# Patient Record
Sex: FEMALE | Race: WHITE | Employment: OTHER | URBAN - METROPOLITAN AREA
[De-identification: names, ages, dates, MRNs, and addresses within clinical notes are randomized per-mention and may not be internally consistent; named-entity substitution may affect disease eponyms.]

---

## 2018-07-24 ENCOUNTER — HOSPITAL ENCOUNTER (OUTPATIENT)
Age: 68
Discharge: HOME | End: 2018-07-26
Payer: MEDICARE

## 2018-07-24 LAB
ALBUMIN SERPL-MCNC: 4.1 G/DL (ref 3.5–5.2)
ALP BLD-CCNC: 68 U/L (ref 35–104)
ALT SERPL-CCNC: 22 U/L (ref 0–32)
ANION GAP SERPL CALCULATED.3IONS-SCNC: 16 MMOL/L (ref 7–16)
AST SERPL-CCNC: 26 U/L (ref 0–31)
BASOPHILS ABSOLUTE: 0.04 E9/L (ref 0–0.2)
BASOPHILS RELATIVE PERCENT: 0.8 % (ref 0–2)
BILIRUB SERPL-MCNC: 0.4 MG/DL (ref 0–1.2)
BUN BLDV-MCNC: 22 MG/DL (ref 8–23)
CALCIUM SERPL-MCNC: 9.4 MG/DL (ref 8.6–10.2)
CHLORIDE BLD-SCNC: 101 MMOL/L (ref 98–107)
CHOLESTEROL, TOTAL: 207 MG/DL (ref 0–199)
CO2: 25 MMOL/L (ref 22–29)
CREAT SERPL-MCNC: 0.9 MG/DL (ref 0.5–1)
EOSINOPHILS ABSOLUTE: 0.17 E9/L (ref 0.05–0.5)
EOSINOPHILS RELATIVE PERCENT: 3.6 % (ref 0–6)
GFR AFRICAN AMERICAN: >60
GFR NON-AFRICAN AMERICAN: >60 ML/MIN/1.73
GLUCOSE BLD-MCNC: 92 MG/DL (ref 74–109)
HCT VFR BLD CALC: 40.6 % (ref 34–48)
HDLC SERPL-MCNC: 66 MG/DL
HEMOGLOBIN: 13.6 G/DL (ref 11.5–15.5)
IMMATURE GRANULOCYTES #: 0.01 E9/L
IMMATURE GRANULOCYTES %: 0.2 % (ref 0–5)
LDL CHOLESTEROL CALCULATED: 120 MG/DL (ref 0–99)
LYMPHOCYTES ABSOLUTE: 1.08 E9/L (ref 1.5–4)
LYMPHOCYTES RELATIVE PERCENT: 22.7 % (ref 20–42)
MCH RBC QN AUTO: 31.9 PG (ref 26–35)
MCHC RBC AUTO-ENTMCNC: 33.5 % (ref 32–34.5)
MCV RBC AUTO: 95.3 FL (ref 80–99.9)
MONOCYTES ABSOLUTE: 0.51 E9/L (ref 0.1–0.95)
MONOCYTES RELATIVE PERCENT: 10.7 % (ref 2–12)
NEUTROPHILS ABSOLUTE: 2.95 E9/L (ref 1.8–7.3)
NEUTROPHILS RELATIVE PERCENT: 62 % (ref 43–80)
PDW BLD-RTO: 12.9 FL (ref 11.5–15)
PLATELET # BLD: 270 E9/L (ref 130–450)
PMV BLD AUTO: 9 FL (ref 7–12)
POTASSIUM SERPL-SCNC: 4.5 MMOL/L (ref 3.5–5)
RBC # BLD: 4.26 E12/L (ref 3.5–5.5)
SODIUM BLD-SCNC: 142 MMOL/L (ref 132–146)
T4 FREE: 1.23 NG/DL (ref 0.93–1.7)
TOTAL PROTEIN: 6.6 G/DL (ref 6.4–8.3)
TRIGL SERPL-MCNC: 104 MG/DL (ref 0–149)
TSH SERPL DL<=0.05 MIU/L-ACNC: 1.49 UIU/ML (ref 0.27–4.2)
VLDLC SERPL CALC-MCNC: 21 MG/DL
WBC # BLD: 4.8 E9/L (ref 4.5–11.5)

## 2018-07-24 PROCEDURE — 80061 LIPID PANEL: CPT

## 2018-07-24 PROCEDURE — 85025 COMPLETE CBC W/AUTO DIFF WBC: CPT

## 2018-07-24 PROCEDURE — 84439 ASSAY OF FREE THYROXINE: CPT

## 2018-07-24 PROCEDURE — 84443 ASSAY THYROID STIM HORMONE: CPT

## 2018-07-24 PROCEDURE — 80053 COMPREHEN METABOLIC PANEL: CPT

## 2019-02-04 ENCOUNTER — HOSPITAL ENCOUNTER (OUTPATIENT)
Age: 69
Discharge: HOME | End: 2019-02-06
Payer: MEDICARE

## 2019-02-04 LAB
ANION GAP SERPL CALCULATED.3IONS-SCNC: 13 MMOL/L (ref 7–16)
BUN BLDV-MCNC: 18 MG/DL (ref 8–23)
CALCIUM SERPL-MCNC: 9.6 MG/DL (ref 8.6–10.2)
CHLORIDE BLD-SCNC: 103 MMOL/L (ref 98–107)
CO2: 26 MMOL/L (ref 22–29)
CREAT SERPL-MCNC: 0.9 MG/DL (ref 0.5–1)
GFR AFRICAN AMERICAN: >60
GFR NON-AFRICAN AMERICAN: >60 ML/MIN/1.73
GLUCOSE BLD-MCNC: 88 MG/DL (ref 74–99)
POTASSIUM SERPL-SCNC: 4.2 MMOL/L (ref 3.5–5)
SODIUM BLD-SCNC: 142 MMOL/L (ref 132–146)

## 2019-02-04 PROCEDURE — 80048 BASIC METABOLIC PNL TOTAL CA: CPT

## 2019-05-07 ENCOUNTER — HOSPITAL ENCOUNTER (OUTPATIENT)
Age: 69
Discharge: HOME | End: 2019-05-09
Payer: MEDICARE

## 2019-05-07 LAB
ALBUMIN SERPL-MCNC: 4.3 G/DL (ref 3.5–5.2)
ALP BLD-CCNC: 60 U/L (ref 35–104)
ALT SERPL-CCNC: 16 U/L (ref 0–32)
ANION GAP SERPL CALCULATED.3IONS-SCNC: 14 MMOL/L (ref 7–16)
AST SERPL-CCNC: 24 U/L (ref 0–31)
BASOPHILS ABSOLUTE: 0.03 E9/L (ref 0–0.2)
BASOPHILS RELATIVE PERCENT: 0.7 % (ref 0–2)
BILIRUB SERPL-MCNC: 0.4 MG/DL (ref 0–1.2)
BUN BLDV-MCNC: 15 MG/DL (ref 8–23)
CALCIUM SERPL-MCNC: 9.7 MG/DL (ref 8.6–10.2)
CHLORIDE BLD-SCNC: 101 MMOL/L (ref 98–107)
CHOLESTEROL, TOTAL: 191 MG/DL (ref 0–199)
CO2: 28 MMOL/L (ref 22–29)
CREAT SERPL-MCNC: 0.9 MG/DL (ref 0.5–1)
EOSINOPHILS ABSOLUTE: 0.14 E9/L (ref 0.05–0.5)
EOSINOPHILS RELATIVE PERCENT: 3.3 % (ref 0–6)
GFR AFRICAN AMERICAN: >60
GFR NON-AFRICAN AMERICAN: >60 ML/MIN/1.73
GLUCOSE BLD-MCNC: 98 MG/DL (ref 74–99)
HCT VFR BLD CALC: 42.9 % (ref 34–48)
HDLC SERPL-MCNC: 61 MG/DL
HEMOGLOBIN: 14.2 G/DL (ref 11.5–15.5)
IMMATURE GRANULOCYTES #: 0.02 E9/L
IMMATURE GRANULOCYTES %: 0.5 % (ref 0–5)
LDL CHOLESTEROL CALCULATED: 117 MG/DL (ref 0–99)
LYMPHOCYTES ABSOLUTE: 1.23 E9/L (ref 1.5–4)
LYMPHOCYTES RELATIVE PERCENT: 29 % (ref 20–42)
MCH RBC QN AUTO: 31.3 PG (ref 26–35)
MCHC RBC AUTO-ENTMCNC: 33.1 % (ref 32–34.5)
MCV RBC AUTO: 94.5 FL (ref 80–99.9)
MONOCYTES ABSOLUTE: 0.44 E9/L (ref 0.1–0.95)
MONOCYTES RELATIVE PERCENT: 10.4 % (ref 2–12)
NEUTROPHILS ABSOLUTE: 2.38 E9/L (ref 1.8–7.3)
NEUTROPHILS RELATIVE PERCENT: 56.1 % (ref 43–80)
PDW BLD-RTO: 12.5 FL (ref 11.5–15)
PLATELET # BLD: 293 E9/L (ref 130–450)
PMV BLD AUTO: 9.4 FL (ref 7–12)
POTASSIUM SERPL-SCNC: 3.9 MMOL/L (ref 3.5–5)
RBC # BLD: 4.54 E12/L (ref 3.5–5.5)
SODIUM BLD-SCNC: 143 MMOL/L (ref 132–146)
TOTAL PROTEIN: 7.2 G/DL (ref 6.4–8.3)
TRIGL SERPL-MCNC: 63 MG/DL (ref 0–149)
TSH SERPL DL<=0.05 MIU/L-ACNC: 1.57 UIU/ML (ref 0.27–4.2)
VLDLC SERPL CALC-MCNC: 13 MG/DL
WBC # BLD: 4.2 E9/L (ref 4.5–11.5)

## 2019-05-07 PROCEDURE — 80061 LIPID PANEL: CPT

## 2019-05-07 PROCEDURE — 84443 ASSAY THYROID STIM HORMONE: CPT

## 2019-05-07 PROCEDURE — 80053 COMPREHEN METABOLIC PANEL: CPT

## 2019-05-07 PROCEDURE — 85025 COMPLETE CBC W/AUTO DIFF WBC: CPT

## 2019-08-08 ENCOUNTER — HOSPITAL ENCOUNTER (OUTPATIENT)
Age: 69
Discharge: HOME | End: 2019-08-10
Payer: MEDICARE

## 2019-08-08 LAB
ALBUMIN SERPL-MCNC: 4.4 G/DL (ref 3.5–5.2)
ALP BLD-CCNC: 61 U/L (ref 35–104)
ALT SERPL-CCNC: 19 U/L (ref 0–32)
ANION GAP SERPL CALCULATED.3IONS-SCNC: 16 MMOL/L (ref 7–16)
AST SERPL-CCNC: 27 U/L (ref 0–31)
BASOPHILS ABSOLUTE: 0.05 E9/L (ref 0–0.2)
BASOPHILS RELATIVE PERCENT: 1 % (ref 0–2)
BILIRUB SERPL-MCNC: 0.6 MG/DL (ref 0–1.2)
BUN BLDV-MCNC: 15 MG/DL (ref 8–23)
CALCIUM SERPL-MCNC: 9.9 MG/DL (ref 8.6–10.2)
CHLORIDE BLD-SCNC: 98 MMOL/L (ref 98–107)
CHOLESTEROL, TOTAL: 209 MG/DL (ref 0–199)
CO2: 27 MMOL/L (ref 22–29)
CREAT SERPL-MCNC: 1 MG/DL (ref 0.5–1)
EOSINOPHILS ABSOLUTE: 0.19 E9/L (ref 0.05–0.5)
EOSINOPHILS RELATIVE PERCENT: 3.9 % (ref 0–6)
GFR AFRICAN AMERICAN: >60
GFR NON-AFRICAN AMERICAN: 55 ML/MIN/1.73
GLUCOSE BLD-MCNC: 94 MG/DL (ref 74–99)
HCT VFR BLD CALC: 43.2 % (ref 34–48)
HDLC SERPL-MCNC: 65 MG/DL
HEMOGLOBIN: 14.4 G/DL (ref 11.5–15.5)
IMMATURE GRANULOCYTES #: 0.02 E9/L
IMMATURE GRANULOCYTES %: 0.4 % (ref 0–5)
LDL CHOLESTEROL CALCULATED: 128 MG/DL (ref 0–99)
LYMPHOCYTES ABSOLUTE: 1.04 E9/L (ref 1.5–4)
LYMPHOCYTES RELATIVE PERCENT: 21.4 % (ref 20–42)
MCH RBC QN AUTO: 31.7 PG (ref 26–35)
MCHC RBC AUTO-ENTMCNC: 33.3 % (ref 32–34.5)
MCV RBC AUTO: 95.2 FL (ref 80–99.9)
MONOCYTES ABSOLUTE: 0.47 E9/L (ref 0.1–0.95)
MONOCYTES RELATIVE PERCENT: 9.7 % (ref 2–12)
NEUTROPHILS ABSOLUTE: 3.08 E9/L (ref 1.8–7.3)
NEUTROPHILS RELATIVE PERCENT: 63.6 % (ref 43–80)
PDW BLD-RTO: 12.4 FL (ref 11.5–15)
PLATELET # BLD: 310 E9/L (ref 130–450)
PMV BLD AUTO: 9.1 FL (ref 7–12)
POTASSIUM REFLEX MAGNESIUM: 4.1 MMOL/L (ref 3.5–5)
RBC # BLD: 4.54 E12/L (ref 3.5–5.5)
SODIUM BLD-SCNC: 141 MMOL/L (ref 132–146)
TOTAL PROTEIN: 7.3 G/DL (ref 6.4–8.3)
TRIGL SERPL-MCNC: 80 MG/DL (ref 0–149)
TSH SERPL DL<=0.05 MIU/L-ACNC: 1.61 UIU/ML (ref 0.27–4.2)
VLDLC SERPL CALC-MCNC: 16 MG/DL
WBC # BLD: 4.9 E9/L (ref 4.5–11.5)

## 2019-08-08 PROCEDURE — 80053 COMPREHEN METABOLIC PANEL: CPT

## 2019-08-08 PROCEDURE — 84443 ASSAY THYROID STIM HORMONE: CPT

## 2019-08-08 PROCEDURE — 85025 COMPLETE CBC W/AUTO DIFF WBC: CPT

## 2019-08-08 PROCEDURE — 80061 LIPID PANEL: CPT

## 2020-10-05 ENCOUNTER — HOSPITAL ENCOUNTER (OUTPATIENT)
Age: 70
Discharge: HOME | End: 2020-10-07
Payer: MEDICARE

## 2020-10-05 LAB
ALBUMIN SERPL-MCNC: 4.6 G/DL (ref 3.5–5.2)
ALP BLD-CCNC: 59 U/L (ref 35–104)
ALT SERPL-CCNC: 35 U/L (ref 0–32)
ANION GAP SERPL CALCULATED.3IONS-SCNC: 17 MMOL/L (ref 7–16)
AST SERPL-CCNC: 42 U/L (ref 0–31)
BASOPHILS ABSOLUTE: 0.04 E9/L (ref 0–0.2)
BASOPHILS RELATIVE PERCENT: 0.7 % (ref 0–2)
BILIRUB SERPL-MCNC: 0.5 MG/DL (ref 0–1.2)
BUN BLDV-MCNC: 14 MG/DL (ref 8–23)
CALCIUM SERPL-MCNC: 10 MG/DL (ref 8.6–10.2)
CHLORIDE BLD-SCNC: 96 MMOL/L (ref 98–107)
CHOLESTEROL, TOTAL: 219 MG/DL (ref 0–199)
CO2: 26 MMOL/L (ref 22–29)
CREAT SERPL-MCNC: 0.9 MG/DL (ref 0.5–1)
EOSINOPHILS ABSOLUTE: 0.1 E9/L (ref 0.05–0.5)
EOSINOPHILS RELATIVE PERCENT: 1.7 % (ref 0–6)
GFR AFRICAN AMERICAN: >60
GFR NON-AFRICAN AMERICAN: >60 ML/MIN/1.73
GLUCOSE BLD-MCNC: 91 MG/DL (ref 74–99)
HCT VFR BLD CALC: 45.9 % (ref 34–48)
HDLC SERPL-MCNC: 65 MG/DL
HEMOGLOBIN: 15.7 G/DL (ref 11.5–15.5)
IMMATURE GRANULOCYTES #: 0.02 E9/L
IMMATURE GRANULOCYTES %: 0.3 % (ref 0–5)
LDL CHOLESTEROL CALCULATED: 141 MG/DL (ref 0–99)
LYMPHOCYTES ABSOLUTE: 1.22 E9/L (ref 1.5–4)
LYMPHOCYTES RELATIVE PERCENT: 20.3 % (ref 20–42)
MCH RBC QN AUTO: 32 PG (ref 26–35)
MCHC RBC AUTO-ENTMCNC: 34.2 % (ref 32–34.5)
MCV RBC AUTO: 93.5 FL (ref 80–99.9)
MONOCYTES ABSOLUTE: 0.69 E9/L (ref 0.1–0.95)
MONOCYTES RELATIVE PERCENT: 11.5 % (ref 2–12)
NEUTROPHILS ABSOLUTE: 3.95 E9/L (ref 1.8–7.3)
NEUTROPHILS RELATIVE PERCENT: 65.5 % (ref 43–80)
PDW BLD-RTO: 12 FL (ref 11.5–15)
PLATELET # BLD: 335 E9/L (ref 130–450)
PMV BLD AUTO: 9.1 FL (ref 7–12)
POTASSIUM SERPL-SCNC: 3.6 MMOL/L (ref 3.5–5)
RBC # BLD: 4.91 E12/L (ref 3.5–5.5)
SODIUM BLD-SCNC: 139 MMOL/L (ref 132–146)
TOTAL PROTEIN: 7.8 G/DL (ref 6.4–8.3)
TRIGL SERPL-MCNC: 67 MG/DL (ref 0–149)
TSH SERPL DL<=0.05 MIU/L-ACNC: 1.03 UIU/ML (ref 0.27–4.2)
VITAMIN D 25-HYDROXY: 35 NG/ML (ref 30–100)
VLDLC SERPL CALC-MCNC: 13 MG/DL
WBC # BLD: 6 E9/L (ref 4.5–11.5)

## 2020-10-05 PROCEDURE — 85025 COMPLETE CBC W/AUTO DIFF WBC: CPT

## 2020-10-05 PROCEDURE — 84443 ASSAY THYROID STIM HORMONE: CPT

## 2020-10-05 PROCEDURE — 80053 COMPREHEN METABOLIC PANEL: CPT

## 2020-10-05 PROCEDURE — 80061 LIPID PANEL: CPT

## 2020-10-05 PROCEDURE — 82306 VITAMIN D 25 HYDROXY: CPT

## 2020-12-29 ENCOUNTER — TELEPHONE (OUTPATIENT)
Dept: ADMINISTRATIVE | Age: 70
End: 2020-12-29

## 2020-12-29 NOTE — TELEPHONE ENCOUNTER
Patient called in, looking to establish care with new provider, feels she is not getting the care she needs with other pcp; Would you be able to prescribe these two meds:  1) generic lamotrigene 50 mg/day  2 a day (lamictal)  mood stabilizer  2) generic clonazepam  0.05 mg  prn (klonopin)  anti-anxiety   She stopped seeing the Psych Doctor due to the high cost, pcp is currently prescribing. Advised provider may not be permitted to prescribe certain medications, but I will ask and get back to her. Please advise and Thank you.

## 2021-01-08 ENCOUNTER — OFFICE VISIT (OUTPATIENT)
Dept: FAMILY MEDICINE CLINIC | Age: 71
End: 2021-01-08
Payer: MEDICARE

## 2021-01-08 VITALS
DIASTOLIC BLOOD PRESSURE: 70 MMHG | RESPIRATION RATE: 18 BRPM | HEIGHT: 64 IN | HEART RATE: 86 BPM | OXYGEN SATURATION: 98 % | WEIGHT: 207 LBS | TEMPERATURE: 97.5 F | BODY MASS INDEX: 35.34 KG/M2 | SYSTOLIC BLOOD PRESSURE: 128 MMHG
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
## 2021-01-08 DIAGNOSIS — K21.00 GASTROESOPHAGEAL REFLUX DISEASE WITH ESOPHAGITIS WITHOUT HEMORRHAGE: ICD-10-CM

## 2021-01-08 DIAGNOSIS — Z12.11 ENCOUNTER FOR SCREENING COLONOSCOPY: ICD-10-CM

## 2021-01-08 DIAGNOSIS — S61.451A CAT BITE OF HAND, RIGHT, INITIAL ENCOUNTER: Primary | ICD-10-CM

## 2021-01-08 LAB
ALBUMIN SERPL-MCNC: 4.3 G/DL (ref 3.5–5.2)
ALP BLD-CCNC: 71 U/L (ref 35–104)
ALT SERPL-CCNC: 23 U/L (ref 0–32)
ANION GAP SERPL CALCULATED.3IONS-SCNC: 10 MMOL/L (ref 7–16)
AST SERPL-CCNC: 27 U/L (ref 0–31)
BASOPHILS ABSOLUTE: 0.06 E9/L (ref 0–0.2)
BASOPHILS RELATIVE PERCENT: 0.8 % (ref 0–2)
BILIRUB SERPL-MCNC: 0.3 MG/DL (ref 0–1.2)
BUN BLDV-MCNC: 16 MG/DL (ref 8–23)
CALCIUM SERPL-MCNC: 9.8 MG/DL (ref 8.6–10.2)
CHLORIDE BLD-SCNC: 102 MMOL/L (ref 98–107)
CO2: 28 MMOL/L (ref 22–29)
CREAT SERPL-MCNC: 0.8 MG/DL (ref 0.5–1)
EOSINOPHILS ABSOLUTE: 0.13 E9/L (ref 0.05–0.5)
EOSINOPHILS RELATIVE PERCENT: 1.7 % (ref 0–6)
GFR AFRICAN AMERICAN: >60
GFR NON-AFRICAN AMERICAN: >60 ML/MIN/1.73
GLUCOSE BLD-MCNC: 96 MG/DL (ref 74–99)
HCT VFR BLD CALC: 45.8 % (ref 34–48)
HEMOGLOBIN: 14.5 G/DL (ref 11.5–15.5)
IMMATURE GRANULOCYTES #: 0.02 E9/L
IMMATURE GRANULOCYTES %: 0.3 % (ref 0–5)
LYMPHOCYTES ABSOLUTE: 1.59 E9/L (ref 1.5–4)
LYMPHOCYTES RELATIVE PERCENT: 20.9 % (ref 20–42)
MCH RBC QN AUTO: 30.8 PG (ref 26–35)
MCHC RBC AUTO-ENTMCNC: 31.7 % (ref 32–34.5)
MCV RBC AUTO: 97.2 FL (ref 80–99.9)
MONOCYTES ABSOLUTE: 0.68 E9/L (ref 0.1–0.95)
MONOCYTES RELATIVE PERCENT: 9 % (ref 2–12)
NEUTROPHILS ABSOLUTE: 5.11 E9/L (ref 1.8–7.3)
NEUTROPHILS RELATIVE PERCENT: 67.3 % (ref 43–80)
PDW BLD-RTO: 12.1 FL (ref 11.5–15)
PLATELET # BLD: 370 E9/L (ref 130–450)
PMV BLD AUTO: 8.8 FL (ref 7–12)
POTASSIUM SERPL-SCNC: 3.6 MMOL/L (ref 3.5–5)
RBC # BLD: 4.71 E12/L (ref 3.5–5.5)
SODIUM BLD-SCNC: 140 MMOL/L (ref 132–146)
TOTAL PROTEIN: 7.5 G/DL (ref 6.4–8.3)
WBC # BLD: 7.6 E9/L (ref 4.5–11.5)

## 2021-01-08 PROCEDURE — 99204 OFFICE O/P NEW MOD 45 MIN: CPT | Performed by: NURSE PRACTITIONER

## 2021-01-08 RX ORDER — FAMOTIDINE 10 MG
10 TABLET ORAL 2 TIMES DAILY
COMMUNITY
End: 2021-01-29

## 2021-01-08 RX ORDER — DOXYCYCLINE HYCLATE 100 MG
100 TABLET ORAL 2 TIMES DAILY
Qty: 20 TABLET | Refills: 0 | Status: SHIPPED | OUTPATIENT
Start: 2021-01-08 | End: 2021-01-18

## 2021-01-08 RX ORDER — MULTIVIT WITH MINERALS/LUTEIN
1000 TABLET ORAL DAILY
COMMUNITY
End: 2021-06-08

## 2021-01-08 RX ORDER — PANTOPRAZOLE SODIUM 40 MG/1
40 TABLET, DELAYED RELEASE ORAL
Qty: 90 TABLET | Refills: 1 | Status: SHIPPED
Start: 2021-01-08 | End: 2021-05-04 | Stop reason: SINTOL

## 2021-01-08 RX ORDER — HYDROCHLOROTHIAZIDE 25 MG/1
25 TABLET ORAL DAILY
COMMUNITY
End: 2021-01-29 | Stop reason: SDUPTHER

## 2021-01-08 RX ORDER — VITAMIN B COMPLEX
1000 TABLET ORAL DAILY
COMMUNITY

## 2021-01-08 ASSESSMENT — ENCOUNTER SYMPTOMS
COUGH: 0
COLOR CHANGE: 1
CHEST TIGHTNESS: 0
SINUS PRESSURE: 0
SHORTNESS OF BREATH: 1
TROUBLE SWALLOWING: 0
RHINORRHEA: 0
VOMITING: 0
CONSTIPATION: 0
FACIAL SWELLING: 0
ABDOMINAL PAIN: 1
BACK PAIN: 1
VOICE CHANGE: 0
SORE THROAT: 0
WHEEZING: 0
NAUSEA: 1
SINUS PAIN: 0
DIARRHEA: 0

## 2021-01-08 NOTE — PROGRESS NOTES
NEW PATIENT PROGRESS NOTE  [address]  [address] [id number]  Dept: [phone]   Chief Complaint   Patient presents with   Bianca Byrd Establish Care     prev. pcp Franky flores, has issues with stomach pain when eating. HPI:     Here to establish care was seeing was seeing Dr Letty De Leon    She has bipolar, anxiety and depression, she has allergy to mold, OA, skin cancer on shoulder removed, chronic back pain spinal stenosis, GERD, HTN, she has cervical spondylosis and does get numbness in her arms and hands when sleeping. Heart murmur, MVP,   PSH: gastric bypass, C section, cholecystectomy, T&A, left eye cataract surgery, skin cancer right shoulder squamous. Breast reduction, Colonoscopy. She does drink wine but not daily will buy a bottle and drink for the week but not weekly, she denies any smoking or drug use. FH: parents  mom asthma, DM, heart disease, copd anxiety, sarcoidosis, Father obesity, stroke, One brother  heart disease, obesity, substance abuse, suicide, one brother living drug abuse, hepatitis C    Last Pap  she thinks, LMPNo LMP recorded. Patient is postmenopausal. , Last mammogram , last DEXA years ago, last colonoscopy greater than 10 years, Last dental exam years, last eye exam     She was bitten by her cat on New Year's belinda she went to urgent care and was placed on Amoxil for 5 days, it is still swollen, pain to the touch. GERD: Paitent complains of heartburn. This has been associated with belching, bilious reflux, early satiety, fullness after meals, heartburn, hoarseness, epigastric pain, nausea, need to clear throat frequently, nocturnal burning and shortness of breath. She denies no other symptoms. Symptoms have been present for 1 years. She denies dysphagia. She has not lost weight. She denies melena, hematochezia, hematemesis, and coffee ground emesis.  Medical therapy in the past has included H2 antagonists. She had gastric bypass surgery in 1996 was stapled. Labs reviewed from 10/5/2020 and found to have slightly elevated liver enzymes and lipids, she states she fasted for 2 days prior to labs. She quit going to the Psychiatrist and Dr Teddy Sorenson was RX the Clonazepam and the Lamictal explained prior to scheduling appointment I do not Rx those medications and she would need to see psychiatry.        No Known Allergies     Current Outpatient Medications:     Multiple Vitamin (MULTI-VITAMIN DAILY PO), Take by mouth, Disp: , Rfl:     vitamin E 1000 units capsule, Take 1,000 Units by mouth daily, Disp: , Rfl:     Vitamin D (CHOLECALCIFEROL) 25 MCG (1000 UT) TABS tablet, Take 1,000 Units by mouth daily, Disp: , Rfl:     famotidine (PEPCID) 10 MG tablet, Take 10 mg by mouth 2 times daily, Disp: , Rfl:     hydroCHLOROthiazide (HYDRODIURIL) 25 MG tablet, Take 25 mg by mouth daily, Disp: , Rfl:     pantoprazole (PROTONIX) 40 MG tablet, Take 1 tablet by mouth every morning (before breakfast), Disp: 90 tablet, Rfl: 1    doxycycline hyclate (VIBRA-TABS) 100 MG tablet, Take 1 tablet by mouth 2 times daily for 10 days, Disp: 20 tablet, Rfl: 0    naproxen (NAPROSYN) 500 MG tablet, Take 1 tablet by mouth 2 times daily as needed for Pain, Disp: 60 tablet, Rfl: 1    loratadine (CLARITIN) 10 MG tablet, Take 1 tablet by mouth daily, Disp: 90 tablet, Rfl: 1    clonazePAM (KLONOPIN) 0.5 MG tablet,  Take 0.5 mg by mouth daily as needed Dr Remy Shea psychiatrist [facility], Disp: , Rfl:     lamoTRIgine (LAMICTAL) 25 MG tablet, Take 25 mg by mouth daily 2 tabs qd, Disp: , Rfl:    Past Medical History:   Diagnosis Date    Allergic rhinitis     Anxiety disorder     [facility] disorder [facility] [facility]     Caffeine use 5/19/2015    Cancer ([facility] [address] [facility]     skin ca on shoulder    Chronic low back pain 5/19/2015    Depression, major, in remission ([facility] [address]     multiple hospitalizations    Endocervical polyp 4/19/2012  GERD (gastroesophageal reflux disease)     Heart murmur, systolic     J3/0    History of bariatric surgery     History of cervical dysplasia 2015    History of low back pain     Hypertension     borderline no meds    Insomnia 2015    Knee pain, right     severe OA per  Dr Sánchez Sales 2015    MVP (mitral valve prolapse)     Neuropathy     cervical spondylosis    Obesity     Osteoarthritis     Osteoarthritis of both knees     Osteoarthritis of hand 2015    Pap smear abnormality of cervix with LGSIL     Spinal stenosis, lumbar 2013 MRI    disc degeneration and bulges with canal and foraminal stenoses      Past Surgical History:   Procedure Laterality Date    BARIATRIC SURGERY      stapling of stomach    BREAST SURGERY      reduction     SECTION      CHOLECYSTECTOMY      COLONOSCOPY  2010    donald one polyp    EYE SURGERY Right 2018    cataract    FOOT SURGERY      FRACTURE SURGERY Bilateral     feet    SKIN BIOPSY      TONSILLECTOMY        Family History   Problem Relation Age of Onset    Heart Disease Mother     COPD Mother     Anxiety Disorder Mother     Asthma Mother     Other Mother         sarcoidosis    Diabetes Mother     Stroke Father     Obesity Father     Heart Disease Brother     Other Brother         suicide    Obesity Brother     Substance Abuse Brother     Substance Abuse Brother         hepatitis C    Mental Illness Brother     Cancer Paternal Uncle         throat    Diabetes Maternal Grandmother     Stroke Maternal Grandmother     Stroke Paternal Grandmother     Stroke Paternal Grandfather     Cancer Paternal Grandfather       Social History     Socioeconomic History    Marital status:      Spouse name: None    Number of children: None    Years of education: None    Highest education level: None   Occupational History    None   Social Needs    Financial resource strain: None    Food insecurity     Worry: None     Inability: None    Transportation needs     Medical: None     Non-medical: None   Tobacco Use    Smoking status: Never Smoker    Smokeless tobacco: Never Used   Substance and Sexual Activity    Alcohol use: Yes     Comment: wine several times per week but not weekly    Drug use: No    Sexual activity: Not Currently     Partners: Male   Lifestyle    Physical activity     Days per week: None     Minutes per session: None    Stress: None   Relationships    Social connections     Talks on phone: None     Gets together: None     Attends Scientologist service: None     Active member of club or organization: None     Attends meetings of clubs or organizations: None     Relationship status: None    Intimate partner violence     Fear of current or ex partner: None     Emotionally abused: None     Physically abused: None     Forced sexual activity: None   Other Topics Concern    None   Social History Narrative    None       I have reviewed Eden's allergies, medications, problem list, medical, social and family history and have updated as needed in the electronic medical record    Review of Systems   Constitutional: Positive for activity change. Negative for appetite change, chills, diaphoresis, fatigue, fever and unexpected weight change. HENT: Positive for postnasal drip. Negative for congestion, dental problem, drooling, ear discharge, ear pain, facial swelling, hearing loss, mouth sores, nosebleeds, rhinorrhea, sinus pressure, sinus pain, sneezing, sore throat, tinnitus, trouble swallowing and voice change. Eyes: Negative for visual disturbance. Respiratory: Positive for shortness of breath ( with activity hasn't been exercising). Negative for cough, chest tightness and wheezing. Cardiovascular: Negative for chest pain, palpitations and leg swelling. Gastrointestinal: Positive for abdominal pain and nausea. Negative for constipation, diarrhea and vomiting.         See HPI GERD   Endocrine: Negative for cold intolerance, heat intolerance, polydipsia, polyphagia and polyuria. Genitourinary: Negative for difficulty urinating, frequency and urgency. Musculoskeletal: Positive for arthralgias, back pain and neck pain. Negative for gait problem, joint swelling, myalgias and neck stiffness. Skin: Positive for color change. Negative for pallor, rash and wound. Allergic/Immunologic: Positive for environmental allergies. Negative for food allergies and immunocompromised state. Neurological: Positive for light-headedness and numbness. Negative for dizziness, tremors, seizures, syncope, facial asymmetry, speech difficulty, weakness and headaches. Hematological: Negative for adenopathy. Does not bruise/bleed easily. Psychiatric/Behavioral: Positive for dysphoric mood. Negative for agitation, behavioral problems, confusion, decreased concentration, hallucinations, self-injury, sleep disturbance and suicidal ideas. The patient is not nervous/anxious and is not hyperactive.         OBJECTIVE:     VS:  Wt Readings from Last 3 Encounters:   01/08/21 207 lb (93.9 kg)   07/14/16 197 lb (89.4 kg)   01/13/16 164 lb (74.4 kg)                       Vitals:    01/08/21 0908 01/08/21 0919 01/08/21 1057   BP: (!) 142/72 (!) 142/70 128/70   Site:   Left Upper Arm   Position:   Sitting   Cuff Size:   Large Adult   Pulse: 86     Resp: 18     Temp: 97.5 °F (36.4 °C)     SpO2: 98%     Weight: 207 lb (93.9 kg)     Height: 5' 4\" (1.626 m)         General: Alert and oriented to person, place, and time, well developed and well nourished, obese in no acute distress  SKIN: Warm and dry, intact with right hand redness and a 1.5 cm slightly raised fluctuant area to the dorsal surface  HEAD: normocephalic, atraumatic  Eyes: sclera/conjunctiva clear, PERRLA, EOMI's intact  ENT: tympanic membranes, external ear and ear canal normal bilaterally, normal hearing, Nose without deformity, nasal mucosa and turbinates normal without polyps   Throat: clear, tongue midline, no  drainage, no masses or lesions noted, good dentition  Neck: supple and non-tender without mass, trachea midline, no cervical lymphadenopathy, no bruit, no thyromegaly or nodules  Cardiovascular: regular rate and regular rhythm,  No murmurs,  rubs, clicks, or gallop. Distal pulses intact, no carotid bruits. No edema  Pulmonary/Chest: clear to auscultation bilaterally, no wheezes, rales or rhonchi, normal air movement, no respiratory distress  Abdomen: soft, non-tender, non-distended, normal bowel sounds, no masses or hepatosplenomegaly  Musculoskeletal: Normal ROM, no joint swelling, deformity or tenderness   Neurologic: reflexes normal and symmetric, no cranial nerve deficit, gait, coordination and speech normal  Extremities: no clubbing, cyanosis, or edema. Psychiatric: Good eye contact, normal mood and affect, answers questions appropriately    ASSESSMENT/PLAN   Lachelle Ge was seen today for establish care. Diagnoses and all orders for this visit:  Establish care with new provider     Cat bite of hand, right, initial encounter  -     doxycycline hyclate (VIBRA-TABS) 100 MG tablet; Take 1 tablet by mouth 2 times daily for 10 days with food  Warm Epsom salt soaks 3 times a day for 20 minutes  Do not pick at the area    Gastroesophageal reflux disease with esophagitis without hemorrhage New  -     pantoprazole (PROTONIX) 40 MG tablet; Take 1 tablet by mouth every morning (before breakfast)  -     Amb External Referral To Gastroenterology  -     CBC Auto Differential; Future  -     Comprehensive Metabolic Panel;  Future  -Discussed elevated the HOB 30 degrees  -Discussed limiting spicy, fatty, greasy foods  -Discussed limit caffeine consumption to 1 - 2 cups daily  -Discussed waiting at least 3 - 4 hours before going to bed after eating  -Discussed not to eat and bend over immediately or lift heavy items.  -Discussed weight reduction if needed even 5 - 10 pounds.  -Discussed taking medications 1 hour prior to eating. Encounter for screening colonoscopy  -     Amb External Referral To Gastroenterology        Discussed reducing caffeine intake and Discussed taking medications as directed and adverse effects    · Reach and stay at a healthy weight. This will lower your risk for many problems, such as obesity, diabetes, heart disease, and high blood pressure. · Get at least 30 minutes of exercise on most days of the week. Walking is a good choice. You also may want to do other activities, such as running, swimming, cycling, or playing tennis or team sports. · Do not smoke. Smoking can make health problems worse. If you need help quitting, talk to your doctor about stop-smoking programs and medicines. These can increase your chances of quitting for good. · Protect your skin from too much sun. When you're outdoors from 10 a.m. to 4 p.m., stay in the shade or cover up with clothing and a hat with a wide brim. Wear sunglasses that block UV rays. Even when it's cloudy, put broad-spectrum sunscreen (SPF 30 or higher) on any exposed skin. · See a dentist one or two times a year for checkups and to have your teeth cleaned. · Wear a seat belt in the car. · Limit alcohol to 1 drink a day. Too much alcohol can cause health problems. Return in about 3 weeks (around 1/29/2021) for hand, back pain labs. I have reviewed my findings and recommendations with Lopez Giraldo.     Pamela Perez, NP-C, FNP-BC

## 2021-01-08 NOTE — PATIENT INSTRUCTIONS
Patient Education        Animal Bites: Care Instructions  Your Care Instructions  After an animal bite, the biggest concern is infection. The chance of infection depends on the type of animal that bit you, where on your body you were bitten, and your general health. Many animal bites are not closed with stitches, because this can increase the chance of infection. Your bite may take as little as 7 days or as long as several months to heal, depending on how bad it is. Taking good care of your wound at home will help it heal and reduce your chance of infection. The doctor has checked you carefully, but problems can develop later. If you notice any problems or new symptoms, get medical treatment right away. Follow-up care is a key part of your treatment and safety. Be sure to make and go to all appointments, and call your doctor if you are having problems. It's also a good idea to know your test results and keep a list of the medicines you take. How can you care for yourself at home? · If your doctor told you how to care for your wound, follow your doctor's instructions. If you did not get instructions, follow this general advice:  ? After 24 to 48 hours, gently wash the wound with clean water 2 times a day. Do not scrub or soak the wound. Don't use hydrogen peroxide or alcohol, which can slow healing. ? You may cover the wound with a thin layer of petroleum jelly, such as Vaseline, and a nonstick bandage. ? Apply more petroleum jelly and replace the bandage as needed. · After you shower, gently dry the wound with a clean towel. · If your doctor has closed the wound, cover the bandage with a plastic bag before you take a shower. · A small amount of skin redness and swelling around the wound edges and the stitches or staples is normal. Your wound may itch or feel irritated. Do not scratch or rub the wound.   · Ask your doctor if you can take an over-the-counter pain medicine, such as acetaminophen (Tylenol), ibuprofen (Advil, Motrin), or naproxen (Aleve). Read and follow all instructions on the label. · Do not take two or more pain medicines at the same time unless the doctor told you to. Many pain medicines have acetaminophen, which is Tylenol. Too much acetaminophen (Tylenol) can be harmful. · If your bite puts you at risk for rabies, you will get a series of shots over the next few weeks to prevent rabies. Your doctor will tell you when to get the shots. It is very important that you get the full cycle of shots. Follow your doctor's instructions exactly. · You may need a tetanus shot if you have not received one in the last 5 years. · If your doctor prescribed antibiotics, take them as directed. Do not stop taking them just because you feel better. You need to take the full course of antibiotics. When should you call for help? Call your doctor now or seek immediate medical care if:    · The skin near the bite turns cold or pale or it changes color.     · You lose feeling in the area near the bite, or it feels numb or tingly.     · You have trouble moving a limb near the bite.     · You have symptoms of infection, such as:  ? Increased pain, swelling, warmth, or redness near the wound. ? Red streaks leading from the wound. ? Pus draining from the wound. ? A fever.     · Blood soaks through the bandage. Oozing small amounts of blood is normal.     · Your pain is getting worse. Watch closely for changes in your health, and be sure to contact your doctor if you are not getting better as expected. Where can you learn more? Go to https://CORD:USE Cord Blood Bankgertrudis.QM Power. org and sign in to your [employer] account. Enter B450 in the [facility] box to learn more about \"Animal Bites: Care Instructions. \"     If you do not have an account, please click on the \"Sign Up Now\" link. Current as of: June 26, 2019               Content Version: 12.6  © 6780-9273 [employer].    Care instructions adapted under license by [facility] ([facility]). If you have questions about a medical condition or this instruction, always ask your healthcare professional. [place] [address] any warranty or liability for your use of this information. Patient Education        Colon Cancer Screening: Care Instructions  Your Care Instructions     Colorectal cancer occurs in the colon or rectum. That's the lower part of your digestive system. It is the second-leading cause of cancer deaths in the [facility]. It often starts with small growths called polyps in the colon or rectum. Polyps are usually found with screening tests. Depending on the type of test, any polyps found may be removed during the tests. Colorectal cancer usually does not cause symptoms at first. But regular tests can help find it early, before it spreads and becomes harder to treat. Your risk for colorectal cancer gets higher as you get older. Some experts say that adults should start regular screening at age 48 and stop at age 76. Others say to start before age 48 or continue after age 76. Talk with your doctor about your risk and when to start and stop screening. You may have one of several tests. Follow-up care is a key part of your treatment and safety. Be sure to make and go to all appointments, and call your doctor if you are having problems. It's also a good idea to know your test results and keep a list of the medicines you take. What are the main screening tests for colon cancer? The screening tests are:  Stool tests. These include the guaiac fecal occult blood test (gFOBT), the fecal immunochemical test (FIT), and the combined fecal immunochemical test and stool DNA test (FIT-DNA). These tests check stool samples for signs of cancer. If your test is positive, you will need to have a colonoscopy. Sigmoidoscopy. This test lets your doctor look at the lining of your rectum and the lowest part of your colon.  Your doctor uses a lighted tube called a sigmoidoscope. This test can't find cancers or polyps in the upper part of your colon. In some cases, polyps that are found can be removed. But if your doctor finds polyps, you will need to have a colonoscopy to check the upper part of your colon. Colonoscopy. This test lets your doctor look at the lining of your rectum and your entire colon. The doctor uses a thin, flexible tool called a colonoscope. It can also be used to remove polyps or get a tissue sample (biopsy). A less common test is CT colonography (CTC). It's also called virtual colonoscopy. Who should be screened for colorectal cancer? Your risk for colorectal cancer gets higher as you get older. Some experts say that adults should start regular screening at age 48 and stop at age 76. Others say to start before age 48 or continue after age 76. Talk with your doctor about your risk and when to start and stop screening. How often you need screening depends on the type of test you get:  Stool tests. Every 1 or 2 years for FIT or gFOBT. Every 3 years for sDNA, also called FIT-DNA. Tests that look inside the colon. Every 5 or 10 years for sigmoidoscopy. Every 5 years for CT colonography (virtual colonoscopy). Every 10 years for colonoscopy. Experts agree that people at higher risk may need to be tested sooner. This includes people who have a strong family history of colon cancer. Talk to your doctor about which test is best for you and when to be tested. When should you call for help? Watch closely for changes in your health, and be sure to contact your doctor if:    · You have any changes in your bowel habits.     · You have any problems. Where can you learn more? Go to https://cody.iSpecimen. org and sign in to your [employer] account. Enter [id number] [id number] in the "[employer] box to learn more about \"Colon Cancer Screening: Care Instructions. \"     If you do not have an account, please click on the \"Sign Up Now\" link. Current as of: April 29, 2020               Content Version: 12.6  © 2006-2020 [employer]. Care instructions adapted under license by [facility] ([facility]). If you have questions about a medical condition or this instruction, always ask your healthcare professional. [address] any warranty or liability for your use of this information. Patient Education        Gastroesophageal Reflux Disease (GERD): Care Instructions  Overview     Gastroesophageal reflux disease (GERD) is the backward flow of stomach acid into the esophagus. The esophagus is the tube that leads from your throat to your stomach. A one-way valve prevents the stomach acid from backing up into this tube. But when you have GERD, this valve does not close tightly enough. This can also cause pain and swelling in your esophagus. (This is called esophagitis.)  If you have mild GERD symptoms including heartburn, you may be able to control the problem with antacids or over-the-counter medicine. You can also make lifestyle changes to help reduce your symptoms. These include changing your diet and eating habits, such as not eating late at night and losing weight. Follow-up care is a key part of your treatment and safety. Be sure to make and go to all appointments, and call your doctor if you are having problems. It's also a good idea to know your test results and keep a list of the medicines you take. How can you care for yourself at home? · Take your medicines exactly as prescribed. Call your doctor if you think you are having a problem with your medicine. · Your doctor may recommend over-the-counter medicine. For mild or occasional indigestion, antacids, such as Tums, Gaviscon, Mylanta, or Maalox, may help. Your doctor also may recommend over-the-counter acid reducers, such as famotidine (Pepcid AC), cimetidine (Tagamet HB), or omeprazole (Prilosec). Read and follow all instructions on the label. If you use these medicines often, talk with your doctor. · Change your eating habits. ? It's best to eat several small meals instead of two or three large meals. ? After you eat, wait 2 to 3 hours before you lie down. ? Chocolate, mint, and alcohol can make GERD worse. ? Spicy foods, foods that have a lot of acid (like tomatoes and oranges), and coffee can make GERD symptoms worse in some people. If your symptoms are worse after you eat a certain food, you may want to stop eating that food to see if your symptoms get better. · Do not smoke or chew tobacco. Smoking can make GERD worse. If you need help quitting, talk to your doctor about stop-smoking programs and medicines. These can increase your chances of quitting for good. · If you have GERD symptoms at night, raise the head of your bed 6 to 8 inches by putting the frame on blocks or placing a foam wedge under the head of your mattress. (Adding extra pillows does not work.)  · Do not wear tight clothing around your middle. · Lose weight if you need to. Losing just 5 to 10 pounds can help. When should you call for help? Call your doctor now or seek immediate medical care if:    · You have new or different belly pain.     · Your stools are black and tarlike or have streaks of blood. Watch closely for changes in your health, and be sure to contact your doctor if:    · Your symptoms have not improved after 2 days.     · Food seems to catch in your throat or chest.   Where can you learn more? Go to https://StreamSpec.Snapeee. org and sign in to your [facility] account. Enter V496 in the [facility] box to learn more about \"Gastroesophageal Reflux Disease (GERD): Care Instructions. \"     If you do not have an account, please click on the \"Sign Up Now\" link. Current as of: April 15, 2020               Content Version: 12.6  © 3472-4575 [employer]. Care instructions adapted under license by [facility] ([facility]).  If you have questions about a medical condition or this instruction, always ask your healthcare professional. [place] [address] any warranty or liability for your use of this information. Patient Education        pantoprazole (oral/injection)  Pronunciation:  pan TOE pra zolgonzalo  Brand:  Protonix  What is the most important information I should know about pantoprazole? Pantoprazole can cause kidney problems. Tell your doctor if you are urinating less than usual, or if you have blood in your urine. Diarrhea may be a sign of a new infection. Call your doctor if you have diarrhea that is watery or has blood in it. Pantoprazole may cause new or worsening symptoms of lupus. Tell your doctor if you have joint pain and a skin rash on your cheeks or arms that worsens in sunlight. You may be more likely to have a broken bone while taking this medicine long term or more than once per day. What is pantoprazole? Pantoprazole is a proton pump inhibitor that decreases the amount of acid produced in the stomach. Pantoprazole is used to treat erosive esophagitis (damage to the esophagus from stomach acid caused by gastroesophageal reflux disease, or GERD) in adults and children who are at least 11years old. Pantoprazole is usually given for up to 8 weeks at a time while your esophagus heals. Pantoprazole is also used to treat Zollinger-Rivas syndrome and other conditions involving excess stomach acid. Pantoprazole is not for immediate relief of heartburn. Pantoprazole may also be used for purposes not listed in this medication guide. What should I discuss with my healthcare provider before using pantoprazole? Heartburn can mimic early symptoms of a heart attack. Get emergency medical help if you have chest pain that spreads to your jaw or shoulder and you feel anxious or light-headed.   You should not use this medicine if:  · you also take medicine that contains rilpivirine (Edurant, Aaron, [place], Odefsey); or  · you are allergic to pantoprazole or similar medicines (lansoprazole, omeprazole, Nexium, Prevacid, Prilosec, and others). Tell your doctor if you have ever had:  · low levels of magnesium in your blood;  · lupus; or  · osteoporosis or low bone mineral density. You may be more likely to have a broken bone in your hip, wrist, or spine while taking a proton pump inhibitor long-term or more than once per day. Talk with your doctor about ways to keep your bones healthy. It is not known whether this medicine will harm an unborn baby. Tell your doctor if you are pregnant or plan to become pregnant. You should not breast-feed while using this medicine. Pantoprazole is not approved for use by anyone younger than 11years old. How should I use pantoprazole? Follow all directions on your prescription label and read all medication guides or instruction sheets. Use the medicine exactly as directed. Use the lowest dose for the shortest amount of time needed to treat your condition. Pantoprazole is taken by mouth (oral) or given as an infusion into a vein (injection). A healthcare provider may teach you how to properly use pantoprazole injection by yourself. Pantoprazole tablets are taken by mouth, with or without food. Pantoprazole oral granules should be taken 30 minutes before a meal.  Do not crush, chew, or break the tablet. Swallow it whole. The oral granules should be mixed with applesauce or apple juice and given either by mouth or through a nasogastric (NG) tube. Read and carefully follow any Instructions for Use provided with your medicine. Ask your doctor or pharmacist if you do not understand these instructions. Use this medicine for the full prescribed length of time, even if your symptoms quickly improve. Call your doctor if your symptoms do not improve or if they get worse while you are using this medicine. This medicine can affect the results of certain medical tests.  Tell any doctor who treats you that you are using pantoprazole. Pantoprazole may also affect a drug-screening urine test and you may have false results. Tell the laboratory staff that you use this medicine. Store this medicine at room temperature away from moisture, heat, and light. What happens if I miss a dose? Use the medicine as soon as you can, but skip the missed dose if it is almost time for your next dose. Do not use two doses at one time. What happens if I overdose? Seek emergency medical attention or call the Poison Help line at 1-149.949.4066. What should I avoid while using pantoprazole? This medicine can cause diarrhea, which may be a sign of a new infection. If you have diarrhea that is watery or bloody, call your doctor. Do not use anti-diarrhea medicine unless your doctor tells you to. What are the possible side effects of pantoprazole? Get emergency medical help if you have signs of an allergic reaction: hives; difficulty breathing; swelling of your face, lips, tongue, or throat. Call your doctor at once if you have:  · severe stomach pain, diarrhea that is watery or bloody;  · sudden pain or trouble moving your hip, wrist, or back;  · bruising or swelling where intravenous pantoprazole was injected;  · kidney problems --urinating less than usual, blood in your urine, swelling, rapid weight gain;  · low magnesium --dizziness, fast or irregular heart rate, tremors (shaking) or jerking muscle movements, feeling jittery, muscle cramps, muscle spasms in your hands and feet, cough or choking feeling; or  · new or worsening symptoms of lupus --joint pain, and a skin rash on your cheeks or arms that worsens in sunlight. Taking pantoprazole long-term may cause you to develop stomach growths called fundic gland polyps. Talk with your doctor about this risk. If you use pantoprazole for longer than 3 years, you could develop a vitamin B-12 deficiency.  Talk to your doctor about how to manage this condition if you develop it. Common side effects may include:  · headache, dizziness;  · stomach pain, gas, nausea, vomiting, diarrhea;  · joint pain; or  · fever, rash, or cold symptoms (most common in children). This is not a complete list of side effects and others may occur. Call your doctor for medical advice about side effects. You may report side effects to FDA at 9-518-GJG-3818. What other drugs will affect pantoprazole? Tell your doctor about all your other medicines, especially:  · digoxin;  · methotrexate; or  · a diuretic or \"water pill. \"  This list is not complete. Other drugs may affect pantoprazole, including prescription and over-the-counter medicines, vitamins, and herbal products. Not all possible drug interactions are listed here. Where can I get more information? Your pharmacist can provide more information about pantoprazole. Remember, keep this and all other medicines out of the reach of children, never share your medicines with others, and use this medication only for the indication prescribed. Every effort has been made to ensure that the information provided by [address] is accurate, up-to-date, and complete, but no guarantee is made to that effect. Drug information contained herein may be time sensitive. [facility] information has been compiled for use by healthcare practitioners and consumers in the [place] and therefore [employer] does not warrant that uses outside of the [place] are appropriate, unless specifically indicated otherwise. [facility] drug information does not endorse drugs, diagnose patients or recommend therapy. [facility] drug information is an informational resource designed to assist licensed healthcare practitioners in caring for their patients and/or to serve consumers viewing this service as a supplement to, and not a substitute for, the expertise, skill, knowledge and judgment of healthcare practitioners.  The absence of a warning for a given drug or drug combination in no way should be construed to indicate that the drug or drug combination is safe, effective or appropriate for any given patient. [facility] does not assume any responsibility for any aspect of healthcare administered with the aid of information [facility] provides. The information contained herein is not intended to cover all possible uses, directions, precautions, warnings, drug interactions, allergic reactions, or adverse effects. If you have questions about the drugs you are taking, check with your doctor, nurse or pharmacist.  Copyright 5017-2896 [address]. Version: 19.02. Revision date: 6/26/2018. Care instructions adapted under license by [facility] ([facility]). If you have questions about a medical condition or this instruction, always ask your healthcare professional. [place] [address] any warranty or liability for your use of this information.
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
## 2021-01-11 PROBLEM — S61.451A CAT BITE OF HAND, RIGHT, INITIAL ENCOUNTER: Status: ACTIVE | Noted: 2021-01-11

## 2021-01-11 PROBLEM — Z12.11 ENCOUNTER FOR SCREENING COLONOSCOPY: Status: ACTIVE | Noted: 2021-01-11

## 2021-01-11 PROBLEM — K21.00 GASTROESOPHAGEAL REFLUX DISEASE WITH ESOPHAGITIS WITHOUT HEMORRHAGE: Status: ACTIVE | Noted: 2021-01-11

## 2021-01-29 ENCOUNTER — OFFICE VISIT (OUTPATIENT)
Dept: FAMILY MEDICINE CLINIC | Age: 71
End: 2021-01-29
Payer: MEDICARE

## 2021-01-29 VITALS
HEIGHT: 64 IN | OXYGEN SATURATION: 98 % | RESPIRATION RATE: 18 BRPM | SYSTOLIC BLOOD PRESSURE: 124 MMHG | HEART RATE: 90 BPM | BODY MASS INDEX: 35 KG/M2 | TEMPERATURE: 97.3 F | WEIGHT: 205 LBS | DIASTOLIC BLOOD PRESSURE: 74 MMHG

## 2021-01-29 DIAGNOSIS — M67.441 GANGLION CYST OF TENDON SHEATH OF RIGHT HAND: ICD-10-CM

## 2021-01-29 DIAGNOSIS — Z78.0 ENCOUNTER FOR OSTEOPOROSIS SCREENING IN ASYMPTOMATIC POSTMENOPAUSAL PATIENT: ICD-10-CM

## 2021-01-29 DIAGNOSIS — I10 ESSENTIAL HYPERTENSION: ICD-10-CM

## 2021-01-29 DIAGNOSIS — K21.00 GASTROESOPHAGEAL REFLUX DISEASE WITH ESOPHAGITIS WITHOUT HEMORRHAGE: ICD-10-CM

## 2021-01-29 DIAGNOSIS — Z13.820 ENCOUNTER FOR OSTEOPOROSIS SCREENING IN ASYMPTOMATIC POSTMENOPAUSAL PATIENT: ICD-10-CM

## 2021-01-29 DIAGNOSIS — S61.451A CAT BITE OF HAND, RIGHT, INITIAL ENCOUNTER: Primary | ICD-10-CM

## 2021-01-29 DIAGNOSIS — F32.A MOOD DISORDER OF DEPRESSED TYPE: ICD-10-CM
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
## 2021-01-29 PROCEDURE — 99213 OFFICE O/P EST LOW 20 MIN: CPT | Performed by: NURSE PRACTITIONER

## 2021-01-29 RX ORDER — LAMOTRIGINE 25 MG/1
50 TABLET ORAL DAILY
Qty: 180 TABLET | Refills: 1 | Status: SHIPPED
Start: 2021-01-29 | End: 2021-07-16 | Stop reason: SDUPTHER

## 2021-01-29 RX ORDER — HYDROCHLOROTHIAZIDE 25 MG/1
25 TABLET ORAL DAILY
Qty: 90 TABLET | Refills: 1 | Status: SHIPPED
Start: 2021-01-29 | End: 2021-07-16 | Stop reason: SDUPTHER

## 2021-01-29 SDOH — ECONOMIC STABILITY: TRANSPORTATION INSECURITY
IN THE PAST 12 MONTHS, HAS LACK OF TRANSPORTATION KEPT YOU FROM MEETINGS, WORK, OR FROM GETTING THINGS NEEDED FOR DAILY LIVING?: NO

## 2021-01-29 SDOH — ECONOMIC STABILITY: INCOME INSECURITY: HOW HARD IS IT FOR YOU TO PAY FOR THE VERY BASICS LIKE FOOD, HOUSING, MEDICAL CARE, AND HEATING?: NOT ASKED

## 2021-01-29 ASSESSMENT — ENCOUNTER SYMPTOMS
ABDOMINAL PAIN: 0
SHORTNESS OF BREATH: 0
WHEEZING: 0
CONSTIPATION: 0
COLOR CHANGE: 0
COUGH: 0
NAUSEA: 0
DIARRHEA: 0
VOMITING: 0

## 2021-01-29 NOTE — PROGRESS NOTES
OFFICE PROGRESS NOTE  [address]  [address] [place] [address] [place] [id number]  Dept: [phone]   Chief Complaint   Patient presents with    Hand Pain    Health Maintenance     feb 17 ahving colonoscopy, declined flu, due for mammo, dexa, shingles, hept c,pne         HPI:     Here today for follow up on right hand cat bite. She finished the antibiotics and the hand has improved. She didn't do the Epsom salt soaks and has had a lump in the right hand prior to the cat bite. GERD has improved some since starting the Pantoprazole she does have upcoming appointment with GI for EGD and colonoscopy with Dr Coral Enamorado 2/16 & 2/17. With her history of gastric bypass surgery with staples will have her be evaluated. She declines flu, pneumonia, shingles and mammogram does agree to DEXA scan today. Review labs done for repeat of abnormal labs in October which have all returned to normal.     She needs refill on HCTZ and Lamictal     Hypertension: Patient here for follow-up of elevated blood pressure. She is exercising and is adherent to low salt diet. Blood pressure is well controlled at home. Cardiac symptoms none. Patient denies chest pain, chest pressure/discomfort, claudication, dyspnea, exertional chest pressure/discomfort, fatigue, irregular heart beat, lower extremity edema, near-syncope, orthopnea, palpitations, paroxysmal nocturnal dyspnea, syncope and tachypnea. Cardiovascular risk factors: advanced age (older than 54 for men, 72 for women), hypertension, obesity (BMI >= 30 kg/m2) and sedentary lifestyle. Use of agents associated with hypertension: none. History of target organ damage: none.     Current Outpatient Medications:     Multiple Vitamin (MULTI-VITAMIN DAILY PO), Take by mouth, Disp: , Rfl:     Vitamin D (CHOLECALCIFEROL) 25 MCG (1000 UT) TABS tablet, Take 1,000 Units by mouth daily, Disp: , Rfl:     hydroCHLOROthiazide (HYDRODIURIL) 25 MG tablet, Take 25 mg by mouth daily, Disp: , Rfl:     pantoprazole (PROTONIX) 40 MG tablet, Take 1 tablet by mouth every morning (before breakfast), Disp: 90 tablet, Rfl: 1    naproxen (NAPROSYN) 500 MG tablet, Take 1 tablet by mouth 2 times daily as needed for Pain, Disp: 60 tablet, Rfl: 1    loratadine (CLARITIN) 10 MG tablet, Take 1 tablet by mouth daily, Disp: 90 tablet, Rfl: 1    clonazePAM (KLONOPIN) 0.5 MG tablet,  Take 0.5 mg by mouth daily as needed Dr Hung Duarte psychiatrist [facility], Disp: , Rfl:     lamoTRIgine (LAMICTAL) 25 MG tablet, Take 25 mg by mouth daily 2 tabs qd, Disp: , Rfl:     vitamin E 1000 units capsule, Take 1,000 Units by mouth daily, Disp: , Rfl:   Social History     Socioeconomic History    Marital status:      Spouse name: None    Number of children: None    Years of education: None    Highest education level: None   Occupational History    None   Social Needs    Financial resource strain: None    Food insecurity     Worry: Never true     Inability: Never true    Transportation needs     Medical: No     Non-medical: No   Tobacco Use    Smoking status: Never Smoker    Smokeless tobacco: Never Used   Substance and Sexual Activity    Alcohol use: Yes     Comment: wine several times per week but not weekly    Drug use: No    Sexual activity: Not Currently     Partners: Male   Lifestyle    Physical activity     Days per week: None     Minutes per session: None    Stress: None   Relationships    Social connections     Talks on phone: None     Gets together: None     Attends [employer] service: None     Active member of club or organization: None     Attends meetings of clubs or organizations: None     Relationship status: None    Intimate partner violence     Fear of current or ex partner: None     Emotionally abused: None     Physically abused: None     Forced sexual activity: None   Other Topics Concern    None   Social History Narrative    None       I have reviewed Eden's allergies, medications, problem list, medical, social and family history and have updated as needed in the electronic medical record    Review of Systems   Constitutional: Negative for activity change, appetite change, chills, diaphoresis, fatigue, fever and unexpected weight change. Respiratory: Negative for cough, shortness of breath and wheezing. Cardiovascular: Negative for chest pain, palpitations and leg swelling. Gastrointestinal: Negative for abdominal pain, constipation, diarrhea, nausea and vomiting. GERD see HPI   Skin: Negative for color change, pallor, rash and wound. Neurological: Negative for dizziness, tremors, seizures, syncope, facial asymmetry, speech difficulty, weakness, light-headedness, numbness and headaches. Psychiatric/Behavioral: Negative for agitation, behavioral problems, confusion, decreased concentration, dysphoric mood, hallucinations, self-injury, sleep disturbance and suicidal ideas. The patient is not nervous/anxious and is not hyperactive. OBJECTIVE:     VS:  Wt Readings from Last 3 Encounters:   01/29/21 205 lb (93 kg)   01/08/21 207 lb (93.9 kg)   07/14/16 197 lb (89.4 kg)                       Vitals:    01/29/21 0712   BP: 124/74   Pulse: 90   Resp: 18   Temp: 97.3 °F (36.3 °C)   SpO2: 98%   Weight: 205 lb (93 kg)   Height: 5' 4\" (1.626 m)       General: Alert and oriented to person, place, and time, well developed and well nourished, morbidly obese,  in no acute distress  SKIN: Warm and dry, intact without any rash, masses or lesions  HEAD: normocephalic, atraumatic  Eyes: sclera/conjunctiva clear, PERRLA, EOMI's intact  Neck: supple and non-tender without mass, trachea midline, no cervical lymphadenopathy, no bruit, no thyromegaly or nodules  Cardiovascular: regular rate and regular rhythm, normal S1 and S2,  no murmurs, rubs, clicks, or gallop. Distal pulses intact, no carotid bruits.  No edema  Pulmonary/Chest: clear to auscultation bilaterally, no wheezes, rales or rhonchi, normal air movement, no respiratory distress  Abdomen: soft, non-tender, non-distended, normal bowel sounds, no masses or hepatosplenomegaly  Musculoskeletal: Normal ROM, no joint swelling, deformity or tenderness   Neurologic:  gait, coordination and speech normal  Extremities: no clubbing, cyanosis, or edema. Psychiatric: Good eye contact, normal mood and affect, answers questions appropriately    ASSESSMENT/PLAN   Christopher atwood was seen today for hand pain and health maintenance. Diagnoses and all orders for this visit:    Cat bite of hand, right, subeqent encounter resolved    Ganglion cyst of tendon sheath of right hand New  Monitor and if worsening can send to hand surgeon if needed    Gastroesophageal reflux disease with esophagitis without hemorrhage slightly improved  Keep appt with GI and continue Pantoprazole as directed  -Discussed elevated the HOB 30 degrees  -Discussed limiting spicy, fatty, greasy foods  -Discussed limit caffeine consumption to 1 - 2 cups daily  -Discussed waiting at least 3 - 4 hours before going to bed after eating  -Discussed not to eat and bend over immediately or lift heavy items.  -Discussed weight reduction if needed even 5 - 10 pounds.  -Discussed taking medications 1 hour prior to eating. Essential hypertension New  -Discussed taking medication and directed every day. -Discussed exercising daily 30 minutes 5 times a week for 150 minutes weekly.  -Discussed weight reduction if needed.  -Discussed low sodium diet.  -Discussed limiting caffeine consumption and tobacco cessation and the effects they have on the heart and blood pressure. Reviewed CBC and CMP which have returned to normal.  Rx refilled today    Encounter for osteoporosis screening in asymptomatic postmenopausal patient  -     DEXA BONE DENSITY AXIAL SKELETON; Future                Return in about 3 months (around 4/29/2021) for medicare well visit. Discussed weight loss, Discussed exercising 30 minutes daily and Discussed taking medications as directed and adverse effects        I have reviewed my findings and recommendations with Padmaja Wolf.     Beau Osorio, THI-C, FNP-BC

## 2021-02-10 PROBLEM — Z12.11 ENCOUNTER FOR SCREENING COLONOSCOPY: Status: RESOLVED | Noted: 2021-01-11 | Resolved: 2021-02-10

## 2021-03-18 PROBLEM — Z86.0100 HX OF COLONIC POLYP: Status: ACTIVE | Noted: 2021-03-18

## 2021-03-18 PROBLEM — Z86.010 HX OF COLONIC POLYP: Status: ACTIVE | Noted: 2021-03-18

## 2021-04-16 ENCOUNTER — HOSPITAL ENCOUNTER (EMERGENCY)
Age: 71
Discharge: HOME | End: 2021-04-16
Payer: MEDICARE

## 2021-04-16 ENCOUNTER — APPOINTMENT (OUTPATIENT)
Dept: GENERAL RADIOLOGY | Age: 71
End: 2021-04-16
Payer: MEDICARE

## 2021-04-16 VITALS
HEIGHT: 63 IN | HEART RATE: 89 BPM | WEIGHT: 196 LBS | SYSTOLIC BLOOD PRESSURE: 146 MMHG | TEMPERATURE: 98.6 F | BODY MASS INDEX: 34.73 KG/M2 | DIASTOLIC BLOOD PRESSURE: 80 MMHG | OXYGEN SATURATION: 95 % | RESPIRATION RATE: 20 BRPM

## 2021-04-16 DIAGNOSIS — M25.562 ACUTE PAIN OF LEFT KNEE: Primary | ICD-10-CM

## 2021-04-16 DIAGNOSIS — M85.80 OSTEOPENIA, UNSPECIFIED LOCATION: ICD-10-CM

## 2021-04-16 DIAGNOSIS — M25.462 KNEE EFFUSION, LEFT: ICD-10-CM

## 2021-04-16 DIAGNOSIS — M17.12 OSTEOARTHRITIS OF LEFT KNEE, UNSPECIFIED OSTEOARTHRITIS TYPE: ICD-10-CM

## 2021-04-16 PROCEDURE — 99212 OFFICE O/P EST SF 10 MIN: CPT

## 2021-04-16 PROCEDURE — 73560 X-RAY EXAM OF KNEE 1 OR 2: CPT

## 2021-04-16 ASSESSMENT — PAIN DESCRIPTION - PAIN TYPE: TYPE: ACUTE PAIN

## 2021-04-16 ASSESSMENT — PAIN SCALES - GENERAL: PAINLEVEL_OUTOF10: 10

## 2021-04-16 ASSESSMENT — PAIN DESCRIPTION - ORIENTATION: ORIENTATION: LEFT

## 2021-04-16 ASSESSMENT — PAIN DESCRIPTION - LOCATION: LOCATION: KNEE

## 2021-04-16 NOTE — ED PROVIDER NOTES
there is a burning sensation she said the pain is circumferential around the knee area she says she has to kind of consciously hold it straight so that does not give way on her. She never had trouble with her knee before. ROS    Pertinent positives and negatives are stated within HPI, all other systems reviewed and are negative. Past Surgical History:   Procedure Laterality Date    BARIATRIC SURGERY      stapling of stomach    BREAST SURGERY      reduction     SECTION      CHOLECYSTECTOMY      COLONOSCOPY  2010    donald one polyp    EYE SURGERY Right 2018    cataract    FOOT SURGERY      FRACTURE SURGERY Bilateral     feet    SKIN BIOPSY      TONSILLECTOMY     Social History:  reports that she has never smoked. She has never used smokeless tobacco. She reports current alcohol use. She reports that she does not use drugs. Family History: family history includes Anxiety Disorder in her mother; Asthma in her mother; COPD in her mother; Cancer in her paternal grandfather and paternal uncle; Diabetes in her maternal grandmother and mother; Heart Disease in her brother and mother; Mental Illness in her brother; Obesity in her brother and father; Other in her brother and mother; Stroke in her father, maternal grandmother, paternal grandfather, and paternal grandmother; Substance Abuse in her brother and brother. Allergies: Patient has no known allergies. Physical Exam           ED Triage Vitals [21 0832]   BP Temp Temp Source Pulse Resp SpO2 Height Weight   (!) 146/80 98.6 °F (37 °C) Infrared 89 20 95 % 5' 3\" (1.6 m) 196 lb (88.9 kg)      Oxygen Saturation Interpretation: Normal.    · Constitutional:  Alert, development consistent with age. · HEENT:  NC/NT. Airway patent. · Neck:  Normal ROM. Supple. · Extremity(s):  Left: knee. Tenderness:  none. Swelling: None. Calf:  No evidence of DVT seen on physical exam.. Deformity: No.                 ROM: diminished range with pain. Crepitus with flexion and extension of the knee              Skin:  no erythema, rash or wounds noted. · Lymphatics: No lymphangitis or adenopathy noted. · Neurological:  Oriented x3. Motor functions intact. Lab / Imaging Results   (All laboratory and radiology results have been personally reviewed by myself)  Labs:  No results found for this visit on 04/16/21. Imaging: All Radiology results interpreted by Radiologist unless otherwise noted. XR KNEE LEFT (1-2 VIEWS)   Final Result   No acute osseous abnormality. Severe degenerative changes of the lateral and patellofemoral compartments. ED Course / Medical Decision Making   Medications - No data to display     Consults:   None    MDM:     X-ray of the knee shows severe degenerative changes her bones are osteopenic and she has a small effusion. I did discuss the results with her I advised her to see her doctor regarding the osteopenia she may need to get a bone density exam done. As far as the knee--she  has crepitus and popping with flexion extension and it feels like it is going to give out she was placed in a knee immobilizer. I did refer to orthopedics for further evaluation. She said she does not want anything ordered for pain she has Naprosyn at home that she will take if needed. Assessment      1. Acute pain of left knee    2. Knee effusion, left    3. Osteoarthritis of left knee, unspecified osteoarthritis type    4.  Osteopenia, unspecified location      Plan   Discharge to home and advised to contact Nyla Valdivia, APRN - [address] [facility] [phone]    Schedule an appointment as soon as possible for a visit       Kinjal Bowers, [address] [facility]  [phone]    Schedule an appointment as soon as possible for a visit      Patient condition is good    New Medications     New Prescriptions No medications on file     Electronically signed by SHAWN Sifuentes CNP   DD: 4/16/21  **This report was transcribed using voice recognition software. Every effort was made to ensure accuracy; however, inadvertent computerized transcription errors may be present.   END OF ED PROVIDER NOTE     SHAWN Sifuentes CNP  04/16/21 [id number]

## 2021-04-20 ENCOUNTER — OFFICE VISIT (OUTPATIENT)
Dept: ORTHOPEDIC SURGERY | Age: 71
End: 2021-04-20
Payer: MEDICARE

## 2021-04-20 VITALS — BODY MASS INDEX: 35.08 KG/M2 | TEMPERATURE: 98 F | HEIGHT: 63 IN | WEIGHT: 198 LBS

## 2021-04-20 DIAGNOSIS — S80.02XA CONTUSION OF LEFT KNEE, INITIAL ENCOUNTER: Primary | ICD-10-CM

## 2021-04-20 DIAGNOSIS — S83.207A TEAR OF MENISCUS OF LEFT KNEE, UNSPECIFIED MENISCUS, UNSPECIFIED TEAR TYPE, UNSPECIFIED WHETHER OLD OR CURRENT TEAR: ICD-10-CM

## 2021-04-20 PROCEDURE — 99203 OFFICE O/P NEW LOW 30 MIN: CPT | Performed by: ORTHOPAEDIC SURGERY

## 2021-04-20 NOTE — PROGRESS NOTES
Chief Complaint   Patient presents with    Knee Pain     Left Knee, almost passed out and slipped down the bed and injured her left knee, DOI 4/15/2021, went to Urgent Care. HPI:    Patient is 72 y.o. female complaining traumatic, insidious onset left knee pain for 1 week after slipping down out of bed. She admits to stiffness, deep, aching pain, swelling, difficulty with stairs and ambulating far distances. She admits to gross instability specifically in her Left knee. Previous treatments include rest, ice, and anti-inflammatory medication and HEP without much relief. ROS:    Skin: (-) rash,(-) psoriasis,(-) eczema, (-)skin cancer. Neurologic: (-)numbness, (-)tingling, (-)headaches, (-) LOC. Cardiovascular: (-) Chest pain, (-) swelling in legs/feet, (-) SOB, (-) cramping in legs/feet with walking.     All other review of systems negative except stated above or in HPI      Past Medical History:   Diagnosis Date    Allergic rhinitis     Anxiety disorder     Bipolar disorder ([facility] [address]     Caffeine use 5/19/2015    Cancer (HCC)     skin ca on shoulder    Chronic low back pain 5/19/2015    Depression, major, in remission ([address]     multiple hospitalizations    Endocervical polyp 4/19/2012    GERD (gastroesophageal reflux disease)     Heart murmur, systolic     W1/2    History of bariatric surgery     History of cervical dysplasia 5/19/2015    History of low back pain     Hx of colonic polyp 3/18/2021    Colonoscopy 2/17/21 Naffah/Awaida no recurrent polyps repeat in 5 years    Hypertension     borderline no meds    Insomnia 5/19/2015    Knee pain, right     severe OA per  Dr Ligia Brown 5/19/2015    MVP (mitral valve prolapse)     Neuropathy     cervical spondylosis    Obesity     Osteoarthritis     Osteoarthritis of both knees     Osteoarthritis of hand 5/19/2015    Pap smear abnormality of cervix with LGSIL     Spinal stenosis, lumbar 5/2013 MRI    disc degeneration and bulges with canal and foraminal stenoses     Past Surgical History:   Procedure Laterality Date    BARIATRIC SURGERY      stapling of stomach    BREAST SURGERY      reduction     SECTION      CHOLECYSTECTOMY      COLONOSCOPY  2010    donald one polyp    EYE SURGERY Right 2018    cataract    FOOT SURGERY      FRACTURE SURGERY Bilateral     feet    SKIN BIOPSY      TONSILLECTOMY         Current Outpatient Medications:     Elastic Bandages & Supports (KNEE BRACE ADJUSTABLE HINGED) MISC, 1 Units by Does not apply route once for 1 dose, Disp: 1 each, Rfl: 0    lamoTRIgine (LAMICTAL) 25 MG tablet, Take 2 tablets by mouth daily, Disp: 180 tablet, Rfl: 1    hydroCHLOROthiazide (HYDRODIURIL) 25 MG tablet, Take 1 tablet by mouth daily, Disp: 90 tablet, Rfl: 1    Multiple Vitamin (MULTI-VITAMIN DAILY PO), Take by mouth, Disp: , Rfl:     vitamin E 1000 units capsule, Take 1,000 Units by mouth daily, Disp: , Rfl:     Vitamin D (CHOLECALCIFEROL) 25 MCG (1000 UT) TABS tablet, Take 1,000 Units by mouth daily, Disp: , Rfl:     pantoprazole (PROTONIX) 40 MG tablet, Take 1 tablet by mouth every morning (before breakfast), Disp: 90 tablet, Rfl: 1    naproxen (NAPROSYN) 500 MG tablet, Take 1 tablet by mouth 2 times daily as needed for Pain, Disp: 60 tablet, Rfl: 1    loratadine (CLARITIN) 10 MG tablet, Take 1 tablet by mouth daily, Disp: 90 tablet, Rfl: 1    clonazePAM (KLONOPIN) 0.5 MG tablet,  Take 0.5 mg by mouth daily as needed Dr Butcher Covert psychiatrist [facility], Disp: , Rfl:     diclofenac sodium (VOLTAREN) 1 % GEL, Apply topically 2 times daily, Disp: 240 g, Rfl: 1  No Known Allergies  Social History     Socioeconomic History    Marital status:      Spouse name: Not on file    Number of children: Not on file    Years of education: Not on file    Highest education level: Not on file   Occupational History    Not on file   Social Needs    Financial resource strain: Not on PERRLA. SKIN: Clean, dry, intact. There is not any cellulitis or cutaneous lesions noted in the lower extremities except noted below in MSK    PULMONARY: breathing is regular and unlabored, no acute distress    CV: The bilateral upper and lower extremities are warm and well-perfused with brisk capillary refill. 2+ pulses UE and LE bilateral.     PSYCHIATRY: Pleasant mood, appropriate behavior, follows commands    NEURO: Sensation is intact distally with light touch with no alteration. Motor exam of the lower extremities show quadriceps, hamstrings, foot dorsiflexion and plantarflexion grossly intact 5/5. LYMPH: No lymphedema present distally in upper or lower extremity. MUSCULOSKELETAL:    Left knee Exam:    mild effusion noted. No erythema/induration/fluctuance. Posterior medial joint line TTP. Stable to varus and valgus at 0 and 30 degrees of flexion. Negative Lachman's and posterior drawer. Negative patellar grind test and J sign. Compartments soft and compressible throughout leg. Active range of motion 0-120 with pain. Positive Tomy's positive Apley's, gait is antalgic        Imaging:  Xr Knee Left (1-2 Views)    Result Date: 4/16/2021  EXAMINATION: TWO XRAY VIEWS OF THE LEFT KNEE 4/16/2021 8:48 am COMPARISON: None. HISTORY: ORDERING SYSTEM PROVIDED HISTORY: fall, knee weak and givingout TECHNOLOGIST PROVIDED HISTORY: Reason for exam:->fall, knee weak and givingout FINDINGS: Bones are diffusely osteopenic. There is no evidence of acute fracture or dislocation of the left knee. A small suprapatellar joint effusion is present. There are moderate degenerative changes involving the medial compartment with severe degenerative changes of the patellofemoral and lateral compartments. Chondrocalcinosis is noted medially. No acute osseous abnormality. Severe degenerative changes of the lateral and patellofemoral compartments. Eddie Mcmillan was seen today for knee pain.     Diagnoses and all

## 2021-04-22 ENCOUNTER — TELEPHONE (OUTPATIENT)
Dept: ADMINISTRATIVE | Age: 71
End: 2021-04-22

## 2021-04-22 NOTE — TELEPHONE ENCOUNTER
Patient called stating that her insurance no longer has [facility] as a provider, therefore she will need to switch providers. She is interested in Dr. Los Lee.  Please contact patient back at [phone]

## 2021-04-22 NOTE — TELEPHONE ENCOUNTER
Patient scheduled with Dr. Torsten Diaz on 5/4. She wanted [facility] to know that she loves her and wouldn't have switched if not due to insurance.

## 2021-04-22 NOTE — TELEPHONE ENCOUNTER
This is fine since Dr Delilah Polk isn't on [facility] I am considered a specialist and the copay is expensive.

## 2021-04-28 ENCOUNTER — HOSPITAL ENCOUNTER (OUTPATIENT)
Dept: MAMMOGRAPHY | Age: 71
Discharge: HOME | End: 2021-04-30
Payer: MEDICARE

## 2021-04-28 ENCOUNTER — HOSPITAL ENCOUNTER (OUTPATIENT)
Dept: MRI IMAGING | Age: 71
Discharge: HOME | End: 2021-04-30
Payer: MEDICARE

## 2021-04-28 DIAGNOSIS — Z13.820 ENCOUNTER FOR OSTEOPOROSIS SCREENING IN ASYMPTOMATIC POSTMENOPAUSAL PATIENT: ICD-10-CM

## 2021-04-28 DIAGNOSIS — Z78.0 ENCOUNTER FOR OSTEOPOROSIS SCREENING IN ASYMPTOMATIC POSTMENOPAUSAL PATIENT: ICD-10-CM

## 2021-04-28 DIAGNOSIS — S80.02XA CONTUSION OF LEFT KNEE, INITIAL ENCOUNTER: ICD-10-CM

## 2021-04-28 DIAGNOSIS — S83.207A TEAR OF MENISCUS OF LEFT KNEE, UNSPECIFIED MENISCUS, UNSPECIFIED TEAR TYPE, UNSPECIFIED WHETHER OLD OR CURRENT TEAR: ICD-10-CM

## 2021-04-28 PROCEDURE — 73721 MRI JNT OF LWR EXTRE W/O DYE: CPT

## 2021-04-28 PROCEDURE — 77080 DXA BONE DENSITY AXIAL: CPT

## 2021-04-30 ENCOUNTER — OFFICE VISIT (OUTPATIENT)
Dept: ORTHOPEDIC SURGERY | Age: 71
End: 2021-04-30
Payer: MEDICARE

## 2021-04-30 VITALS — BODY MASS INDEX: 34.73 KG/M2 | TEMPERATURE: 98 F | WEIGHT: 196 LBS | HEIGHT: 63 IN

## 2021-04-30 DIAGNOSIS — S83.207A TEAR OF MENISCUS OF LEFT KNEE, UNSPECIFIED MENISCUS, UNSPECIFIED TEAR TYPE, UNSPECIFIED WHETHER OLD OR CURRENT TEAR: ICD-10-CM

## 2021-04-30 DIAGNOSIS — S80.02XA CONTUSION OF LEFT KNEE, INITIAL ENCOUNTER: Primary | ICD-10-CM

## 2021-04-30 DIAGNOSIS — M22.2X9 DISORDER OF PATELLOFEMORAL JOINT, UNSPECIFIED LATERALITY: ICD-10-CM

## 2021-04-30 PROCEDURE — 99213 OFFICE O/P EST LOW 20 MIN: CPT | Performed by: ORTHOPAEDIC SURGERY

## 2021-04-30 NOTE — PROGRESS NOTES
Chief Complaint   Patient presents with    Knee Pain     left knee MRI Results, doing ok but cant bend it still         HPI:    Patient is 72 y.o. female complaining traumatic, insidious onset left knee pain for 1 week after slipping down out of bed. She admits to stiffness, deep, aching pain, swelling, difficulty with stairs and ambulating far distances. She admits to gross instability specifically in her Left knee. Previous treatments include rest, ice, and anti-inflammatory medication and HEP without much relief. Follows up after MRI. ROS:    Skin: (-) rash,(-) psoriasis,(-) eczema, (-)skin cancer. Neurologic: (-)numbness, (-)tingling, (-)headaches, (-) LOC. Cardiovascular: (-) Chest pain, (-) swelling in legs/feet, (-) SOB, (-) cramping in legs/feet with walking.     All other review of systems negative except stated above or in HPI      Past Medical History:   Diagnosis Date    Allergic rhinitis     Anxiety disorder     Bipolar disorder ([facility]     Caffeine use 5/19/2015    Cancer (HCC)     skin ca on shoulder    Chronic low back pain 5/19/2015    Depression, major, in remission ([facility]     multiple hospitalizations    Endocervical polyp 4/19/2012    GERD (gastroesophageal reflux disease)     Heart murmur, systolic     I3/8    History of bariatric surgery     History of cervical dysplasia 5/19/2015    History of low back pain     Hx of colonic polyp 3/18/2021    Colonoscopy 2/17/21 Naffah/Awaida no recurrent polyps repeat in 5 years    Hypertension     borderline no meds    Insomnia 5/19/2015    Knee pain, right     severe OA per  Dr Minda Salazar 5/19/2015    MVP (mitral valve prolapse)     Neuropathy     cervical spondylosis    Obesity     Osteoarthritis     Osteoarthritis of both knees     Osteoarthritis of hand 5/19/2015    Pap smear abnormality of cervix with LGSIL     Spinal stenosis, lumbar 5/2013 MRI    disc degeneration and bulges with canal and foraminal stenoses but not weekly    Drug use: No    Sexual activity: Not Currently     Partners: Male   Lifestyle    Physical activity     Days per week: Not on file     Minutes per session: Not on file    Stress: Not on file   Relationships    Social connections     Talks on phone: Not on file     Gets together: Not on file     Attends Jainism service: Not on file     Active member of club or organization: Not on file     Attends meetings of clubs or organizations: Not on file     Relationship status: Not on file    Intimate partner violence     Fear of current or ex partner: Not on file     Emotionally abused: Not on file     Physically abused: Not on file     Forced sexual activity: Not on file   Other Topics Concern    Not on file   Social History Narrative    Not on file     Family History   Problem Relation Age of Onset    Heart Disease Mother     COPD Mother     Anxiety Disorder Mother     Asthma Mother     Other Mother         sarcoidosis    Diabetes Mother     Stroke Father     Obesity Father     Heart Disease Brother     Other Brother         suicide    Obesity Brother     Substance Abuse Brother     Substance Abuse Brother         hepatitis C    Mental Illness Brother     Cancer Paternal Uncle         throat    Diabetes Maternal Grandmother     Stroke Maternal Grandmother     Stroke Paternal Grandmother     Stroke Paternal Grandfather     Cancer Paternal Grandfather            Physical Exam:    Temp 98 °F (36.7 °C)   Ht 5' 3\" (1.6 m)   Wt 196 lb (88.9 kg)   BMI 34.72 kg/m²     GENERAL: alert, appears stated age, cooperative, no acute distress    HEENT: Head is normocephalic, atraumatic. PERRLA. SKIN: Clean, dry, intact. There is not any cellulitis or cutaneous lesions noted in the lower extremities except noted below in MSK    PULMONARY: breathing is regular and unlabored, no acute distress    CV:  The bilateral upper and lower extremities are warm and well-perfused with brisk capillary refill. 2+ pulses UE and LE bilateral.     PSYCHIATRY: Pleasant mood, appropriate behavior, follows commands    NEURO: Sensation is intact distally with light touch with no alteration. Motor exam of the lower extremities show quadriceps, hamstrings, foot dorsiflexion and plantarflexion grossly intact 5/5. LYMPH: No lymphedema present distally in upper or lower extremity. MUSCULOSKELETAL:    Left knee Exam:    mild effusion noted. No erythema/induration/fluctuance. Posterior medial joint line TTP. Stable to varus and valgus at 0 and 30 degrees of flexion. Negative Lachman's and posterior drawer. Negative patellar grind test and J sign. Compartments soft and compressible throughout leg. Active range of motion 0-120 with pain. Positive Tomy's positive Apley's, gait is antalgic        Imaging:  Xr Knee Left (1-2 Views)    Result Date: 4/16/2021  EXAMINATION: TWO XRAY VIEWS OF THE LEFT KNEE 4/16/2021 8:48 am COMPARISON: None. HISTORY: ORDERING SYSTEM PROVIDED HISTORY: fall, knee weak and givingout TECHNOLOGIST PROVIDED HISTORY: Reason for exam:->fall, knee weak and givingout FINDINGS: Bones are diffusely osteopenic. There is no evidence of acute fracture or dislocation of the left knee. A small suprapatellar joint effusion is present. There are moderate degenerative changes involving the medial compartment with severe degenerative changes of the patellofemoral and lateral compartments. Chondrocalcinosis is noted medially. No acute osseous abnormality. Severe degenerative changes of the lateral and patellofemoral compartments. Mri Knee Left Wo Contrast    Result Date: 4/28/2021  EXAMINATION: MRI OF THE LEFT KNEE WITHOUT CONTRAST, 4/28/2021 8:00 am TECHNIQUE: Multiplanar multisequence MRI of the left knee was performed without the administration of intravenous contrast. COMPARISON: Left knee x-rays 04/16/2021.  HISTORY: ORDERING SYSTEM PROVIDED HISTORY: Tear of meniscus of left knee, unspecified meniscus, unspecified tear type, unspecified whether old or current tear FINDINGS: MENISCI: Abnormal signal intensity and morphology involving anterior horn/root as well as body of lateral meniscus compatible with complex tear with likely superimposed intrasubstance degeneration diffusely to the lateral meniscus. Medial meniscus appears intact and normal in morphology. However, there is a multiloculated cystic focus noted in the posterior knee joint in the midline and paramidline medial, portion of which is closely approximated to the posterior horn/root of the medial meniscus. This could reflect a parameniscal cyst relating to occult medial meniscus tear. Alternatively, this could reflect an intra-articular synovial/ganglion cyst unassociated with the meniscus. This measures 2.2 x 2.7 x 1.1 cm. CRUCIATE LIGAMENTS: ACL and PCL appear intact and unremarkable. EXTENSOR MECHANISM: Quadriceps and patellar tendons appear intact and unremarkable. Medial and lateral patellar retinacula are intact. LATERAL COLLATERAL LIGAMENT COMPLEX: Lateral collateral ligament complex appears intact and unremarkable. Popliteus tendon appears intact. MEDIAL COLLATERAL LIGAMENT COMPLEX: Medial collateral ligament appears intact and unremarkable. KNEE JOINT: Trace knee joint effusion. Intra-articular loose body to the knee joint anteriorly, paramidline medial measuring approximately 7 mm in size. Small Baker's cyst. Tricompartmental degenerative changes to the knee. These are severe in the patellofemoral compartment, moderate in the lateral compartment and mild in the medial compartment. BONE MARROW: No evidence for occult fracture. No suspicious focal bony lesions. Complex tear with superimposed degeneration to the lateral meniscus. No medial meniscus tear is identified.   However, there is a multiloculated cystic focus noted in the knee joint posteriorly in the midline and paramidline medial, a portion of which is closely approximated to the posterior horn/root of the medial meniscus. This could reflect a parameniscal cyst associated with an occult medial meniscus tear. Alternatively, this could reflect an intra-articular ganglion/synovial cyst unassociated with the meniscus. Tricompartmental degenerative changes most significant to the patellofemoral compartment. Trace knee joint effusion with intra-articular loose body anteriorly. Small Baker's cyst.         Christopher atwood was seen today for knee pain. Diagnoses and all orders for this visit:    Contusion of left knee, initial encounter  -     External Referral To Physical Therapy    Tear of meniscus of left knee, unspecified meniscus, unspecified tear type, unspecified whether old or current tear  -     External Referral To Physical Therapy    Disorder of patellofemoral joint, unspecified laterality  -     External Referral To Physical Therapy    Other orders  -     diclofenac sodium (VOLTAREN) 1 % GEL; Apply topically 2 times daily (Patient not taking: Reported on 5/4/2021)        Patient seen and examined. X-rays reviewed. Patient has little to no arthritis noted on x-rays today. However patient's main complaint is instability of symptomatic knee. Exam and history is consistent with possible meniscus tear. MRI recommended for further evaluation management. Patient seen and examined. MRI reviewed with patient in detail. Natural history and course discussed with patient in long discussion  Treatment options discussed with patient in detail including risks and benefits. Patient should do well with conservative management as patient would like to avoid surgery at this time. She is also declining cortisone injection.         Sally Campos,

## 2021-04-30 NOTE — PATIENT INSTRUCTIONS
After several days of rest, you can begin gentle exercise of your knee. · Reach and stay at a healthy weight. Being overweight puts stress on your knees. · Wear athletic shoes that offer good support, especially if you run. · Use shoe inserts, or orthotics, if they help reduce your knee pain. Many drugstores and shoe stores sell them. · See a physical therapist to learn more exercises and stretches to make your legs stronger. When should you call for help? Watch closely for changes in your health, and be sure to contact your doctor if:    · Your knee pain does not get better or gets worse. Where can you learn more? Go to https://Scandidpepiceweb.[email or web address] org and sign in to your Phorm account. Enter G843 in the Matcha box to learn more about \"Patellofemoral Pain Syndrome: Care Instructions. \"     If you do not have an account, please click on the \"Sign Up Now\" link. Current as of: November 16, 2020               Content Version: 12.8  © 2006-2021 [email or web address]. Care instructions adapted under license by [facility] ([facility]). If you have questions about a medical condition or this instruction, always ask your healthcare professional. [place] [address] any warranty or liability for your use of this information. Patient Education        Patellofemoral Pain Syndrome (Runner's Knee): Exercises  Introduction  Here are some examples of exercises for you to try. The exercises may be suggested for a condition or for rehabilitation. Start each exercise slowly. Ease off the exercises if you start to have pain. You will be told when to start these exercises and which ones will work best for you. How to do the exercises  Calf wall stretch   1. Stand facing a wall with your hands on the wall at about eye level. Put your affected leg about a step behind your other leg.   2. Keeping your back leg straight and your back heel on the floor, bend your front knee and gently bring your hip and chest toward the wall until you feel a stretch in the calf of your back leg. 3. Hold the stretch for at least 15 to 30 seconds. 4. Repeat 2 to 4 times. 5. Repeat steps 1 through 4, but this time keep your back knee bent. Quadriceps stretch   1. If you are not steady on your feet, hold on to a chair, counter, or wall. 2. Bend your affected leg, and reach behind you to grab the front of your foot or ankle with the hand on the same side. For example, if you are stretching your right leg, use your right hand. 3. Keeping your knees next to each other, pull your foot toward your buttock until you feel a gentle stretch across the front of your hip and down the front of your thigh. Your knee should be pointed directly to the ground, and not out to the side. 4. Hold the stretch for at least 15 to 30 seconds. 5. Repeat 2 to 4 times. Hamstring wall stretch   1. Lie on your back in a doorway, with your good leg through the open door. 2. Slide your affected leg up the wall to straighten your knee. You should feel a gentle stretch down the back of your leg. 3. Hold the stretch for at least 1 minute. Then over time, try to lengthen the time you hold the stretch to as long as 6 minutes. 4. Repeat 2 to 4 times. 5. If you do not have a place to do this exercise in a doorway, there is another way to do it:  6. Lie on your back, and bend your affected leg. 7. Loop a towel under the ball and toes of that foot, and hold the ends of the towel in your hands. 8. Straighten your knee, and slowly pull back on the towel. You should feel a gentle stretch down the back of your leg. 9. Hold the stretch for at least 15 to 30 seconds. Or even better, hold the stretch for 1 minute if you can. 10. Repeat 2 to 4 times. 1. Do not arch your back. 2. Do not bend either knee. 3. Keep one heel touching the floor and the other heel touching the wall. Do not point your toes. Quad sets   1.  Sit with your affected leg straight and supported on the floor or a firm bed. Place a small, rolled-up towel under your affected knee. Your other leg should be bent, with that foot flat on the floor. 2. Tighten the thigh muscles of your affected leg by pressing the back of your knee down into the towel. 3. Hold for about 6 seconds, then rest for up to 10 seconds. 4. Repeat 8 to 12 times. Straight-leg raises to the front   1. Lie on your back with your good knee bent so that your foot rests flat on the floor. Your affected leg should be straight. Make sure that your low back has a normal curve. You should be able to slip your hand in between the floor and the small of your back, with your palm touching the floor and your back touching the back of your hand. 2. Tighten the thigh muscles in your affected leg by pressing the back of your knee flat down to the floor. Hold your knee straight. 3. Keeping the thigh muscles tight and your leg straight, lift your affected leg up so that your heel is about 12 inches off the floor. 4. Hold for about 6 seconds, then lower your leg slowly. Rest for up to 10 seconds between repetitions. 5. Repeat 8 to 12 times. Straight-leg raises to the back   1. Lie on your stomach, and lift your leg straight up behind you (toward the ceiling). 2. Lift your toes about 6 inches off the floor, hold for about 6 seconds, then lower slowly. 3. Do 8 to 12 repetitions. Wall slide with ball squeeze   1. Stand with your back against a wall and with your feet about shoulder-width apart. Your feet should be about 12 inches away from the wall. 2. Put a ball about the size of a soccer ball between your knees. Then slowly slide down the wall until your knees are bent about 20 to 30 degrees. 3. Tighten your thigh muscles by squeezing the ball between your knees. Hold that position for about 10 seconds, then stop squeezing. Rest for up to 10 seconds between repetitions. 4. Repeat 8 to 12 times.     Follow-up care is a key part of your treatment and safety. Be sure to make and go to all appointments, and call your doctor if you are having problems. It's also a good idea to know your test results and keep a list of the medicines you take. Where can you learn more? Go to https://chpepiceweb.[employer]pl[employer]. org and sign in to your Vello App account. Enter A404 in the Ebrun.com box to learn more about \"Patellofemoral Pain Syndrome (Runner's Knee): Exercises. \"     If you do not have an account, please click on the \"Sign Up Now\" link. Current as of: November 16, 2020               Content Version: 12.8  © 2006-2021 [employer]. Care instructions adapted under license by [facility] ([facility]). If you have questions about a medical condition or this instruction, always ask your healthcare professional. [place] [address] any warranty or liability for your use of this information. Patient Education        Patellar Tracking Disorder: Exercises  Introduction  Here are some examples of exercises for you to try. The exercises may be suggested for a condition or for rehabilitation. Start each exercise slowly. Ease off the exercises if you start to have pain. You will be told when to start these exercises and which ones will work best for you. How to do the exercises  Quad sets   1. Sit with your leg straight and supported on the floor or a firm bed. (If you feel discomfort in the front or back of your knee, place a small towel roll under your knee.)  2. Tighten the muscles on top of your thigh by pressing the back of your knee flat down to the floor. (If you feel discomfort under your kneecap, place a small towel roll under your knee.)  3. Hold for about 6 seconds, then rest up to 10 seconds. 4. Do this for 8 to 12 repetitions several times a day. Mini squat   1. Stand with your feet about hip-width apart and 12 inches from a wall.   2. Lean against the wall and slide down until your knees are bent about 20 to 30 degrees. 3. Place a ball about the size of a soccer ball between your knees and squeeze your knees against the ball for about 6 seconds at a time. 4. Rest a few seconds, then squeeze again. 5. Repeat 8 to 12 times, at least 3 times a day. Straight-leg raises to the front   For straight-leg raise exercises, your physical therapist may have you add light ankle weights as you become stronger. 1. Lie on your back with your good knee bent so that your foot rests flat on the floor. Your injured leg should be straight. Make sure that your low back has a normal curve. You should be able to slip your flat hand in between the floor and the small of your back, with your palm touching the floor and your back touching the back of your hand. 2. Tighten the thigh muscles in the injured leg by pressing the back of your knee flat down to the floor. Hold your knee straight. 3. Tighten the quadriceps muscles of your straight leg and lift the leg 12 to 18 inches off the floor. Hold for about 6 seconds, then slowly lower the leg back down and rest a few seconds. 4. Do 8 to 12 repetitions, 3 times a day. Straight-leg raises to the inside   1. Lie on your side with the leg you are going to exercise on the bottom and your other foot up on a chair. 2. Tighten your thigh muscles, and then lift your leg straight up away from the floor. 3. Hold for about 6 seconds, slowly lower the leg back down, and rest a few seconds. 4. Do 8 to 12 repetitions, 3 times a day. Straight-leg raises to the outside   1. Lie on your side with the leg you are going to exercise on top. 2. Tighten your thigh muscles, and then lift your leg straight up away from the floor. 3. Keep your hip and your leg straight in line with the rest of your body, and keep your knee pointing forward. Do not drop your hip back. 4. Hold for about 6 seconds, slowly lower the leg back down, and rest a few seconds.   5. Do 8 to 12 repetitions, 3 times a day. Straight-leg raises to the back   1. Lie on your belly. 2. Tighten your thigh muscles, and then lift your leg straight up away from the floor. 3. Hold for about 6 seconds, slowly lower the leg back down, and rest a few seconds. 4. Do 8 to 12 repetitions, 3 times a day. Shallow standing knee bends   1. Stand with your hands lightly resting on a counter or chair in front of you. Place your feet shoulder-width apart. 2. Slowly bend your knees so that you squat down like you are going to sit in a chair. Make sure your knees do not go in front of your toes. 3. Lower yourself about 6 inches. Your heels should remain on the floor at all times. 4. Rise slowly to a standing position. 5. Do 8 to 12 repetitions, 3 times a day. 6. Note for shallow knee bend on one leg: Remember to limit the bend of your knee to a 30-degree angle at first. When your knee is bent past this point, your kneecap will have more contact with the thighbone, causing more pressure, pain, and possible cartilage damage. Shallow knee bend on one leg   1. Stand on a step, on the leg you want to exercise. Let your other leg hang down off the step. 2. Keeping your head up and your back straight, lean slightly forward. Hold on to a banister if you feel unsteady. 3. Slowly bend your knee so the foot hanging down moves down toward the floor, then slowly straighten your knee again. Your heel should stay on the step, and your knee should not go any farther forward than your toe. As you bend and straighten your leg, try to keep your knee moving in a straight line with your middle toe. 4. Do 8 to 12 repetitions. Standing quadriceps stretch   1. If you are steady on your feet, stand holding a chair, counter, or wall. You can also lie on your stomach or your side to do this exercise. 2. Bend the knee of the leg you want to stretch, and grab the front of your foot with the hand on the same side.  For example, if you are stretching your right leg, use your right hand. 3. Keeping your knees next to each other, pull your foot toward your buttock until you feel a gentle stretch across the front of your hip and down the front of your thigh. Your knee should be pointed directly to the ground, and not out to the side. 4. Hold the stretch for at least 15 to 30 seconds. Repeat 2 to 4 times. Hamstring stretch in doorway   1. Lie on the floor near a doorway, with your buttocks close to the wall. 2. Let the leg you are not stretching extend through the doorway. 3. Put the leg you want to stretch up on the wall, and straighten your knee to feel a gentle stretch at the back of your leg. 4. Hold the stretch for at least 15 to 30 seconds. Repeat 2 to 4 times. Hip rotator stretch   1. Lie on your back with both knees bent and your feet on the floor. 2. Put the ankle of the leg you are going to stretch on your opposite thigh near your knee. 3. Push gently on the knee of the leg you are stretching until you feel a gentle stretch around your hip. 4. Hold the stretch for at least 15 to 30 seconds. Repeat 2 to 4 times. Iliotibial band and buttock stretch   1. Sit on the floor with your legs out in front of you. 2. Bend the knee of the leg you want to stretch, and put that foot on the floor on the outside of the opposite leg. (Your legs will be crossed.)  3. Twist your shoulders toward your bent leg, and put your opposite elbow on that knee. 4. Push your arm against your knee to feel a gentle stretch at the back of your buttock and around your hip. 5. Hold the stretch for at least 15 to 30 seconds. Repeat 2 to 4 times. Calf stretch   1. Stand facing a wall with your hands on the wall at about eye level. 2. Put the leg you want to stretch about a step behind your other leg. 3. Keeping your back heel on the floor, bend your front knee until you feel a stretch in the back leg. 4. Hold the stretch for at least 15 to 30 seconds. Repeat 2 to 4 times. Follow-up care is a key part of your treatment and safety. Be sure to make and go to all appointments, and call your doctor if you are having problems. It's also a good idea to know your test results and keep a list of the medicines you take. Where can you learn more? Go to https://chpepiceweb.[employer]. org and sign in to your TixAlert account. Enter [phone] in the siOPTICA box to learn more about \"Patellar Tracking Disorder: Exercises. \"     If you do not have an account, please click on the \"Sign Up Now\" link. Current as of: November 16, 2020               Content Version: 12.8  © 2006-2021 [employer]. Care instructions adapted under license by [facility] ([facility]). If you have questions about a medical condition or this instruction, always ask your healthcare professional. [address] any warranty or liability for your use of this information.

## 2021-05-04 ENCOUNTER — OFFICE VISIT (OUTPATIENT)
Dept: FAMILY MEDICINE CLINIC | Age: 71
End: 2021-05-04
Payer: MEDICARE

## 2021-05-04 VITALS
DIASTOLIC BLOOD PRESSURE: 65 MMHG | TEMPERATURE: 97.7 F | OXYGEN SATURATION: 98 % | SYSTOLIC BLOOD PRESSURE: 121 MMHG | WEIGHT: 197.6 LBS | RESPIRATION RATE: 16 BRPM | HEART RATE: 76 BPM | BODY MASS INDEX: 35.01 KG/M2 | HEIGHT: 63 IN

## 2021-05-04 DIAGNOSIS — Z12.31 ENCOUNTER FOR SCREENING MAMMOGRAM FOR MALIGNANT NEOPLASM OF BREAST: ICD-10-CM

## 2021-05-04 DIAGNOSIS — I10 ESSENTIAL HYPERTENSION: Primary | ICD-10-CM

## 2021-05-04 PROCEDURE — 99202 OFFICE O/P NEW SF 15 MIN: CPT | Performed by: INTERNAL MEDICINE

## 2021-05-04 NOTE — PROGRESS NOTES
5/4/2021  New Patient Visit  Chief Complaint   Patient presents with   [facility]       HPI  Recently fell an sustained meniscal tear of L knee. She is taking naproxen as needed and doing ok. Has taken many medications for depression and anxiety over the years and has remote history of hospitalizations for these problems. Now doing well. Enjoying spending time with her grandchildren and driving them around when able. Review of Systems  Denies fever, chills, chest pain, and shortness of breath   Past Medical History:   Diagnosis Date    Allergic rhinitis     Anxiety disorder     Bipolar disorder ([address]     Caffeine use 5/19/2015    Cancer ([facility]     skin ca on shoulder    Chronic low back pain 5/19/2015    Depression, major, in remission ([address]     multiple hospitalizations    Endocervical polyp 4/19/2012    GERD (gastroesophageal reflux disease)     Heart murmur, systolic     G0/8    History of bariatric surgery     History of cervical dysplasia 5/19/2015    History of low back pain     Hx of colonic polyp 3/18/2021    Colonoscopy 2/17/21 [place]wa[place] no recurrent polyps repeat in 5 years    Hypertension     borderline no meds    Insomnia 5/19/2015    Knee pain, right     severe OA per  Dr Frances Allen 5/19/2015    MVP (mitral valve prolapse)     Neuropathy     cervical spondylosis    Obesity     Osteoarthritis     Osteoarthritis of both knees     Osteoarthritis of hand 5/19/2015    Pap smear abnormality of cervix with LGSIL     Spinal stenosis, lumbar 5/2013 MRI    disc degeneration and bulges with canal and foraminal stenoses       Social History     Tobacco Use    Smoking status: Never Smoker    Smokeless tobacco: Never Used   Substance Use Topics    Alcohol use: Yes     Comment: wine several times per week but not weekly       Past surgical and family history reviewed and updated.     EXAM  /65   Pulse 76   Temp 97.7 °F (36.5 °C) (Temporal)   Resp 16 Ht 5' 3\" (1.6 m)   Wt 197 lb 9.6 oz (89.6 kg)   SpO2 98%   BMI 35.00 kg/m²     Physical Exam  Vitals signs reviewed. Constitutional:       General: She is not in acute distress. Pulmonary:      Effort: No respiratory distress. Neurological:      Mental Status: She is alert. ASSESSMENT AND PLAN   1. Essential hypertension  Stable- continue present management. 2. Encounter for screening mammogram for malignant neoplasm of breast     - CHIP DIGITAL SCREEN W OR WO CAD BILATERAL; Future    We discussed preventive care in great detail. Counseled on vaccines but she declines. Says she would like to do mammogram and would like to return for AWV.  -------------------------------------------------------------------------------         I spent 30 minutes with patient with the majority of the time dedicated to reviewing prior notes and test results, education, counseling, and/or care coordination.       Jer Smith MD

## 2021-06-08 ENCOUNTER — OFFICE VISIT (OUTPATIENT)
Dept: ORTHOPEDIC SURGERY | Age: 71
End: 2021-06-08
Payer: MEDICARE

## 2021-06-08 VITALS — HEIGHT: 63 IN | WEIGHT: 196 LBS | BODY MASS INDEX: 34.73 KG/M2

## 2021-06-08 DIAGNOSIS — M17.12 PRIMARY OSTEOARTHRITIS OF LEFT KNEE: Primary | ICD-10-CM

## 2021-06-08 DIAGNOSIS — F41.9 ANXIETY DISORDER, UNSPECIFIED TYPE: ICD-10-CM

## 2021-06-08 PROCEDURE — 99203 OFFICE O/P NEW LOW 30 MIN: CPT | Performed by: ORTHOPAEDIC SURGERY

## 2021-06-08 NOTE — PROGRESS NOTES
Chief Complaint:   Chief Complaint   Patient presents with    Knee Injury     Pain, locking and some popping left knee x 6 weeks. Pain in mostly anterior, has tightness behind knee. Twisted knee on 4/13/21. MRI and xrays in [facility]. Has not gone to therapy, doing home exercises. Wearing hinged knee brace. Taking Naproxen for pain and diclofenac gel. KRYSTYNA Esquivel is a 72 y.o. female, who presents with chronic but recently increased left knee pain over the past month or more, relates a ground-level nonsyncopal weightbearing twisting mechanism a couple of months ago. Seen elsewhere had x-rays and MRI scan that showed \"torn cartilage\". She has been wearing an off-the-shelf brace with minimal relief, has taken some naproxen that has helped. Was seen here in the remote past diagnosed with some degree of early degenerative disease of the knee and did relatively well with some physical therapy at that time. She does have a number of comorbidities especially leading to her spine and probably some stenosis as reviewed and noted below. Allergies; medications; past medical, surgical, family, and social history; and problem list have been reviewed today and updated as indicated in this encounter - see below following Ortho specifics. Musculoskeletal: Overweight otherwise healthy-appearing middle-aged female. Upper extremities grossly unremarkable, leg lengths are equal and hip motions painless bilaterally. Right knee is unremarkable, left knee tender to palpation lateral and patellofemoral aspects with valgus deformity that is not correctable to neutral.  Range of motion nearly 0 to 120 degrees although painful in deep flexion.     Radiologic Studies: Prior x-rays of this knee are reviewed in epic, there is end-stage loss of patellofemoral cartilage with sclerosis as well as lateral compartment narrowing significant osteophytes and sclerosis as well consistent with advanced bicompartmental DJD left knee. MRI is also reviewed showing as anticipated advanced degenerative joint disease of these compartments and typical degenerative tearing of the lateral meniscus. ASSESSMENT/PLAN:    Aster Shows was seen today for knee injury. Diagnoses and all orders for this visit:    Primary osteoarthritis of left knee    Anxiety disorder, unspecified type       Diagnosis and treatment alternatives were reviewed with the patient, at this point she declines injections, she has had injections in her back in the past that she found painful. We talked about possible progression toward total knee arthroplasty as a more curative procedure but in the meantime she will pursue weight loss low impact motion based exercise continue with over-the-counter's and or topicals as needed. Questions asked and answered follow-up in 6 weeks for repeat exam and further discussion. Return in about 6 weeks (around 7/20/2021).        De Sacks, MD    6/8/2021  4:57 PM      Patient Active Problem List   Diagnosis    Depression, major, in remission ([address]    Anxiety disorder    MVP (mitral valve prolapse)    Osteoarthritis of both knees    Spinal stenosis, lumbar    History of bariatric surgery    Spondylolisthesis L5 on S1, grade 1    Lumbar disc herniation L5-S1    Osteoarthritis of hand    Chronic low back pain    History of cervical dysplasia    Kyphosis    Essential hypertension    Allergic rhinitis    Cat bite of hand, right, initial encounter    Gastroesophageal reflux disease with esophagitis without hemorrhage    Ganglion cyst of tendon sheath of right hand    Mood disorder of depressed type    Hx of colonic polyp       Past Medical History:   Diagnosis Date    Allergic rhinitis     Anxiety disorder     Bipolar disorder ([address]     Caffeine use 5/19/2015    Cancer ([address]     skin ca on shoulder    Chronic low back pain 5/19/2015    Depression, major, in remission ([address]     multiple hospitalizations    Endocervical polyp 2012    GERD (gastroesophageal reflux disease)     Heart murmur, systolic     B3/3    History of bariatric surgery     History of cervical dysplasia 2015    History of low back pain     Hx of colonic polyp 3/18/2021    Colonoscopy 21 Naff[place]/Awaida no recurrent polyps repeat in 5 years    Hypertension     borderline no meds    Insomnia 2015    Knee pain, right     severe OA per  Dr Elba Kitchen 2015    MVP (mitral valve prolapse)     Neuropathy     cervical spondylosis    Obesity     Osteoarthritis     Osteoarthritis of both knees     Osteoarthritis of hand 2015    Pap smear abnormality of cervix with LGSIL     Spinal stenosis, lumbar 2013 MRI    disc degeneration and bulges with canal and foraminal stenoses       Past Surgical History:   Procedure Laterality Date    BARIATRIC SURGERY      stapling of stomach    BREAST SURGERY      reduction     SECTION      CHOLECYSTECTOMY      COLONOSCOPY  2010    donald one polyp    EYE SURGERY Right 2018    cataract    FOOT SURGERY      FRACTURE SURGERY Bilateral     feet    SKIN BIOPSY      TONSILLECTOMY         Current Outpatient Medications   Medication Sig Dispense Refill    diclofenac sodium (VOLTAREN) 1 % GEL Apply topically 2 times daily 240 g 1    Elastic Bandages & Supports (KNEE BRACE ADJUSTABLE HINGED) MISC 1 Units by Does not apply route once for 1 dose 1 each 0    lamoTRIgine (LAMICTAL) 25 MG tablet Take 2 tablets by mouth daily 180 tablet 1    hydroCHLOROthiazide (HYDRODIURIL) 25 MG tablet Take 1 tablet by mouth daily 90 tablet 1    Multiple Vitamin (MULTI-VITAMIN DAILY PO) Take by mouth      Vitamin D (CHOLECALCIFEROL) 25 MCG (1000 UT) TABS tablet Take 1,000 Units by mouth daily Vitamin D3      naproxen (NAPROSYN) 500 MG tablet Take 1 tablet by mouth 2 times daily as needed for Pain 60 tablet 1     No current facility-administered medications for this visit. No Known Allergies    Social History     Socioeconomic History    Marital status:      Spouse name: Not on file    Number of children: Not on file    Years of education: Not on file    Highest education level: Not on file   Occupational History    Not on file   Tobacco Use    Smoking status: Never Smoker    Smokeless tobacco: Never Used   Vaping Use    Vaping Use: Never used   Substance and Sexual Activity    Alcohol use: Yes     Comment: socially    Drug use: No    Sexual activity: Not Currently     Partners: Male   Other Topics Concern    Not on file   Social History Narrative    Not on file     Social Determinants of Health     Financial Resource Strain:     Difficulty of Paying Living Expenses:    Food Insecurity: No Food Insecurity    Worried About Running Out of Food in the Last Year: Never true    Chloe of Food in the Last Year: Never true   Transportation Needs: No Transportation Needs    Lack of Transportation (Medical): No    Lack of Transportation (Non-Medical):  No   Physical Activity:     Days of Exercise per Week:     Minutes of Exercise per Session:    Stress:     Feeling of Stress :    Social Connections:     Frequency of Communication with Friends and Family:     Frequency of Social Gatherings with Friends and Family:     Attends Congregation Services:     Active Member of Clubs or Organizations:     Attends Club or Organization Meetings:     Marital Status:    Intimate Partner Violence:     Fear of Current or Ex-Partner:     Emotionally Abused:     Physically Abused:     Sexually Abused:        Family History   Problem Relation Age of Onset    Heart Disease Mother     COPD Mother     Anxiety Disorder Mother     Asthma Mother     Other Mother         sarcoidosis    Diabetes Mother     Stroke Father     Obesity Father     Heart Disease Brother     Other Brother         suicide    Obesity Brother     Substance Abuse Brother     Substance Abuse Brother         hepatitis C    Mental Illness Brother     Cancer Paternal Uncle         throat    Diabetes Maternal Grandmother     Stroke Maternal Grandmother     Stroke Paternal Grandmother     Stroke Paternal Grandfather     Cancer Paternal Grandfather          Review of Systems  As follows except as previously noted in HPI:  Constitutional: Negative for chills, diaphoresis, fatigue, fever and unexpected weight change. Respiratory: Negative for cough, shortness of breath and wheezing. Cardiovascular: Negative for chest pain and palpitations. Neurological: Negative for dizziness, syncope, cephalgia. GI / : negative  Musculoskeletal: see HPI       Objective:   Physical Exam   Constitutional: Oriented to person, place, and time. and appears well-developed and well-nourished. :   Head: Normocephalic and atraumatic. Eyes: EOM are normal.   Neck: Neck supple. Cardiovascular: Normal rate and regular rhythm. Pulmonary/Chest: Effort normal. No stridor. No respiratory distress, no wheezes. Abdominal:  No abnormal distension. Neurological: Alert and oriented to person, place, and time. Skin: Skin is warm and dry. Psychiatric: Normal mood and affect.  Behavior is normal. Thought content normal.

## 2021-06-09 DIAGNOSIS — M48.061 SPINAL STENOSIS, LUMBAR: Chronic | ICD-10-CM

## 2021-06-09 RX ORDER — NAPROXEN 500 MG/1
500 TABLET ORAL 2 TIMES DAILY PRN
Qty: 60 TABLET | Refills: 1 | Status: SHIPPED
Start: 2021-06-09 | End: 2021-08-06

## 2021-06-14 ENCOUNTER — TELEPHONE (OUTPATIENT)
Dept: ORTHOPEDIC SURGERY | Age: 71
End: 2021-06-14

## 2021-06-14 NOTE — TELEPHONE ENCOUNTER
6/14/2021 8:44 am Patient phoned the office to report: Saw doctor last week. Discussed surgery. Had a very bad week - knee pain. Discussed surgery w/ family and friends. Patient has decided to go through w/ knee replacement.  Patient wishes to schedule surgery TLK after July 4 th

## 2021-06-14 NOTE — TELEPHONE ENCOUNTER
Can do her on July 26 if that's ok.   Should see me a week or so before but if you can go ahead with the Lolis Corcoran etc.

## 2021-06-14 NOTE — TELEPHONE ENCOUNTER
6/14/2021 3:30 pm Follow up phone call / spoke and informed of [facility]'s response. Patient is in agreement w/ July 26 th for [facility]  Will call patient tomorrow Tuesday, Isabell 15 th to schedule a pre op appointment for patient must  her [facility] now.

## 2021-06-14 NOTE — LETTER
Kindra Serna M.D.   [address] Symone Sommer M.D.   [facility], [address]  [address]     July 26, 2021  Surgery Date:                                                                  Regarding Prescription & Non-Prescription Medications:    Medication Name to [address]:   Days before surgery  Last dose  Naproxen       7   7/18/2021                                                                                                                                               Voltaren Gel       7   7/18/2021                                                                                                                                               All aspirin and aspirin products    7   7/18/2021                                                                                                                                               All OTC vitamins, minerals, herbals and supplements 7   7/18/2021                                                                                                                                               All OTC arthritis pain relievers    7   7/18/2021                                                                                                                                                                                                                                                                                                                                                                                                                                                                                                                                                                                                                                                                                                                                             May take Tylenol for pain as needed up until midnight before surgery. May take 1000 mg every 8 hours not to exceed 3000 mg in 8 hours. ** All medications discontinued prior to surgery may be resumed after your surgery is completed, unless otherwise specified by the physician.     If you have any questions or concerns, please contact our nurse at [phone], Ext. [phone]:  Monday through Thursday 9:00 am - 4:00 pm  Friday 10:00 am - 12 noon    Betty Nayak RN, BSN, ONC

## 2021-06-15 NOTE — TELEPHONE ENCOUNTER
6/15/2021 12:01 pm Follow up phone call / Cat Duran w/ patient    - Pre-Op Visit w/ [facility]SB Wednesday, July 14 th at 10:00 am  - Patient will  Joint Folder w/ letters (in epic) on Friday, June 18 th late morning for review at home  - Patient will keep her previously scheduled appointment w/ Dr. Vernon Long, PCP in [place]  - Patient will make an appointment w/ a dentist for a cleaning and exam before surgery. Patient reports it has been many, many years since she last saw a dentist. Patient denies problems w/ her teeth or gums.

## 2021-06-18 ENCOUNTER — TELEPHONE (OUTPATIENT)
Dept: ORTHOPEDIC SURGERY | Age: 71
End: 2021-06-18

## 2021-06-18 NOTE — TELEPHONE ENCOUNTER
6/18/2021 11:00 am Patient stopped by the office: Joint Folder for [address] given to the patient.  Patient will review data at home before her pre op appointment w/ Dr. Alissa Newton

## 2021-06-30 ENCOUNTER — TELEPHONE (OUTPATIENT)
Dept: ORTHOPEDIC SURGERY | Age: 71
End: 2021-06-30

## 2021-06-30 NOTE — TELEPHONE ENCOUNTER
Late Entry 6/30/2021 6/29/2021 2:31 pm Pre-Optimization Checklist faxed to [facility], nurse navigator, [facility], [place], [phone]  Surgery: Carlotta Harrison 7/26/21

## 2021-07-01 ENCOUNTER — TELEPHONE (OUTPATIENT)
Dept: ORTHOPEDIC SURGERY | Age: 71
End: 2021-07-01

## 2021-07-01 NOTE — TELEPHONE ENCOUNTER
6/30/2021 11:50 am  Fax message to Dr. Jake Culver [phone]: Notification of plans for surgery - [id number] 7/26/2021. Request medical clearance for surgery.  Holding the following medications for surgery: Naproxen and Voltaren Gel 7 days pre op

## 2021-07-06 ENCOUNTER — TELEPHONE (OUTPATIENT)
Dept: ORTHOPEDIC SURGERY | Age: 71
End: 2021-07-06

## 2021-07-06 DIAGNOSIS — M17.12 PRIMARY OSTEOARTHRITIS OF LEFT KNEE: Primary | ICD-10-CM

## 2021-07-06 DIAGNOSIS — G89.29 CHRONIC PAIN OF LEFT KNEE: ICD-10-CM

## 2021-07-06 DIAGNOSIS — M25.562 CHRONIC PAIN OF LEFT KNEE: ICD-10-CM

## 2021-07-06 NOTE — TELEPHONE ENCOUNTER
Patient was informed that CHUCK MELENDEZ will be calling her to schedule a CT Scan prior to surgery date 7/26/2021 for Left TKA (ROBBIE). Order for CT Scan pended in Epic.

## 2021-07-07 ASSESSMENT — PROMIS GLOBAL HEALTH SCALE
IN GENERAL, HOW WOULD YOU RATE YOUR SATISFACTION WITH YOUR SOCIAL ACTIVITIES AND RELATIONSHIPS [ON A SCALE OF 1 (POOR) TO 5 (EXCELLENT)]?: 4
WHO IS THE PERSON COMPLETING THE PROMIS V1.1 SURVEY?: 0
IN THE PAST 7 DAYS, HOW WOULD YOU RATE YOUR PAIN ON AVERAGE [ON A SCALE FROM 0 (NO PAIN) TO 10 (WORST IMAGINABLE PAIN)]?: 3
IN GENERAL, HOW WOULD YOU RATE YOUR MENTAL HEALTH, INCLUDING YOUR MOOD AND YOUR ABILITY TO THINK [ON A SCALE OF 1 (POOR) TO 5 (EXCELLENT)]?: 4
IN THE PAST 7 DAYS, HOW WOULD YOU RATE YOUR FATIGUE ON AVERAGE [ON A SCALE FROM 1 (NONE) TO 5 (VERY SEVERE)]?: 3
SUM OF RESPONSES TO QUESTIONS 2, 4, 5, & 10: 15
IN GENERAL, WOULD YOU SAY YOUR HEALTH IS...[ON A SCALE OF 1 (POOR) TO 5 (EXCELLENT)]: 4
IN THE PAST 7 DAYS, HOW OFTEN HAVE YOU BEEN BOTHERED BY EMOTIONAL PROBLEMS, SUCH AS FEELING ANXIOUS, DEPRESSED, OR IRRITABLE [ON A SCALE FROM 1 (NEVER) TO 5 (ALWAYS)]?: 3
HOW IS THE PROMIS V1.1 BEING ADMINISTERED?: 2
IN GENERAL, PLEASE RATE HOW WELL YOU CARRY OUT YOUR USUAL SOCIAL ACTIVITIES (INCLUDES ACTIVITIES AT HOME, AT WORK, AND IN YOUR COMMUNITY, AND RESPONSIBILITIES AS A PARENT, CHILD, SPOUSE, EMPLOYEE, FRIEND, ETC) [ON A SCALE OF 1 (POOR) TO 5 (EXCELLENT)]?: 4
IN GENERAL, HOW WOULD YOU RATE YOUR PHYSICAL HEALTH [ON A SCALE OF 1 (POOR) TO 5 (EXCELLENT)]?: 4
IN GENERAL, WOULD YOU SAY YOUR QUALITY OF LIFE IS...[ON A SCALE OF 1 (POOR) TO 5 (EXCELLENT)]: 4
SUM OF RESPONSES TO QUESTIONS 3, 6, 7, & 8: 13
TO WHAT EXTENT ARE YOU ABLE TO CARRY OUT YOUR EVERYDAY PHYSICAL ACTIVITIES SUCH AS WALKING, CLIMBING STAIRS, CARRYING GROCERIES, OR MOVING A CHAIR [ON A SCALE OF 1 (NOT AT ALL) TO 5 (COMPLETELY)]?: 3

## 2021-07-07 ASSESSMENT — KOOS JR
GOING UP OR DOWN STAIRS: 2
TWISING OR PIVOTING ON KNEE: 3
BENDING TO THE FLOOR TO PICK UP OBJECT: 0
HOW SEVERE IS YOUR KNEE STIFFNESS AFTER FIRST WAKING IN MORNING: 2
STANDING UPRIGHT: 1
STRAIGHTENING KNEE FULLY: 2
RISING FROM SITTING: 1

## 2021-07-12 ENCOUNTER — HOSPITAL ENCOUNTER (OUTPATIENT)
Dept: CT IMAGING | Age: 71
Discharge: HOME | End: 2021-07-14
Payer: MEDICARE

## 2021-07-12 DIAGNOSIS — G89.29 CHRONIC PAIN OF LEFT KNEE: ICD-10-CM

## 2021-07-12 DIAGNOSIS — M25.562 CHRONIC PAIN OF LEFT KNEE: ICD-10-CM

## 2021-07-12 DIAGNOSIS — M17.12 PRIMARY OSTEOARTHRITIS OF LEFT KNEE: ICD-10-CM

## 2021-07-12 PROCEDURE — 73700 CT LOWER EXTREMITY W/O DYE: CPT

## 2021-07-13 ENCOUNTER — OFFICE VISIT (OUTPATIENT)
Dept: FAMILY MEDICINE CLINIC | Age: 71
End: 2021-07-13
Payer: MEDICARE

## 2021-07-13 ENCOUNTER — TELEPHONE (OUTPATIENT)
Dept: ORTHOPEDIC SURGERY | Age: 71
End: 2021-07-13

## 2021-07-13 VITALS
OXYGEN SATURATION: 99 % | RESPIRATION RATE: 16 BRPM | WEIGHT: 187.8 LBS | BODY MASS INDEX: 33.27 KG/M2 | TEMPERATURE: 97.8 F | HEIGHT: 63 IN | SYSTOLIC BLOOD PRESSURE: 124 MMHG | HEART RATE: 72 BPM | DIASTOLIC BLOOD PRESSURE: 62 MMHG

## 2021-07-13 DIAGNOSIS — I10 ESSENTIAL HYPERTENSION: Primary | ICD-10-CM

## 2021-07-13 DIAGNOSIS — Z01.818 PREOPERATIVE CLEARANCE: ICD-10-CM

## 2021-07-13 PROCEDURE — 99213 OFFICE O/P EST LOW 20 MIN: CPT | Performed by: INTERNAL MEDICINE

## 2021-07-13 PROCEDURE — 93000 ELECTROCARDIOGRAM COMPLETE: CPT | Performed by: INTERNAL MEDICINE

## 2021-07-13 RX ORDER — FAMOTIDINE 20 MG/1
20 TABLET, FILM COATED ORAL NIGHTLY
COMMUNITY
Start: 2021-06-13

## 2021-07-13 ASSESSMENT — ENCOUNTER SYMPTOMS
VOMITING: 0
CONSTIPATION: 0
TROUBLE SWALLOWING: 0
BLOOD IN STOOL: 0
SHORTNESS OF BREATH: 0
DIARRHEA: 0
NAUSEA: 0
COUGH: 0
ABDOMINAL PAIN: 0

## 2021-07-13 NOTE — TELEPHONE ENCOUNTER
Received fax from [facility].   No Authorization required for Lt TKA OPT/OBS 7/26/2021 REJI [facility]

## 2021-07-14 ENCOUNTER — OFFICE VISIT (OUTPATIENT)
Dept: ORTHOPEDIC SURGERY | Age: 71
End: 2021-07-14
Payer: MEDICARE

## 2021-07-14 VITALS — HEIGHT: 63 IN | WEIGHT: 183 LBS | BODY MASS INDEX: 32.43 KG/M2

## 2021-07-14 DIAGNOSIS — M17.12 PRIMARY OSTEOARTHRITIS OF LEFT KNEE: Primary | ICD-10-CM

## 2021-07-14 DIAGNOSIS — R94.31 ABNORMAL EKG: Primary | ICD-10-CM

## 2021-07-14 PROCEDURE — 99214 OFFICE O/P EST MOD 30 MIN: CPT | Performed by: ORTHOPAEDIC SURGERY

## 2021-07-14 RX ORDER — PNV NO.95/FERROUS FUM/FOLIC AC 28MG-0.8MG
1 TABLET ORAL DAILY
COMMUNITY
End: 2022-04-19

## 2021-07-15 ENCOUNTER — PREP FOR PROCEDURE (OUTPATIENT)
Dept: ORTHOPEDIC SURGERY | Age: 71
End: 2021-07-15

## 2021-07-15 ENCOUNTER — TELEPHONE (OUTPATIENT)
Dept: ORTHOPEDIC SURGERY | Age: 71
End: 2021-07-15

## 2021-07-15 DIAGNOSIS — Z01.818 PRE-OP EVALUATION: Primary | ICD-10-CM

## 2021-07-15 RX ORDER — SODIUM CHLORIDE 9 MG/ML
25 INJECTION, SOLUTION INTRAVENOUS PRN
Status: CANCELLED | OUTPATIENT
Start: 2021-07-15

## 2021-07-15 RX ORDER — SODIUM CHLORIDE 9 MG/ML
INJECTION, SOLUTION INTRAVENOUS CONTINUOUS
Status: CANCELLED | OUTPATIENT
Start: 2021-07-15

## 2021-07-15 RX ORDER — ACETAMINOPHEN 325 MG/1
1000 TABLET ORAL ONCE
Status: CANCELLED | OUTPATIENT
Start: 2021-07-15 | End: 2021-07-15

## 2021-07-15 RX ORDER — SODIUM CHLORIDE 0.9 % (FLUSH) 0.9 %
10 SYRINGE (ML) INJECTION PRN
Status: CANCELLED | OUTPATIENT
Start: 2021-07-15

## 2021-07-15 RX ORDER — CELECOXIB 100 MG/1
100 CAPSULE ORAL ONCE
Status: CANCELLED | OUTPATIENT
Start: 2021-07-15 | End: 2021-07-15

## 2021-07-15 RX ORDER — SODIUM CHLORIDE 0.9 % (FLUSH) 0.9 %
10 SYRINGE (ML) INJECTION EVERY 12 HOURS SCHEDULED
Status: CANCELLED | OUTPATIENT
Start: 2021-07-15

## 2021-07-15 NOTE — PROGRESS NOTES
Chief Complaint:   Chief Complaint   Patient presents with    Pre-op Exam     Pre-op visit surgery Lt [facility] 7/26/2021. Saw PCP, Dr Gilford Reason, had abnormal EKG yesterday. He has suggested cardiac clearance. Waiting for appt with cardiologist.  Daughter also in process of moving, may need to postpone. Swetha Narvaez presents with persisting pain in the left knee, pain continues to be significant and interfering with daily activities especially those involving sit to stand transfers weightbearing standing and walking. Following her previous discussion she has elected to proceed with left total knee arthroplasty which is scheduled for July 26. There has been some question about her EKG and primary care has requested cardiac evaluation. Patient also expresses some concern about possibly delaying the surgery and that her family is undergoing a move. Otherwise she is doing well has no other joint complaints. Here today for preoperative discussion and preparation. Allergies; medications; past medical, surgical, family, and social history; and problem list have been reviewed today and updated as indicated in this encounter seen below. Exam: Leg lengths equal hip motion painless right knee benign, left knee shows some mild none correctable valgus alignment with synovitis and significant especially retropatellar crepitus although motion remains preserved 0 to 120 degrees of flexion. The knee is stable to medial lateral stress, patellar tracking is slightly lateral but stable. Radiographs: Prior x-rays of the knees are reviewed showing end-stage patellofemoral arthritis with early but not end-stage tricompartmental features of joint space irregularities and osteophyte formation.     Christopher atwood was seen today for pre-op exam.    Diagnoses and all orders for this visit:    Primary osteoarthritis of left knee       Diagnosis and the alternative of left total knee arthroplasty was reviewed with the patient including the procedure itself anticipated risk benefits and probable outcomes, patient also met with nursing staff today for all perioperative counseling, questions asked and answered, she plans to proceed with the procedure whether on the date as scheduled or postpone for a bit if she would like that would be fine, follow-up to be arranged 7 to 10 days postoperative for clinical exam advancement of activities and x-rays left knee. Return in about 19 days (around 8/2/2021). Current Outpatient Medications   Medication Sig Dispense Refill    Ferrous Sulfate (IRON) 325 (65 Fe) MG TABS Take 1 tablet by mouth daily      famotidine (PEPCID) 20 MG tablet TAKE 1 TABLET BY MOUTH EVERY DAY IN THE EVENING      naproxen (NAPROSYN) 500 MG tablet Take 1 tablet by mouth 2 times daily as needed for Pain 60 tablet 1    Elastic Bandages & Supports (KNEE BRACE ADJUSTABLE HINGED) MISC 1 Units by Does not apply route once for 1 dose 1 each 0    lamoTRIgine (LAMICTAL) 25 MG tablet Take 2 tablets by mouth daily 180 tablet 1    hydroCHLOROthiazide (HYDRODIURIL) 25 MG tablet Take 1 tablet by mouth daily 90 tablet 1    Multiple Vitamin (MULTI-VITAMIN DAILY PO) Take by mouth      Vitamin D (CHOLECALCIFEROL) 25 MCG (1000 UT) TABS tablet Take 1,000 Units by mouth daily Vitamin D3      diclofenac sodium (VOLTAREN) 1 % GEL Apply topically 2 times daily (Patient not taking: Reported on 7/13/2021) 240 g 1     No current facility-administered medications for this visit.        Patient Active Problem List   Diagnosis    Depression, major, in remission ([facility] [address]    Anxiety disorder    MVP (mitral valve prolapse)    Osteoarthritis of both knees    Spinal stenosis, lumbar    History of bariatric surgery    Spondylolisthesis L5 on S1, grade 1    Lumbar disc herniation L5-S1    Osteoarthritis of hand    Chronic low back pain    History of cervical dysplasia    Kyphosis    Essential hypertension    Allergic rhinitis    Cat bite of hand, right, initial encounter    Gastroesophageal reflux disease with esophagitis without hemorrhage    Ganglion cyst of tendon sheath of right hand    Mood disorder of depressed type    Hx of colonic polyp       Past Medical History:   Diagnosis Date    Allergic rhinitis     Anxiety disorder     Bipolar disorder ([facility] [address]     Caffeine use 2015    Cancer ([facility] [address]     skin ca on shoulder    Chronic low back pain 2015    Depression, major, in remission ([facility] [address]     multiple hospitalizations    Endocervical polyp 2012    GERD (gastroesophageal reflux disease)     Heart murmur, systolic     G9/0    History of bariatric surgery     History of cervical dysplasia 2015    History of low back pain     Hx of colonic polyp 3/18/2021    Colonoscopy 21 Naffah/Awaida no recurrent polyps repeat in 5 years    Hypertension     borderline no meds    Insomnia 2015    Knee pain, right     severe OA per  Dr Desiree Bence 2015    MVP (mitral valve prolapse)     Neuropathy     cervical spondylosis    Obesity     Osteoarthritis     Osteoarthritis of both knees     Osteoarthritis of hand 2015    Pap smear abnormality of cervix with LGSIL     Spinal stenosis, lumbar 2013 MRI    disc degeneration and bulges with canal and foraminal stenoses       Past Surgical History:   Procedure Laterality Date    BARIATRIC SURGERY      stapling of stomach    BREAST SURGERY      reduction     SECTION      CHOLECYSTECTOMY      COLONOSCOPY  2010    donald one polyp    EYE SURGERY Right 2018    cataract    FOOT SURGERY      FRACTURE SURGERY Bilateral     feet    SKIN BIOPSY      TONSILLECTOMY         No Known Allergies    Social History     Socioeconomic History    Marital status:      Spouse name: None    Number of children: None    Years of education: None    Highest education level: None   Occupational History    None   Tobacco Use    Smoking status: Never Smoker    Smokeless tobacco: Never Used   Vaping Use    Vaping Use: Never used   Substance and Sexual Activity    Alcohol use: Yes     Comment: socially    Drug use: No    Sexual activity: Not Currently     Partners: Male   Other Topics Concern    None   Social History Narrative    None     Social Determinants of Health     Financial Resource Strain:     Difficulty of Paying Living Expenses:    Food Insecurity: No Food Insecurity    Worried About Running Out of Food in the Last Year: Never true    Chloe of Food in the Last Year: Never true   Transportation Needs: No Transportation Needs    Lack of Transportation (Medical): No    Lack of Transportation (Non-Medical):  No   Physical Activity:     Days of Exercise per Week:     Minutes of Exercise per Session:    Stress:     Feeling of Stress :    Social Connections:     Frequency of Communication with Friends and Family:     Frequency of Social Gatherings with Friends and Family:     Attends Mandaen Services:     Active Member of Clubs or Organizations:     Attends Club or Organization Meetings:     Marital Status:    Intimate Partner Violence:     Fear of Current or Ex-Partner:     Emotionally Abused:     Physically Abused:     Sexually Abused:        Family History   Problem Relation Age of Onset    Heart Disease Mother     COPD Mother     Anxiety Disorder Mother     Asthma Mother     Other Mother         sarcoidosis    Diabetes Mother     Stroke Father     Obesity Father     Heart Disease Brother     Other Brother         suicide    Obesity Brother     Substance Abuse Brother     Substance Abuse Brother         hepatitis C    Mental Illness Brother     Cancer Paternal Uncle         throat    Diabetes Maternal Grandmother     Stroke Maternal Grandmother     Stroke Paternal Grandmother     Stroke Paternal Grandfather     Cancer Paternal Grandfather          Review of Systems  As follows except as previously noted in HPI:  Constitutional: Negative for chills, diaphoresis, fatigue, fever and unexpected weight change. Respiratory: Negative for cough, shortness of breath and wheezing. Cardiovascular: Negative for chest pain and palpitations. Neurological: Negative for dizziness, syncope, cephalgia. GI / : negative  Musculoskeletal: see HPI       Objective:   Physical Exam   Constitutional: Oriented to person, place, and time. and appears well-developed and well-nourished. :   Head: Normocephalic and atraumatic. Eyes: EOM are normal.   Neck: Neck supple. Cardiovascular: Normal rate and regular rhythm. Pulmonary/Chest: Effort normal. No stridor. No respiratory distress, no wheezes. Abdominal:  No abnormal distension. Neurological: Alert and oriented to person, place, and time. Skin: Skin is warm and dry. Psychiatric: Normal mood and affect.  Behavior is normal. Thought content normal.    7/15/2021  11:17 AM

## 2021-07-15 NOTE — TELEPHONE ENCOUNTER
Patient called. Her daughter is moving the week of her current surgery date. Asking to be rescheduled for August 9, 2021. Also waiting for Cardiac Clearance per PCP.

## 2021-07-16 DIAGNOSIS — I10 ESSENTIAL HYPERTENSION: ICD-10-CM

## 2021-07-16 DIAGNOSIS — F32.A MOOD DISORDER OF DEPRESSED TYPE: ICD-10-CM

## 2021-07-16 NOTE — TELEPHONE ENCOUNTER
Surgery moved to 8/9/2021 @ 7:30 a.m. Lt [facility] ([facility]OBBIE) Informed patient of new date and time. New stop date for D/C med list will be 8/1/2021. Patient verbalized understanding. [place]-op appt date: 8/17/2021  11:15 a.m. Marbella Diamond with 8 day post-op appt?   You will be out of office until 8/24/2021

## 2021-07-19 RX ORDER — HYDROCHLOROTHIAZIDE 25 MG/1
25 TABLET ORAL DAILY
Qty: 90 TABLET | Refills: 1 | Status: SHIPPED
Start: 2021-07-19 | End: 2022-01-10

## 2021-07-19 RX ORDER — LAMOTRIGINE 25 MG/1
50 TABLET ORAL DAILY
Qty: 180 TABLET | Refills: 1 | Status: SHIPPED
Start: 2021-07-19 | End: 2022-01-10

## 2021-07-21 NOTE — TELEPHONE ENCOUNTER
Spoke with patient. Keep post-op appt for 8/17/2021. Patient also states she has appt for cardiac clearance with Dr. Perry Alexandra on 8/2/2021.

## 2021-07-28 PROBLEM — E66.9 MODERATE OBESITY: Status: ACTIVE | Noted: 2021-07-28

## 2021-07-28 PROBLEM — F31.9 BIPOLAR DEPRESSION (HCC): Status: ACTIVE | Noted: 2021-07-28

## 2021-07-28 PROBLEM — E66.8 MODERATE OBESITY: Status: ACTIVE | Noted: 2021-07-28

## 2021-08-02 ENCOUNTER — OFFICE VISIT (OUTPATIENT)
Dept: CARDIOLOGY CLINIC | Age: 71
End: 2021-08-02
Payer: MEDICARE

## 2021-08-02 VITALS
HEART RATE: 75 BPM | DIASTOLIC BLOOD PRESSURE: 62 MMHG | HEIGHT: 63 IN | BODY MASS INDEX: 31.89 KG/M2 | WEIGHT: 180 LBS | SYSTOLIC BLOOD PRESSURE: 106 MMHG | RESPIRATION RATE: 18 BRPM | OXYGEN SATURATION: 98 %

## 2021-08-02 DIAGNOSIS — F31.9 BIPOLAR DEPRESSION (HCC): ICD-10-CM

## 2021-08-02 DIAGNOSIS — E66.8 MODERATE OBESITY: ICD-10-CM

## 2021-08-02 DIAGNOSIS — I10 ESSENTIAL HYPERTENSION: Primary | Chronic | ICD-10-CM

## 2021-08-02 DIAGNOSIS — Z01.810 PREOPERATIVE CARDIOVASCULAR EXAMINATION: ICD-10-CM

## 2021-08-02 PROCEDURE — 99204 OFFICE O/P NEW MOD 45 MIN: CPT | Performed by: INTERNAL MEDICINE

## 2021-08-02 PROCEDURE — 93000 ELECTROCARDIOGRAM COMPLETE: CPT | Performed by: INTERNAL MEDICINE

## 2021-08-02 RX ORDER — MULTIVIT WITH MINERALS/LUTEIN
1000 TABLET ORAL DAILY
COMMUNITY

## 2021-08-02 NOTE — PROGRESS NOTES
OUTPATIENT CARDIOLOGY CONSULT    Name: Pat Coello    Age: 80 y.o. Date of Service: 8/2/2021    Reason for Consultation: Hypertension, bipolar disorder, moderate obesity, preoperative cardiovascular evaluation    Referring Physician: Roula Rossi MD    History of Present Illness: The patient is a 78-year-old white female with no known structural heart disease and a risk profile inclusive of hypertension. She presents exclusively due to progressive osteoarthritis of her left knee with recommendation of total joint replacement and is otherwise active with functional capabilities of approximately 4-5 METs with no symptoms of anginal-like chest discomfort or other ischemic equivalents, decompensated left ventricular systolic dysfunction or volume overload nor arrhythmia related manifestations. She is normotensive at the time of her evaluation. Review of Systems: The remainder of a complete multisystem review including consitutional, central nervous, respiratory, circulatory, gastrointestinal, genitourinary, endocrinologic, hematologic, musculoskeletal and psychiatric are negative.     Past Medical History:  Past Medical History:   Diagnosis Date    Allergic rhinitis     Anxiety disorder     Bipolar disorder ([facility] [address]     Caffeine use 5/19/2015    Cancer ([facility] [address]     skin ca on shoulder    Chronic low back pain 5/19/2015    Depression, major, in remission ([address]     multiple hospitalizations    Endocervical polyp 4/19/2012    GERD (gastroesophageal reflux disease)     Heart murmur, systolic     Y0/8    History of bariatric surgery     History of cervical dysplasia 5/19/2015    History of low back pain     Hx of colonic polyp 3/18/2021    Colonoscopy 2/17/21 Naffah/Awaida no recurrent polyps repeat in 5 years    Hypertension     borderline no meds    Insomnia 5/19/2015    Knee pain, right     severe OA per  Dr Carol Palacio 5/19/2015    MVP (mitral valve prolapse)     Neuropathy     cervical spondylosis    Obesity     Osteoarthritis     Osteoarthritis of both knees     Osteoarthritis of hand 2015    Pap smear abnormality of cervix with LGSIL     Spinal stenosis, lumbar 2013 MRI    disc degeneration and bulges with canal and foraminal stenoses       Past Surgical History:  Past Surgical History:   Procedure Laterality Date    BARIATRIC SURGERY  1996    stapling of stomach    BREAST SURGERY      reduction     SECTION      CHOLECYSTECTOMY      COLONOSCOPY  2010    donald one polyp    EYE SURGERY Right 2018    cataract    FOOT SURGERY      FRACTURE SURGERY Bilateral     feet    SKIN BIOPSY      TONSILLECTOMY         Family History:  Family History   Problem Relation Age of Onset    Heart Disease Mother     COPD Mother     Anxiety Disorder Mother     Asthma Mother     Other Mother         sarcoidosis    Diabetes Mother     Stroke Father     Obesity Father     Heart Disease Brother     Other Brother         suicide    Obesity Brother     Substance Abuse Brother     Substance Abuse Brother         hepatitis C    Mental Illness Brother     Cancer Paternal Uncle         throat    Diabetes Maternal Grandmother     Stroke Maternal Grandmother     Stroke Paternal [de-identified]     Stroke Paternal Grandfather     Cancer Paternal Grandfather        Social History:  Social History     Socioeconomic History    Marital status:      Spouse name: Not on file    Number of children: Not on file    Years of education: Not on file    Highest education level: Not on file   Occupational History    Not on file   Tobacco Use    Smoking status: Never Smoker    Smokeless tobacco: Never Used   Vaping Use    Vaping Use: Never used   Substance and Sexual Activity    Alcohol use: Yes     Comment: socially    Drug use: No    Sexual activity: Not Currently     Partners: Male   Other Topics Concern    Not on file   Social History Narrative  Not on file     Social Determinants of Health     Financial Resource Strain:     Difficulty of Paying Living Expenses:    Food Insecurity: No Food Insecurity    Worried About Running Out of Food in the Last Year: Never true    Chloe of Food in the Last Year: Never true   Transportation Needs: No Transportation Needs    Lack of Transportation (Medical): No    Lack of Transportation (Non-Medical): No   Physical Activity:     Days of Exercise per Week:     Minutes of Exercise per Session:    Stress:     Feeling of Stress :    Social Connections:     Frequency of Communication with Friends and Family:     Frequency of Social Gatherings with Friends and Family:     Attends Mormonism Services:     Active Member of Clubs or Organizations:     Attends Club or Organization Meetings:     Marital Status:    Intimate Partner Violence:     Fear of Current or Ex-Partner:     Emotionally Abused:     Physically Abused:     Sexually Abused:         Allergies:  No Known Allergies    Current Medications:  Current Outpatient Medications   Medication Sig Dispense Refill    lamoTRIgine (LAMICTAL) 25 MG tablet Take 2 tablets by mouth daily 180 tablet 1    hydroCHLOROthiazide (HYDRODIURIL) 25 MG tablet Take 1 tablet by mouth daily 90 tablet 1    famotidine (PEPCID) 20 MG tablet TAKE 1 TABLET BY MOUTH EVERY DAY IN THE EVENING      naproxen (NAPROSYN) 500 MG tablet Take 1 tablet by mouth 2 times daily as needed for Pain 60 tablet 1    Ascorbic Acid (VITAMIN C) 1000 MG tablet Take 1,000 mg by mouth daily (Patient not taking: Reported on 8/2/2021)      Ferrous Sulfate (IRON) 325 (65 Fe) MG TABS Take 1 tablet by mouth daily (Patient not taking: Reported on 8/2/2021)      Multiple Vitamin (MULTI-VITAMIN DAILY PO) Take by mouth (Patient not taking: Reported on 8/2/2021)      Vitamin D (CHOLECALCIFEROL) 25 MCG (1000 UT) TABS tablet Take 1,000 Units by mouth daily Vitamin D3 (Patient not taking: Reported on 8/2/2021) No current facility-administered medications for this visit. Physical Exam:  /62   Pulse 75   Resp 18   Ht 5' 3\" (1.6 m)   Wt 180 lb (81.6 kg)   SpO2 98%   BMI 31.89 kg/m²   Wt Readings from Last 3 Encounters:   08/02/21 180 lb (81.6 kg)   07/14/21 183 lb (83 kg)   07/13/21 187 lb 12.8 oz (85.2 kg)     The patient is awake, alert and in no discomfort or distress. No gross musculoskeletal deformity or lymphadenopathy are present. No significant skin or nail changes are present. Gross examination of head, eyes, nose and throat are negative. Jugular venous pressure is normal and no carotid bruits are present. No thyromegaly is noted. Normal respiratory effort is noted with no accessory muscle usage present. Lung fields are clear to ascultation. Cardiac examination is notable for a regular rate and rhythm with no palpable thrill. No gallop rhythm or cardiac murmur are identified. A benign abdominal examination is present with the exception of mild obesity and no masses or organomegaly. A normal abdominal aortic pulsation is present. Intact pulses are present throughout all extremities and no peripheral edema is present. No focal neurologic deficits are present. Laboratory Tests:  Lab Results   Component Value Date    CREATININE 0.8 01/08/2021    BUN 16 01/08/2021     01/08/2021    K 3.6 01/08/2021     01/08/2021    CO2 28 01/08/2021     No results found for: BNP  Lab Results   Component Value Date    WBC 7.6 01/08/2021    RBC 4.71 01/08/2021    HGB 14.5 01/08/2021    HCT 45.8 01/08/2021    MCV 97.2 01/08/2021    MCH 30.8 01/08/2021    MCHC 31.7 01/08/2021    RDW 12.1 01/08/2021     01/08/2021    MPV 8.8 01/08/2021     No results for input(s): ALKPHOS, ALT, AST, PROT, BILITOT, BILIDIR, LABALBU in the last 72 hours.   No results found for: MG  No results found for: PROTIME, INR  Lab Results   Component Value Date    TSH 1.030 10/05/2020     No components found for: CHLPL  Lab Results   Component Value Date    TRIG 67 10/05/2020    TRIG 80 08/08/2019    TRIG 63 05/07/2019     Lab Results   Component Value Date    HDL 65 10/05/2020    HDL 65 08/08/2019    HDL 61 05/07/2019     Lab Results   Component Value Date    LDLCALC 141 (H) 10/05/2020    LDLCALC 128 (H) 08/08/2019    LDLCALC 117 (H) 05/07/2019       Cardiac Tests:  ECG: A resting electrocardiogram demonstrates evidence of sinus rhythm with left axis deviation and a delayed precordial transition zone unchanged from that of prior electrocardiographic tracings      ASSESSMENT / PLAN: On a clinical basis, the patient presents with no active cardiovascular symptomatology and would be an acceptable candidate for her proposed noncardiac surgery without needs of additional cardiovascular assessment. I would recommend cautious periprocedural fluid administration to reduce risk of iatrogenic volume overload and/or perioperative congestive heart failure and will defer to her orthopedic surgeon appropriate perioperative thromboembolic protection based on the inherent risks of the procedure. On a long-term basis, appropriate lifestyle modification to achieve weight reduction will be beneficial to diastolic cardiac performance as well as assist in aggressive risk factor modification of blood pressure and serum lipids and attempt to reduce risk of future atherosclerotic development. I will present return her to your care and would happily reevaluate her in the future should additional cardiovascular difficulties or concerns arise. Follow-up office visit as needed should additional cardiovascular difficulties or concerns arise. Thank you for allowing me to participate in your patient's care. Please feel free to contact me if you have any questions or concerns. Note: This report was completed utilizing a computerized voice recognition software.  Every effort has been made to insure accuracy, however; inadvertent computerized transcription errors may be present. Henry Iniguez.  [facility], [address] [facility]    An electronic copy of this consult note was forwarded to Dr. Skye Patel and Maddi Pendleton

## 2021-08-04 ENCOUNTER — ANESTHESIA EVENT (OUTPATIENT)
Dept: OPERATING ROOM | Age: 71
End: 2021-08-04
Payer: MEDICARE

## 2021-08-04 ENCOUNTER — HOSPITAL ENCOUNTER (OUTPATIENT)
Dept: PREADMISSION TESTING | Age: 71
Discharge: HOME | End: 2021-08-04
Payer: MEDICARE

## 2021-08-04 ENCOUNTER — HOSPITAL ENCOUNTER (OUTPATIENT)
Age: 71
Discharge: HOME | End: 2021-08-06
Payer: MEDICARE

## 2021-08-04 VITALS
HEIGHT: 63 IN | WEIGHT: 180 LBS | TEMPERATURE: 97.5 F | RESPIRATION RATE: 14 BRPM | SYSTOLIC BLOOD PRESSURE: 131 MMHG | BODY MASS INDEX: 31.89 KG/M2 | OXYGEN SATURATION: 98 % | HEART RATE: 69 BPM | DIASTOLIC BLOOD PRESSURE: 63 MMHG

## 2021-08-04 DIAGNOSIS — Z01.818 PREOP TESTING: ICD-10-CM

## 2021-08-04 DIAGNOSIS — Z01.818 PRE-OP EVALUATION: ICD-10-CM

## 2021-08-04 LAB
ANION GAP SERPL CALCULATED.3IONS-SCNC: 6 MMOL/L (ref 7–16)
BASOPHILS ABSOLUTE: 0.02 E9/L (ref 0–0.2)
BASOPHILS RELATIVE PERCENT: 0.4 % (ref 0–2)
BUN BLDV-MCNC: 10 MG/DL (ref 6–23)
CALCIUM SERPL-MCNC: 9.9 MG/DL (ref 8.6–10.2)
CHLORIDE BLD-SCNC: 96 MMOL/L (ref 98–107)
CO2: 30 MMOL/L (ref 22–29)
CREAT SERPL-MCNC: 0.8 MG/DL (ref 0.5–1)
EOSINOPHILS ABSOLUTE: 0.07 E9/L (ref 0.05–0.5)
EOSINOPHILS RELATIVE PERCENT: 1.3 % (ref 0–6)
GFR AFRICAN AMERICAN: >60
GFR NON-AFRICAN AMERICAN: >60 ML/MIN/1.73
GLUCOSE BLD-MCNC: 119 MG/DL (ref 74–99)
HCT VFR BLD CALC: 42.3 % (ref 34–48)
HEMOGLOBIN: 14.5 G/DL (ref 11.5–15.5)
IMMATURE GRANULOCYTES #: 0.01 E9/L
IMMATURE GRANULOCYTES %: 0.2 % (ref 0–5)
LYMPHOCYTES ABSOLUTE: 1.16 E9/L (ref 1.5–4)
LYMPHOCYTES RELATIVE PERCENT: 21.2 % (ref 20–42)
MCH RBC QN AUTO: 32.1 PG (ref 26–35)
MCHC RBC AUTO-ENTMCNC: 34.3 % (ref 32–34.5)
MCV RBC AUTO: 93.6 FL (ref 80–99.9)
MONOCYTES ABSOLUTE: 0.54 E9/L (ref 0.1–0.95)
MONOCYTES RELATIVE PERCENT: 9.9 % (ref 2–12)
NEUTROPHILS ABSOLUTE: 3.67 E9/L (ref 1.8–7.3)
NEUTROPHILS RELATIVE PERCENT: 67 % (ref 43–80)
PDW BLD-RTO: 12.4 FL (ref 11.5–15)
PLATELET # BLD: 278 E9/L (ref 130–450)
PMV BLD AUTO: 8.9 FL (ref 7–12)
POTASSIUM REFLEX MAGNESIUM: 3.7 MMOL/L (ref 3.5–5)
PREALBUMIN: 16 MG/DL (ref 20–40)
RBC # BLD: 4.52 E12/L (ref 3.5–5.5)
SODIUM BLD-SCNC: 132 MMOL/L (ref 132–146)
WBC # BLD: 5.5 E9/L (ref 4.5–11.5)

## 2021-08-04 PROCEDURE — U0003 INFECTIOUS AGENT DETECTION BY NUCLEIC ACID (DNA OR RNA); SEVERE ACUTE RESPIRATORY SYNDROME CORONAVIRUS 2 (SARS-COV-2) (CORONAVIRUS DISEASE [COVID-19]), AMPLIFIED PROBE TECHNIQUE, MAKING USE OF HIGH THROUGHPUT TECHNOLOGIES AS DESCRIBED BY CMS-2020-01-R: HCPCS

## 2021-08-04 PROCEDURE — 85025 COMPLETE CBC W/AUTO DIFF WBC: CPT

## 2021-08-04 PROCEDURE — U0005 INFEC AGEN DETEC AMPLI PROBE: HCPCS

## 2021-08-04 PROCEDURE — 36415 COLL VENOUS BLD VENIPUNCTURE: CPT

## 2021-08-04 PROCEDURE — 84134 ASSAY OF PREALBUMIN: CPT

## 2021-08-04 PROCEDURE — 87081 CULTURE SCREEN ONLY: CPT

## 2021-08-04 PROCEDURE — 80048 BASIC METABOLIC PNL TOTAL CA: CPT

## 2021-08-04 RX ORDER — ROPIVACAINE HYDROCHLORIDE 5 MG/ML
30 INJECTION, SOLUTION EPIDURAL; INFILTRATION; PERINEURAL
Status: CANCELLED | OUTPATIENT
Start: 2021-08-04 | End: 2021-08-04

## 2021-08-04 RX ORDER — LIDOCAINE HYDROCHLORIDE 10 MG/ML
10 INJECTION, SOLUTION INFILTRATION; PERINEURAL
Status: CANCELLED | OUTPATIENT
Start: 2021-08-04 | End: 2021-08-04

## 2021-08-04 RX ORDER — FENTANYL CITRATE 50 UG/ML
25 INJECTION, SOLUTION INTRAMUSCULAR; INTRAVENOUS PRN
Status: CANCELLED | OUTPATIENT
Start: 2021-08-04

## 2021-08-04 RX ORDER — MIDAZOLAM HYDROCHLORIDE 2 MG/2ML
0.5 INJECTION, SOLUTION INTRAMUSCULAR; INTRAVENOUS PRN
Status: CANCELLED | OUTPATIENT
Start: 2021-08-04

## 2021-08-04 ASSESSMENT — KOOS JR
GOING UP OR DOWN STAIRS: 2
RISING FROM SITTING: 2
BENDING TO THE FLOOR TO PICK UP OBJECT: 1
STANDING UPRIGHT: 1
STRAIGHTENING KNEE FULLY: 1
HOW SEVERE IS YOUR KNEE STIFFNESS AFTER FIRST WAKING IN MORNING: 1
TWISING OR PIVOTING ON KNEE: 2

## 2021-08-04 ASSESSMENT — PAIN DESCRIPTION - DESCRIPTORS: DESCRIPTORS: ACHING

## 2021-08-04 ASSESSMENT — PAIN DESCRIPTION - ONSET: ONSET: ON-GOING

## 2021-08-04 ASSESSMENT — PAIN DESCRIPTION - ORIENTATION: ORIENTATION: LEFT

## 2021-08-04 ASSESSMENT — PAIN DESCRIPTION - DIRECTION: RADIATING_TOWARDS: BACK OF KNEE

## 2021-08-04 ASSESSMENT — PAIN DESCRIPTION - PAIN TYPE: TYPE: CHRONIC PAIN

## 2021-08-04 ASSESSMENT — PAIN DESCRIPTION - PROGRESSION: CLINICAL_PROGRESSION: GRADUALLY WORSENING

## 2021-08-04 ASSESSMENT — PAIN DESCRIPTION - LOCATION: LOCATION: KNEE

## 2021-08-04 ASSESSMENT — LIFESTYLE VARIABLES: SMOKING_STATUS: 0

## 2021-08-04 ASSESSMENT — PAIN - FUNCTIONAL ASSESSMENT: PAIN_FUNCTIONAL_ASSESSMENT: PREVENTS OR INTERFERES SOME ACTIVE ACTIVITIES AND ADLS

## 2021-08-04 ASSESSMENT — PAIN SCALES - GENERAL: PAINLEVEL_OUTOF10: 2

## 2021-08-04 ASSESSMENT — PAIN DESCRIPTION - FREQUENCY: FREQUENCY: INTERMITTENT

## 2021-08-04 NOTE — ANESTHESIA PRE PROCEDURE
Department of Anesthesiology  Preprocedure Note       Name:  Bniu Cardoza   Age:  80 y.o.  :  1950                                          MRN:  [id number]         Date:  2021      Surgeon: Erick Renteria):  Sary Sullivan MD    Procedure: Procedure(s):  ROBOTIC ROBBIE ASSISTED TOTAL LEFT KNEE ARTHROPLASTY   +++MARY+++   ++ANB++    Medications prior to admission:   Prior to Admission medications    Medication Sig Start Date End Date Taking?  Authorizing Provider   Ascorbic Acid (VITAMIN C) 1000 MG tablet Take 1,000 mg by mouth daily    Yes Historical Provider, MD   lamoTRIgine (LAMICTAL) 25 MG tablet Take 2 tablets by mouth daily 21  Yes Kyle Enriquez MD   hydroCHLOROthiazide (HYDRODIURIL) 25 MG tablet Take 1 tablet by mouth daily 21  Yes Kyle Enriquez MD   Ferrous Sulfate (IRON) 325 (65 Fe) MG TABS Take 1 tablet by mouth daily    Yes Historical Provider, MD   famotidine (PEPCID) 20 MG tablet nightly Patient states she does not take every day 21  Yes Historical Provider, MD   naproxen (NAPROSYN) 500 MG tablet Take 1 tablet by mouth 2 times daily as needed for Pain 21  Yes Kyle Enriquez MD   Multiple Vitamin (MULTI-VITAMIN DAILY PO) Take by mouth    Yes Historical Provider, MD   Vitamin D (CHOLECALCIFEROL) 25 MCG (1000 UT) TABS tablet Take 1,000 Units by mouth daily Vitamin D3   Yes Historical Provider, MD       Current medications:    Current Outpatient Medications   Medication Sig Dispense Refill    Ascorbic Acid (VITAMIN C) 1000 MG tablet Take 1,000 mg by mouth daily       lamoTRIgine (LAMICTAL) 25 MG tablet Take 2 tablets by mouth daily 180 tablet 1    hydroCHLOROthiazide (HYDRODIURIL) 25 MG tablet Take 1 tablet by mouth daily 90 tablet 1    Ferrous Sulfate (IRON) 325 (65 Fe) MG TABS Take 1 tablet by mouth daily       famotidine (PEPCID) 20 MG tablet nightly Patient states she does not take every day      naproxen (NAPROSYN) 500 MG tablet Take 1 tablet by mouth 2 times daily as needed for Pain 60 tablet 1    Multiple Vitamin (MULTI-VITAMIN DAILY PO) Take by mouth       Vitamin D (CHOLECALCIFEROL) 25 MCG (1000 UT) TABS tablet Take 1,000 Units by mouth daily Vitamin D3       No current facility-administered medications for this encounter. Allergies:  No Known Allergies    Problem List:    Patient Active Problem List   Diagnosis Code    Depression, major, in remission ([facility] [address] F32.5    Anxiety disorder F41.9    MVP (mitral valve prolapse) I34.1    Osteoarthritis of both knees M17.0    Spinal stenosis, lumbar M48.061    History of bariatric surgery Z98.84    Spondylolisthesis L5 on S1, grade 1 M43.10    Lumbar disc herniation L5-S1 M51.26    Osteoarthritis of hand M19.049    Chronic low back pain M54.5, G89.29    History of cervical dysplasia Z87.410    Kyphosis M40.209    Essential hypertension I10    Allergic rhinitis J30.9    Cat bite of hand, right, initial encounter S61.451A, W55. 01XA    Gastroesophageal reflux disease with esophagitis without hemorrhage K21.00    Ganglion cyst of tendon sheath of right hand M67.441    Mood disorder of depressed type F32.9    Hx of colonic polyp Z86.010    Bipolar depression ([facility] [address] F31.9    Moderate obesity E66.8       Past Medical History:        Diagnosis Date    Allergic rhinitis     Anxiety disorder     Bipolar disorder ([facility] [address]     Caffeine use 5/19/2015    Chronic low back pain 5/19/2015    Depression, major, in remission ([facility] [address]     multiple hospitalizations    Endocervical polyp 4/19/2012    GERD (gastroesophageal reflux disease)     Heart murmur, systolic     H0/7    History of bariatric surgery 1980's    gastric stapling    History of cervical dysplasia 5/19/2015    History of low back pain     Hx of colonic polyp 3/18/2021    Colonoscopy 2/17/21 Naffah/Awaida no recurrent polyps repeat in 5 years    Hypertension          Insomnia 5/19/2015    Knee pain, right     severe OA per Dr Baldwin Sever 2015    MVP (mitral valve prolapse)     Neuropathy     cervical spondylosis    Obesity     Osteoarthritis     Osteoarthritis of both knees     Osteoarthritis of hand 2015    Pap smear abnormality of cervix with LGSIL     Spinal stenosis, lumbar 2013 MRI    disc degeneration and bulges with canal and foraminal stenoses       Past Surgical History:        Procedure Laterality Date    BARIATRIC SURGERY      stapling of stomach    BREAST SURGERY      reduction     SECTION      CHOLECYSTECTOMY      COLONOSCOPY  2010    donald one polyp    EYE SURGERY Bilateral 2018    cataract    FOOT SURGERY      FRACTURE SURGERY Bilateral     feet    SKIN BIOPSY      TONSILLECTOMY         Social History:    Social History     Tobacco Use    Smoking status: Never Smoker    Smokeless tobacco: Never Used   Substance Use Topics    Alcohol use: Yes     Comment:  rarely                                Counseling given: Not Answered      Vital Signs (Current):   Vitals:    21 1141   BP: 131/63   Pulse: 69   Resp: 14   Temp: 97.5 °F (36.4 °C)   TempSrc: Infrared   SpO2: 98%   Weight: 180 lb (81.6 kg)   Height: 5' 3\" (1.6 m)                                              BP Readings from Last 3 Encounters:   21 131/63   21 106/62   21 124/62       NPO Status:                                                                                 BMI:   Wt Readings from Last 3 Encounters:   21 180 lb (81.6 kg)   21 180 lb (81.6 kg)   21 183 lb (83 kg)     Body mass index is 31.89 kg/m².     CBC:   Lab Results   Component Value Date    WBC 5.5 2021    RBC 4.52 2021    HGB 14.5 2021    HCT 42.3 2021    MCV 93.6 2021    RDW 12.4 2021     2021       CMP:   Lab Results   Component Value Date     2021    K 3.6 2021    K 4.1 2019     2021    CO2 28 2021 BUN 16 01/08/2021    CREATININE 0.8 01/08/2021    GFRAA >60 01/08/2021    LABGLOM >60 01/08/2021    GLUCOSE 96 01/08/2021    GLUCOSE 102 02/08/2012    PROT 7.5 01/08/2021    CALCIUM 9.8 01/08/2021    BILITOT 0.3 01/08/2021    ALKPHOS 71 01/08/2021    AST 27 01/08/2021    ALT 23 01/08/2021       POC Tests: No results for input(s): POCGLU, POCNA, POCK, POCCL, POCBUN, POCHEMO, POCHCT in the last 72 hours. Coags: No results found for: PROTIME, INR, APTT    HCG (If Applicable): No results found for: PREGTESTUR, PREGSERUM, HCG, HCGQUANT     ABGs: No results found for: PHART, PO2ART, TYX9QAK, PWB8WUV, BEART, V8RACNEW     Type & Screen (If Applicable):  No results found for: LABABO, LABRH    Drug/Infectious Status (If Applicable):  No results found for: HIV, HEPCAB    COVID-19 Screening (If Applicable): No results found for: COVID19        Anesthesia Evaluation   no history of anesthetic complications:   Airway: Mallampati: III  TM distance: >3 FB   Neck ROM: full  Mouth opening: > = 3 FB Dental:    (+) caps      Pulmonary:Negative Pulmonary ROS       (-) not a current smoker                           Cardiovascular:  Exercise tolerance: good (>4 METS),   (+) hypertension:,         Rhythm: regular  Rate: normal      Cleared by cardiology              Neuro/Psych:   (+) psychiatric history:            GI/Hepatic/Renal:   (+) GERD: well controlled,          ROS comment: Gastric stapling. Endo/Other:    (+) : arthritis: OA., .                 Abdominal:             Vascular: negative vascular ROS. Other Findings:             Anesthesia Plan      spinal     ASA 3     (Agrees to spinal & AC block.)        Anesthetic plan and risks discussed with patient.                       Yane Clifford MD   8/4/2021

## 2021-08-04 NOTE — PROGRESS NOTES
[facility] PRE-ADMISSION TESTING INSTRUCTIONS    The Preadmission Testing patient is instructed accordingly using the following criteria (check applicable):    ARRIVAL INSTRUCTIONS:  [x] Parking the day of Surgery is located in the Main Entrance lot. Upon entering the door, make an immediate right to the surgery reception desk    [] Bring photo ID and insurance card    [] Bring in a copy of Living will or Durable Power of  papers. [x] Please be sure to arrange for responsible adult to provide transportation to and from the hospital    [x] Please arrange for responsible adult to be with you for the 24 hour period post procedure due to having anesthesia      GENERAL INSTRUCTIONS:    [x] Nothing by mouth after midnight, including gum, candy, mints or water    [x] You may brush your teeth, but do not swallow any water    [x] Take medications as instructed with 1-2 oz of water    [x] Stop herbal supplements and vitamins 5 days prior to procedure    [] Follow preop dosing of blood thinners per physician instructions    [] Take 1/2 dose of evening insulin, but no insulin after midnight    [] No oral diabetic medications after midnight    [] If diabetic and have low blood sugar or feel symptomatic, take 1-2oz apple juice only    [] Bring inhalers day of surgery    [] Bring C-PAP/ Bi-Pap day of surgery    [] Bring urine specimen day of surgery    [] Shower or bath with soap, lather and rinse well, AM of Surgery, no lotion, powders or creams to surgical site    [] Follow bowel prep as instructed per surgeon    [x] No tobacco products within 24 hours of surgery     [x] No alcohol or illegal drug use within 24 hours of surgery.     [x] Jewelry, body piercing's, eyeglasses, contact lenses and dentures are not permitted into surgery (bring cases)      [x] Please do not wear any nail polish, make up or hair products on the day of surgery    [x] You can expect a call the business day prior to procedure to notify you if your arrival time changes    [x] If you receive a survey after surgery we would greatly appreciate your comments    [] Parent/guardian of a minor must accompany their child and remain on the premises  the entire time they are under our care     [] Pediatric patients may bring favorite toy, blanket or comfort item with them    [] A caregiver or family member must remain with the patient during their stay if they are mentally handicapped, have dementia, disoriented or unable to use a call light or would be a safety concern if left unattended    [x] Please notify surgeon if you develop any illness between now and time of surgery (cold, cough, sore throat, fever, nausea, vomiting) or any signs of infections  including skin, wounds, and dental.    [x]  The Outpatient Pharmacy is available to fill your prescription here on your day of surgery, ask your preop nurse for details    [] Other instructions    EDUCATIONAL MATERIALS PROVIDED:    [x] PAT Preoperative Education Packet/Booklet     [x] Medication List    [] Transfusion bracelet applied with instructions    [x] Shower with soap, lather and rinse well, and use CHG wipes provided the evening before surgery as instructed    [x] Incentive spirometer with instructions

## 2021-08-04 NOTE — PROGRESS NOTES
Have you been tested for COVID  Yes   8/4/21        Have you been told you were positive for COVID No  Have you had any known exposure to someone that is positive for COVID No  Do you have a cough                   No              Do you have shortness of breath No                 Do you have a sore throat            No                Are you having chills                    No                Are you having muscle aches. No                    Please come to the hospital wearing a mask and have your significant other wear a mask as well. Both of you should check your temperature before leaving to come here,  if it is 100 or higher please call [phone] for instruction.

## 2021-08-05 LAB — MRSA CULTURE ONLY: NORMAL

## 2021-08-05 NOTE — H&P (VIEW-ONLY)
Department of Orthopedic Surgery  Attending History and Physical        CHIEF COMPLAINT: Left knee pain    Reason for Admission: Left total knee arthroplasty    History Obtained From: patient, electronic medical record    HISTORY OF PRESENT ILLNESS:      The patient is a 80 y.o. female with significant past medical history of issues as enumerated and reviewed below who presents with chronic and persistent progressively disabling left knee pain that has not responded to reasonable nonsurgical means including activity modification oral medication or injections. X-rays disclose end-stage degenerative joint disease of the left knee and the patient presents now for elective left total knee arthroplasty.     Past Medical History:        Diagnosis Date    Allergic rhinitis     Anxiety disorder     Bipolar disorder (HCC)     Caffeine use 2015    Chronic low back pain 2015    Depression, major, in remission ([facility] [address]     multiple hospitalizations    Endocervical polyp 2012    GERD (gastroesophageal reflux disease)     Heart murmur, systolic     N2/1    History of bariatric surgery     gastric stapling    History of cervical dysplasia 2015    History of low back pain     Hx of colonic polyp 3/18/2021    Colonoscopy 21 Naffah/Awaida no recurrent polyps repeat in 5 years    Hypertension          Insomnia 2015    Knee pain, right     severe OA per  Dr Kirby Eng 2015    MVP (mitral valve prolapse)     Neuropathy     cervical spondylosis    Obesity     Osteoarthritis     Osteoarthritis of both knees     Osteoarthritis of hand 2015    Pap smear abnormality of cervix with LGSIL     Spinal stenosis, lumbar 2013 MRI    disc degeneration and bulges with canal and foraminal stenoses     Past Surgical History:        Procedure Laterality Date    BARIATRIC SURGERY      stapling of stomach    BREAST SURGERY      reduction     SECTION      CHOLECYSTECTOMY      COLONOSCOPY  1/2010    donald one polyp    EYE SURGERY Bilateral 09/2018    cataract    FOOT SURGERY      FRACTURE SURGERY Bilateral     feet    SKIN BIOPSY      TONSILLECTOMY       Immunizations:              Influenza:  Not indicated            Pneumococcal Polysaccharide:  Not indicated    Medications Prior to Admission:     Current Outpatient Medications:     Ascorbic Acid (VITAMIN C) 1000 MG tablet, Take 1,000 mg by mouth daily , Disp: , Rfl:     lamoTRIgine (LAMICTAL) 25 MG tablet, Take 2 tablets by mouth daily, Disp: 180 tablet, Rfl: 1    hydroCHLOROthiazide (HYDRODIURIL) 25 MG tablet, Take 1 tablet by mouth daily, Disp: 90 tablet, Rfl: 1    Ferrous Sulfate (IRON) 325 (65 Fe) MG TABS, Take 1 tablet by mouth daily , Disp: , Rfl:     famotidine (PEPCID) 20 MG tablet, nightly Patient states she does not take every day, Disp: , Rfl:     naproxen (NAPROSYN) 500 MG tablet, Take 1 tablet by mouth 2 times daily as needed for Pain, Disp: 60 tablet, Rfl: 1    Multiple Vitamin (MULTI-VITAMIN DAILY PO), Take by mouth , Disp: , Rfl:     Vitamin D (CHOLECALCIFEROL) 25 MCG (1000 UT) TABS tablet, Take 1,000 Units by mouth daily Vitamin D3, Disp: , Rfl:       Allergies:  Patient has no known allergies. Social History:   TOBACCO:   reports that she has never smoked. She has never used smokeless tobacco.  ETOH:   reports current alcohol use.   Patient currently lives in a private home  Family History:       Problem Relation Age of Onset    Heart Disease Mother     COPD Mother     Anxiety Disorder Mother     Asthma Mother     Other Mother         sarcoidosis    Diabetes Mother     Stroke Father     Obesity Father     Heart Disease Brother     Other Brother         suicide    Obesity Brother     Substance Abuse Brother     Substance Abuse Brother         hepatitis C    Mental Illness Brother     Cancer Paternal Uncle         throat    Diabetes Maternal Grandmother     Stroke Maternal Grandmother     Stroke Paternal Grandmother     Stroke Paternal Grandfather     Cancer Paternal Grandfather      REVIEW OF SYSTEMS:  Constitutional: Negative for chills, diaphoresis, fatigue, fever and unexpected weight change. Respiratory: Negative for cough, shortness of breath and wheezing. Cardiovascular: Negative for chest pain and palpitations. Neurological: Negative for dizziness, syncope, weakness and numbness. Musculoskeletal: see HPI     PHYSICAL EXAM:  VITALS:  There were no vitals taken for this visit. CONSTITUTIONAL: Healthy 73-year-old female  NECK:  supple, symmetrical, trachea midline  LUNGS:  clear  CARDIOVASCULAR:  Regular rate and rhythm  MUSCULOSKELETAL:  Leg lengths equal hip motion painless right knee benign, left knee shows some mild none correctable valgus alignment with synovitis and significant especially retropatellar crepitus although motion remains preserved 0 to 120 degrees of flexion. The knee is stable to medial lateral stress, patellar tracking is slightly lateral but stable. NEUROLOGIC:  Grossly intact motor and sensation  Mental Status Exam:  Level of Alertness:   awake  Orientation:   person, place, time    DATA:  CBC with Differential:    Lab Results   Component Value Date    WBC 5.5 08/04/2021    RBC 4.52 08/04/2021    HGB 14.5 08/04/2021    HCT 42.3 08/04/2021     08/04/2021    MCV 93.6 08/04/2021    MCH 32.1 08/04/2021    MCHC 34.3 08/04/2021    RDW 12.4 08/04/2021    LYMPHOPCT 21.2 08/04/2021    MONOPCT 9.9 08/04/2021    BASOPCT 0.4 08/04/2021    MONOSABS 0.54 08/04/2021    LYMPHSABS 1.16 08/04/2021    EOSABS 0.07 08/04/2021    BASOSABS 0.02 08/04/2021       Radiographs:  Prior x-rays of the knees are reviewed showing end-stage patellofemoral arthritis with early but not end-stage tricompartmental features of joint space irregularities and osteophyte formation consistent with tricompartmental DJD left knee.     ASSESSMENT AND PLAN:    Primary osteoarthritis of left knee        Diagnosis and the alternative of left total knee arthroplasty was reviewed with the patient including the procedure itself anticipated risk benefits and probable outcomes, patient also met with nursing staff  for all perioperative counseling, questions asked and answered, she plans to proceed with the procedure and this will likely be on an overnight stay basis to ensure pain control and safely independent mobility.

## 2021-08-06 LAB
SARS-COV-2: NOT DETECTED
SOURCE: NORMAL

## 2021-08-09 ENCOUNTER — HOSPITAL ENCOUNTER (OUTPATIENT)
Age: 71
Setting detail: OBSERVATION
Discharge: HOME | End: 2021-08-11
Attending: ORTHOPAEDIC SURGERY | Admitting: ORTHOPAEDIC SURGERY
Payer: MEDICARE

## 2021-08-09 ENCOUNTER — ANESTHESIA (OUTPATIENT)
Dept: OPERATING ROOM | Age: 71
End: 2021-08-09
Payer: MEDICARE

## 2021-08-09 VITALS — SYSTOLIC BLOOD PRESSURE: 100 MMHG | TEMPERATURE: 93.9 F | DIASTOLIC BLOOD PRESSURE: 52 MMHG | OXYGEN SATURATION: 96 %

## 2021-08-09 DIAGNOSIS — Z98.84 HISTORY OF BARIATRIC SURGERY: ICD-10-CM

## 2021-08-09 DIAGNOSIS — F32.5 DEPRESSION, MAJOR, IN REMISSION (HCC): ICD-10-CM

## 2021-08-09 DIAGNOSIS — I10 ESSENTIAL HYPERTENSION: ICD-10-CM

## 2021-08-09 DIAGNOSIS — Z86.010 HX OF COLONIC POLYP: ICD-10-CM

## 2021-08-09 DIAGNOSIS — F32.A MOOD DISORDER OF DEPRESSED TYPE: ICD-10-CM

## 2021-08-09 DIAGNOSIS — Z96.652 S/P TOTAL KNEE ARTHROPLASTY, LEFT: ICD-10-CM

## 2021-08-09 DIAGNOSIS — I34.1 MVP (MITRAL VALVE PROLAPSE): ICD-10-CM

## 2021-08-09 DIAGNOSIS — Z87.410 HISTORY OF CERVICAL DYSPLASIA: ICD-10-CM

## 2021-08-09 DIAGNOSIS — M43.10 SPONDYLOLISTHESIS, GRADE 1: ICD-10-CM

## 2021-08-09 DIAGNOSIS — S61.451A CAT BITE OF HAND, RIGHT, INITIAL ENCOUNTER: ICD-10-CM

## 2021-08-09 DIAGNOSIS — M51.26 LUMBAR DISC HERNIATION: ICD-10-CM

## 2021-08-09 DIAGNOSIS — E66.8 MODERATE OBESITY: ICD-10-CM

## 2021-08-09 DIAGNOSIS — F31.9 BIPOLAR DEPRESSION (HCC): ICD-10-CM
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
## 2021-08-09 DIAGNOSIS — Z01.818 PREOP TESTING: Primary | ICD-10-CM

## 2021-08-09 DIAGNOSIS — K21.00 GASTROESOPHAGEAL REFLUX DISEASE WITH ESOPHAGITIS WITHOUT HEMORRHAGE: ICD-10-CM

## 2021-08-09 DIAGNOSIS — M67.441 GANGLION CYST OF TENDON SHEATH OF RIGHT HAND: ICD-10-CM

## 2021-08-09 PROCEDURE — 3700000000 HC ANESTHESIA ATTENDED CARE: Performed by: ORTHOPAEDIC SURGERY

## 2021-08-09 PROCEDURE — 7100000001 HC PACU RECOVERY - ADDTL 15 MIN: Performed by: ORTHOPAEDIC SURGERY

## 2021-08-09 PROCEDURE — 6370000000 HC RX 637 (ALT 250 FOR IP)

## 2021-08-09 PROCEDURE — 88305 TISSUE EXAM BY PATHOLOGIST: CPT

## 2021-08-09 PROCEDURE — 2580000003 HC RX 258: Performed by: ORTHOPAEDIC SURGERY

## 2021-08-09 PROCEDURE — G0378 HOSPITAL OBSERVATION PER HR: HCPCS

## 2021-08-09 PROCEDURE — 2500000003 HC RX 250 WO HCPCS: Performed by: ORTHOPAEDIC SURGERY

## 2021-08-09 PROCEDURE — 7100000000 HC PACU RECOVERY - FIRST 15 MIN: Performed by: ORTHOPAEDIC SURGERY

## 2021-08-09 PROCEDURE — 6360000002 HC RX W HCPCS: Performed by: ANESTHESIOLOGY

## 2021-08-09 PROCEDURE — 6370000000 HC RX 637 (ALT 250 FOR IP): Performed by: ORTHOPAEDIC SURGERY

## 2021-08-09 PROCEDURE — 6360000002 HC RX W HCPCS: Performed by: ORTHOPAEDIC SURGERY

## 2021-08-09 PROCEDURE — 2709999900 HC NON-CHARGEABLE SUPPLY: Performed by: ORTHOPAEDIC SURGERY

## 2021-08-09 PROCEDURE — 2500000003 HC RX 250 WO HCPCS: Performed by: NURSE ANESTHETIST, CERTIFIED REGISTERED

## 2021-08-09 PROCEDURE — 27447 TOTAL KNEE ARTHROPLASTY: CPT | Performed by: ORTHOPAEDIC SURGERY

## 2021-08-09 PROCEDURE — 20985 CPTR-ASST DIR MS PX: CPT | Performed by: ORTHOPAEDIC SURGERY

## 2021-08-09 PROCEDURE — 3600000005 HC SURGERY LEVEL 5 BASE: Performed by: ORTHOPAEDIC SURGERY

## 2021-08-09 PROCEDURE — 6360000002 HC RX W HCPCS: Performed by: NURSE ANESTHETIST, CERTIFIED REGISTERED

## 2021-08-09 PROCEDURE — C1776 JOINT DEVICE (IMPLANTABLE): HCPCS | Performed by: ORTHOPAEDIC SURGERY

## 2021-08-09 PROCEDURE — 3700000001 HC ADD 15 MINUTES (ANESTHESIA): Performed by: ORTHOPAEDIC SURGERY

## 2021-08-09 PROCEDURE — 2720000010 HC SURG SUPPLY STERILE: Performed by: ORTHOPAEDIC SURGERY

## 2021-08-09 PROCEDURE — C1713 ANCHOR/SCREW BN/BN,TIS/BN: HCPCS | Performed by: ORTHOPAEDIC SURGERY

## 2021-08-09 PROCEDURE — 2580000003 HC RX 258: Performed by: NURSE ANESTHETIST, CERTIFIED REGISTERED

## 2021-08-09 PROCEDURE — 97165 OT EVAL LOW COMPLEX 30 MIN: CPT

## 2021-08-09 PROCEDURE — 64447 NJX AA&/STRD FEMORAL NRV IMG: CPT | Performed by: ANESTHESIOLOGY

## 2021-08-09 PROCEDURE — 97161 PT EVAL LOW COMPLEX 20 MIN: CPT

## 2021-08-09 PROCEDURE — 3600000015 HC SURGERY LEVEL 5 ADDTL 15MIN: Performed by: ORTHOPAEDIC SURGERY

## 2021-08-09 PROCEDURE — 6360000002 HC RX W HCPCS

## 2021-08-09 PROCEDURE — 88311 DECALCIFY TISSUE: CPT

## 2021-08-09 DEVICE — BASEPLATE TIB SZ 4 KNEE TRITANIUM CEM PRI LO PROF TRIATHLON: Type: IMPLANTABLE DEVICE | Site: KNEE | Status: FUNCTIONAL

## 2021-08-09 DEVICE — IMPLANTABLE DEVICE: Type: IMPLANTABLE DEVICE | Site: KNEE | Status: FUNCTIONAL

## 2021-08-09 DEVICE — IMPLANT PAT DIA32MM THK10MM X3 ASYM TRIATHLON: Type: IMPLANTABLE DEVICE | Site: KNEE | Status: FUNCTIONAL

## 2021-08-09 DEVICE — CEMENT BNE 40 GM RADIOPAQUE BA SIMPLEX P: Type: IMPLANTABLE DEVICE | Site: KNEE | Status: FUNCTIONAL

## 2021-08-09 RX ORDER — SODIUM CHLORIDE 0.9 % (FLUSH) 0.9 %
10 SYRINGE (ML) INJECTION EVERY 12 HOURS SCHEDULED
Status: DISCONTINUED | OUTPATIENT
Start: 2021-08-09 | End: 2021-08-11 | Stop reason: HOSPADM

## 2021-08-09 RX ORDER — MIDAZOLAM HYDROCHLORIDE 2 MG/2ML
0.5 INJECTION, SOLUTION INTRAMUSCULAR; INTRAVENOUS PRN
Status: DISCONTINUED | OUTPATIENT
Start: 2021-08-09 | End: 2021-08-09 | Stop reason: HOSPADM

## 2021-08-09 RX ORDER — PHENYLEPHRINE HCL IN 0.9% NACL 1 MG/10 ML
SYRINGE (ML) INTRAVENOUS PRN
Status: DISCONTINUED | OUTPATIENT
Start: 2021-08-09 | End: 2021-08-09

## 2021-08-09 RX ORDER — MIDAZOLAM HYDROCHLORIDE 1 MG/ML
INJECTION INTRAMUSCULAR; INTRAVENOUS PRN
Status: DISCONTINUED | OUTPATIENT
Start: 2021-08-09 | End: 2021-08-09 | Stop reason: SDUPTHER

## 2021-08-09 RX ORDER — CALCIUM CHLORIDE 100 MG/ML
INJECTION INTRAVENOUS; INTRAVENTRICULAR PRN
Status: DISCONTINUED | OUTPATIENT
Start: 2021-08-09 | End: 2021-08-09 | Stop reason: ALTCHOICE

## 2021-08-09 RX ORDER — SODIUM CHLORIDE 0.9 % (FLUSH) 0.9 %
10 SYRINGE (ML) INJECTION PRN
Status: DISCONTINUED | OUTPATIENT
Start: 2021-08-09 | End: 2021-08-09 | Stop reason: HOSPADM

## 2021-08-09 RX ORDER — FENTANYL CITRATE 50 UG/ML
25 INJECTION, SOLUTION INTRAMUSCULAR; INTRAVENOUS EVERY 5 MIN PRN
Status: DISCONTINUED | OUTPATIENT
Start: 2021-08-09 | End: 2021-08-09 | Stop reason: HOSPADM

## 2021-08-09 RX ORDER — FENTANYL CITRATE 50 UG/ML
50 INJECTION, SOLUTION INTRAMUSCULAR; INTRAVENOUS EVERY 5 MIN PRN
Status: DISCONTINUED | OUTPATIENT
Start: 2021-08-09 | End: 2021-08-09 | Stop reason: HOSPADM

## 2021-08-09 RX ORDER — ACETAMINOPHEN 325 MG/1
650 TABLET ORAL EVERY 6 HOURS
Status: DISCONTINUED | OUTPATIENT
Start: 2021-08-09 | End: 2021-08-11 | Stop reason: HOSPADM

## 2021-08-09 RX ORDER — ACETAMINOPHEN 500 MG
1000 TABLET ORAL ONCE
Status: COMPLETED | OUTPATIENT
Start: 2021-08-09 | End: 2021-08-09

## 2021-08-09 RX ORDER — SODIUM CHLORIDE 0.9 % (FLUSH) 0.9 %
10 SYRINGE (ML) INJECTION PRN
Status: DISCONTINUED | OUTPATIENT
Start: 2021-08-09 | End: 2021-08-11 | Stop reason: HOSPADM

## 2021-08-09 RX ORDER — OXYCODONE HYDROCHLORIDE AND ACETAMINOPHEN 5; 325 MG/1; MG/1
1 TABLET ORAL EVERY 6 HOURS PRN
Qty: 28 TABLET | Refills: 0 | Status: SHIPPED | OUTPATIENT
Start: 2021-08-09 | End: 2021-08-16

## 2021-08-09 RX ORDER — CELECOXIB 100 MG/1
CAPSULE ORAL
Status: COMPLETED
Start: 2021-08-09 | End: 2021-08-09

## 2021-08-09 RX ORDER — ACETAMINOPHEN 500 MG
TABLET ORAL
Status: COMPLETED
Start: 2021-08-09 | End: 2021-08-09

## 2021-08-09 RX ORDER — MORPHINE SULFATE 2 MG/ML
2 INJECTION, SOLUTION INTRAMUSCULAR; INTRAVENOUS EVERY 4 HOURS PRN
Status: DISCONTINUED | OUTPATIENT
Start: 2021-08-09 | End: 2021-08-11 | Stop reason: HOSPADM

## 2021-08-09 RX ORDER — FENTANYL CITRATE 50 UG/ML
INJECTION, SOLUTION INTRAMUSCULAR; INTRAVENOUS
Status: COMPLETED
Start: 2021-08-09 | End: 2021-08-09

## 2021-08-09 RX ORDER — KETAMINE HYDROCHLORIDE 10 MG/ML
INJECTION, SOLUTION INTRAMUSCULAR; INTRAVENOUS PRN
Status: DISCONTINUED | OUTPATIENT
Start: 2021-08-09 | End: 2021-08-09 | Stop reason: SDUPTHER

## 2021-08-09 RX ORDER — FENTANYL CITRATE 50 UG/ML
INJECTION, SOLUTION INTRAMUSCULAR; INTRAVENOUS PRN
Status: DISCONTINUED | OUTPATIENT
Start: 2021-08-09 | End: 2021-08-09 | Stop reason: SDUPTHER

## 2021-08-09 RX ORDER — PROPOFOL 10 MG/ML
INJECTION, EMULSION INTRAVENOUS PRN
Status: DISCONTINUED | OUTPATIENT
Start: 2021-08-09 | End: 2021-08-09

## 2021-08-09 RX ORDER — OXYCODONE HYDROCHLORIDE AND ACETAMINOPHEN 5; 325 MG/1; MG/1
1 TABLET ORAL EVERY 6 HOURS PRN
Qty: 28 TABLET | Refills: 0 | Status: SHIPPED | OUTPATIENT
Start: 2021-08-09 | End: 2021-08-09 | Stop reason: HOSPADM

## 2021-08-09 RX ORDER — SODIUM CHLORIDE 9 MG/ML
INJECTION, SOLUTION INTRAVENOUS CONTINUOUS
Status: DISCONTINUED | OUTPATIENT
Start: 2021-08-09 | End: 2021-08-09

## 2021-08-09 RX ORDER — LAMOTRIGINE 25 MG/1
50 TABLET ORAL DAILY
Status: DISCONTINUED | OUTPATIENT
Start: 2021-08-10 | End: 2021-08-11 | Stop reason: HOSPADM

## 2021-08-09 RX ORDER — SENNA AND DOCUSATE SODIUM 50; 8.6 MG/1; MG/1
1 TABLET, FILM COATED ORAL 2 TIMES DAILY
Status: DISCONTINUED | OUTPATIENT
Start: 2021-08-09 | End: 2021-08-11 | Stop reason: HOSPADM

## 2021-08-09 RX ORDER — SODIUM CHLORIDE 0.9 % (FLUSH) 0.9 %
10 SYRINGE (ML) INJECTION EVERY 12 HOURS SCHEDULED
Status: DISCONTINUED | OUTPATIENT
Start: 2021-08-09 | End: 2021-08-09 | Stop reason: HOSPADM

## 2021-08-09 RX ORDER — LIDOCAINE HYDROCHLORIDE 10 MG/ML
10 INJECTION, SOLUTION INFILTRATION; PERINEURAL
Status: DISCONTINUED | OUTPATIENT
Start: 2021-08-09 | End: 2021-08-09 | Stop reason: HOSPADM

## 2021-08-09 RX ORDER — ROPIVACAINE HYDROCHLORIDE 5 MG/ML
30 INJECTION, SOLUTION EPIDURAL; INFILTRATION; PERINEURAL
Status: COMPLETED | OUTPATIENT
Start: 2021-08-09 | End: 2021-08-09

## 2021-08-09 RX ORDER — FENTANYL CITRATE 50 UG/ML
25 INJECTION, SOLUTION INTRAMUSCULAR; INTRAVENOUS PRN
Status: DISCONTINUED | OUTPATIENT
Start: 2021-08-09 | End: 2021-08-09 | Stop reason: HOSPADM

## 2021-08-09 RX ORDER — HYDROCHLOROTHIAZIDE 25 MG/1
25 TABLET ORAL DAILY
Status: DISCONTINUED | OUTPATIENT
Start: 2021-08-09 | End: 2021-08-11 | Stop reason: HOSPADM

## 2021-08-09 RX ORDER — CELECOXIB 100 MG/1
100 CAPSULE ORAL ONCE
Status: COMPLETED | OUTPATIENT
Start: 2021-08-09 | End: 2021-08-09

## 2021-08-09 RX ORDER — PROPOFOL 10 MG/ML
INJECTION, EMULSION INTRAVENOUS CONTINUOUS PRN
Status: DISCONTINUED | OUTPATIENT
Start: 2021-08-09 | End: 2021-08-09 | Stop reason: SDUPTHER

## 2021-08-09 RX ORDER — SODIUM CHLORIDE 9 MG/ML
INJECTION, SOLUTION INTRAVENOUS CONTINUOUS
Status: DISCONTINUED | OUTPATIENT
Start: 2021-08-09 | End: 2021-08-11 | Stop reason: HOSPADM

## 2021-08-09 RX ORDER — MIDAZOLAM HYDROCHLORIDE 1 MG/ML
INJECTION INTRAMUSCULAR; INTRAVENOUS
Status: COMPLETED
Start: 2021-08-09 | End: 2021-08-09

## 2021-08-09 RX ORDER — ONDANSETRON 2 MG/ML
4 INJECTION INTRAMUSCULAR; INTRAVENOUS
Status: DISCONTINUED | OUTPATIENT
Start: 2021-08-09 | End: 2021-08-09 | Stop reason: HOSPADM

## 2021-08-09 RX ORDER — BUPIVACAINE HYDROCHLORIDE 7.5 MG/ML
INJECTION, SOLUTION INTRASPINAL PRN
Status: DISCONTINUED | OUTPATIENT
Start: 2021-08-09 | End: 2021-08-09 | Stop reason: SDUPTHER

## 2021-08-09 RX ORDER — GLYCOPYRROLATE 1 MG/5 ML
SYRINGE (ML) INTRAVENOUS PRN
Status: DISCONTINUED | OUTPATIENT
Start: 2021-08-09 | End: 2021-08-09 | Stop reason: SDUPTHER

## 2021-08-09 RX ORDER — ROPIVACAINE HYDROCHLORIDE 5 MG/ML
INJECTION, SOLUTION EPIDURAL; INFILTRATION; PERINEURAL
Status: COMPLETED | OUTPATIENT
Start: 2021-08-09 | End: 2021-08-09

## 2021-08-09 RX ORDER — SODIUM CHLORIDE 9 MG/ML
INJECTION, SOLUTION INTRAVENOUS CONTINUOUS PRN
Status: DISCONTINUED | OUTPATIENT
Start: 2021-08-09 | End: 2021-08-09 | Stop reason: SDUPTHER

## 2021-08-09 RX ORDER — PHENYLEPHRINE HCL IN 0.9% NACL 1 MG/10 ML
SYRINGE (ML) INTRAVENOUS PRN
Status: DISCONTINUED | OUTPATIENT
Start: 2021-08-09 | End: 2021-08-09 | Stop reason: SDUPTHER

## 2021-08-09 RX ORDER — SODIUM CHLORIDE 9 MG/ML
25 INJECTION, SOLUTION INTRAVENOUS PRN
Status: DISCONTINUED | OUTPATIENT
Start: 2021-08-09 | End: 2021-08-11 | Stop reason: HOSPADM

## 2021-08-09 RX ORDER — OXYCODONE HYDROCHLORIDE 5 MG/1
5 TABLET ORAL EVERY 4 HOURS PRN
Status: DISCONTINUED | OUTPATIENT
Start: 2021-08-09 | End: 2021-08-11 | Stop reason: HOSPADM

## 2021-08-09 RX ORDER — MEPERIDINE HYDROCHLORIDE 25 MG/ML
25 INJECTION INTRAMUSCULAR; INTRAVENOUS; SUBCUTANEOUS EVERY 5 MIN PRN
Status: DISCONTINUED | OUTPATIENT
Start: 2021-08-09 | End: 2021-08-09 | Stop reason: HOSPADM

## 2021-08-09 RX ORDER — ROPIVACAINE HYDROCHLORIDE 5 MG/ML
INJECTION, SOLUTION EPIDURAL; INFILTRATION; PERINEURAL
Status: COMPLETED
Start: 2021-08-09 | End: 2021-08-09

## 2021-08-09 RX ORDER — SODIUM CHLORIDE 9 MG/ML
25 INJECTION, SOLUTION INTRAVENOUS PRN
Status: DISCONTINUED | OUTPATIENT
Start: 2021-08-09 | End: 2021-08-09 | Stop reason: HOSPADM

## 2021-08-09 RX ORDER — DEXAMETHASONE SODIUM PHOSPHATE 10 MG/ML
8 INJECTION, SOLUTION INTRAMUSCULAR; INTRAVENOUS ONCE
Status: COMPLETED | OUTPATIENT
Start: 2021-08-09 | End: 2021-08-09

## 2021-08-09 RX ADMIN — Medication 2000 MG: at 07:25

## 2021-08-09 RX ADMIN — MIDAZOLAM 1 MG: 1 INJECTION INTRAMUSCULAR; INTRAVENOUS at 07:26

## 2021-08-09 RX ADMIN — ROPIVACAINE HYDROCHLORIDE 30 ML: 5 INJECTION, SOLUTION EPIDURAL; INFILTRATION; PERINEURAL at 06:54

## 2021-08-09 RX ADMIN — BUPIVACAINE HYDROCHLORIDE IN DEXTROSE 1.8 MG: 7.5 INJECTION, SOLUTION SUBARACHNOID at 07:30

## 2021-08-09 RX ADMIN — ROPIVACAINE HYDROCHLORIDE 30 ML: 5 INJECTION, SOLUTION EPIDURAL; INFILTRATION; PERINEURAL at 08:01

## 2021-08-09 RX ADMIN — ACETAMINOPHEN 650 MG: 325 TABLET ORAL at 20:58

## 2021-08-09 RX ADMIN — Medication 100 MCG: at 08:06

## 2021-08-09 RX ADMIN — ACETAMINOPHEN 500 MG: 500 TABLET ORAL at 06:32

## 2021-08-09 RX ADMIN — SODIUM CHLORIDE: 9 INJECTION, SOLUTION INTRAVENOUS at 06:33

## 2021-08-09 RX ADMIN — Medication 500 MG: at 06:32

## 2021-08-09 RX ADMIN — DOCUSATE SODIUM 50 MG AND SENNOSIDES 8.6 MG 1 TABLET: 8.6; 5 TABLET, FILM COATED ORAL at 20:58

## 2021-08-09 RX ADMIN — SODIUM CHLORIDE: 9 INJECTION, SOLUTION INTRAVENOUS at 07:25

## 2021-08-09 RX ADMIN — MIDAZOLAM HYDROCHLORIDE 1 MG: 1 INJECTION, SOLUTION INTRAMUSCULAR; INTRAVENOUS at 06:50

## 2021-08-09 RX ADMIN — FENTANYL CITRATE 50 MCG: 0.05 INJECTION, SOLUTION INTRAMUSCULAR; INTRAVENOUS at 06:50

## 2021-08-09 RX ADMIN — PROPOFOL 40 MCG/KG/MIN: 10 INJECTION, EMULSION INTRAVENOUS at 07:32

## 2021-08-09 RX ADMIN — TRANEXAMIC ACID 1000 MG: 1 INJECTION, SOLUTION INTRAVENOUS at 07:35

## 2021-08-09 RX ADMIN — CELECOXIB 100 MG: 100 CAPSULE ORAL at 06:29

## 2021-08-09 RX ADMIN — Medication 100 MCG: at 07:54

## 2021-08-09 RX ADMIN — ACETAMINOPHEN 650 MG: 325 TABLET ORAL at 15:15

## 2021-08-09 RX ADMIN — SODIUM CHLORIDE, PRESERVATIVE FREE 10 ML: 5 INJECTION INTRAVENOUS at 21:00

## 2021-08-09 RX ADMIN — MIDAZOLAM HYDROCHLORIDE 1 MG: 2 INJECTION, SOLUTION INTRAMUSCULAR; INTRAVENOUS at 06:50

## 2021-08-09 RX ADMIN — Medication 100 MCG: at 08:37

## 2021-08-09 RX ADMIN — OXYCODONE 5 MG: 5 TABLET ORAL at 20:58

## 2021-08-09 RX ADMIN — KETAMINE HYDROCHLORIDE 30 MG: 10 INJECTION INTRAMUSCULAR; INTRAVENOUS at 07:32

## 2021-08-09 RX ADMIN — ACETAMINOPHEN 1000 MG: 500 TABLET ORAL at 06:29

## 2021-08-09 RX ADMIN — Medication 2000 MG: at 15:17

## 2021-08-09 RX ADMIN — Medication 100 MCG: at 08:21

## 2021-08-09 RX ADMIN — SODIUM CHLORIDE: 9 INJECTION, SOLUTION INTRAVENOUS at 07:55

## 2021-08-09 RX ADMIN — SODIUM CHLORIDE: 9 INJECTION, SOLUTION INTRAVENOUS at 12:06

## 2021-08-09 RX ADMIN — ASPIRIN 325 MG: 325 TABLET, COATED ORAL at 15:16

## 2021-08-09 RX ADMIN — DOCUSATE SODIUM 50 MG AND SENNOSIDES 8.6 MG 1 TABLET: 8.6; 5 TABLET, FILM COATED ORAL at 15:16

## 2021-08-09 RX ADMIN — Medication 1000 MG: at 06:29

## 2021-08-09 RX ADMIN — Medication 100 MCG: at 07:47

## 2021-08-09 RX ADMIN — FENTANYL CITRATE 50 MCG: 50 INJECTION, SOLUTION INTRAMUSCULAR; INTRAVENOUS at 06:50

## 2021-08-09 RX ADMIN — Medication 0.1 MG: at 08:10

## 2021-08-09 RX ADMIN — FENTANYL CITRATE 25 MCG: 50 INJECTION, SOLUTION INTRAMUSCULAR; INTRAVENOUS at 07:28

## 2021-08-09 RX ADMIN — HYDROCHLOROTHIAZIDE 25 MG: 25 TABLET ORAL at 15:16

## 2021-08-09 RX ADMIN — TRANEXAMIC ACID 1000 MG: 1 INJECTION, SOLUTION INTRAVENOUS at 10:32

## 2021-08-09 RX ADMIN — ASPIRIN 325 MG: 325 TABLET, COATED ORAL at 20:58

## 2021-08-09 RX ADMIN — MIDAZOLAM 1 MG: 1 INJECTION INTRAMUSCULAR; INTRAVENOUS at 07:35

## 2021-08-09 RX ADMIN — MEPERIDINE HYDROCHLORIDE 25 MG: 25 INJECTION, SOLUTION INTRAMUSCULAR; INTRAVENOUS; SUBCUTANEOUS at 10:09

## 2021-08-09 RX ADMIN — DEXAMETHASONE SODIUM PHOSPHATE 8 MG: 10 INJECTION, SOLUTION INTRAMUSCULAR; INTRAVENOUS at 08:03

## 2021-08-09 ASSESSMENT — PULMONARY FUNCTION TESTS
PIF_VALUE: 1
PIF_VALUE: 2
PIF_VALUE: 0
PIF_VALUE: 1
PIF_VALUE: 1
PIF_VALUE: 0
PIF_VALUE: 1
PIF_VALUE: 2
PIF_VALUE: 1
PIF_VALUE: 0
PIF_VALUE: 1
PIF_VALUE: 2
PIF_VALUE: 1
PIF_VALUE: 2
PIF_VALUE: 1
PIF_VALUE: 0
PIF_VALUE: 1
PIF_VALUE: 0
PIF_VALUE: 1
PIF_VALUE: 0
PIF_VALUE: 0
PIF_VALUE: 1
PIF_VALUE: 0
PIF_VALUE: 1

## 2021-08-09 ASSESSMENT — PAIN SCALES - GENERAL
PAINLEVEL_OUTOF10: 0
PAINLEVEL_OUTOF10: 3
PAINLEVEL_OUTOF10: 0
PAINLEVEL_OUTOF10: 5
PAINLEVEL_OUTOF10: 1

## 2021-08-09 NOTE — ANESTHESIA POSTPROCEDURE EVALUATION
Department of Anesthesiology  Postprocedure Note    Patient: Jamie Mayer  MRN: [id number]  [place]: 1950  Date of evaluation: 8/9/2021  Time:  2:23 PM     Procedure Summary     Date: 08/09/21 Room / Location: [facility] OR 05 / [facility]    Anesthesia Start: 6716 Anesthesia Stop: 6878    Procedure: LEFT TOTAL KNEE ARTHROPLASTY ROBOTIC ASSISTED (Left Knee) Diagnosis: (OSTEOARTHRITIS KNEE)    Surgeons: Caitie Dill MD Responsible Provider: Lisa Taylor MD    Anesthesia Type: spinal ASA Status: 2          Anesthesia Type: spinal    Arnol Phase I: Arnol Score: 10    Arnol Phase II:      Last vitals: Reviewed and per EMR flowsheets.        Anesthesia Post Evaluation    Patient location during evaluation: PACU  Patient participation: complete - patient participated  Level of consciousness: awake  Pain score: 3  Airway patency: patent  Nausea & Vomiting: no nausea  Complications: no  Cardiovascular status: blood pressure returned to baseline  Respiratory status: acceptable  Hydration status: euvolemic

## 2021-08-09 NOTE — PROGRESS NOTES
PT up in chair. Tolerating diet. Ambulating to restroom with standby assist x1. She states her pain level is about a 3/10 at this time. Will continue to monitor.

## 2021-08-09 NOTE — ANESTHESIA PRE PROCEDURE
Department of Anesthesiology  Preprocedure Note       Name:  Amanda Bowen   Age:  80 y.o.  :  1950                                          MRN:  [id number]         Date:  2021      Surgeon: Yanely Duncan):  Alicia Mtz MD    Procedure: Procedure(s):  ROBOTIC ROBBIE ASSISTED TOTAL LEFT KNEE ARTHROPLASTY   +++MARY+++   ++ANB++    Medications prior to admission:   Prior to Admission medications    Medication Sig Start Date End Date Taking?  Authorizing Provider   naproxen (NAPROSYN) 500 MG tablet TAKE 1 TABLET BY MOUTH 2 TIMES DAILY AS NEEDED FOR PAIN 21  Yes Yolanda Bhandari MD   Ascorbic Acid (VITAMIN C) 1000 MG tablet Take 1,000 mg by mouth daily    Yes Historical Provider, MD   lamoTRIgine (LAMICTAL) 25 MG tablet Take 2 tablets by mouth daily 21  Yes Yolanda Bhandari MD   hydroCHLOROthiazide (HYDRODIURIL) 25 MG tablet Take 1 tablet by mouth daily 21  Yes Yolanda Bhandari MD   Ferrous Sulfate (IRON) 325 (65 Fe) MG TABS Take 1 tablet by mouth daily    Yes Historical Provider, MD   famotidine (PEPCID) 20 MG tablet nightly Patient states she does not take every day 21  Yes Historical Provider, MD   Multiple Vitamin (MULTI-VITAMIN DAILY PO) Take by mouth    Yes Historical Provider, MD   Vitamin D (CHOLECALCIFEROL) 25 MCG (1000 UT) TABS tablet Take 1,000 Units by mouth daily Vitamin D3   Yes Historical Provider, MD       Current medications:    Current Facility-Administered Medications   Medication Dose Route Frequency Provider Last Rate Last Admin    0.9 % sodium chloride infusion   Intravenous Continuous Alicia Mtz  mL/hr at 21 0633 New Bag at 21 0633    0.9 % sodium chloride infusion  25 mL Intravenous PRN Alicia Mtz MD        ceFAZolin (ANCEF) 2000 mg in sterile water 20 mL IV syringe  2,000 mg Intravenous On Call to MD Yeison        dexamethasone (PF) (DECADRON) injection 8 mg  8 mg Intravenous Once Loren Benitez  Endocervical polyp 2012    GERD (gastroesophageal reflux disease)     Heart murmur, systolic     G0/9    History of bariatric surgery     gastric stapling    History of cervical dysplasia 2015    History of low back pain     Hx of colonic polyp 3/18/2021    Colonoscopy 21 Naffah/Awaida no recurrent polyps repeat in 5 years    Hypertension          Insomnia 2015    Knee pain, right     severe OA per  Dr Thornton Fent 2015    MVP (mitral valve prolapse)     Neuropathy     cervical spondylosis    Obesity     Osteoarthritis     Osteoarthritis of both knees     Osteoarthritis of hand 2015    Pap smear abnormality of cervix with LGSIL     Spinal stenosis, lumbar 2013 MRI    disc degeneration and bulges with canal and foraminal stenoses       Past Surgical History:        Procedure Laterality Date    BARIATRIC SURGERY      stapling of stomach    BREAST SURGERY      reduction     SECTION      CHOLECYSTECTOMY      COLONOSCOPY  2010    donald one polyp    EYE SURGERY Bilateral 2018    cataract    FOOT SURGERY      FRACTURE SURGERY Bilateral     feet    SKIN BIOPSY      TONSILLECTOMY         Social History:    Social History     Tobacco Use    Smoking status: Never Smoker    Smokeless tobacco: Never Used   Substance Use Topics    Alcohol use: Yes     Comment:  rarely                                Counseling given: Not Answered      Vital Signs (Current):   Vitals:    21 0625   BP: (!) 129/58   Pulse: 69   Resp: 20   Temp: 99 °F (37.2 °C)   TempSrc: Temporal   SpO2: 97%   Weight: 180 lb (81.6 kg)   Height: 5' 3\" (1.6 m)                                              BP Readings from Last 3 Encounters:   21 (!) 129/58   21 131/63   21 106/62       NPO Status: Time of last liquid consumption:                         Time of last solid consumption:                         Date of last liquid consumption: 08/08/21                        Date of last solid food consumption: 08/08/21    BMI:   Wt Readings from Last 3 Encounters:   08/09/21 180 lb (81.6 kg)   08/04/21 180 lb (81.6 kg)   08/02/21 180 lb (81.6 kg)     Body mass index is 31.89 kg/m². CBC:   Lab Results   Component Value Date    WBC 5.5 08/04/2021    RBC 4.52 08/04/2021    HGB 14.5 08/04/2021    HCT 42.3 08/04/2021    MCV 93.6 08/04/2021    RDW 12.4 08/04/2021     08/04/2021       CMP:   Lab Results   Component Value Date     08/04/2021    K 3.7 08/04/2021    CL 96 08/04/2021    CO2 30 08/04/2021    BUN 10 08/04/2021    CREATININE 0.8 08/04/2021    GFRAA >60 08/04/2021    LABGLOM >60 08/04/2021    GLUCOSE 119 08/04/2021    GLUCOSE 102 02/08/2012    PROT 7.5 01/08/2021    CALCIUM 9.9 08/04/2021    BILITOT 0.3 01/08/2021    ALKPHOS 71 01/08/2021    AST 27 01/08/2021    ALT 23 01/08/2021       POC Tests: No results for input(s): POCGLU, POCNA, POCK, POCCL, POCBUN, POCHEMO, POCHCT in the last 72 hours.     Coags: No results found for: PROTIME, INR, APTT    HCG (If Applicable): No results found for: PREGTESTUR, PREGSERUM, HCG, HCGQUANT     ABGs: No results found for: PHART, PO2ART, CQJ3VJA, GJZ4OGB, BEART, G8AADWSX     Type & Screen (If Applicable):  No results found for: LABABO, LABRH    Drug/Infectious Status (If Applicable):  No results found for: HIV, HEPCAB    COVID-19 Screening (If Applicable):   Lab Results   Component Value Date    COVID19 Not Detected 08/04/2021           Anesthesia Evaluation  Patient summary reviewed  Airway: Mallampati: II  TM distance: >3 FB   Neck ROM: full  Mouth opening: > = 3 FB Dental: normal exam         Pulmonary: breath sounds clear to auscultation                             Cardiovascular:    (+) hypertension:,         Rhythm: regular  Rate: normal           Beta Blocker:  Not on Beta Blocker         Neuro/Psych:   (+) psychiatric history:depression/anxiety             GI/Hepatic/Renal:   (+) GERD:, Endo/Other: Negative Endo/Other ROS                    Abdominal:   (+) obese,     Abdomen: soft. Vascular: Other Findings:             Anesthesia Plan      spinal     ASA 2       Induction: intravenous. Anesthetic plan and risks discussed with patient. Plan discussed with CRNA.                   Jeffrey Restrepo MD   8/9/2021

## 2021-08-09 NOTE — OP NOTE
Operative Note      Patient: Keely Stafford  YOB: 1950  MRN: [id number]    Date of Procedure: 8/9/2021    Pre-Op Diagnosis: OSTEOARTHRITIS KNEE    Post-Op Diagnosis: Same       Procedure(s):  LEFT TOTAL KNEE ARTHROPLASTY ROBOTIC ASSISTED    Surgeon(s):  Dick Tuttle MD    Assistant:   Resident: Jasmyn Boothe DO    Anesthesia: Spinal    Estimated Blood Loss (mL): Minimal    Complications: None    Specimens:   ID Type Source Tests Collected by Time Destination   A : FEMORAL, TIBIAL, PATELLA BONE Bone Bone SURGICAL PATHOLOGY Dick Tuttle MD 8/9/2021 6033        Implants:  Implant Name Type Inv. Item Serial No.  Lot No. LRB No. Used Action   CEMENT BNE 40 GM RADIOPAQUE BA SIMPLEX P  CEMENT BNE 40 GM RADIOPAQUE BA SIMPLEX P  [employer] [id number] Left 1 Implanted   INSERT TIB BEAR SZ 4 THK9MM KNEE X3 CRUCE RET TRIATHLON  INSERT TIB BEAR SZ 4 THK9MM KNEE X3 CRUCE RET TRIATHLON  [employer] [id number] Left 1 Implanted   IMPLANT PAT [id number] X3 ASYM TRIATHLON  IMPLANT PAT [id number] X3 ASYM TRIATHLON  [employer] [id number] Left 1 Implanted   BASEPLATE TIB SZ 4 KNEE TRITANIUM DEXTER JER LO PROF TRIATHLON  BASEPLATE TIB SZ 4 KNEE TRITANIUM DEXTER JER LO PROF TRIATHLON  [employer] [facility] [id number] Left 1 Implanted   COMPONENT FEM SZ 4 L ANT KNEE CRUCE RET DEXTER REFERENCING  COMPONENT FEM SZ 4 L ANT KNEE CRUCE RET DEXTER REFERENCING  [employer] [facility] [employer] [id number] Left 1 Implanted         Drains: * No LDAs found *    Findings: tricompartmental osteoarthritis, complete cartilage loss    Detailed Description of Procedure:     PROCEDURE: The patient was identified and brought to the operating suite and transferred to the hospital bed in supine position. Appropriate antibiotic and blood loss prophylaxes were administered. Spinal anesthesia was administered. A tourniquet was applied high on the Left  thigh.   The knee thigh and leg were now prepped and draped in the usual sterile fashion, timeout was performed then the extremity was exsanguinated with Elevation and the tourniquet was inflated to 275 mmHg. Anterior midline incision was made on the knee centered about the patella and dissection was carried down to the extensor mechanism where a medial parapatellar arthrotomy was made, the knee was then flexed. Visual findings of the knee included those noted above. Threaded half pins were now placed in the distal femur and proximal tibia at the appropriate locations and the grids for robotic assist were attached and intraarticular locators were also placed in the medial femoral and tibial metaphyses. The hip center, malleoli at the ankle, and femoral and tibial landmarks were now registered with the [employer] system. Osteophytes were resected and preoperative deformity was noted to now be correctable to neutral alignment. While maintaining alignment, the implants were adjusted virtually in the [employer] system for proper gap and tissue balance in extension and flexion, including depth, angle, and implant rotation. After confirmation of implant position, tissue balance, and limb alignment the ROBBIE arm and saw were brought into position and were sequentially used to resect the femoral and tibial articular surfaces. Resections were carried out without difficulty and were consistent with the preoperative plan. A trial tibial component gave proper coverage, this was inserted with a CR trial insert then a femoral trial was press fit onto the distal femur. The knee was reduced showing neutral alignment and excellent tissue balance through a range of motion of 0 to more than 125° and normal patellar tracking. There was significant cartilage loss so the patella was free hand cut and drilled for the patellar trial. This was applied and taken through range of motion showing good patellar tracking. The knee was then flexed and trial implants were removed. Cancellous surfaces were copiously irrigated with pulsatile lavage and then dried. Polymethylmethacrylate was mixed and at the appropriate stage applied to the definitive triathlon tibial component which was press-fit into place and cement was trimmed from the margins. A CR tibial polyethylene was now snapped into the base plate and then the triathlon femoral component was press-fit with PMMA onto the distal femur. Cement was compressed by extending the knee and it was again trimmed from all margins. The patella was pressed into place and all excess cement was removed. Examination showed full range of motion, neutral clinical alignment, excellent tissue balance and normal patellar tracking. A 3 minute soak of the joint with dilute Betadine was performed after which the knee was irrigated and then dried and platelet rich activated platelet gel was placed into the depths of the wound around the margins of the implant. Periarticular tissues were injected with Toradol and Sensorcaine for postop pain control, and the arthrotomy was closed with #1 Ethibond suture in a running fashion. Remaining Toradol and Sensorcaine was injected into the joint, and platelet poor gel was placed into the subcutaneous tissues which were then closed with interrupted 2-0 Vicryl. Incision was closed with running monocryl and steri-strips, pin sites with 3-0 nylon, and a dry sterile compressive dressing was applied then the tourniquet was released. The patient was awakened and taken to recovery having tolerated the procedure well. It should be noted that Dr. Edith Maradiaga was present for the entirety of the procedure. Post op plan  Patient will be covered in the area. She will then be transferred to the floor and start physical therapy today with weightbearing as tolerated to left lower extremity. She will be started on aspirin 81 mg bid for DVT ppx. We will continue post operative antibiotics for 24 hours.  We will see her back in the office in 1 week for wound check.            Electronically signed by Claudetta Polite, DO on 8/9/2021 at 9:18 AM

## 2021-08-09 NOTE — PLAN OF CARE
Problem: Discharge Planning:  Goal: Discharged to appropriate level of care  Description: Discharged to appropriate level of care  Outcome: Met This Shift     Problem: Mobility - Impaired:  Goal: Mobility will improve  Description: Mobility will improve  Outcome: Met This Shift     Problem: Infection - Surgical Site:  Goal: Will show no infection signs and symptoms  Description: Will show no infection signs and symptoms  Outcome: Met This Shift

## 2021-08-09 NOTE — INTERVAL H&P NOTE
Update History & Physical    The patient's History and Physical of August 5, 2021 was reviewed with the patient and I examined the patient. There was no change. The surgical site was confirmed by the patient and me. Plan: The risks, benefits, expected outcome, and alternative to the recommended procedure have been discussed with the patient. Patient understands and wants to proceed with the procedure.      Electronically signed by Sary Sullivan MD on 8/9/2021 at 6:50 AM

## 2021-08-09 NOTE — ANESTHESIA PROCEDURE NOTES
Spinal Block    Patient location during procedure: OR  Start time: 8/9/2021 7:25 AM  End time: 8/9/2021 7:30 AM  Reason for block: primary anesthetic and at surgeon's request  Staffing  Anesthesiologist: Romi Kruse MD  Resident/CRNA: SHAWN Souza CRNA  Preanesthetic Checklist  Completed: patient identified, IV checked, site marked, risks and benefits discussed, surgical consent, monitors and equipment checked, pre-op evaluation, timeout performed, anesthesia consent given, oxygen available and patient being monitored  Spinal Block  Patient position: sitting  Prep: ChloraPrep  Patient monitoring: cardiac monitor, continuous pulse ox, continuous capnometry and frequent blood pressure checks  Approach: midline  Location: L2/L3  Provider prep: mask, sterile gloves and sterile gown  Local infiltration: lidocaine  Agent: bupivacaine  Dose: 1.8  Dose: 1.8  Needle  Needle type: Pencan   Needle gauge: 25 G  Needle length: 3.5 in  Assessment  Sensory level: T6  Swirl obtained: Yes  CSF: clear  Attempts: 1  Hemodynamics: stable

## 2021-08-09 NOTE — CARE COORDINATION
Social work:    Social service met with Rich Bennett in [place] [address] on August 4th and provided her with discharge information. Rich Bennett underwent a left QUE today. Social service met with Rich Bennett today and we revisited discharge goals. Rich Bennett resides alone in a one story home but plans to discharge to her daughter Shruti's home at [facility] [address]  She will need a wheeled walker and private pay commode riser. Choices for [place] [address] & DME were provided and Rich Bennett has no preference of agency. A referral was given to [facility] and DME today. Electronically signed by ALYSIA Martinez on 8/9/2021 at 12:44 PM     The Plan for Transition of Care is related to the following treatment goals: HHC, DME, SNF    The Patient and/or patient representative August Otto was provided with a choice of provider and agrees with the discharge plan. [x] Yes [] No    Freedom of choice list was provided with basic dialogue that supports the patient's individualized plan of care/goals, treatment preferences and shares the quality data associated with the providers.  [x] Yes [] No    Electronically signed by ALYSIA Martinez on 8/9/2021 at 12:45 PM

## 2021-08-09 NOTE — PROGRESS NOTES
Occupational Therapy  OCCUPATIONAL THERAPY INITIAL EVALUATION      Date:2021  Patient Name: Jack Eckert  MRN: [id number]  : 1950  Room: [address] [place] [address]         Date:2021                                                  Patient Name: Jack Eckert    MRN: [id number]    : 1950    Room: [address] [place] [address]      Evaluating OT: Tod Cheung OTR/L   [id number]      Referring Alina Haynes MD    Specific Provider Orders/Date:OT eval and treat 2021      Diagnosis: L knee OA     Surgery: L QUE 2021     Pertinent Medical History: OA , HTN, bipolar disorder, anxiety, spinal stenosis, back pain      Precautions:  Fall Risk, L LE WBAT      Assessment of current deficits    [x] Functional mobility  [x]ADLs  [x] Strength               []Cognition    [x] Functional transfers   [x] IADLs         [x] Safety Awareness   [x]Endurance    [] Fine Coordination              [x] Balance      [] Vision/perception   []Sensation     []Gross Motor Coordination  [] ROM  [] Delirium                   [] Motor Control     OT PLAN OF CARE   OT POC based on physician orders, patient diagnosis and results of clinical assessment    Frequency/Duration  2-4 days/wk for 2 weeks PRN   Specific OT Treatment Interventions to include:   ADL retraining/adapted techniques and AE recommendations to increase functional independence within precautions                    Energy conservation techniques to improve tolerance for selfcare routine   Functional transfer/mobility training/DME recommendations for increased independence, safety and fall prevention         Patient/family education to increase safety and functional independence             Environmental modifications for safe mobility and completion of ADLs                             Therapeutic activity to improve functional performance during ADLs. Therapeutic exercise to improve tolerance and functional strength for ADLs    Balance retraining/tolerance tasks for facilitation of postural control with dynamic challenges during ADLs . Positioning to improve functional independence    Recommended Adaptive Equipment: TBD     Home Living: Pt lives alone, basement apartment with 6 steps/rail   Tub/shower unit with tub bench   Plan to stay at [facility] after discharge:  2 story with bed/bath on 1st. Ramp to enter.   Walk in shower with grab bars      Prior Level of Function: Independent with ADLs , Independent  with IADLs; ambulated with cane:     Pain Level:  Surgical pain;   Cognition: A&O: 4 /4;    Memory:  Good    Sequencing:  Good    Problem solving:  Fair    Judgement/safety:  Fair      Functional Assessment:  AM-PAC Daily Activity Raw Score: 17/24   Initial Eval Status  Date: 8/9/21 Treatment Status  Date: STGs = LTGs  Time frame: 10-14 days   Feeding Independent      Grooming Set-up   Independent    UB Dressing Set-up   Independent    LB Dressing Mod A   Educated on dressing tech   Able to bend to reach sock while seated in chair   Mod i   Bathing Dheeraj   Mod I    Toileting Independent      Bed Mobility  Up in chair upon entry   Mod I    Functional Transfers SBA  Sit-stand from chair   Mod i   Functional Mobility SBA,w/walker   Ambulating in room   Mod I  with good tolerance    Balance Sitting:     Static:  Independent     Dynamic:SBA   Standing: SBA   Independent    Activity Tolerance Fair with light activity   Good  with ADL activity    Visual/  Perceptual Glasses: none by bedside                 Hand Dominance right    AROM (PROM) Strength Additional Info:    RUE  WFL WFL good  and wfl FMC/dexterity noted during ADL tasks     LUE WFL WFL good  and wfl FMC/dexterity noted during ADL tasks       Hearing: WFL   Sensation:  No c/o numbness or tingling   Tone: WFL   Edema: none observed Comments: Upon arrival patient sitting in chair . At end of session, patient returned to chair  with call light and phone within reach, all lines and tubes intact. Overall patient demonstrated  decreased independence and safety during completion of ADL/functional transfer/mobility tasks. Pt would benefit from continued skilled OT to increase safety and independence with completion of ADL/IADL tasks for functional independence and quality of life. Rehab Potential: good for established goals     Patient / Family Goal: return home       Patient and/or family were instructed on functional diagnosis, prognosis/goals and OT plan of care. Demonstrated good  understanding. Eval Complexity: Low    Time In: 1413  Time Out: 1425      Min Units   OT Eval Low 97165 x  1   OT Eval Medium 52716      OT Eval High 54492      OT Re-Eval [id number]       Therapeutic Ex 31273       Therapeutic Activities 59044       ADL/Self Care 69631       Orthotic Management 04172       Manual 59619     Neuro Re-Ed 93157       Non-Billable Time          Evaluation Time additionally includes thorough review of current medical information, gathering information on past medical history/social history and prior level of function, interpretation of standardized testing/informal observation of tasks, assessment of data and development of plan of care and goals.             Preet Morrow  OTR/L  OT [id number]

## 2021-08-09 NOTE — PROGRESS NOTES
Nursing Transfer Note    Data:  Summary of patients progress: S/P left total knee arthroplasty  Reason for transfer: for inpatient admission    Action:  Explained reason for transfer to Patient and Family. Report given to: Lino Mcgrath RN, using RN Handoff Navigator.   Mode of transportation: cart    Response:  RN Recommendations: see orders/notes/MAR

## 2021-08-09 NOTE — ANESTHESIA PROCEDURE NOTES
Peripheral Block    Patient location during procedure: procedure area  Start time: 8/9/2021 6:45 AM  End time: 8/9/2021 6:50 AM  Staffing  Performed: anesthesiologist   Anesthesiologist: Stephany Arauz MD  Preanesthetic Checklist  Completed: patient identified, IV checked, site marked, risks and benefits discussed, surgical consent, monitors and equipment checked, pre-op evaluation, timeout performed, anesthesia consent given, oxygen available and patient being monitored  Peripheral Block  Patient position: supine  Prep: ChloraPrep  Patient monitoring: cardiac monitor, continuous pulse ox, frequent blood pressure checks and IV access  Block type: Femoral  Laterality: left  Injection technique: single-shot  Guidance: ultrasound guided  Adductor canal  Provider prep: mask and sterile gloves  Needle  Needle type: combined needle/nerve stimulator   Needle gauge: 21 G  Needle length: 8 cm  Needle localization: ultrasound guidance  Test dose: negative  Assessment  Injection assessment: negative aspiration for heme and local visualized surrounding nerve on ultrasound  Paresthesia pain: none  Slow fractionated injection: yes  Hemodynamics: stable  Medications Administered  Ropivacaine (NAROPIN) injection 0.5%, 30 mL  Reason for block: post-op pain management and at surgeon's request

## 2021-08-09 NOTE — PROGRESS NOTES
Physical Therapy    Facility/Department: [facility] SURGERY  Initial Assessment    NAME: Pat Coello  : 1950  MRN: [id number]    Date of Service: 2021       REQUIRES PT FOLLOW UP: Yes       Patient Diagnosis(es): The primary encounter diagnosis was Preop testing. Diagnoses of S/P total knee arthroplasty, left, Moderate obesity, Bipolar depression ([address] Hx of colonic polyp, Mood disorder of depressed type, Ganglion cyst of tendon sheath of right hand, Gastroesophageal reflux disease with esophagitis without hemorrhage, Cat bite of hand, right, initial encounter, Essential hypertension, History of cervical dysplasia, Lumbar disc herniation L5-S1, Spondylolisthesis L5 on S1, grade 1, History of bariatric surgery, MVP (mitral valve prolapse), and Depression, major, in remission ([address] were also pertinent to this visit. has a past medical history of Allergic rhinitis, Anxiety disorder, Bipolar disorder ([address] Caffeine use, Chronic low back pain, Depression, major, in remission ([address] Endocervical polyp, GERD (gastroesophageal reflux disease), Heart murmur, systolic, History of bariatric surgery, History of cervical dysplasia, History of low back pain, Hx of colonic polyp, Hypertension, Insomnia, Knee pain, right, Kyphosis, MVP (mitral valve prolapse), Neuropathy, Obesity, Osteoarthritis, Osteoarthritis of both knees, Osteoarthritis of hand, Pap smear abnormality of cervix with LGSIL, and Spinal stenosis, lumbar. has a past surgical history that includes Tonsillectomy; Foot surgery;  section; Bariatric Surgery (); Breast surgery; Cholecystectomy; Colonoscopy (2010); skin biopsy; fracture surgery (Bilateral); eye surgery (Bilateral, 2018); and Total knee arthroplasty (Left, 2021).     Evaluating Therapist: Holley Sandhoff, PT     rec ww     Referring Provider:  Howard Salinas MD    PT order : PT eval and treat     Room #:  710   DIAGNOSIS:  OA s/p L TKA 2021 PRECAUTIONS: falls, WBAT     Social:  Pt lives alone  in a  1  floor plan apartment , 6  steps and 1 rails to enter. Plans to stay with daughter upon discharge in first floor set up home with ramp entry    Prior to admission pt walked with no AD. Has cane      Initial Evaluation  Date:  8/9/2021  Treatment      Short Term/ Long Term   Goals   Was pt agreeable to Eval/treatment? yes      Does pt have pain? L knee      Bed Mobility  Rolling:  NT   Supine to sit:  Nt   Sit to supine:  NT   Scooting:   SBA in sit    SBA    Transfers Sit to stand:  CGA   Stand to sit:  CGA   Stand pivot:  NT    SBA    Ambulation     150  feet with  ww  with  SBA/CGA    200  feet with  ww with S/I       Stair negotiation: ascended and descended NT    4  steps with  1  rail with SBA    LE ROM  AAROM L knee 10-90 degrees     LE strength  3+/ 5 L knee in available range      AM- PAC RAW score  18/ 24            Pt is alert and Oriented x  3      Balance:  SBA/CGA   Endurance: WFL   Bed/Chair alarm:  No      ASSESSMENT  Pt displays functional ability as noted in the objective portion of this evaluation. Conditions Requiring Skilled Therapeutic Intervention:    [x]Decreased strength     [x]Decreased ROM  [x]Decreased functional mobility  [x]Decreased balance   [x]Decreased endurance   []Decreased posture  []Decreased sensation  []Decreased coordination   []Decreased vision  []Decreased safety awareness   [x]Increased pain       Treatment/Education:    Mobility as above. Pt in chair upon arrival. Cues for hand and foot placement with transfers . Cues for ww safety and to increased L knee flex with gait. Pt educated on fall risk,  LE exercises, safe and proper technique with mobility         Patient response to education:   Pt verbalized understanding Pt demonstrated skill Pt requires further education in this area   x  with cues   x       Comments:  Pt left  In chaur after session, with call light in reach.       Rehab potential is Good for reaching above PT goals. Pts/ family goals   1. Home      Patient and or family understand(s) diagnosis, prognosis, and plan of care. -  yes    PLAN  PT care will be provided in accordance with the objectives noted above. Whenever appropriate, clear delegation orders will be provided for nursing staff. Exercises and functional mobility practice will be used as well as appropriate assistive devices or modalities to obtain goals. Patient and family education will also be administered as needed. PLAN OF CARE:    Current Treatment Recommendations     [x] Strengthening to improve independence with functional mobility   [x] ROM to improve independence with functional mobility   [x] Balance Training to improve static/dynamic balance and to reduce fall risk  [x] Endurance Training to improve activity tolerance during functional mobility   [x] Transfer Training to improve safety and independence with all functional transfers   [x] Gait Training to improve gait mechanics, endurance and assess need for appropriate assistive device  [x] Stair Training in preparation for safe discharge home and/or into the community   [x] Positioning to prevent skin breakdown and contractures  [x] Safety and Education Training   [x] Patient/Caregiver Education   [x] HEP  [] Other     Frequency of treatments will be BID x 2 days. Time in: 1413   Time out: 1430      Evaluation Time includes thorough review of current medical information, gathering information on past medical history/social history and prior level of function, completion of standardized testing/informal observation of tasks, assessment of data and education on plan of care and goals.     CPT codes:  [x] Low Complexity PT evaluation 98603  [] Moderate Complexity PT evaluation 65468  [] High Complexity PT evaluation 31076  [] PT Re-evaluation 80222  [] Gait training 54211  minutes  [] Therapeutic activities 64765  minutes  [] Therapeutic exercises 69402 minutes  [] Neuromuscular reeducation 02411  minutes       Elizabeth 18 number:  PT 2399

## 2021-08-10 LAB
HCT VFR BLD CALC: 32.3 % (ref 34–48)
HEMOGLOBIN: 11.3 G/DL (ref 11.5–15.5)

## 2021-08-10 PROCEDURE — G0378 HOSPITAL OBSERVATION PER HR: HCPCS

## 2021-08-10 PROCEDURE — 97110 THERAPEUTIC EXERCISES: CPT

## 2021-08-10 PROCEDURE — 97535 SELF CARE MNGMENT TRAINING: CPT

## 2021-08-10 PROCEDURE — 97530 THERAPEUTIC ACTIVITIES: CPT

## 2021-08-10 PROCEDURE — 6370000000 HC RX 637 (ALT 250 FOR IP): Performed by: ORTHOPAEDIC SURGERY

## 2021-08-10 PROCEDURE — 36415 COLL VENOUS BLD VENIPUNCTURE: CPT

## 2021-08-10 PROCEDURE — 6360000002 HC RX W HCPCS: Performed by: ORTHOPAEDIC SURGERY

## 2021-08-10 PROCEDURE — 99024 POSTOP FOLLOW-UP VISIT: CPT | Performed by: ORTHOPAEDIC SURGERY

## 2021-08-10 PROCEDURE — 2580000003 HC RX 258: Performed by: ORTHOPAEDIC SURGERY

## 2021-08-10 PROCEDURE — 85014 HEMATOCRIT: CPT

## 2021-08-10 PROCEDURE — 85018 HEMOGLOBIN: CPT

## 2021-08-10 PROCEDURE — 96374 THER/PROPH/DIAG INJ IV PUSH: CPT

## 2021-08-10 RX ADMIN — ASPIRIN 325 MG: 325 TABLET, COATED ORAL at 20:06

## 2021-08-10 RX ADMIN — ASPIRIN 325 MG: 325 TABLET, COATED ORAL at 08:23

## 2021-08-10 RX ADMIN — DOCUSATE SODIUM 50 MG AND SENNOSIDES 8.6 MG 1 TABLET: 8.6; 5 TABLET, FILM COATED ORAL at 20:06

## 2021-08-10 RX ADMIN — HYDROCHLOROTHIAZIDE 25 MG: 25 TABLET ORAL at 08:23

## 2021-08-10 RX ADMIN — OXYCODONE 5 MG: 5 TABLET ORAL at 08:23

## 2021-08-10 RX ADMIN — SODIUM CHLORIDE, PRESERVATIVE FREE 10 ML: 5 INJECTION INTRAVENOUS at 20:09

## 2021-08-10 RX ADMIN — ACETAMINOPHEN 650 MG: 325 TABLET ORAL at 20:06

## 2021-08-10 RX ADMIN — LAMOTRIGINE 50 MG: 25 TABLET ORAL at 08:28

## 2021-08-10 RX ADMIN — Medication 2000 MG: at 02:24

## 2021-08-10 RX ADMIN — OXYCODONE 5 MG: 5 TABLET ORAL at 02:21

## 2021-08-10 RX ADMIN — ACETAMINOPHEN 650 MG: 325 TABLET ORAL at 08:23

## 2021-08-10 RX ADMIN — ACETAMINOPHEN 650 MG: 325 TABLET ORAL at 03:25

## 2021-08-10 RX ADMIN — ACETAMINOPHEN 650 MG: 325 TABLET ORAL at 14:39

## 2021-08-10 RX ADMIN — SODIUM CHLORIDE, PRESERVATIVE FREE 10 ML: 5 INJECTION INTRAVENOUS at 02:22

## 2021-08-10 RX ADMIN — OXYCODONE 5 MG: 5 TABLET ORAL at 12:18

## 2021-08-10 RX ADMIN — SODIUM CHLORIDE, PRESERVATIVE FREE 10 ML: 5 INJECTION INTRAVENOUS at 08:30

## 2021-08-10 RX ADMIN — MORPHINE SULFATE 2 MG: 2 INJECTION, SOLUTION INTRAMUSCULAR; INTRAVENOUS at 14:38

## 2021-08-10 RX ADMIN — OXYCODONE 5 MG: 5 TABLET ORAL at 16:44

## 2021-08-10 RX ADMIN — DOCUSATE SODIUM 50 MG AND SENNOSIDES 8.6 MG 1 TABLET: 8.6; 5 TABLET, FILM COATED ORAL at 08:23

## 2021-08-10 ASSESSMENT — PAIN DESCRIPTION - FREQUENCY
FREQUENCY: INTERMITTENT

## 2021-08-10 ASSESSMENT — PAIN SCALES - GENERAL
PAINLEVEL_OUTOF10: 5
PAINLEVEL_OUTOF10: 2
PAINLEVEL_OUTOF10: 4
PAINLEVEL_OUTOF10: 3
PAINLEVEL_OUTOF10: 4
PAINLEVEL_OUTOF10: 6
PAINLEVEL_OUTOF10: 8
PAINLEVEL_OUTOF10: 8
PAINLEVEL_OUTOF10: 7
PAINLEVEL_OUTOF10: 2
PAINLEVEL_OUTOF10: 3
PAINLEVEL_OUTOF10: 7

## 2021-08-10 ASSESSMENT — PAIN DESCRIPTION - ORIENTATION
ORIENTATION: LEFT

## 2021-08-10 ASSESSMENT — PAIN DESCRIPTION - LOCATION
LOCATION: KNEE

## 2021-08-10 ASSESSMENT — PAIN DESCRIPTION - PAIN TYPE
TYPE: SURGICAL PAIN

## 2021-08-10 ASSESSMENT — PAIN DESCRIPTION - ONSET
ONSET: ON-GOING

## 2021-08-10 ASSESSMENT — PAIN DESCRIPTION - PROGRESSION
CLINICAL_PROGRESSION: GRADUALLY WORSENING

## 2021-08-10 ASSESSMENT — PAIN - FUNCTIONAL ASSESSMENT
PAIN_FUNCTIONAL_ASSESSMENT: PREVENTS OR INTERFERES SOME ACTIVE ACTIVITIES AND ADLS

## 2021-08-10 ASSESSMENT — PAIN DESCRIPTION - DESCRIPTORS
DESCRIPTORS: ACHING

## 2021-08-10 NOTE — PROGRESS NOTES
Physical Therapy    Facility/Department: [address] ORTHO SURGERY  Treatment note    NAME: Giuliana Jean  : 1950  MRN: [id number]    Date of Service: 8/10/2021               Patient Diagnosis(es): The primary encounter diagnosis was Preop testing. Diagnoses of S/P total knee arthroplasty, left, Moderate obesity, Bipolar depression ([address] Hx of colonic polyp, Mood disorder of depressed type, Ganglion cyst of tendon sheath of right hand, Gastroesophageal reflux disease with esophagitis without hemorrhage, Cat bite of hand, right, initial encounter, Essential hypertension, History of cervical dysplasia, Lumbar disc herniation L5-S1, Spondylolisthesis L5 on S1, grade 1, History of bariatric surgery, MVP (mitral valve prolapse), and Depression, major, in remission ([address] were also pertinent to this visit. has a past medical history of Allergic rhinitis, Anxiety disorder, Bipolar disorder ([address] Caffeine use, Chronic low back pain, Depression, major, in remission ([address] Endocervical polyp, GERD (gastroesophageal reflux disease), Heart murmur, systolic, History of bariatric surgery, History of cervical dysplasia, History of low back pain, Hx of colonic polyp, Hypertension, Insomnia, Knee pain, right, Kyphosis, MVP (mitral valve prolapse), Neuropathy, Obesity, Osteoarthritis, Osteoarthritis of both knees, Osteoarthritis of hand, Pap smear abnormality of cervix with LGSIL, and Spinal stenosis, lumbar. has a past surgical history that includes Tonsillectomy; Foot surgery;  section; Bariatric Surgery (); Breast surgery; Cholecystectomy; Colonoscopy (2010); skin biopsy; fracture surgery (Bilateral); eye surgery (Bilateral, 2018); and Total knee arthroplasty (Left, 2021).     Evaluating Therapist: Whitley Slade PT     rec ww     Referring Provider:  Yudith Avila MD    PT order : PT eval and treat     Room #:  710   DIAGNOSIS:  OA s/p L TKA 2021   PRECAUTIONS: falls, WBAT     Social: Pt lives alone  in a  1  floor plan apartment , 6  steps and 1 rails to enter. Plans to stay with daughter upon discharge in first floor set up home with ramp entry    Prior to admission pt walked with no AD. Has cane      Initial Evaluation  Date:  8/9/2021  Treatment  8/10/21    Short Term/ Long Term   Goals   Was pt agreeable to Eval/treatment? yes  yes    Does pt have pain?  L knee  Moderate L knee pain, this decreased with exercise and gait    Bed Mobility  Rolling:  NT   Supine to sit:  Nt   Sit to supine:  NT   Scooting:   SBA in sit  Supine to sit: SBA  Sit to supine: Min A L LE support  Scooting: SBA seated to EOB  SBA    Transfers Sit to stand:  CGA   Stand to sit:  CGA   Stand pivot:  NT  Sit <> stand SBA  Stand Pivot: SBA using [facility] for support  SBA    Ambulation     150  feet with  ww  with  SBA/CGA  220 feet and 170 feet x 1 each using [facility] for support SBA for balance  200  feet with  ww with S/I       Stair negotiation: ascended and descended NT  4 stairs x 2 with rail/standard cane for support CGA, step to pattern used  4  steps with  1  rail with SBA    LE ROM  AAROM L knee 10-90 degrees     LE strength  3+/ 5 L knee in available range      AM- PAC RAW score  18/ 24 18/24          Pt is alert and able to follow instruction  Balance: fair dynamic using WW for support    Pt performed therapeutic exercise of the following: seated B ankle pumps, L LE quad sets AROM x 20, towel slides AROM x 30    Patient education  Pt was educated on exercise promoting circulation, ROM and strengthening, UE usage to assist with transfers, gait promoting posture, stair negotiation promoting sequence and cane placement, assist with bed mobility as needed, car transfer to back into the front passenger seat    Patient response to education:   Pt verbalized understanding Pt demonstrated skill Pt requires further education in this area   yes With instruction yes     ASSESSMENT:   Comments: Pt found in a bedside chair, agreed to Rx. Exercise performed. Gait to the hallway, wm slow and consistent, step through reciprocal pattern used, no loss of balance or shortness of breath noted. Stairs performed with difficulty. Once back to the room, bed mobility reviewed, car transfer discussed. Treatment: Pt practiced and was instructed in the following treatment: therapeutic exercise, transfer safety, gait mechanics, stair negotiation, bed mobility, car transfer    Pt was left in a bedside chair with call light in reach    Time in 0955   Time out 1036   Total Treatment Time 41 minutes   CPT codes:     Therapeutic activities 98087 27 minutes   Therapeutic exercises 82412 14 minutes       Pt is making good progress toward established Physical Therapy goals. Continue with physical therapy current plan of care promoting discharge home after second session to improve stair negotiation.     Viviane Ornelas PTA   License Number: [id number]

## 2021-08-10 NOTE — PROGRESS NOTES
challenges during ADLs .       Positioning to improve functional independence     Recommended Adaptive Equipment: TBD      Home Living: Pt lives alone, basement apartment with 6 steps/rail   Tub/shower unit with tub bench   Plan to stay at [facility] after discharge:  2 story with bed/bath on 1st. Ramp to enter. Walk in shower with grab bars       Prior Level of Function: Independent with ADLs , Independent  with IADLs; ambulated with cane:      Pain Level:  L knee- Mild   Cognition: A&O: 4 /4                Functional Assessment:  AM-PAC Daily Activity Raw Score: 19/24    Initial Eval Status  Date: 8/9/21 Treatment Status  Date:8/10/21 STGs = LTGs  Time frame: 10-14 days   Feeding Independent        Grooming Set-up  Supervision   Standing at sink  Independent    UB Dressing Set-up  S/U  To doff gown and don pullover shirt/ bras\  Independent    LB Dressing Mod A   Educated on dressing tech   Able to bend to reach sock while seated in chair   Min A  Pt able to don underwear/ pants and doff socks with SBA. Pt required Min A to don socks and shoes. See comments Mod i   Bathing Dheeraj  UE: S/U  LE: Min A  Mod I    Toileting Independent  Supervision     Bed Mobility  Up in chair upon entry    Mod I    Functional Transfers SBA  Sit-stand from chair  STS: SBA  From chair and commode Mod i   Functional Mobility SBA,w/walker   Ambulating in room  SBA using ww in room Mod I  with good tolerance    Balance Sitting:     Static:  Independent     Dynamic:SBA   Standing: SBA   Sitting: Independent  Standing: SBA Independent    Activity Tolerance Fair with light activity  Fair  Good  with ADL activity    Visual/  Perceptual Glasses: none by bedside                        Treatment: Upon arrival pt was supine in bed. Cues for AD management and safety during ADls provided. Per pt she does not need DME post D/C. Per pt she will receive assistance from family as needed when at home.   Issued [facility], [facility] per t request to decrease difficulty with LE dressing. Educated pt on easy-slide to decrease difficulty with HARSHAD hose management. Per pt she hs LHS at home. Extended time required during tasks due to fatigue. Pt declined additional needs/ questions for therapist.  At end of session pt was transferred to chair with alarm on, all lines and tubes intact and call light within reach. Education: Transfer training, balance training, AD management, AE, compensatory LE dressing techniques and safety training    · Pt has made good progress towards set goals.      Treatment Charges: Mins Units   Ther Ex  26365     Manual Therapy Jean Pichardo [id number] [phone]   ADL/Home Mgt 08610 20 1   Neuro Re-ed 73729     Group Therapy      Orthotic manage/training  45242     Non-Billable Time       Time In: 8:44  Time Out: 9:35  Total Time: [address] [place]/L [id number]

## 2021-08-10 NOTE — PROGRESS NOTES
CLINICAL PHARMACY NOTE: MEDS TO BEDS    Total # of Prescriptions Filled: 2   The following medications were delivered to the patient:  · Percocet   · Aspirin     Additional Documentation:

## 2021-08-10 NOTE — PROGRESS NOTES
P Quality Flow/Interdisciplinary Rounds Progress Note        Quality Flow Rounds held on August 10, 2021    Disciplines Attending:  Bedside Nurse, ,  and Nursing Unit Leadership    Karen Garza was admitted on 8/9/2021  5:44 AM    Anticipated Discharge Date:       Disposition:    Ministerio Score:  Ministerio Scale Score: 20    Readmission Risk              Risk of Unplanned Readmission:  0           Discussed patient goal for the day, patient clinical progression, and barriers to discharge.   The following Goal(s) of the Day/Commitment(s) have been identified:  Discharge [address] Erin Jaramillo RN  August 10, 2021

## 2021-08-10 NOTE — PLAN OF CARE
Problem: Discharge Planning:  Goal: Discharged to appropriate level of care  Description: Discharged to appropriate level of care  Outcome: Met This Shift     Problem: Mobility - Impaired:  Goal: Mobility will improve  Description: Mobility will improve  Outcome: Met This Shift     Problem: Infection - Surgical Site:  Goal: Will show no infection signs and symptoms  Description: Will show no infection signs and symptoms  Outcome: Met This Shift     Problem: Pain - Acute:  Goal: Pain level will decrease  Description: Pain level will decrease  Outcome: Met This Shift     Problem: Pain:  Goal: Pain level will decrease  Description: Pain level will decrease  Outcome: Met This Shift  Goal: Control of acute pain  Description: Control of acute pain  Outcome: Met This Shift  Goal: Control of chronic pain  Description: Control of chronic pain  Outcome: Met This Shift

## 2021-08-10 NOTE — PROGRESS NOTES
Called DR. Adrian [facility])  Need Zofran and patient staying another night  Waiting for a call back

## 2021-08-10 NOTE — PROGRESS NOTES
Physical Therapy    Facility/Department: [address] ORTHO SURGERY  Treatment note    NAME: Yaya Obrien  : 1950  MRN: [id number]    Date of Service: 8/10/2021               Patient Diagnosis(es): The primary encounter diagnosis was Preop testing. Diagnoses of S/P total knee arthroplasty, left, Moderate obesity, Bipolar depression ([address] Hx of colonic polyp, Mood disorder of depressed type, Ganglion cyst of tendon sheath of right hand, Gastroesophageal reflux disease with esophagitis without hemorrhage, Cat bite of hand, right, initial encounter, Essential hypertension, History of cervical dysplasia, Lumbar disc herniation L5-S1, Spondylolisthesis L5 on S1, grade 1, History of bariatric surgery, MVP (mitral valve prolapse), and Depression, major, in remission ([address] were also pertinent to this visit. has a past medical history of Allergic rhinitis, Anxiety disorder, Bipolar disorder ([address] Caffeine use, Chronic low back pain, Depression, major, in remission ([address] Endocervical polyp, GERD (gastroesophageal reflux disease), Heart murmur, systolic, History of bariatric surgery, History of cervical dysplasia, History of low back pain, Hx of colonic polyp, Hypertension, Insomnia, Knee pain, right, Kyphosis, MVP (mitral valve prolapse), Neuropathy, Obesity, Osteoarthritis, Osteoarthritis of both knees, Osteoarthritis of hand, Pap smear abnormality of cervix with LGSIL, and Spinal stenosis, lumbar. has a past surgical history that includes Tonsillectomy; Foot surgery;  section; Bariatric Surgery (); Breast surgery; Cholecystectomy; Colonoscopy (2010); skin biopsy; fracture surgery (Bilateral); eye surgery (Bilateral, 2018); and Total knee arthroplasty (Left, 2021).     Evaluating Therapist: Rohit Murray, PT     rec ww     Referring Provider:  Sher Mtz MD    PT order : PT eval and treat     Room #:  710   DIAGNOSIS:  OA s/p L TKA 2021   PRECAUTIONS: falls, WBAT     Social: Pt lives alone  in a  1  floor plan apartment , 6  steps and 1 rails to enter. Plans to stay with daughter upon discharge in first floor set up home with ramp entry    Prior to admission pt walked with no AD. Has cane      Initial Evaluation  Date:  8/9/2021  Treatment  8/10/21 PM   Short Term/ Long Term   Goals   Was pt agreeable to Eval/treatment? yes  yes    Does pt have pain? L knee  Severe L knee pain, this decreased with gait    Bed Mobility  Rolling:  NT   Supine to sit:  Nt   Sit to supine:  NT   Scooting:   SBA in sit  Supine to sit: Min A L LE support  Sit to supine: Min A L LE support  Scooting: Min A seated to EOB L LE support  SBA    Transfers Sit to stand:  CGA   Stand to sit:  CGA   Stand pivot:  NT  Sit <> stand SBA  Stand Pivot: SBA using [address] for support  SBA    Ambulation     150  feet with  ww  with  SBA/CGA  120 feet x 1  using [address] for support Min/CGA for balance  200  feet with  ww with S/I       Stair negotiation: ascended and descended NT  NT  4  steps with  1  rail with SBA    LE ROM  AAROM L knee 10-90 degrees     LE strength  3+/ 5 L knee in available range      AM- PAC RAW score  18/ 24 18/24          Pt is alert and able to follow instruction  Balance: fair minus/poor dynamic using [address] for support    Pt performed therapeutic exercise of the following: supine L ankle pumps AAROM; L quad sets, glut sets x 20 AROM. Pt unable to tolerate mild heel slide motions. Patient education  Pt was educated on exercise promoting circulation and strengthening, UE usage to assist with transfers, gait promoting posture, assist with bed mobility as needed    Patient response to education:   Pt verbalized understanding Pt demonstrated skill Pt requires further education in this area   yes With instruction yes     ASSESSMENT:   Comments: Pt found in bed, states in a lot of pain, was medicated prior to rx. Pt required encouragement to participate with Rx this session, initially states unable.  Mild exercise performed with Pt supine. Pt assisted to EOB, sat EOB 5 minutes due to nausea, Nurse present and aware of this. Once this passed, gait performed to the hallway. Lora slow, inconsistent and laboring, step to pattern used. Pt unsteady throughout, was unable to stand upright, displayed intermittent trunk flexion. Pt fatigued after activity, requested back to bed. Treatment: Pt practiced and was instructed in the following treatment: therapeutic exercise, transfer safety, gait mechanics, bed mobility    Pt was left in bed per request with call light in reach    Time in 1334   Time out 1407  Total Treatment Time 33 minutes   CPT codes:     Therapeutic activities 64194 20 minutes   Therapeutic exercises 65644 13 minutes       Pt is showing declining progress toward established Physical Therapy goals, was unable to gait as fart this session, unable to tolerate exercise. Functional mobility and exercise limited due to increased pain. Continue with physical therapy current plan of care. Discussed with Nursing. Pt is to stay another night to promote pain management and improved functional mobility needed to go home tomorrow.      Julio César Eldridge PTA   License Number: [id number]

## 2021-08-11 VITALS
HEART RATE: 85 BPM | HEIGHT: 63 IN | DIASTOLIC BLOOD PRESSURE: 54 MMHG | TEMPERATURE: 98.6 F | SYSTOLIC BLOOD PRESSURE: 113 MMHG | RESPIRATION RATE: 16 BRPM | BODY MASS INDEX: 31.89 KG/M2 | OXYGEN SATURATION: 96 % | WEIGHT: 180 LBS

## 2021-08-11 PROCEDURE — 97530 THERAPEUTIC ACTIVITIES: CPT

## 2021-08-11 PROCEDURE — 97535 SELF CARE MNGMENT TRAINING: CPT

## 2021-08-11 PROCEDURE — 6370000000 HC RX 637 (ALT 250 FOR IP): Performed by: ORTHOPAEDIC SURGERY

## 2021-08-11 PROCEDURE — 97110 THERAPEUTIC EXERCISES: CPT

## 2021-08-11 PROCEDURE — G0378 HOSPITAL OBSERVATION PER HR: HCPCS

## 2021-08-11 RX ADMIN — OXYCODONE 5 MG: 5 TABLET ORAL at 00:02

## 2021-08-11 RX ADMIN — OXYCODONE 5 MG: 5 TABLET ORAL at 09:31

## 2021-08-11 RX ADMIN — ACETAMINOPHEN 650 MG: 325 TABLET ORAL at 04:21

## 2021-08-11 RX ADMIN — ACETAMINOPHEN 650 MG: 325 TABLET ORAL at 09:28

## 2021-08-11 RX ADMIN — LAMOTRIGINE 50 MG: 25 TABLET ORAL at 09:28

## 2021-08-11 RX ADMIN — ASPIRIN 325 MG: 325 TABLET, COATED ORAL at 09:28

## 2021-08-11 RX ADMIN — DOCUSATE SODIUM 50 MG AND SENNOSIDES 8.6 MG 1 TABLET: 8.6; 5 TABLET, FILM COATED ORAL at 09:27

## 2021-08-11 RX ADMIN — HYDROCHLOROTHIAZIDE 25 MG: 25 TABLET ORAL at 09:28

## 2021-08-11 ASSESSMENT — PAIN - FUNCTIONAL ASSESSMENT
PAIN_FUNCTIONAL_ASSESSMENT: ACTIVITIES ARE NOT PREVENTED
PAIN_FUNCTIONAL_ASSESSMENT: ACTIVITIES ARE NOT PREVENTED

## 2021-08-11 ASSESSMENT — PAIN DESCRIPTION - PROGRESSION
CLINICAL_PROGRESSION: GRADUALLY WORSENING
CLINICAL_PROGRESSION: GRADUALLY WORSENING

## 2021-08-11 ASSESSMENT — PAIN DESCRIPTION - FREQUENCY
FREQUENCY: CONTINUOUS
FREQUENCY: CONTINUOUS

## 2021-08-11 ASSESSMENT — PAIN DESCRIPTION - ORIENTATION
ORIENTATION: LEFT
ORIENTATION: LEFT

## 2021-08-11 ASSESSMENT — PAIN SCALES - GENERAL
PAINLEVEL_OUTOF10: 2
PAINLEVEL_OUTOF10: 6
PAINLEVEL_OUTOF10: 5
PAINLEVEL_OUTOF10: 5

## 2021-08-11 ASSESSMENT — PAIN DESCRIPTION - DESCRIPTORS
DESCRIPTORS: ACHING;CONSTANT;DISCOMFORT
DESCRIPTORS: ACHING;CONSTANT;DISCOMFORT

## 2021-08-11 ASSESSMENT — PAIN DESCRIPTION - PAIN TYPE
TYPE: SURGICAL PAIN
TYPE: SURGICAL PAIN

## 2021-08-11 ASSESSMENT — PAIN DESCRIPTION - ONSET
ONSET: ON-GOING
ONSET: ON-GOING

## 2021-08-11 ASSESSMENT — PAIN DESCRIPTION - LOCATION
LOCATION: KNEE
LOCATION: KNEE

## 2021-08-11 NOTE — PROGRESS NOTES
Physical Therapy    Facility/Department: [address] ORTHO SURGERY  Treatment note    NAME: Aneudy Malagon  : 1950  MRN: [id number]    Date of Service: 2021               Patient Diagnosis(es): The primary encounter diagnosis was Preop testing. Diagnoses of S/P total knee arthroplasty, left, Moderate obesity, Bipolar depression ([address] Hx of colonic polyp, Mood disorder of depressed type, Ganglion cyst of tendon sheath of right hand, Gastroesophageal reflux disease with esophagitis without hemorrhage, Cat bite of hand, right, initial encounter, Essential hypertension, History of cervical dysplasia, Lumbar disc herniation L5-S1, Spondylolisthesis L5 on S1, grade 1, History of bariatric surgery, MVP (mitral valve prolapse), and Depression, major, in remission ([address] were also pertinent to this visit. has a past medical history of Allergic rhinitis, Anxiety disorder, Bipolar disorder ([address] Caffeine use, Chronic low back pain, Depression, major, in remission ([address] Endocervical polyp, GERD (gastroesophageal reflux disease), Heart murmur, systolic, History of bariatric surgery, History of cervical dysplasia, History of low back pain, Hx of colonic polyp, Hypertension, Insomnia, Knee pain, right, Kyphosis, MVP (mitral valve prolapse), Neuropathy, Obesity, Osteoarthritis, Osteoarthritis of both knees, Osteoarthritis of hand, Pap smear abnormality of cervix with LGSIL, and Spinal stenosis, lumbar. has a past surgical history that includes Tonsillectomy; Foot surgery;  section; Bariatric Surgery (); Breast surgery; Cholecystectomy; Colonoscopy (2010); skin biopsy; fracture surgery (Bilateral); eye surgery (Bilateral, 2018); and Total knee arthroplasty (Left, 2021).     Evaluating Therapist: Horacio Andino, PT     rec ww     Referring Provider:  Stacy Martin MD    PT order : PT eval and treat     Room #:  710   DIAGNOSIS:  OA s/p L TKA 2021   PRECAUTIONS: falls, WBAT     Social: Pt lives alone  in a  1  floor plan apartment , 6  steps and 1 rails to enter. Plans to stay with daughter upon discharge in first floor set up home with ramp entry    Prior to admission pt walked with no AD. Has cane      Initial Evaluation  Date:  8/9/2021  Treatment  8/11/21    Short Term/ Long Term   Goals   Was pt agreeable to Eval/treatment? yes  yes    Does pt have pain?  L knee  Minimal L knee pain,L knee burning and hamstring pain during gait    Bed Mobility  Rolling:  NT   Supine to sit:  Nt   Sit to supine:  NT   Scooting:   SBA in sit  Supine to sit: SBA  Sit to supine: Min A L LE support  Scooting: SBA seated to EOB  SBA    Transfers Sit to stand:  CGA   Stand to sit:  CGA   Stand pivot:  NT  Sit <> stand SBA  Stand Pivot: SBA using [employer] for support  SBA    Ambulation     150  feet with  ww  with  SBA/CGA  220 feet and 110 feet and 15 feet x 1 each using WW for support SBA for balance  200  feet with  ww with S/I       Stair negotiation: ascended and descended NT  4 stairs x 2 with rail/standard cane for support SBA, step to pattern used  4  steps with  1  rail with SBA    LE ROM  AAROM L knee 10-90 degrees     LE strength  3+/ 5 L knee in available range      AM- PAC RAW score  18/ 24 18/24          Pt is alert and able to follow instruction  Balance: fair dynamic using WW for support    Pt performed therapeutic exercise of the following: seated B ankle pumps x 40, L LE quad sets AROM x 20, towel slides AROM x 40    Patient education  Pt was educated on exercise promoting circulation, ROM and strengthening, UE usage to assist with transfers, gait promoting posture, stair negotiation promoting sequence and cane placement, assist with bed mobility as needed    Patient response to education:   Pt verbalized understanding Pt demonstrated skill Pt requires further education in this area   yes With instruction yes     ASSESSMENT:   Comments: Pt found in a bedside chair, agreed to Rx, states in less pain today compared to yesterday in the PM. Exercise performed. Gait to the hallway, wm slow and inconsistent, step through reciprocal pattern used, no loss of balance or shortness of breath noted. Stairs performed with minimal difficulty. Once back to the room, bed mobility reviewed. Pt states has no further questions about home safety    Treatment: Pt practiced and was instructed in the following treatment: therapeutic exercise, transfer safety, gait mechanics, stair negotiation, bed mobility, car transfer    Pt was left in a bedside chair with call light in reach    Time in 0828   Time out 0910   Total Treatment Time 42 minutes   CPT codes:     Therapeutic activities 93181 28 minutes   Therapeutic exercises 87985 14 minutes       Pt is making good progress toward established Physical Therapy goals.  Pt displays functional mobility needed to go home with family assistance  Continue with physical therapy current plan of care    Viviane Ornelas PTA   License Number: [id number]

## 2021-08-11 NOTE — PROGRESS NOTES
Occupational Therapy  OT BEDSIDE TREATMENT NOTE      Date:2021  Patient Name: Rojelio Rodrigues  MRN: [id number]  : 1950  Room: [address] [place], [place] [address]     Evaluating OT: Jennie Aranda OTR/L   [id number]     Lynn Smith MD    Specific Provider Orders/Date:OT eval and treat 2021       Diagnosis: L knee OA     Surgery: L TKA 2021     Pertinent Medical History: OA , HTN, bipolar disorder, anxiety, spinal stenosis, back pain      Precautions:  Fall Risk, L LE WBAT       Assessment of current deficits    [x]? ? Functional mobility            [x]? ?ADLs           [x]? ? Strength                  []? ? Cognition    [x]? ? Functional transfers          [x]? ? IADLs         [x]? ? Safety Awareness   [x]? ?Endurance    []? ? Fine Coordination                         [x]? ? Balance      []? ? Vision/perception   []? ? Sensation      []? ?Gross Motor Coordination             []? ? ROM           []? ? Delirium                   []? ? Motor Control      OT PLAN OF CARE   OT POC based on physician orders, patient diagnosis and results of clinical assessment     Frequency/Duration  2-4 days/wk for 2 weeks PRN   Specific OT Treatment Interventions to include:   ADL retraining/adapted techniques and AE recommendations to increase functional independence within precautions                    Energy conservation techniques to improve tolerance for selfcare routine   Functional transfer/mobility training/DME recommendations for increased independence, safety and fall prevention         Patient/family education to increase safety and functional independence             Environmental modifications for safe mobility and completion of ADLs                             Therapeutic activity to improve functional performance during ADLs.                                          Therapeutic exercise to improve tolerance and functional strength for ADLs    Balance retraining/tolerance tasks for facilitation of postural control with dynamic challenges during ADLs .       Positioning to improve functional independence     Recommended Adaptive [place]      Home Living: Pt lives alone, basement apartment with 6 steps/rail   Tub/shower unit with tub bench   Plan to stay at daughters after discharge:  2 story with bed/bath on 1st. Ramp to enter.  Walk in shower with grab bars       Prior Level of Function: Independent with ADLs , Independent  with IADLs; ambulated with cane:      Pain Level:  L knee- Mild   Cognition: A&O: 4/4 with ability to follow multi- step commands                Functional Assessment:  AM-PAC Daily Activity Raw Score: 19/24    Initial Eval Status  Date: 8/9/21 Treatment Status  Date:8/11/21 STGs = LTGs  Time frame: 10-14 days   Feeding Independent        Grooming Set-up   Independent    UB Dressing Set-up   Independent    LB Dressing Mod A   Educated on dressing tech   Able to bend to reach sock while seated in chair   Mod i   Bathing Dheeraj   Mod I    Toileting Independent  Supervision  With use of grab bar as needed for support when standing     Bed Mobility  Up in chair upon entry    Mod I    Functional Transfers SBA  Sit-stand from chair  STS: SBA  From chair and commode Mod i   Functional Mobility SBA,w/walker   Ambulating in room  SBA using ww in room Mod I  with good tolerance    Balance Sitting:     Static:  Independent     Dynamic:SBA   Standing: SBA   Sitting: Independent  Standing: SBA Independent    Activity Tolerance Fair with light activity  Martha Boga ADL activity    Visual/  Perceptual Glasses: none by bedside                            Treatment: Upon arrival pt was sitting in chair. Per pt she was able to dress herself this AM post training on compensatory techniques discussed during previous tx. Per pt her daughter's walk in shower does not have a ledge to step over. Instructed pt on ww management during shower transfers.   Encouraged pt to trial shower transfers with home health therapist prior to partaking in ADL to prevent falls. Pt declined additional activity due to fatigue. Pt also declined questions/ needs for therapist. At end of session pt was transferred to chair with all lines and tubes intact and call light within reach. Education: ADL safety and transfer training     · Pt has made good progress towards set goals.        Treatment Charges: Mins Units   Ther Ex  20639     Manual Therapy 49503     Thera Activities 96579 5 0   ADL/Home Mgt 24977 10 1   Neuro Re-ed 38711     Group Therapy      Orthotic manage/training  23243     Non-Billable Time       Time In: 8:08  Time Out: 8:23  Total Time: [address] [place]/L [id number]

## 2021-08-12 ENCOUNTER — TELEPHONE (OUTPATIENT)
Dept: ORTHOPEDIC SURGERY | Age: 71
End: 2021-08-12

## 2021-08-12 NOTE — TELEPHONE ENCOUNTER
8/12/2021 1:01 pm Patient phoned office / Message left on voice mail: Going over my Discharge Instructions. Not clear on when to remove my bandages. Doctor did give me instructions but this is all overwhelming. 8/12/2021 1:42 pm Follow up phone call / Veronica Hagan w/ and informed patient: Island dressing over robot pin sites can be removed now. May take a shower. To protect stitches, keep covered w/ a band aid. Reapply band aid(s) as needed after showering. Remove Aquacel dressing over incision line next Monday, August 16 th. May shower. No need to cover incision unless incision is draining, then notify this office of the drainage. Discharge Instructions briefly reviewed w/ patient. Patient had a very difficult time staying focused on the conversation.

## 2021-08-17 ENCOUNTER — TELEPHONE (OUTPATIENT)
Dept: ORTHOPEDIC SURGERY | Age: 71
End: 2021-08-17

## 2021-08-17 ENCOUNTER — OFFICE VISIT (OUTPATIENT)
Dept: ORTHOPEDIC SURGERY | Age: 71
End: 2021-08-17

## 2021-08-17 VITALS — BODY MASS INDEX: 32.43 KG/M2 | HEIGHT: 63 IN | WEIGHT: 183 LBS

## 2021-08-17 DIAGNOSIS — Z96.652 STATUS POST TOTAL LEFT KNEE REPLACEMENT: Primary | ICD-10-CM

## 2021-08-17 PROCEDURE — 99024 POSTOP FOLLOW-UP VISIT: CPT | Performed by: ORTHOPAEDIC SURGERY

## 2021-08-17 NOTE — PROGRESS NOTES
Sutures removed from left TKA ROBBIE pin site without difficulty, no drainage. Patient tolerated procedure well.

## 2021-08-17 NOTE — TELEPHONE ENCOUNTER
Patient called. She talked with [facility]SB about stopping Percocet. Wants to make sure of daily max doses of Naproxen and Tylenol. She has Naproxen 500 mg at home. I informed her max dose of Tylenol is 3,000 mg per day. How do you want her to take Naproxen 500 mg? She is taking Aspirin 325 mg bid.

## 2021-08-18 NOTE — TELEPHONE ENCOUNTER
She can take naproxyn 500 BID with food, and if doing that should reduce ASA to 81 mg BID also with food

## 2021-08-18 NOTE — TELEPHONE ENCOUNTER
Spoke with patient regarding Naproxen dosage and dropping ASA dosage to 81 mg bid. Both meds to be taken with food. Patient verbalized understanding of all instructions.

## 2021-08-18 NOTE — PROGRESS NOTES
Chief Complaint:   Chief Complaint   Patient presents with    Post-Op Check     Left total knee arthroplasty. [place] Chau is 8 days status post robotic assisted left total knee arthroplasty, having a lot of difficulty regaining flexion, not inconsistent with her primary preoperative diagnosis of patellofemoral arthritis. Tricompartmental resurfacing was performed, with excellent patellar tracking and full range of motion intraoperatively. She is managing pain with oral medication, actually doing better with Tylenol and Naprosyn than Percocet at this time. Denies fever chills sweats chest pain or dyspnea. Bearing full weight using walker for balance. Allergies; medications; past medical, surgical, family, and social history; and problem list have been reviewed today and updated as indicated in this encounter seen below. Exam: Left knee incision is healing without erythema fluctuance or drainage, minimal if any appreciable effusion, generalized tenderness about the knee no specific site. Patellar tracking is neutral although the patient has limited flexion from 0 to 60 degrees of flexion at this point with significant quadriceps resistance. Expected generalized ecchymosis, calf swollen but nontender negative Homans. Knee is stable to medial lateral and AP stress. Radiographs: AP lateral x-rays of the left knee show the tricompartmental components to be well fit and fixed in proper position with restoration of neutral limb alignment, however the periarticular checkpoints for the [facility] system are noted to remain in place at the medial distal femur and proximal medial tibia, these are well away from the articular surface and do not appear to have migrated from their intraoperative fixation points. Corinne Brock was seen today for post-op check.     Diagnoses and all orders for this visit:    Status post total left knee replacement  -     XR KNEE LEFT (1-2 VIEWS)  -     Ambulatory referral to Physical Therapy       Sutures were removed from the pin sites, patient will advance to outpatient therapy, advised to work on active motion at home as much as possible. We discussed the possibility of exam and manipulation under anesthesia if she does not achieve at least 90 degrees of flexion in the coming weeks. I advised the patient as to the checkpoints being left hand which is not appropriate but do not anticipate they will cause any issues, if they do become tender or show evidence of migration we will plan for an outpatient removal by percutaneous technique. Patient will try to manage pain with Naprosyn and Tylenol instead of Percocet, reduce her aspirin 81 mg twice daily and watch for GI issues. Follow-up in 3 weeks for clinical and x-ray exams of the left knee. Possible close manipulation and if that occurs electively remove the checkpoints as well. Note I did advise OR at the hospital of the checkpoint issue, safe care report will be filed. Return in about 3 weeks (around 9/7/2021). Current Outpatient Medications   Medication Sig Dispense Refill    aspirin 325 MG EC tablet Take 1 tablet by mouth 2 times daily for 28 days 56 tablet 0    Ascorbic Acid (VITAMIN C) 1000 MG tablet Take 1,000 mg by mouth daily       lamoTRIgine (LAMICTAL) 25 MG tablet Take 2 tablets by mouth daily 180 tablet 1    hydroCHLOROthiazide (HYDRODIURIL) 25 MG tablet Take 1 tablet by mouth daily 90 tablet 1    Ferrous Sulfate (IRON) 325 (65 Fe) MG TABS Take 1 tablet by mouth daily       famotidine (PEPCID) 20 MG tablet nightly Patient states she does not take every day      Multiple Vitamin (MULTI-VITAMIN DAILY PO) Take by mouth       Vitamin D (CHOLECALCIFEROL) 25 MCG (1000 UT) TABS tablet Take 1,000 Units by mouth daily Vitamin D3       No current facility-administered medications for this visit.        Patient Active Problem List   Diagnosis    Depression, major, in remission ([facility]    Anxiety disorder  MVP (mitral valve prolapse)    Osteoarthritis of both knees    Spinal stenosis, lumbar    History of bariatric surgery    Spondylolisthesis L5 on S1, grade 1    Lumbar disc herniation L5-S1    Osteoarthritis of hand    Chronic low back pain    History of cervical dysplasia    Kyphosis    Essential hypertension    Allergic rhinitis    Cat bite of hand, right, initial encounter    Gastroesophageal reflux disease with esophagitis without hemorrhage    Ganglion cyst of tendon sheath of right hand    Mood disorder of depressed type    Hx of colonic polyp    Bipolar depression ([address]    Moderate obesity    S/P total knee arthroplasty, left    Preop testing    Primary osteoarthritis of left knee       Past Medical History:   Diagnosis Date    Allergic rhinitis     Anxiety disorder     Bipolar disorder ([address]     Caffeine use 2015    Chronic low back pain 2015    Depression, major, in remission ([address]     multiple hospitalizations    Endocervical polyp 2012    GERD (gastroesophageal reflux disease)     Heart murmur, systolic     M5/9    History of bariatric surgery     gastric stapling    History of cervical dysplasia 2015    History of low back pain     Hx of colonic polyp 3/18/2021    Colonoscopy 21 Naff[place]/Awaida no recurrent polyps repeat in 5 years    Hypertension          Insomnia 2015    Knee pain, right     severe OA per  Dr Llanos Setting 2015    MVP (mitral valve prolapse)     Neuropathy     cervical spondylosis    Obesity     Osteoarthritis     Osteoarthritis of both knees     Osteoarthritis of hand 2015    Pap smear abnormality of cervix with LGSIL     Spinal stenosis, lumbar 2013 MRI    disc degeneration and bulges with canal and foraminal stenoses       Past Surgical History:   Procedure Laterality Date    BARIATRIC SURGERY      stapling of stomach    BREAST SURGERY      reduction     SECTION  CHOLECYSTECTOMY      COLONOSCOPY  1/2010    donald one polyp    EYE SURGERY Bilateral 09/2018    cataract    FOOT SURGERY      FRACTURE SURGERY Bilateral     feet    SKIN BIOPSY      TONSILLECTOMY      TOTAL KNEE ARTHROPLASTY Left 8/9/2021    LEFT TOTAL KNEE ARTHROPLASTY ROBOTIC ASSISTED performed by Esteban Carcamo MD at [facility] OR       No Known Allergies    Social History     Socioeconomic History    Marital status:      Spouse name: None    Number of children: None    Years of education: None    Highest education level: None   Occupational History    None   Tobacco Use    Smoking status: Never Smoker    Smokeless tobacco: Never Used   Vaping Use    Vaping Use: Never used   Substance and Sexual Activity    Alcohol use: Yes     Comment:  rarely    Drug use: No    Sexual activity: Not Currently     Partners: Male   Other Topics Concern    None   Social History Narrative    None     Social Determinants of Health     Financial Resource Strain:     Difficulty of Paying Living Expenses:    Food Insecurity: No Food Insecurity    Worried About Running Out of Food in the Last Year: Never true    Chloe of Food in the Last Year: Never true   Transportation Needs: No Transportation Needs    Lack of Transportation (Medical): No    Lack of Transportation (Non-Medical):  No   Physical Activity:     Days of Exercise per Week:     Minutes of Exercise per Session:    Stress:     Feeling of Stress :    Social Connections:     Frequency of Communication with Friends and Family:     Frequency of Social Gatherings with Friends and Family:     Attends Jain Services:     Active Member of Clubs or Organizations:     Attends Club or Organization Meetings:     Marital Status:    Intimate Partner Violence:     Fear of Current or Ex-Partner:     Emotionally Abused:     Physically Abused:     Sexually Abused:        Family History   Problem Relation Age of Onset    Heart Disease Mother  COPD Mother     Anxiety Disorder Mother     Asthma Mother     Other Mother         sarcoidosis    Diabetes Mother     Stroke Father     Obesity Father     Heart Disease Brother     Other Brother         suicide    Obesity Brother     Substance Abuse Brother     Substance Abuse Brother         hepatitis C    Mental Illness Brother     Cancer Paternal Uncle         throat    Diabetes Maternal Grandmother     Stroke Maternal Grandmother     Stroke Paternal Grandmother     Stroke Paternal Grandfather     Cancer Paternal Grandfather          Review of Systems  As follows except as previously noted in HPI:  Constitutional: Negative for chills, diaphoresis, fatigue, fever and unexpected weight change. Respiratory: Negative for cough, shortness of breath and wheezing. Cardiovascular: Negative for chest pain and palpitations. Neurological: Negative for dizziness, syncope, cephalgia. GI / : negative  Musculoskeletal: see HPI       Objective:   Physical Exam   Constitutional: Oriented to person, place, and time. and appears well-developed and well-nourished. :   Head: Normocephalic and atraumatic. Eyes: EOM are normal.   Neck: Neck supple. Cardiovascular: Normal rate and regular rhythm. Pulmonary/Chest: Effort normal. No stridor. No respiratory distress, no wheezes. Abdominal:  No abnormal distension. Neurological: Alert and oriented to person, place, and time. Skin: Skin is warm and dry. Psychiatric: Normal mood and affect.  Behavior is normal. Thought content normal.    8/18/2021  9:05 AM

## 2021-08-20 ENCOUNTER — EVALUATION (OUTPATIENT)
Dept: PHYSICAL THERAPY | Age: 71
End: 2021-08-20
Payer: MEDICARE

## 2021-08-20 DIAGNOSIS — Z96.652 STATUS POST TOTAL LEFT KNEE REPLACEMENT: Primary | ICD-10-CM

## 2021-08-20 PROCEDURE — 97161 PT EVAL LOW COMPLEX 20 MIN: CPT | Performed by: PHYSICAL THERAPIST

## 2021-08-20 PROCEDURE — 97110 THERAPEUTIC EXERCISES: CPT | Performed by: PHYSICAL THERAPIST

## 2021-08-20 NOTE — PROGRESS NOTES
[address] [facility]   Phone: [phone]   Fax: [phone]      Physical Therapy Daily Treatment Note    Date: 2021  Patient Name: Tammy Osman  : 1950   MRN: [id number]  DOInjury: 2021   DOSx: 2021  Referring Provider: Heather German MD  [address] [place] [address]     Medical Diagnosis:     G78.270 (ICD-10-CM) - Status post total left knee replacement    Outcome Measure: LEFS 74%    S: see eval  O:   Time 2153-9113     Visit  Repeat outcome measure at mid point and end. Pain 4/10     Strength Right: Hip: 4+/5 globally, Knee: Flexion 4+/5,  Extension 4+/5  Left: Hip: 4-/5 globally, Knee: Flexion 4-/5,  Extension 4-/5     Palpation Tender to palpation globally,  Moderate palpable edema noted. ROM Left:   AROM: 65° Flexion,  0° Extension     Modalities            Manual            Stretch      IT band supine      HS supine      Quad Prone      Exercise      Bike      Heel slides 10x10s holds HEP TE   QS 5 sec hold x 30 c heel prop HEP TE   SLR 2x10 c heel prop QS HEP TE   SAQ      LAQ      Hamstring Curl       TG squats      TG calf raises      Step-ups - FWD      Step-ups - LAT      Step-ups - BWD        NMR To improve balance for safe community and home ambulation    Resisted walk      FWD      BKWD      lat      March      Side stepping      Retro walk      Heel to toe      A:  Tolerated well. Above added to written HEP and will perform daily.   She was educated on edema reduction  P: Continue with rehab plan  Laura Quezada, PT DPT, PT [id number]    Treatment Charges: Mins Units   Initial Evaluation 20 1   Re-Evaluation     Ther Exercise         TE 10 1   Manual Therapy     MT     Ther Activities        TA     Gait Training          GT     Neuro Re-education NR     Modalities     Non-Billable Service Time     Other     Total Time/Units 30 2

## 2021-08-20 NOTE — PROGRESS NOTES
[address] [facility]   Phone: [phone]   Fax: [phone]         Date:  2021   Patient: Prince Chau  : 1950  MRN: [id number]  Referring Provider: MD QUINTEN [facility]do [facility],  [facility] Diagnosis:     X72.918 (ICD-10-CM) - Status post total left knee replacement     SUBJECTIVE:     **goes by [facility]**    Surgery Date: 2021    Surgery Procedure: L TKA    Onset date: 2021    Onset: Insidious onset    Mechanism of Injury: Pt had elective TKA on 2021. She reports her biggest limitation is with flexion d/t pain. She reports that she has stairs to enter her home but is living c her daughter currently. She has been icing and elevating for pain relief. Previous PT: none     Medical Management for Current Problem: naproxen and tylenol    Chief complaint: pain, edema, decreased motion, decreased mobility, weakness, inability / limited ability to use leg, difficulty walking, difficulty with stairs, limited ability to complete ADLs (dressing , toileting , transferring , walking), limited ability to complete IADLs (cleaning, laundry), limited ability to lift/carry/handle material, limited ability to complete home/outdoor chores/tasks, inability to participate in exercise regimen / fitness program, limited tolerance to weightbearing tasks and weightbearing duration, poor sleep quality     Behavior: condition is staying the same    Pain: waxing and waning  Current: 10        Symptom Type/Quality: dull, aching  Location[de-identified] Knee: global     Aggravated by: walking, standing, stairs, inclines, declines, getting in/out of car    Relieved by: rest, medication    Imaging results: XR KNEE LEFT (1-2 VIEWS)    Result Date: 2021  Radiology exam is complete. No Radiologist dictation. Please follow up with ordering provider.        Past Medical History:  Past Medical History:   Diagnosis Date    Allergic rhinitis     Anxiety disorder     [facility]ipolar disorder [facility] [address] [facility]     Caffeine use 2015    Chronic low back pain 2015    Depression, major, in remission ([facility] [address] [facility]     multiple hospitalizations    Endocervical polyp 2012    GERD (gastroesophageal reflux disease)     Heart murmur, systolic     G4/6    History of bariatric surgery     gastric stapling    History of cervical dysplasia 2015    History of low back pain     Hx of colonic polyp 3/18/2021    Colonoscopy 21 Naffah/Awaida no recurrent polyps repeat in 5 years    Hypertension          Insomnia 2015    Knee pain, right     severe OA per  Dr Carol Palacio 2015    MVP (mitral valve prolapse)     Neuropathy     cervical spondylosis    Obesity     Osteoarthritis     Osteoarthritis of both knees     Osteoarthritis of hand 2015    Pap smear abnormality of cervix with LGSIL     Spinal stenosis, lumbar 2013 MRI    disc degeneration and bulges with canal and foraminal stenoses     Past Surgical History:   Procedure Laterality Date    BARIATRIC SURGERY      stapling of stomach    BREAST SURGERY      reduction     SECTION      CHOLECYSTECTOMY      COLONOSCOPY  2010    donald one polyp    EYE SURGERY Bilateral 2018    cataract    FOOT SURGERY      FRACTURE SURGERY Bilateral     feet    SKIN BIOPSY      TONSILLECTOMY      TOTAL KNEE ARTHROPLASTY Left 2021    LEFT TOTAL KNEE ARTHROPLASTY ROBOTIC ASSISTED performed by Howard Salinas MD at [facility] OR       Medications:   Current Outpatient Medications   Medication Sig Dispense Refill    aspirin 325 MG EC tablet Take 1 tablet by mouth 2 times daily for 28 days 56 tablet 0    Ascorbic Acid (VITAMIN C) 1000 MG tablet Take 1,000 mg by mouth daily       lamoTRIgine (LAMICTAL) 25 MG tablet Take 2 tablets by mouth daily 180 tablet 1    hydroCHLOROthiazide (HYDRODIURIL) 25 MG tablet Take 1 tablet by mouth daily 90 tablet 1    Ferrous Sulfate (IRON) 325 (65 Fe) MG TABS restrictions and promote functional gains. Pt requires skilled care to improve global ROM, strength, stability, and overall fxn mobility needed for ADL, rec, and work activity. [x] There are no barriers affecting plan of care or recovery    [] Barriers to this patient's plan of care or recovery include. Domestic Concerns:  [x] No  [] Yes:      Long Term goals (4-6 weeks)   Decrease reported pain to 0/10   Increase ROM to 0-120   Increase Strength to 4+/5 globally    Able to perform/complete the following functions/tasks: Pt will ambulate for 30 min s AD or limitation, able to negotiate a flight of stairs recip s pain, limitation, or HR, able to perform 10 STS transfers s pain or limitation or HHA   LEFS 0-30% impairment    Independent with Home Exercise Programs    Rehab Potential: [x] Good  [] Fair  [] Poor    PLAN       Treatment Plan:   [x] Therapeutic Exercise  [x] Therapeutic Activity  [x] Neuromuscular Re-education   [x] Gait Training  [x] Balance Training  [x] Aerobic conditioning  [x] Manual Therapy  [x] Massage/Fascial release   [] Work/Sport specific activities    [] Pain Neuroscience [x] Cold/hotpack  [x] Vasocompression  [x] Electrical Stimulation  [] Lumbar/Cervical Traction  [x] Ultrasound   [] Iontophoresis: 4 mg/mL Dexamethasone Sodium Phosphate 40-80 mAmin  [x] Dry Needling      [x] Instruction in HEP      []  Medication allergies reviewed for use of Dexamethasone Sodium Phosphate 4mg/ml  with iontophoresis treatments. Patient is not allergic.       The following CPT codes are likely to be used in the care of this patient: 72346 PT Evaluation: Low Complexity   43116 PT Re-Evaluation   64606 Therapeutic Exercise   [id number] Neuromuscular Re-Education   33419 Therapeutic Activities   93958 Manual Therapy   40167 Gait Training   50505 Vasopneumatic Device   89911      Suggested Professional Referral: [x] No  [] Yes:     Patient Education:  [x] Plans/Goals, Risks/Benefits discussed  [x] Home exercise program  Method of Education: [x] Verbal  [x] Demo  [x] Written  Comprehension of Education:  [x] Verbalizes understanding. [x] Demonstrates understanding. [] Needs Review. [] Demonstrates/verbalizes understanding of HEP/Ed previously given. Frequency: 1-2 days per week for 4-6 weeks    Patient understands diagnosis/prognosis and consents to treatment, plan and goals: [x] Yes    [] No     Thank you for the opportunity to work with your patient. If you have questions or comments, please contact me at numbers listed above. Electronically signed by: Pranay Mcclendon, PT PT DPT [id number]         Please sign Physician's Certification and return to: [address] [place] [address] E  [address] [place] [address]   Dept: [phone]  Dept Fax: [phone] PT , DPT PT [id number]    Physician's Certification / Comments     Frequency/Duration 1-2 days per week for 4-6 weeks. Certification period from 8/20/2021  to 10/20/2021. I have reviewed the Plan of Care established for skilled therapy services and certify that the services are required and that they will be provided while the patient is under my care.     Physician's Comments/Revisions:               Physician's Printed Name:                                           [de-identified] Signature:                                                               Date:

## 2021-08-26 ENCOUNTER — TREATMENT (OUTPATIENT)
Dept: PHYSICAL THERAPY | Age: 71
End: 2021-08-26
Payer: MEDICARE

## 2021-08-26 DIAGNOSIS — Z96.652 STATUS POST TOTAL LEFT KNEE REPLACEMENT: Primary | ICD-10-CM

## 2021-08-26 PROCEDURE — 97110 THERAPEUTIC EXERCISES: CPT | Performed by: PHYSICAL THERAPIST

## 2021-08-26 PROCEDURE — 97016 VASOPNEUMATIC DEVICE THERAPY: CPT | Performed by: PHYSICAL THERAPIST

## 2021-08-26 NOTE — PROGRESS NOTES
[address] [facility]   Phone: [phone]   Fax: [phone]      Physical Therapy Daily Treatment Note    Date: 2021  Patient Name: Pat Coello  : 1950   MRN: [id number]  DOInjury: 2021   DOSx: 2021  Referring Provider: Howard Salinas MD  [address] [place] [address]     Medical Diagnosis:     K43.320 (ICD-10-CM) - Status post total left knee replacement    Outcome Measure: LEFS 74%    S: Pt reports to PT stating she has global body pain specifically in the low back and upper back. O:   Time [id number]     Visit  Repeat outcome measure at mid point and end. Pain 4/10     Strength Right: Hip: 4+/5 globally, Knee: Flexion 4+/5,  Extension 4+/5  Left: Hip: 4-/5 globally, Knee: Flexion 4-/5,  Extension 4-/5     Palpation Tender to palpation globally,  Moderate palpable edema noted. ROM Left:   AROM: 65° Flexion,  0° Extension     Modalities      Vaso and elevation 15 min  Vaso   Manual            Stretch      IT band supine      HS supine      Quad Prone      Exercise      Bike      Heel slides 2x10 10s holds HEP TE   QS 5 sec hold x 30 c heel prop HEP TE   SLR 2x10 c heel prop QS HEP TE   SAQ x25 Stopped d/t pain TE   Supine hip add 3x10  TE   Supine clams 3x10 YTB TE   Hamstring Curl       TG squats      TG calf raises      Step-ups - FWD      Step-ups - LAT      Step-ups - BWD        NMR To improve balance for safe community and home ambulation    Resisted walk      FWD      BKWD      lat      March      Side stepping      Retro walk      Heel to toe      A:  Tolerated well. Pt continues to report diffuse pain causing self limiting c exercise. She displays flexion ROM restriction and was educated on the need to perform HEP multiple times throughout the day.   P: Continue with rehab plan  Robin Menchaca, PT DPT, PT [id number]    Treatment Charges: Mins Units   Initial Evaluation     Re-Evaluation     Ther Exercise         TE 30 2   Manual Therapy

## 2021-08-28 DIAGNOSIS — M48.061 SPINAL STENOSIS, LUMBAR: Chronic | ICD-10-CM

## 2021-08-31 ENCOUNTER — TREATMENT (OUTPATIENT)
Dept: PHYSICAL THERAPY | Age: 71
End: 2021-08-31
Payer: MEDICARE

## 2021-08-31 ENCOUNTER — TELEPHONE (OUTPATIENT)
Dept: FAMILY MEDICINE CLINIC | Age: 71
End: 2021-08-31

## 2021-08-31 DIAGNOSIS — Z96.652 STATUS POST TOTAL LEFT KNEE REPLACEMENT: Primary | ICD-10-CM

## 2021-08-31 PROCEDURE — 97110 THERAPEUTIC EXERCISES: CPT | Performed by: PHYSICAL THERAPIST

## 2021-08-31 NOTE — PROGRESS NOTES
[address] [facility]   Phone: [phone]   Fax: [phone]      Physical Therapy Daily Treatment Note    Date: 2021  Patient Name: Yaya Obrien  : 1950   MRN: [id number]  DOInjury: 2021   DOSx: 2021  Referring Provider: Sher Mtz MD  [address] [place] [address]     Medical Diagnosis:     Z99.291 (ICD-10-CM) - Status post total left knee replacement    Outcome Measure: LEFS 74%    S: Pt reports that she is noting she can bend her knee more and is noticing some improvement. O:   Time [id number]     Visit 3/8 Repeat outcome measure at mid point and end. Pain 4/10     Strength Right: Hip: 4+/5 globally, Knee: Flexion 4+/5,  Extension 4+/5  Left: Hip: 4-/5 globally, Knee: Flexion 4-/5,  Extension 4-/5     Palpation Tender to palpation globally,  Moderate palpable edema noted. ROM Left:   AROM: 65° Flexion,  0° Extension     Modalities      Vaso and elevation 15 min  Vaso   Manual            Stretch      IT band supine      HS supine      Quad Prone      Exercise      Bike      Heel slides 2x10 10s holds HEP TE   QS 5 sec hold x 30 c heel prop HEP TE   SLR 3x10 flex c heel prop, 4 direction QS HEP TE   Stopped d/t pain TE    TE   YTB TE   bridging 2x10  TE   TG squats      TG calf raises      Step-ups - FWD      Step-ups - LAT      Step-ups - BWD        NMR To improve balance for safe community and home ambulation    Resisted walk      FWD      BKWD      lat      March      Side stepping      Retro walk      Heel to toe      A:  Tolerated well. Pt was notably in less pain today and tolerated a full session of exercise. She was able to SLR c less quad lag and progress c mobility.   P: Continue with rehab plan  Luisa Carbajal, PT DPT, PT [id number]    Treatment Charges: Mins Units   Initial Evaluation     Re-Evaluation     Ther Exercise         TE 40 3   Manual Therapy     MT     Ther Activities        TA     Gait Training          GT     Neuro Re-education NR     Modalities     Non-Billable Service Time     Other     Total Time/Units 40 3

## 2021-08-31 NOTE — TELEPHONE ENCOUNTER
Patient called had knee surgery on 8.9.2021, and she can't sleep for the pain asking for something to help her sleep. She don't want pain Rx she is taking tylenol and naproxen. She is getting better just the pain at night. Please advise.     Last seen 7/13/2021  Next appt 10/19/2021    OSCAR Soto Sic

## 2021-09-02 ENCOUNTER — TREATMENT (OUTPATIENT)
Dept: PHYSICAL THERAPY | Age: 71
End: 2021-09-02
Payer: MEDICARE

## 2021-09-02 DIAGNOSIS — Z96.652 STATUS POST TOTAL LEFT KNEE REPLACEMENT: Primary | ICD-10-CM

## 2021-09-02 PROCEDURE — 97110 THERAPEUTIC EXERCISES: CPT | Performed by: PHYSICAL THERAPIST

## 2021-09-02 RX ORDER — NAPROXEN 500 MG/1
500 TABLET ORAL 2 TIMES DAILY PRN
Qty: 60 TABLET | Refills: 0 | Status: SHIPPED
Start: 2021-09-02 | End: 2021-10-04

## 2021-09-02 NOTE — TELEPHONE ENCOUNTER
Spoke to patient who is having some trouble sleeping but we agreed she would be patient and we will avoid adding medication at this time, she will update me if problem persists.

## 2021-09-02 NOTE — PROGRESS NOTES
[address] [facility]   Phone: [phone]   Fax: [phone]      Physical Therapy Daily Treatment Note    Date: 2021  Patient Name: Maria Fernanda Pablo  : 1950   MRN: [id number]  DOInjury: 2021   DOSx: 2021  Referring Provider: Leta Arango MD  [address] [place] [address]     Medical Diagnosis:     V00.747 (ICD-10-CM) - Status post total left knee replacement    Outcome Measure: LEFS 74%    S: Pt arrived late to this session. She reports that she has been pushing motion at home but it stiffens up when she takes break. O:   Time 9494-2055     Visit  Repeat outcome measure at mid point and end. Pain 4/10     Strength Right: Hip: 4+/5 globally, Knee: Flexion 4+/5,  Extension 4+/5  Left: Hip: 4-/5 globally, Knee: Flexion 4-/5,  Extension 4-/5     Palpation Tender to palpation globally,  Moderate palpable edema noted. ROM Left:   AROM: 65° Flexion,  0° Extension     Modalities       Vaso   Manual            Stretch      IT band supine      HS supine      Quad Prone      Exercise      Bike 5 min  TE   Heel slides 2x10 10s holds supine  x10 10s holds seated HEP TE   c heel prop HEP TE   QS HEP TE   Stopped d/t pain TE    TE   YTB TE   bridging 2x10  TE   TG squats x20 10s holds  TE   TG calf raises      Step-ups - FWD      Step-ups - LAT      Step-ups - BWD        NMR To improve balance for safe community and home ambulation    Resisted walk      FWD      BKWD      lat      March      Side stepping      Retro walk      Heel to toe      A:  Tolerated well. Session shortened today d/t patient arriving late. She was educated on the need to push motion c activity at home as she has lingered at 80* of flexion. She was progressed c flexion based activity today to improve ROM needed for ADL activity.    P: Continue with rehab plan  Lokesh Ozuna, PT DPT, PT [id number]    Treatment Charges: Mins Units   Initial Evaluation     Re-Evaluation     Ther Exercise TE 30 2   Manual Therapy     MT     Ther Activities        TA     Gait Training          GT     Neuro Re-education NR     Modalities     Non-Billable Service Time     Other     Total Time/Units 30 2 85.32

## 2021-09-05 ASSESSMENT — KOOS JR
BENDING TO THE FLOOR TO PICK UP OBJECT: 0
HOW SEVERE IS YOUR KNEE STIFFNESS AFTER FIRST WAKING IN MORNING: 3
TWISING OR PIVOTING ON KNEE: 3
GOING UP OR DOWN STAIRS: 2
RISING FROM SITTING: 1
STANDING UPRIGHT: 1
STRAIGHTENING KNEE FULLY: 1

## 2021-09-05 ASSESSMENT — PROMIS GLOBAL HEALTH SCALE
HOW IS THE PROMIS V1.1 BEING ADMINISTERED?: 2
IN GENERAL, PLEASE RATE HOW WELL YOU CARRY OUT YOUR USUAL SOCIAL ACTIVITIES (INCLUDES ACTIVITIES AT HOME, AT WORK, AND IN YOUR COMMUNITY, AND RESPONSIBILITIES AS A PARENT, CHILD, SPOUSE, EMPLOYEE, FRIEND, ETC) [ON A SCALE OF 1 (POOR) TO 5 (EXCELLENT)]?: 2
IN GENERAL, HOW WOULD YOU RATE YOUR PHYSICAL HEALTH [ON A SCALE OF 1 (POOR) TO 5 (EXCELLENT)]?: 4
SUM OF RESPONSES TO QUESTIONS 2, 4, 5, & 10: 15
IN THE PAST 7 DAYS, HOW WOULD YOU RATE YOUR PAIN ON AVERAGE [ON A SCALE FROM 0 (NO PAIN) TO 10 (WORST IMAGINABLE PAIN)]?: 5
IN THE PAST 7 DAYS, HOW WOULD YOU RATE YOUR FATIGUE ON AVERAGE [ON A SCALE FROM 1 (NONE) TO 5 (VERY SEVERE)]?: 1
IN GENERAL, HOW WOULD YOU RATE YOUR SATISFACTION WITH YOUR SOCIAL ACTIVITIES AND RELATIONSHIPS [ON A SCALE OF 1 (POOR) TO 5 (EXCELLENT)]?: 4
SUM OF RESPONSES TO QUESTIONS 3, 6, 7, & 8: 14
TO WHAT EXTENT ARE YOU ABLE TO CARRY OUT YOUR EVERYDAY PHYSICAL ACTIVITIES SUCH AS WALKING, CLIMBING STAIRS, CARRYING GROCERIES, OR MOVING A CHAIR [ON A SCALE OF 1 (NOT AT ALL) TO 5 (COMPLETELY)]?: 4
IN GENERAL, HOW WOULD YOU RATE YOUR MENTAL HEALTH, INCLUDING YOUR MOOD AND YOUR ABILITY TO THINK [ON A SCALE OF 1 (POOR) TO 5 (EXCELLENT)]?: 3
IN THE PAST 7 DAYS, HOW OFTEN HAVE YOU BEEN BOTHERED BY EMOTIONAL PROBLEMS, SUCH AS FEELING ANXIOUS, DEPRESSED, OR IRRITABLE [ON A SCALE FROM 1 (NEVER) TO 5 (ALWAYS)]?: 4
IN GENERAL, WOULD YOU SAY YOUR QUALITY OF LIFE IS...[ON A SCALE OF 1 (POOR) TO 5 (EXCELLENT)]: 4
IN GENERAL, WOULD YOU SAY YOUR HEALTH IS...[ON A SCALE OF 1 (POOR) TO 5 (EXCELLENT)]: 4

## 2021-09-07 ENCOUNTER — TREATMENT (OUTPATIENT)
Dept: PHYSICAL THERAPY | Age: 71
End: 2021-09-07
Payer: MEDICARE

## 2021-09-07 ENCOUNTER — OFFICE VISIT (OUTPATIENT)
Dept: ORTHOPEDIC SURGERY | Age: 71
End: 2021-09-07

## 2021-09-07 VITALS — HEIGHT: 63 IN | WEIGHT: 183 LBS | BODY MASS INDEX: 32.43 KG/M2

## 2021-09-07 DIAGNOSIS — Z96.652 STATUS POST TOTAL LEFT KNEE REPLACEMENT: Primary | ICD-10-CM

## 2021-09-07 PROCEDURE — 97110 THERAPEUTIC EXERCISES: CPT

## 2021-09-07 PROCEDURE — 99024 POSTOP FOLLOW-UP VISIT: CPT | Performed by: ORTHOPAEDIC SURGERY

## 2021-09-07 RX ORDER — ASPIRIN 81 MG/1
81 TABLET ORAL 2 TIMES DAILY
COMMUNITY
End: 2022-04-19

## 2021-09-07 NOTE — PROGRESS NOTES
[address] [facility] [address] [facility]   Phone: [phone]   Fax: [phone]      Physical Therapy Daily Treatment Note    Date: 2021  Patient Name: Dennis Nurse  : 1950   MRN: [id number]  DOInjury: 2021   DOSx: 2021  Referring Provider: Damari Anand MD  [address] [place] [address]     Medical Diagnosis:     D28.186 (ICD-10-CM) - Status post total left knee replacement    Outcome Measure: LEFS 74%    S: Pt reports she is going to schedule manipulation per surgeon request.     O:   Time [id number]     Visit  Repeat outcome measure at mid point and end. Pain 4/10     Strength Right: Hip: 4+/5 globally, Knee: Flexion 4+/5,  Extension 4+/5  Left: Hip: 4-/5 globally, Knee: Flexion 4-/5,  Extension 4-/5     Palpation Tender to palpation globally,  Moderate palpable edema noted. ROM Left:   AROM: 65° Flexion,  0° Extension     Modalities       Vaso   Manual            Stretch      IT band supine      HS supine      Quad Prone            Exercise      Bike 5 min  TE   Heel slides 2x10 10s holds supine  x10 10s holds seated HEP TE   c heel prop HEP TE   QS HEP TE   Stopped d/t pain TE    TE   YTB TE   bridging 2x10  TE   TG squats x20 10s holds  TE   TG calf raises      Step-ups - FWD      Step-ups - LAT        NMR To improve balance for safe community and home ambulation    Resisted walk      FWD      BKWD      lat      March      Side stepping      Retro walk      Heel to toe      A:  Tolerated well. Continued to educate pt on the importance of improving LE mobility pushing into restriction. Focused treatment session on improving LE ROM.       P: Continue with rehab plan  Susan Hinojosa, MARCIA [id number]    Treatment Charges: Mins Units   Initial Evaluation     Re-Evaluation     Ther Exercise         TE 40 3   Manual Therapy     MT     Ther Activities        TA     Gait Training          GT     Neuro Re-education NR     Modalities     Non-Billable Service Time     Other Total Time/Units 40 3

## 2021-09-07 NOTE — PROGRESS NOTES
Surgical Procedure: LEFT KNEE MANIPULATION UNDER ANESTHESIA / REMOVE CHECK POINTS, Needs: Fluoroscopy, Anesthesia: [facility], Date: Monday, September 20, 2021, Time: 7:30 am, Place: [facility] - [facility], Surgeon: Toy Adrian. Medications reviewed with the patient / do not hold asa and naprosyn for this procedure per tsb. . Pre Op Instructions reviewed with the patient. Informed patient: A hospital nurse will contact her with instructions for the day of this procedure. The patient expresses understanding and is in agreement with the plan.      Patient has not had the Covid vaccine     Instructed patient to resume therapy / knee exercises after this procedure    Post Op Appointment: Tuesday, September 28 th at 9:45 am

## 2021-09-07 NOTE — DISCHARGE SUMMARY
Physician Discharge Summary     Patient ID:  Deo Tracey  [id number]  80 y.o.  1950    Admit date: 8/9/2021    Discharge date and time: 8/11/2021  2:53 PM     Admitting Physician: Becki Mena MD     Discharge Physician: SAME    Admission Diagnoses: S/P total knee arthroplasty, left [Z96.652]    Discharge Diagnoses: SAME    Admission Condition: good    Discharged Condition: good    Indication for Admission: Left knee PAIN FROM [facility] AND REFRACTORY TO [address] Course: SEE NOTES    Consults: none    Significant Diagnostic Studies: labs: SEE RESULTS    Treatments: surgery: Left total knee arthroplasty robotic assisted    Discharge Exam:  Extremities: Homans sign is negative, no sign of DVT and no edema, redness or tenderness in the calves or thighs    Disposition: home    Patient Instructions:   Discharge Medication List as of 8/11/2021  2:19 PM      START taking these medications    Details   oxyCODONE-acetaminophen (PERCOCET) 5-325 MG per tablet Take 1 tablet by mouth every 6 hours as needed for Pain for up to 7 days. Intended supply: 7 days.  Take lowest dose possible to manage pain, Disp-28 tablet, R-0Print      aspirin 325 MG EC tablet Take 1 tablet by mouth 2 times daily for 28 days, Disp-56 tablet, R-0Print         CONTINUE these medications which have NOT CHANGED    Details   Ascorbic Acid (VITAMIN C) 1000 MG tablet Take 1,000 mg by mouth daily Historical Med      lamoTRIgine (LAMICTAL) 25 MG tablet Take 2 tablets by mouth daily, Disp-180 tablet, R-1Normal      hydroCHLOROthiazide (HYDRODIURIL) 25 MG tablet Take 1 tablet by mouth daily, Disp-90 tablet, R-1Normal      Ferrous Sulfate (IRON) 325 (65 Fe) MG TABS Take 1 tablet by mouth daily Historical Med      famotidine (PEPCID) 20 MG tablet nightly Patient states she does not take every dayHistorical Med      Multiple Vitamin (MULTI-VITAMIN DAILY PO) Take by mouth Historical Med Vitamin D (CHOLECALCIFEROL) 25 MCG (1000 UT) TABS tablet Take 1,000 Units by mouth daily Vitamin [id number] may look different. 25 mcg=1000 Units. Please double check dosages. Historical Med         STOP taking these medications       naproxen (NAPROSYN) 500 MG tablet Comments:   Reason for Stopping:             Activity: no heavy lifting, pushing, pulling with the implant side for 2 months  Diet: regular diet  Wound Care: keep wound clean and dry    Follow-up with MD CHRIS in 1 week.     Signed:  9/7/2021  4:44 PM

## 2021-09-07 NOTE — PROGRESS NOTES
Chief Complaint:   Chief Complaint   Patient presents with    Post-Op Check     FU Lt TKA 8/9/2021. Steri-strips remain on incision. Difficulty sleeping. Concerned about flexion. Becomming very impatient. Continues with PTx        Jamie Mayer is 4 weeks after robotic assisted left total knee arthroplasty, she is independently ambulatory using a cane for balance, denies fever chills sweats chest pain or dyspnea. Having minimal pain but very anxious and concerned about her inability to restore motion as quickly as anticipated. Taking over-the-counter's occasionally for pain. Pain is predominantly anterior infrapatellar. Allergies; medications; past medical, surgical, family, and social history; and problem list have been reviewed today and updated as indicated in this encounter seen below. Exam: Left knee incisions are healed without erythema fluctuance or drainage, there is no effusion. Patellar tracking is neutral without crepitus, active assisted motion though is limited from 5 to approximately 80 or 85 degrees of flexion. No appreciable tenderness referable to the checkpoints that were not removed at the time of surgery. Radiographs: AP lateral x-rays of left knee continue to show tricompartmental components remain well fit and fixed in proper alignment, checkpoints remain undisturbed in the distal medial femur and proximal medial tibia. Ruslan Fontenot was seen today for post-op check.     Diagnoses and all orders for this visit:    Status post total left knee replacement  -     XR KNEE LEFT (1-2 VIEWS)       Given the patient's difficulty with recovering motion we talked about the alternative of exam and manipulation anesthesia, as well as the fact that she did have significant patellofemoral arthritis which would make postoperative stiffness more of an issue, she is concerned about long-term results and I advised her that exam and manipulation in early stage is more likely to be of benefit than waiting. I also again advised her as to the presence of the checkpoints that were inadvertently left in place, and that we would remove these with percutaneous technique at the time of manipulation. Questions were asked answered and understood, the patient request to proceed with exam and manipulation of left total knee under anesthesia with removal checkpoints under fluoroscopy at that time she will follow up here postoperatively. Return in about 22 days (around 9/29/2021). Current Outpatient Medications   Medication Sig Dispense Refill    aspirin 81 MG EC tablet Take 81 mg by mouth 2 times daily      naproxen (NAPROSYN) 500 MG tablet TAKE 1 TABLET BY MOUTH 2 TIMES DAILY AS NEEDED FOR PAIN 60 tablet 0    Ascorbic Acid (VITAMIN C) 1000 MG tablet Take 1,000 mg by mouth daily       lamoTRIgine (LAMICTAL) 25 MG tablet Take 2 tablets by mouth daily 180 tablet 1    hydroCHLOROthiazide (HYDRODIURIL) 25 MG tablet Take 1 tablet by mouth daily 90 tablet 1    Ferrous Sulfate (IRON) 325 (65 Fe) MG TABS Take 1 tablet by mouth daily       famotidine (PEPCID) 20 MG tablet nightly Patient states she does not take every day      Multiple Vitamin (MULTI-VITAMIN DAILY PO) Take by mouth       Vitamin D (CHOLECALCIFEROL) 25 MCG (1000 UT) TABS tablet Take 1,000 Units by mouth daily Vitamin D3      aspirin 325 MG EC tablet Take 1 tablet by mouth 2 times daily for 28 days 56 tablet 0     No current facility-administered medications for this visit.        Patient Active Problem List   Diagnosis    Depression, major, in remission ([facility] [address]    Anxiety disorder    MVP (mitral valve prolapse)    Osteoarthritis of both knees    Spinal stenosis, lumbar    History of bariatric surgery    Spondylolisthesis L5 on S1, grade 1    Lumbar disc herniation L5-S1    Osteoarthritis of hand    Chronic low back pain    History of cervical dysplasia    Kyphosis    Essential hypertension    Allergic rhinitis    Cat bite of hand, right, initial encounter    Gastroesophageal reflux disease with esophagitis without hemorrhage    Ganglion cyst of tendon sheath of right hand    Mood disorder of depressed type    Hx of colonic polyp    Bipolar depression ([facility] [address]    Moderate obesity    S/P total knee arthroplasty, left    Preop testing    Primary osteoarthritis of left knee       Past Medical History:   Diagnosis Date    Allergic rhinitis     Anxiety disorder     Bipolar disorder ([facility] [address]     Caffeine use 2015    Chronic low back pain 2015    Depression, major, in remission ([facility] [address]     multiple hospitalizations    Endocervical polyp 2012    GERD (gastroesophageal reflux disease)     Heart murmur, systolic     X2/0    History of bariatric surgery     gastric stapling    History of cervical dysplasia 2015    History of low back pain     Hx of colonic polyp 3/18/2021    Colonoscopy 21 Naffah/Awaida no recurrent polyps repeat in 5 years    Hypertension          Insomnia 2015    Knee pain, right     severe OA per  Dr Loy Oconnell 2015    MVP (mitral valve prolapse)     Neuropathy     cervical spondylosis    Obesity     Osteoarthritis     Osteoarthritis of both knees     Osteoarthritis of hand 2015    Pap smear abnormality of cervix with LGSIL     Spinal stenosis, lumbar 2013 MRI    disc degeneration and bulges with canal and foraminal stenoses       Past Surgical History:   Procedure Laterality Date    BARIATRIC SURGERY      stapling of stomach    BREAST SURGERY      reduction     SECTION      CHOLECYSTECTOMY      COLONOSCOPY  2010    donald one polyp    EYE SURGERY Bilateral 2018    cataract    FOOT SURGERY      FRACTURE SURGERY Bilateral     feet    SKIN BIOPSY      TONSILLECTOMY      TOTAL KNEE ARTHROPLASTY Left 2021    LEFT TOTAL KNEE ARTHROPLASTY ROBOTIC ASSISTED performed by Heather German MD at [facility] OR       No Known Allergies    Social History     Socioeconomic History    Marital status:      Spouse name: None    Number of children: None    Years of education: None    Highest education level: None   Occupational History    None   Tobacco Use    Smoking status: Never Smoker    Smokeless tobacco: Never Used   Vaping Use    Vaping Use: Never used   Substance and Sexual Activity    Alcohol use: Yes     Comment:  rarely    Drug use: No    Sexual activity: Not Currently     Partners: Male   Other Topics Concern    None   Social History Narrative    None     Social Determinants of Health     Financial Resource Strain:     Difficulty of Paying Living Expenses:    Food Insecurity: No Food Insecurity    Worried About Running Out of Food in the Last Year: Never true    Chloe of Food in the Last Year: Never true   Transportation Needs: No Transportation Needs    Lack of Transportation (Medical): No    Lack of Transportation (Non-Medical):  No   Physical Activity:     Days of Exercise per Week:     Minutes of Exercise per Session:    Stress:     Feeling of Stress :    Social Connections:     Frequency of Communication with Friends and Family:     Frequency of Social Gatherings with Friends and Family:     Attends Latter-day Services:     Active Member of Clubs or Organizations:     Attends Club or Organization Meetings:     Marital Status:    Intimate Partner Violence:     Fear of Current or Ex-Partner:     Emotionally Abused:     Physically Abused:     Sexually Abused:        Family History   Problem Relation Age of Onset    Heart Disease Mother     COPD Mother     Anxiety Disorder Mother     Asthma Mother     Other Mother         sarcoidosis    Diabetes Mother     Stroke Father     Obesity Father     Heart Disease Brother     Other Brother         suicide    Obesity Brother     Substance Abuse Brother     Substance Abuse Brother         hepatitis C    Mental Illness Brother     Cancer Paternal Uncle         throat    Diabetes Maternal Grandmother     Stroke Maternal Grandmother     Stroke Paternal Grandmother     Stroke Paternal Grandfather     Cancer Paternal Grandfather          Review of Systems  As follows except as previously noted in HPI:  Constitutional: Negative for chills, diaphoresis, fatigue, fever and unexpected weight change. Respiratory: Negative for cough, shortness of breath and wheezing. Cardiovascular: Negative for chest pain and palpitations. Neurological: Negative for dizziness, syncope, cephalgia. GI / : negative  Musculoskeletal: see HPI       Objective:   Physical Exam   Constitutional: Oriented to person, place, and time. and appears well-developed and well-nourished. :   Head: Normocephalic and atraumatic. Eyes: EOM are normal.   Neck: Neck supple. Cardiovascular: Normal rate and regular rhythm. Pulmonary/Chest: Effort normal. No stridor. No respiratory distress, no wheezes. Abdominal:  No abnormal distension. Neurological: Alert and oriented to person, place, and time. Skin: Skin is warm and dry. Psychiatric: Normal mood and affect.  Behavior is normal. Thought content normal.    9/7/2021  3:55 PM

## 2021-09-09 ENCOUNTER — TREATMENT (OUTPATIENT)
Dept: PHYSICAL THERAPY | Age: 71
End: 2021-09-09
Payer: MEDICARE

## 2021-09-09 DIAGNOSIS — Z96.652 STATUS POST TOTAL LEFT KNEE REPLACEMENT: Primary | ICD-10-CM

## 2021-09-09 PROCEDURE — 97110 THERAPEUTIC EXERCISES: CPT

## 2021-09-09 NOTE — PROGRESS NOTES
[address] [facility]   Phone: [phone]   Fax: [phone]      Physical Therapy Daily Treatment Note    Date: 2021  Patient Name: Malachi Murillo  : 1950   MRN: [id number]  DOInjury: 2021   DOSx: 2021  Referring Provider: Haily Soria MD  [address] [place] [address]     Medical Diagnosis:     Q43.326 (ICD-10-CM) - Status post total left knee replacement    Outcome Measure: LEFS 74%    S: Pt reports she hasn't been able to work on ROM d/t increased soreness and not being able to sleep. O:   Time 7532-1539     Visit  Repeat outcome measure at mid point and end. Pain 4/10     Strength Right: Hip: 4+/5 globally, Knee: Flexion 4+/5,  Extension 4+/5  Left: Hip: 4-/5 globally, Knee: Flexion 4-/5,  Extension 4-/5     Palpation Tender to palpation globally,  Moderate palpable edema noted. ROM Left:   AROM: 65° Flexion,  0° Extension     Modalities       Vaso   Manual            Stretch      IT band supine      HS supine      Quad Prone            Exercise      Bike 5 min  TE   Heel slides 2x10 10s holds supine  x10 10s holds seated HEP TE   c heel prop HEP TE   SLR 3x10 flex c heel prop, 4 direction QS HEP TE   SAQ x25  TE    TE   YTB TE         bridging 2x10  TE   TG squats x30 10s holds  TE         Step-ups - FWD      Step-ups - LAT                                          A:  Tolerated well. Continued to educate pt on the importance of improving LE mobility pushing into restriction. Focused treatment session on improving LE ROM. She reports she wasn't able to work on motion yesterday d/t having meetings.     P: Continue with rehab plan  Dottie Paulino, MARCIA [id number]    Treatment Charges: Mins Units   Initial Evaluation     Re-Evaluation     Ther Exercise         TE 40 3   Manual Therapy     MT     Ther Activities        TA     Gait Training          GT     Neuro Re-education NR     Modalities     Non-Billable Service Time     Other     Total Time/Units 40 3

## 2021-09-14 ENCOUNTER — TREATMENT (OUTPATIENT)
Dept: PHYSICAL THERAPY | Age: 71
End: 2021-09-14
Payer: MEDICARE

## 2021-09-14 ENCOUNTER — TELEPHONE (OUTPATIENT)
Dept: ORTHOPEDIC SURGERY | Age: 71
End: 2021-09-14

## 2021-09-14 DIAGNOSIS — Z96.652 STATUS POST TOTAL LEFT KNEE REPLACEMENT: Primary | ICD-10-CM

## 2021-09-14 PROCEDURE — 97110 THERAPEUTIC EXERCISES: CPT | Performed by: PHYSICAL THERAPIST

## 2021-09-14 NOTE — PROGRESS NOTES
[address] [facility]   Phone: [phone]   Fax: [phone]      Physical Therapy Daily Treatment Note    Date: 2021  Patient Name: Wander Hughes  : 1950   MRN: [id number]  DOInjury: 2021   DOSx: 2021  Referring Provider: Aakash Duke MD  [address] [place] [address]     Medical Diagnosis:     I78.114 (ICD-10-CM) - Status post total left knee replacement    Outcome Measure: LEFS 74%    S: Pt reports that she was struck by a dog and twisted her knee over the weekend and is limited c ROM at this time. She is scheduled for manipulation . O:   Time 7839-8650     Visit  Repeat outcome measure at mid point and end. Pain 4/10     Strength Right: Hip: 4+/5 globally, Knee: Flexion 4+/5,  Extension 4+/5  Left: Hip: 4/5 globally, Knee: Flexion 4/5,  Extension 4/5     Palpation Tender to palpation globally,  Moderate palpable edema noted. ROM Left:   AROM: 90° Flexion,  0° Extension     Modalities       Vaso   Manual            Stretch      IT band supine      HS supine      Quad Prone            Exercise       TE   Heel slides 3x10 10s holds supine   HEP TE   QS 5 sec hold x 30 c heel prop HEP TE   SLR 3x10 flex c heel prop, 4 direction QS HEP TE    TE    TE   YTB TE          TE    TE         Step-ups - FWD      Step-ups - LAT                                          A:  Tolerated well. Pt was evaluated by surgeon today and was determined to have a stitch abscess that was removed in session. Objective measures were updated today and she will be scheduled for 4 sessions a week following manipulation on .     P: Continue with rehab plan  Randle Hashimoto, PT DPT [id number]    Treatment Charges: Mins Units   Initial Evaluation     Re-Evaluation     Ther Exercise         TE 30 2   Manual Therapy     MT     Ther Activities        TA     Gait Training          GT     Neuro Re-education NR     Modalities     Non-Billable Service Time 10 0   Other Total Time/Units 40 3

## 2021-09-14 NOTE — TELEPHONE ENCOUNTER
Patient called. She sent message to [facility] yesterday. She did a lot of standing on Sunday. Also had incident where a dog bumped into her left knee while scuffling with another dog. She noticed swelling of knee by days end. Yesterday she notices a small pea sized blood blister just below surface of incision. States it is draining a small amount of dark \"blood\" today. Has appt at 10:40 in PTx. Per [facility]SB, he will check her while in PT today.

## 2021-09-15 ENCOUNTER — HOSPITAL ENCOUNTER (OUTPATIENT)
Age: 71
Discharge: HOME | End: 2021-09-17
Payer: MEDICARE

## 2021-09-15 ENCOUNTER — TELEPHONE (OUTPATIENT)
Dept: ORTHOPEDIC SURGERY | Age: 71
End: 2021-09-15

## 2021-09-15 DIAGNOSIS — Z01.818 PREOP TESTING: ICD-10-CM

## 2021-09-15 PROCEDURE — U0003 INFECTIOUS AGENT DETECTION BY NUCLEIC ACID (DNA OR RNA); SEVERE ACUTE RESPIRATORY SYNDROME CORONAVIRUS 2 (SARS-COV-2) (CORONAVIRUS DISEASE [COVID-19]), AMPLIFIED PROBE TECHNIQUE, MAKING USE OF HIGH THROUGHPUT TECHNOLOGIES AS DESCRIBED BY CMS-2020-01-R: HCPCS

## 2021-09-15 PROCEDURE — U0005 INFEC AGEN DETEC AMPLI PROBE: HCPCS

## 2021-09-15 RX ORDER — ACETAMINOPHEN 500 MG
1000 TABLET ORAL 3 TIMES DAILY
Status: ON HOLD | COMMUNITY
End: 2021-09-20 | Stop reason: HOSPADM

## 2021-09-15 NOTE — TELEPHONE ENCOUNTER
Spoke with Radha Owen @ [facility]. No pre-cert required for OPT Surgery Lt knee manipulation under anesthesia (30144), Removal of check points (60845) 9/20/2021 [facility] SEB OPT     Call Ref# Radha Scottprem. 9/15/2021 9:55 a.m.

## 2021-09-15 NOTE — PROGRESS NOTES
Have you been tested for COVID  Yes    09/15/21       Have you been told you were positive for COVID No   Have you had any known exposure to someone that is positive for COVID No  Do you have a cough                   No              Do you have shortness of breath No                 Do you have a sore throat            No                Are you having chills                    No                Are you having muscle aches. No                    Please come to the hospital wearing a mask and have your significant other wear a mask as well. Both of you should check your temperature before leaving to come here,  if it is 100 or higher please call [phone] for instruction.
to procedure to notify you if your arrival time changes    [x] If you receive a survey after surgery we would greatly appreciate your comments    [] Parent/guardian of a minor must accompany their child and remain on the premises  the entire time they are under our care     [] Pediatric patients may bring favorite toy, blanket or comfort item with them    [] A caregiver or family member must remain with the patient during their stay if they are mentally handicapped, have dementia, disoriented or unable to use a call light or would be a safety concern if left unattended    [x] Please notify surgeon if you develop any illness between now and time of surgery (cold, cough, sore throat, fever, nausea, vomiting) or any signs of infections  including skin, wounds, and dental.    [x]  The Outpatient Pharmacy is available to fill your prescription here on your day of surgery, ask your preop nurse for details    [] Other instructions    EDUCATIONAL MATERIALS PROVIDED:    [] PAT Preoperative Education Packet/Booklet     [] Medication List    [] Transfusion bracelet applied with instructions    [] Shower with soap, lather and rinse well, and use CHG wipes provided the evening before surgery as instructed    [] Incentive spirometer with instructions

## 2021-09-16 LAB
SARS-COV-2: NOT DETECTED
SOURCE: NORMAL

## 2021-09-17 NOTE — H&P (VIEW-ONLY)
Department of Orthopedic Surgery  Attending History and Physical        CHIEF COMPLAINT: Left knee stiffness    Reason for Admission: Manipulation under anesthesia left total knee    History Obtained From: patient, electronic medical record    HISTORY OF PRESENT ILLNESS:      The patient is a 80 y.o. female with significant past medical history of left total knee arthroplasty approximate 1 month ago who presents with persisting stiffness despite appropriate postoperative physical therapy. She has resumed normal weightbearing activities and her arthritic pain is relieved but she is having pain and difficulty recovering especially flexion which is limiting those activities. No fever chills sweats or other systemic symptom.     Past Medical History:        Diagnosis Date    Allergic rhinitis     Anxiety disorder     Bipolar disorder (HCC)     Caffeine use 5/19/2015    Chronic low back pain 5/19/2015    Depression, major, in remission ([facility] [address]     multiple hospitalizations    Endocervical polyp 4/19/2012    GERD (gastroesophageal reflux disease)     Heart murmur, systolic     Q5/9    History of bariatric surgery 1980's    gastric stapling    History of cervical dysplasia 5/19/2015    Hx of colonic polyp 3/18/2021    Colonoscopy 2/17/21 Naffah/Awaida no recurrent polyps repeat in 5 years    Hypertension          Insomnia 5/19/2015    Knee pain, right     severe OA per  Dr Sanchez Haddad stiffness, left 09/15/2021    with pain    Kyphosis 5/19/2015    MVP (mitral valve prolapse)     Neuropathy     cervical spondylosis    Obesity     Osteoarthritis     Osteoarthritis of both knees     Osteoarthritis of hand 5/19/2015    Pap smear abnormality of cervix with LGSIL     Skin cancer     Spinal stenosis, lumbar 5/2013 MRI    disc degeneration and bulges with canal and foraminal stenoses     Past Surgical History:        Procedure Laterality Date    BARIATRIC SURGERY  1996    stapling of stomach    BREAST SURGERY      reduction     SECTION      CHOLECYSTECTOMY      COLONOSCOPY  2010    donald one polyp    EYE SURGERY Bilateral 2018    cataract    FOOT SURGERY      FRACTURE SURGERY Bilateral     feet    SKIN BIOPSY      TONSILLECTOMY      TOTAL KNEE ARTHROPLASTY Left 2021    LEFT TOTAL KNEE ARTHROPLASTY ROBOTIC ASSISTED performed by Yudith Avila MD at [address]:              Influenza:  Not indicated            Pneumococcal Polysaccharide:  Not indicated    Medications Prior to Admission:     Current Outpatient Medications:     acetaminophen (TYLENOL) 500 MG tablet, Take 1,000 mg by mouth 3 times daily, Disp: , Rfl:     aspirin 81 MG EC tablet, Take 81 mg by mouth 2 times daily, Disp: , Rfl:     naproxen (NAPROSYN) 500 MG tablet, TAKE 1 TABLET BY MOUTH 2 TIMES DAILY AS NEEDED FOR PAIN, Disp: 60 tablet, Rfl: 0    Ascorbic Acid (VITAMIN C) 1000 MG tablet, Take 1,000 mg by mouth daily , Disp: , Rfl:     lamoTRIgine (LAMICTAL) 25 MG tablet, Take 2 tablets by mouth daily, Disp: 180 tablet, Rfl: 1    hydroCHLOROthiazide (HYDRODIURIL) 25 MG tablet, Take 1 tablet by mouth daily, Disp: 90 tablet, Rfl: 1    Ferrous Sulfate (IRON) 325 (65 Fe) MG TABS, Take 1 tablet by mouth daily , Disp: , Rfl:     famotidine (PEPCID) 20 MG tablet, Take 20 mg by mouth nightly , Disp: , Rfl:     Multiple Vitamin (MULTI-VITAMIN DAILY PO), Take by mouth , Disp: , Rfl:     Vitamin D (CHOLECALCIFEROL) 25 MCG (1000 UT) TABS tablet, Take 1,000 Units by mouth daily Vitamin D3, Disp: , Rfl:       Allergies:  Patient has no known allergies. Social History:   TOBACCO:   reports that she has never smoked. She has never used smokeless tobacco.  ETOH:   reports current alcohol use. Patient currently lives in a private home and is independent with activities.   Family History:       Problem Relation Age of Onset    Heart Disease Mother     COPD Mother     Anxiety Disorder Mother    [facility] knee components to be intact properly position with restoration of normal alignment. Checkpoints for the robotic procedure performed remain in place in the superior medial femoral condyle and inferior medial tibial plateau. ASSESSMENT AND PLAN:    Arthrofibrosis status post left total knee arthroplasty. Retained hardware from prior procedure. Diagnosis and treatment options were reviewed with the patient, her preoperative issues were significantly patellofemoral so stiffness at this point is not entirely unexpected. I have recommended exam and manipulation under anesthesia as a reasonable and safe alternative to more rapidly restore and ensure proper recovery in terms of motion and function. Presence of the indwelling checkpoints although not pertinent to her current problems were acknowledged as  Inappropriate and I recommended that these be removed percutaneously at the time of the exam and manipulation procedure. Expected risk benefit and outcome of that portion procedure was also reviewed. Questions asked and answered, the procedures will be carried out on outpatient basis under general anesthesia.

## 2021-09-18 ENCOUNTER — ANESTHESIA EVENT (OUTPATIENT)
Dept: OPERATING ROOM | Age: 71
End: 2021-09-18
Payer: MEDICARE

## 2021-09-20 ENCOUNTER — HOSPITAL ENCOUNTER (OUTPATIENT)
Age: 71
Setting detail: OUTPATIENT SURGERY
Discharge: HOME | End: 2021-09-20
Attending: ORTHOPAEDIC SURGERY | Admitting: ORTHOPAEDIC SURGERY
Payer: MEDICARE

## 2021-09-20 ENCOUNTER — ANESTHESIA (OUTPATIENT)
Dept: OPERATING ROOM | Age: 71
End: 2021-09-20
Payer: MEDICARE

## 2021-09-20 ENCOUNTER — APPOINTMENT (OUTPATIENT)
Dept: GENERAL RADIOLOGY | Age: 71
End: 2021-09-20
Attending: ORTHOPAEDIC SURGERY
Payer: MEDICARE

## 2021-09-20 VITALS
OXYGEN SATURATION: 97 % | TEMPERATURE: 97.2 F | SYSTOLIC BLOOD PRESSURE: 129 MMHG | BODY MASS INDEX: 31.01 KG/M2 | DIASTOLIC BLOOD PRESSURE: 57 MMHG | HEART RATE: 64 BPM | RESPIRATION RATE: 16 BRPM | WEIGHT: 175 LBS | HEIGHT: 63 IN

## 2021-09-20 VITALS — TEMPERATURE: 95.5 F | OXYGEN SATURATION: 95 % | SYSTOLIC BLOOD PRESSURE: 133 MMHG | DIASTOLIC BLOOD PRESSURE: 62 MMHG

## 2021-09-20 DIAGNOSIS — Z96.652 S/P TOTAL KNEE ARTHROPLASTY, LEFT: ICD-10-CM

## 2021-09-20 DIAGNOSIS — Z01.818 PREOP TESTING: Primary | ICD-10-CM

## 2021-09-20 DIAGNOSIS — M17.12 PRIMARY OSTEOARTHRITIS OF LEFT KNEE: ICD-10-CM

## 2021-09-20 PROCEDURE — 3209999900 FLUORO FOR SURGICAL PROCEDURES

## 2021-09-20 PROCEDURE — 6370000000 HC RX 637 (ALT 250 FOR IP): Performed by: ANESTHESIOLOGY

## 2021-09-20 PROCEDURE — 3700000000 HC ANESTHESIA ATTENDED CARE: Performed by: ORTHOPAEDIC SURGERY

## 2021-09-20 PROCEDURE — 2709999900 HC NON-CHARGEABLE SUPPLY: Performed by: ORTHOPAEDIC SURGERY

## 2021-09-20 PROCEDURE — 3600000012 HC SURGERY LEVEL 2 ADDTL 15MIN: Performed by: ORTHOPAEDIC SURGERY

## 2021-09-20 PROCEDURE — 2580000003 HC RX 258: Performed by: NURSE ANESTHETIST, CERTIFIED REGISTERED

## 2021-09-20 PROCEDURE — 88300 SURGICAL PATH GROSS: CPT

## 2021-09-20 PROCEDURE — 27570 FIXATION OF KNEE JOINT: CPT | Performed by: ORTHOPAEDIC SURGERY

## 2021-09-20 PROCEDURE — 3600000002 HC SURGERY LEVEL 2 BASE: Performed by: ORTHOPAEDIC SURGERY

## 2021-09-20 PROCEDURE — 6360000002 HC RX W HCPCS: Performed by: NURSE ANESTHETIST, CERTIFIED REGISTERED

## 2021-09-20 PROCEDURE — 7100000011 HC PHASE II RECOVERY - ADDTL 15 MIN: Performed by: ORTHOPAEDIC SURGERY

## 2021-09-20 PROCEDURE — 2500000003 HC RX 250 WO HCPCS: Performed by: ORTHOPAEDIC SURGERY

## 2021-09-20 PROCEDURE — 2500000003 HC RX 250 WO HCPCS: Performed by: NURSE ANESTHETIST, CERTIFIED REGISTERED

## 2021-09-20 PROCEDURE — 7100000010 HC PHASE II RECOVERY - FIRST 15 MIN: Performed by: ORTHOPAEDIC SURGERY

## 2021-09-20 PROCEDURE — 3700000001 HC ADD 15 MINUTES (ANESTHESIA): Performed by: ORTHOPAEDIC SURGERY

## 2021-09-20 RX ORDER — SODIUM CHLORIDE 0.9 % (FLUSH) 0.9 %
10 SYRINGE (ML) INJECTION EVERY 12 HOURS SCHEDULED
Status: DISCONTINUED | OUTPATIENT
Start: 2021-09-20 | End: 2021-09-20 | Stop reason: HOSPADM

## 2021-09-20 RX ORDER — SODIUM CHLORIDE 9 MG/ML
25 INJECTION, SOLUTION INTRAVENOUS PRN
Status: DISCONTINUED | OUTPATIENT
Start: 2021-09-20 | End: 2021-09-20 | Stop reason: HOSPADM

## 2021-09-20 RX ORDER — ONDANSETRON 2 MG/ML
INJECTION INTRAMUSCULAR; INTRAVENOUS PRN
Status: DISCONTINUED | OUTPATIENT
Start: 2021-09-20 | End: 2021-09-20 | Stop reason: SDUPTHER

## 2021-09-20 RX ORDER — LIDOCAINE HYDROCHLORIDE AND EPINEPHRINE 10; 10 MG/ML; UG/ML
INJECTION, SOLUTION INFILTRATION; PERINEURAL PRN
Status: DISCONTINUED | OUTPATIENT
Start: 2021-09-20 | End: 2021-09-20 | Stop reason: ALTCHOICE

## 2021-09-20 RX ORDER — SODIUM CHLORIDE 9 MG/ML
INJECTION, SOLUTION INTRAVENOUS CONTINUOUS PRN
Status: DISCONTINUED | OUTPATIENT
Start: 2021-09-20 | End: 2021-09-20 | Stop reason: SDUPTHER

## 2021-09-20 RX ORDER — KETOROLAC TROMETHAMINE 30 MG/ML
INJECTION, SOLUTION INTRAMUSCULAR; INTRAVENOUS PRN
Status: DISCONTINUED | OUTPATIENT
Start: 2021-09-20 | End: 2021-09-20 | Stop reason: SDUPTHER

## 2021-09-20 RX ORDER — SODIUM CHLORIDE 0.9 % (FLUSH) 0.9 %
10 SYRINGE (ML) INJECTION PRN
Status: DISCONTINUED | OUTPATIENT
Start: 2021-09-20 | End: 2021-09-20 | Stop reason: HOSPADM

## 2021-09-20 RX ORDER — PROPOFOL 10 MG/ML
INJECTION, EMULSION INTRAVENOUS CONTINUOUS PRN
Status: DISCONTINUED | OUTPATIENT
Start: 2021-09-20 | End: 2021-09-20 | Stop reason: SDUPTHER

## 2021-09-20 RX ORDER — MIDAZOLAM HYDROCHLORIDE 1 MG/ML
INJECTION INTRAMUSCULAR; INTRAVENOUS PRN
Status: DISCONTINUED | OUTPATIENT
Start: 2021-09-20 | End: 2021-09-20 | Stop reason: SDUPTHER

## 2021-09-20 RX ORDER — KETAMINE HYDROCHLORIDE 10 MG/ML
INJECTION, SOLUTION INTRAMUSCULAR; INTRAVENOUS PRN
Status: DISCONTINUED | OUTPATIENT
Start: 2021-09-20 | End: 2021-09-20 | Stop reason: SDUPTHER

## 2021-09-20 RX ORDER — HYDROCODONE BITARTRATE AND ACETAMINOPHEN 5; 325 MG/1; MG/1
2 TABLET ORAL PRN
Status: COMPLETED | OUTPATIENT
Start: 2021-09-20 | End: 2021-09-20

## 2021-09-20 RX ORDER — OXYCODONE HYDROCHLORIDE AND ACETAMINOPHEN 5; 325 MG/1; MG/1
1 TABLET ORAL EVERY 6 HOURS PRN
Qty: 12 TABLET | Refills: 0 | Status: SHIPPED | OUTPATIENT
Start: 2021-09-20 | End: 2021-09-23

## 2021-09-20 RX ORDER — HYDROCODONE BITARTRATE AND ACETAMINOPHEN 5; 325 MG/1; MG/1
1 TABLET ORAL PRN
Status: COMPLETED | OUTPATIENT
Start: 2021-09-20 | End: 2021-09-20

## 2021-09-20 RX ORDER — FENTANYL CITRATE 50 UG/ML
INJECTION, SOLUTION INTRAMUSCULAR; INTRAVENOUS PRN
Status: DISCONTINUED | OUTPATIENT
Start: 2021-09-20 | End: 2021-09-20 | Stop reason: SDUPTHER

## 2021-09-20 RX ORDER — CEFAZOLIN SODIUM 2 G/50ML
SOLUTION INTRAVENOUS PRN
Status: DISCONTINUED | OUTPATIENT
Start: 2021-09-20 | End: 2021-09-20 | Stop reason: SDUPTHER

## 2021-09-20 RX ADMIN — SODIUM CHLORIDE: 9 INJECTION, SOLUTION INTRAVENOUS at 09:12

## 2021-09-20 RX ADMIN — KETAMINE HYDROCHLORIDE 30 MG: 10 INJECTION INTRAMUSCULAR; INTRAVENOUS at 08:12

## 2021-09-20 RX ADMIN — SODIUM CHLORIDE: 9 INJECTION, SOLUTION INTRAVENOUS at 08:05

## 2021-09-20 RX ADMIN — FENTANYL CITRATE 25 MCG: 50 INJECTION, SOLUTION INTRAMUSCULAR; INTRAVENOUS at 08:17

## 2021-09-20 RX ADMIN — FENTANYL CITRATE 25 MCG: 50 INJECTION, SOLUTION INTRAMUSCULAR; INTRAVENOUS at 09:04

## 2021-09-20 RX ADMIN — FENTANYL CITRATE 25 MCG: 50 INJECTION, SOLUTION INTRAMUSCULAR; INTRAVENOUS at 08:12

## 2021-09-20 RX ADMIN — KETAMINE HYDROCHLORIDE 30 MG: 10 INJECTION INTRAMUSCULAR; INTRAVENOUS at 08:20

## 2021-09-20 RX ADMIN — MIDAZOLAM 1 MG: 1 INJECTION INTRAMUSCULAR; INTRAVENOUS at 08:05

## 2021-09-20 RX ADMIN — FENTANYL CITRATE 25 MCG: 50 INJECTION, SOLUTION INTRAMUSCULAR; INTRAVENOUS at 08:24

## 2021-09-20 RX ADMIN — FENTANYL CITRATE 25 MCG: 50 INJECTION, SOLUTION INTRAMUSCULAR; INTRAVENOUS at 08:46

## 2021-09-20 RX ADMIN — FENTANYL CITRATE 25 MCG: 50 INJECTION, SOLUTION INTRAMUSCULAR; INTRAVENOUS at 08:07

## 2021-09-20 RX ADMIN — CEFAZOLIN SODIUM 2000 MG: 2 SOLUTION INTRAVENOUS at 08:12

## 2021-09-20 RX ADMIN — FENTANYL CITRATE 25 MCG: 50 INJECTION, SOLUTION INTRAMUSCULAR; INTRAVENOUS at 09:02

## 2021-09-20 RX ADMIN — FENTANYL CITRATE 25 MCG: 50 INJECTION, SOLUTION INTRAMUSCULAR; INTRAVENOUS at 08:52

## 2021-09-20 RX ADMIN — KETOROLAC TROMETHAMINE 30 MG: 30 INJECTION, SOLUTION INTRAMUSCULAR; INTRAVENOUS at 09:06

## 2021-09-20 RX ADMIN — PROPOFOL 90 MCG/KG/MIN: 10 INJECTION, EMULSION INTRAVENOUS at 08:12

## 2021-09-20 RX ADMIN — MIDAZOLAM 1 MG: 1 INJECTION INTRAMUSCULAR; INTRAVENOUS at 08:02

## 2021-09-20 RX ADMIN — ONDANSETRON 4 MG: 2 INJECTION INTRAMUSCULAR; INTRAVENOUS at 08:34

## 2021-09-20 RX ADMIN — HYDROCODONE BITARTRATE AND ACETAMINOPHEN 2 TABLET: 5; 325 TABLET ORAL at 09:33

## 2021-09-20 ASSESSMENT — PULMONARY FUNCTION TESTS
PIF_VALUE: 0
PIF_VALUE: 0
PIF_VALUE: 1
PIF_VALUE: 0
PIF_VALUE: 1
PIF_VALUE: 0
PIF_VALUE: 1
PIF_VALUE: 3
PIF_VALUE: 1
PIF_VALUE: 1
PIF_VALUE: 0
PIF_VALUE: 0
PIF_VALUE: 1
PIF_VALUE: 0
PIF_VALUE: 24
PIF_VALUE: 1
PIF_VALUE: 1
PIF_VALUE: 0
PIF_VALUE: 1

## 2021-09-20 ASSESSMENT — LIFESTYLE VARIABLES: SMOKING_STATUS: 0

## 2021-09-20 ASSESSMENT — PAIN DESCRIPTION - DESCRIPTORS: DESCRIPTORS: ACHING

## 2021-09-20 ASSESSMENT — PAIN - FUNCTIONAL ASSESSMENT: PAIN_FUNCTIONAL_ASSESSMENT: 0-10

## 2021-09-20 ASSESSMENT — PAIN SCALES - GENERAL
PAINLEVEL_OUTOF10: 8
PAINLEVEL_OUTOF10: 7

## 2021-09-20 NOTE — ANESTHESIA PRE PROCEDURE
Department of Anesthesiology  Preprocedure Note       Name:  Dennis Nurse   Age:  80 y.o.  :  1950                                          MRN:  [id number]         Date:  2021      Surgeon: Caroline Bynum):  Damari Anand MD    Procedure: Procedure(s):  LEFT KNEE MANIPULATION WITH FLUOROSCOPY REMOVE CHECKPOINT (NO  )    Medications prior to admission:   Prior to Admission medications    Medication Sig Start Date End Date Taking? Authorizing Provider   acetaminophen (TYLENOL) 500 MG tablet Take 1,000 mg by mouth 3 times daily    Historical Provider, MD   aspirin 81 MG EC tablet Take 81 mg by mouth 2 times daily    Historical Provider, MD   naproxen (NAPROSYN) 500 MG tablet TAKE 1 TABLET BY MOUTH 2 TIMES DAILY AS NEEDED FOR PAIN 21   Bettie Gutierrez MD   Ascorbic Acid (VITAMIN C) 1000 MG tablet Take 1,000 mg by mouth daily     Historical Provider, MD   lamoTRIgine (LAMICTAL) 25 MG tablet Take 2 tablets by mouth daily 21   Bettie Gutierrez MD   hydroCHLOROthiazide (HYDRODIURIL) 25 MG tablet Take 1 tablet by mouth daily 21   Bettie Gutierrez MD   Ferrous Sulfate (IRON) 325 (65 Fe) MG TABS Take 1 tablet by mouth daily     Historical Provider, MD   famotidine (PEPCID) 20 MG tablet Take 20 mg by mouth nightly  21   Historical Provider, MD   Multiple Vitamin (MULTI-VITAMIN DAILY PO) Take by mouth     Historical Provider, MD   Vitamin D (CHOLECALCIFEROL) 25 MCG (1000 UT) TABS tablet Take 1,000 Units by mouth daily Vitamin D3    Historical Provider, MD       Current medications:    No current facility-administered medications for this visit. No current outpatient medications on file.      Facility-Administered Medications Ordered in Other Visits   Medication Dose Route Frequency Provider Last Rate Last Admin    HYDROcodone-acetaminophen (NORCO) 5-325 MG per tablet 1 tablet  1 tablet Oral PRN Calderon Vega MD        Or    HYDROcodone-acetaminophen [facility]) 0-969 MG per tablet 2 tablet  2 tablet Oral PRN Yanira Weaver MD           Allergies:  No Known Allergies    Problem List:    Patient Active Problem List   Diagnosis Code    Depression, major, in remission ([facility] [address] F32.5    Anxiety disorder F41.9    MVP (mitral valve prolapse) I34.1    Osteoarthritis of both knees M17.0    Spinal stenosis, lumbar M48.061    History of bariatric surgery Z98.84    Spondylolisthesis L5 on S1, grade 1 M43.10    Lumbar disc herniation L5-S1 M51.26    Osteoarthritis of hand M19.049    Chronic low back pain M54.5, G89.29    History of cervical dysplasia Z87.410    Kyphosis M40.209    Essential hypertension I10    Allergic rhinitis J30.9    Cat bite of hand, right, initial encounter S61.451A, W55. 01XA    Gastroesophageal reflux disease with esophagitis without hemorrhage K21.00    Ganglion cyst of tendon sheath of right hand M67.441    Mood disorder of depressed type F32.9    Hx of colonic polyp Z86.010    Bipolar depression (HCC) F31.9    Moderate obesity E66.8    S/P total knee arthroplasty, left Z96.652    Primary osteoarthritis of left knee M17.12       Past Medical History:        Diagnosis Date    Allergic rhinitis     Anxiety disorder     [facility]     Caffeine use 5/19/2015    Chronic low back pain 5/19/2015    Depression, major, in remission ([facility] [address]     multiple hospitalizations    Endocervical polyp 4/19/2012    GERD (gastroesophageal reflux disease)     Heart murmur, systolic     P3/8    History of bariatric surgery 1980's    gastric stapling    History of cervical dysplasia 5/19/2015    Hx of colonic polyp 3/18/2021    Colonoscopy 2/17/21 Naffah/Awaida no recurrent polyps repeat in 5 years    Hypertension          Insomnia 5/19/2015    Knee pain, right     severe OA per  Dr Sherron Kong stiffness, left 09/15/2021    with pain    Kyphosis 5/19/2015    MVP (mitral valve prolapse)     Neuropathy     cervical spondylosis    Obesity  Osteoarthritis     Osteoarthritis of both knees     Osteoarthritis of hand 2015    Pap smear abnormality of cervix with LGSIL     Skin cancer     Spinal stenosis, lumbar 2013 MRI    disc degeneration and bulges with canal and foraminal stenoses       Past Surgical History:        Procedure Laterality Date    BARIATRIC SURGERY      stapling of stomach    BREAST SURGERY      reduction     SECTION      CHOLECYSTECTOMY      COLONOSCOPY  2010    donald one polyp    EYE SURGERY Bilateral 2018    cataract    FOOT SURGERY      FRACTURE SURGERY Bilateral     feet    SKIN BIOPSY      TONSILLECTOMY      TOTAL KNEE ARTHROPLASTY Left 2021    LEFT TOTAL KNEE ARTHROPLASTY ROBOTIC ASSISTED performed by Franki Messer MD at [address] [place] [address] History:    Social History     Tobacco Use    Smoking status: Never Smoker    Smokeless tobacco: Never Used   Substance Use Topics    Alcohol use: Yes     Comment:  rarely                                Counseling given: Not Answered      Vital Signs (Current): There were no vitals filed for this visit.                                            BP Readings from Last 3 Encounters:   21 (!) 113/54   21 (!) 100/52   21 131/63       NPO Status:                                                                                 BMI:   Wt Readings from Last 3 Encounters:   09/15/21 175 lb (79.4 kg)   21 183 lb (83 kg)   21 183 lb (83 kg)     There is no height or weight on file to calculate BMI.    CBC:   Lab Results   Component Value Date    WBC 5.5 2021    RBC 4.52 2021    HGB 11.3 08/10/2021    HCT 32.3 08/10/2021    MCV 93.6 2021    RDW 12.4 2021     2021       CMP:   Lab Results   Component Value Date     2021    K 3.7 2021    CL 96 2021    CO2 30 2021    BUN 10 2021    CREATININE 0.8 2021    GFRAA >60 2021    LABGLOM >60 08/04/2021    GLUCOSE 119 08/04/2021    GLUCOSE 102 02/08/2012    PROT 7.5 01/08/2021    CALCIUM 9.9 08/04/2021    BILITOT 0.3 01/08/2021    ALKPHOS 71 01/08/2021    AST 27 01/08/2021    ALT 23 01/08/2021       POC Tests: No results for input(s): POCGLU, POCNA, POCK, POCCL, POCBUN, POCHEMO, POCHCT in the last 72 hours. Coags: No results found for: PROTIME, INR, APTT    HCG (If Applicable): No results found for: PREGTESTUR, PREGSERUM, HCG, HCGQUANT     ABGs: No results found for: PHART, PO2ART, FDV3ZWH, TYZ8WXC, BEART, A4AJSKMX     Type & Screen (If Applicable):  No results found for: LABABO, LABRH    Drug/Infectious Status (If Applicable):  No results found for: HIV, HEPCAB    COVID-19 Screening (If Applicable):   Lab Results   Component Value Date    COVID19 Not Detected 09/15/2021           Anesthesia Evaluation  Patient summary reviewed no history of anesthetic complications:   Airway: Mallampati: III  TM distance: >3 FB   Neck ROM: full  Mouth opening: > = 3 FB Dental:    (+) caps      Pulmonary:Negative Pulmonary ROS breath sounds clear to auscultation      (-) not a current smoker                           Cardiovascular:  Exercise tolerance: good (>4 METS),   (+) hypertension:, valvular problems/murmurs: MVP,         Rhythm: regular  Rate: normal      Cleared by cardiology     Beta Blocker:  Not on Beta Blocker         Neuro/Psych:   (+) psychiatric history (Bipolar depression (HCC)):depression/anxiety             GI/Hepatic/Renal:   (+) GERD: well controlled,          ROS comment: Gastric stapling. Endo/Other:    (+) : arthritis: OA., .                  ROS comment: obese Abdominal:             Vascular: negative vascular ROS. Other Findings:               Anesthesia Plan      MAC     ASA 3       Induction: intravenous. Anesthetic plan and risks discussed with patient. Plan discussed with CRNA.                   Jayde Pereira MD   9/20/2021

## 2021-09-20 NOTE — OP NOTE
Operative Note      Patient: Reggie Darby  YOB: 1950  MRN: [id number]    Date of Procedure: 9/20/2021    Pre-Op Diagnosis: STIFF TOTAL KNEE, with retained hardware    Post-Op Diagnosis: Same       Procedure(s):  LEFT KNEE MANIPULATION WITH FLUOROSCOPY REMOVE CHECKPOINT    Surgeon(s):  Michael Dalton MD    Assistant:   * No surgical staff found *    Anesthesia: Monitor Anesthesia Care    Estimated Blood Loss (mL): Minimal    Complications: None    Specimens:   ID Type Source Tests Collected by Time Destination   A : LEFT FEMORAL AND TIBIAL CHECKPOINTS Hardware Hardware SURGICAL PATHOLOGY Michael Dalton MD 9/20/2021 7990        Implants:  * No implants in log *      Drains: * No LDAs found *    Findings: Intact total knee benign, improved flexion after manipulation. Detailed Description of Procedure:   Patient was identified placed in the operating table in the supine position administered general sedation and 2 g of intravenous Ancef. Exam of the left knee showed no erythema or effusion no warmth, knee was stable to medial lateral stress but limited motion from approximately 0 to 80 degrees of flexion. Gentle but then progressive flexion of the left knee was performed with minimal crepitus appreciated, flexion was achievable back to 125 degrees, followed manipulation the knee remained stable to medial lateral and AP stress with smooth range of motion no crepitus. Tourniquet was now placed on the left thigh left knee and leg were prepped and draped in usual sterile fashion. The retained checkpoints from the previous total knee were identified fluoroscopically and each was approached with a needle providing local anesthesia with 1% lidocaine with epinephrine over the tibial checkpoint below the medial joint line of the knee and femoral checkpoint medially above the knee.   Following needle localization each of the checkpoints were approached through a short longitudinal incisions

## 2021-09-20 NOTE — ANESTHESIA POSTPROCEDURE EVALUATION
Department of Anesthesiology  Postprocedure Note    Patient: Maria Fernanda Pablo  MRN: [id number]  [place]: 1950  Date of evaluation: 9/20/2021  Time:  10:36 AM     Procedure Summary     Date: 09/20/21 Room / Location: [address]    Anesthesia Start: 2191 Anesthesia Stop: 3269    Procedure: LEFT KNEE MANIPULATION WITH FLUOROSCOPY REMOVE CHECKPOINT (Left ) Diagnosis: (STIFF TOTAL KNEE)    Surgeons: Leta Arango MD Responsible Provider: Elizabeth Flower MD    Anesthesia Type: MAC ASA Status: 3          Anesthesia Type: MAC    Arnol Phase I:      Arnol Phase II: Arnol Score: 10    Last vitals: Reviewed and per EMR flowsheets. Anesthesia Post Evaluation    Patient location during evaluation: PACU  Patient participation: complete - patient participated  Level of consciousness: awake and alert  Airway patency: patent  Nausea & Vomiting: no nausea and no vomiting  Complications: no  Cardiovascular status: hemodynamically stable  Respiratory status: acceptable  Hydration status: euvolemic  Comments: Department of Anesthesiology  Post-Anesthesia Note    Name:  Maria Fernanda Pablo                                         Age:  80 y.o.   MRN:  [id number]     Last Vitals:  BP (!) 129/57   Pulse 64   Temp 97.2 °F (36.2 °C) (Temporal)   Resp 16   Ht 5' 3\" (1.6 m)   Wt 175 lb (79.4 kg)   SpO2 97%   BMI 31.00 kg/m²   Patient Vitals in the past 4 hrs:  09/20/21 0940, BP:(!) 129/57, Temp:97.2 °F (36.2 °C), Temp src:Temporal, Pulse:64, Resp:16, SpO2:97 %  09/20/21 0925, BP:121/60, Pulse:69, Resp:16, SpO2:98 %  09/20/21 0912, BP:(!) 126/57, Temp:97 °F (36.1 °C), Temp src:Temporal, Pulse:67, Resp:17, SpO2:96 %  09/20/21 0911, BP:(!) 126/57, Temp:97 °F (36.1 °C), Temp src:Temporal, Pulse:67, Resp:17, SpO2:96 %  09/20/21 0653, BP:131/61, Temp:98.4 °F (36.9 °C), Temp src:Temporal, Pulse:82, Resp:16, SpO2:96 %, Height:5' 3\" (1.6 m), Weight:175 lb (79.4 kg)    Level of Consciousness:  Awake    Respiratory:  Stable    Oxygen Saturation:  Stable    Cardiovascular:  Stable    Hydration:  Adequate    PONV:  Stable    Post-op Pain:  Adequate analgesia    Post-op Assessment:  No apparent anesthetic complications    Additional Follow-Up / Treatment / Comment:  None    Tarun Wolf MD  September 20, 2021   10:36 AM

## 2021-09-20 NOTE — INTERVAL H&P NOTE
Update History & Physical    The patient's History and Physical of September 17, 2021 was reviewed with the patient and I examined the patient. There was no change. The surgical site was confirmed by the patient and me. Plan: The risks, benefits, expected outcome, and alternative to the recommended procedure have been discussed with the patient. Patient understands and wants to proceed with the procedure.      Electronically signed by Viki Peña MD on 9/20/2021 at 6:51 AM

## 2021-09-20 NOTE — BRIEF OP NOTE
Brief Postoperative Note      Patient: Calos Burns   YOB: 1950  MRN: [id number]    Date of Procedure: 9/20/2021    Pre-Op Diagnosis: STIFF TOTAL KNEE and retained femoral and tibial checkpoints    Post-Op Diagnosis: Same       Procedure(s):  LEFT KNEE MANIPULATION WITH FLUOROSCOPY REMOVE CHECKPOINT    Surgeon(s):  Marvetta Boas, MD    Assistant:  * No surgical staff found *    Anesthesia: Monitor Anesthesia Care    Estimated Blood Loss (mL): less than 50     Complications: None    Specimens:   ID Type Source Tests Collected by Time Destination   A : LEFT FEMORAL AND TIBIAL CHECKPOINTS Hardware Hardware SURGICAL PATHOLOGY Marvetta Boas, MD 9/20/2021 8888        Implants:  * No implants in log *      Drains: * No LDAs found *    Findings: See op note    Electronically signed by Asif Padron DO on 9/20/2021 at 9:15 AM

## 2021-09-21 ENCOUNTER — OFFICE VISIT (OUTPATIENT)
Dept: ORTHOPEDIC SURGERY | Age: 71
End: 2021-09-21

## 2021-09-21 VITALS — BODY MASS INDEX: 31.01 KG/M2 | WEIGHT: 175 LBS | HEIGHT: 63 IN

## 2021-09-21 DIAGNOSIS — M24.662 ARTHROFIBROSIS OF KNEE JOINT, LEFT: ICD-10-CM

## 2021-09-21 DIAGNOSIS — Z96.652 STATUS POST TOTAL LEFT KNEE REPLACEMENT: Primary | ICD-10-CM

## 2021-09-21 PROCEDURE — 99024 POSTOP FOLLOW-UP VISIT: CPT | Performed by: ORTHOPAEDIC SURGERY

## 2021-09-21 NOTE — PROGRESS NOTES
Chief Complaint:   Chief Complaint   Patient presents with    Post-Op Check     Post-op LANA Lt TK, Removal of checkpoints 9/20/2021. Incision check. Alexandria Gasca underwent exam and manipulation of left total knee as well as percutaneous removal of the femoral and tibial checkpoints yesterday as an outpatient. Intraoperatively achieved 125 degrees of flexion without significant crepitus. Checkpoints were removed with fluoroscopic control uneventfully other than some bleeding from the superior medial incision. Patient has displayed significant anxiety prior to that procedure and subsequently, she was to go to physical therapy today, was very anxious and uncomfortable proceeding today in that regard. She is bearing full weight though, reports no fever chills sweats or chest pain, feels that her knee may be subjectively flexing more easily. Allergies; medications; past medical, surgical, family, and social history; and problem list have been reviewed today and updated as indicated in this encounter seen below. Exam: Left knee examined today, there was some fresh bloody drainage on the dressings from both sites although no active drainage at this time, knee shows no effusion, normally aligned stable to medial lateral stress, with guided relaxation the patient was able to flex her knee almost 90 degrees. Calf is soft nontender negative Homans. Radiographs: Deferred benign clinical exam.    Rose Marie Banerjee was seen today for post-op check. Diagnoses and all orders for this visit:    Status post total left knee replacement    Arthrofibrosis of knee joint, left       Percutaneous incisions were cleaned and sterile dressings were applied, patient was encouraged to work on active motion at home and go ahead and attend physical therapy, I reviewed her intraoperative findings including photographic evidence of her knee flexing very deeply.   She was reassured by this should go to physical therapy and see me next week for clinical exam removal of sutures. No follow-ups on file. Current Outpatient Medications   Medication Sig Dispense Refill    oxyCODONE-acetaminophen (PERCOCET) 5-325 MG per tablet Take 1 tablet by mouth every 6 hours as needed for Pain for up to 3 days. Intended supply: 7 days. Take lowest dose possible to manage pain 12 tablet 0    aspirin 81 MG EC tablet Take 81 mg by mouth 2 times daily      naproxen (NAPROSYN) 500 MG tablet TAKE 1 TABLET BY MOUTH 2 TIMES DAILY AS NEEDED FOR PAIN 60 tablet 0    Ascorbic Acid (VITAMIN C) 1000 MG tablet Take 1,000 mg by mouth daily       lamoTRIgine (LAMICTAL) 25 MG tablet Take 2 tablets by mouth daily 180 tablet 1    hydroCHLOROthiazide (HYDRODIURIL) 25 MG tablet Take 1 tablet by mouth daily 90 tablet 1    Ferrous Sulfate (IRON) 325 (65 Fe) MG TABS Take 1 tablet by mouth daily       famotidine (PEPCID) 20 MG tablet Take 20 mg by mouth nightly       Multiple Vitamin (MULTI-VITAMIN DAILY PO) Take by mouth       Vitamin D (CHOLECALCIFEROL) 25 MCG (1000 UT) TABS tablet Take 1,000 Units by mouth daily Vitamin D3       No current facility-administered medications for this visit.        Patient Active Problem List   Diagnosis    Depression, major, in remission ([address]    Anxiety disorder    MVP (mitral valve prolapse)    Osteoarthritis of both knees    Spinal stenosis, lumbar    History of bariatric surgery    Spondylolisthesis L5 on S1, grade 1    Lumbar disc herniation L5-S1    Osteoarthritis of hand    Chronic low back pain    History of cervical dysplasia    Kyphosis    Essential hypertension    Allergic rhinitis    Cat bite of hand, right, initial encounter    Gastroesophageal reflux disease with esophagitis without hemorrhage    Ganglion cyst of tendon sheath of right hand    Mood disorder of depressed type    Hx of colonic polyp    Bipolar depression ([address]    Moderate obesity    S/P total knee arthroplasty, left    Primary osteoarthritis of left knee       Past Medical History:   Diagnosis Date    Allergic rhinitis     Anxiety disorder     Bipolar disorder (HCC)     Caffeine use 2015    Chronic low back pain 2015    Depression, major, in remission ([facility]     multiple hospitalizations    Endocervical polyp 2012    GERD (gastroesophageal reflux disease)     Heart murmur, systolic     A2/9    History of bariatric surgery     gastric stapling    History of cervical dysplasia 2015    Hx of colonic polyp 3/18/2021    Colonoscopy 21 Naffah/Awaida no recurrent polyps repeat in 5 years    Hypertension          Insomnia 2015    Knee pain, right     severe OA per  Dr Bravo Joseph stiffness, left 09/15/2021    with pain    Kyphosis 2015    MVP (mitral valve prolapse)     Neuropathy     cervical spondylosis    Obesity     Osteoarthritis     Osteoarthritis of both knees     Osteoarthritis of hand 2015    Pap smear abnormality of cervix with LGSIL     Skin cancer     Spinal stenosis, lumbar 2013 MRI    disc degeneration and bulges with canal and foraminal stenoses       Past Surgical History:   Procedure Laterality Date    BARIATRIC SURGERY      stapling of stomach    BREAST SURGERY      reduction     SECTION      CHOLECYSTECTOMY      COLONOSCOPY  2010    donald one polyp    EYE SURGERY Bilateral 2018    cataract    FOOT SURGERY      FRACTURE SURGERY Bilateral     feet    KNEE SURGERY Left 2021    LEFT KNEE MANIPULATION WITH FLUOROSCOPY REMOVE CHECKPOINT performed by Dick Tuttle MD at [facility] Left 2021    LEFT TOTAL KNEE ARTHROPLASTY ROBOTIC ASSISTED performed [facility]       No Known Allergies    Social History     Socioeconomic History    Marital status:      Spouse name: None    Number of children: None    Years of education: None    Highest education level: None   Occupational History    None   Tobacco Use    Smoking status: Never Smoker    Smokeless tobacco: Never Used   Vaping Use    Vaping Use: Never used   Substance and Sexual Activity    Alcohol use: Yes     Comment:  rarely    Drug use: No    Sexual activity: Not Currently     Partners: Male   Other Topics Concern    None   Social History Narrative    None     Social Determinants of Health     Financial Resource Strain:     Difficulty of Paying Living Expenses:    Food Insecurity: No Food Insecurity    Worried About Running Out of Food in the Last Year: Never true    Chloe of Food in the Last Year: Never true   Transportation Needs: No Transportation Needs    Lack of Transportation (Medical): No    Lack of Transportation (Non-Medical):  No   Physical Activity:     Days of Exercise per Week:     Minutes of Exercise per Session:    Stress:     Feeling of Stress :    Social Connections:     Frequency of Communication with Friends and Family:     Frequency of Social Gatherings with Friends and Family:     Attends Yarsani Services:     Active Member of Clubs or Organizations:     Attends Club or Organization Meetings:     Marital Status:    Intimate Partner Violence:     Fear of Current or Ex-Partner:     Emotionally Abused:     Physically Abused:     Sexually Abused:        Family History   Problem Relation Age of Onset    Heart Disease Mother     COPD Mother     Anxiety Disorder Mother     Asthma Mother     Other Mother         sarcoidosis    Diabetes Mother     Stroke Father     Obesity Father     Heart Disease Brother     Other Brother         suicide    Obesity Brother     Substance Abuse Brother     Substance Abuse Brother         hepatitis C    Mental Illness Brother     Cancer Paternal Uncle         throat    Diabetes Maternal Grandmother     Stroke Maternal Grandmother     Stroke Paternal Grandmother     Stroke Paternal Grandfather     Cancer Paternal Grandfather          Review of Systems  As follows except as previously noted in HPI:  Constitutional: Negative for chills, diaphoresis, fatigue, fever and unexpected weight change. Respiratory: Negative for cough, shortness of breath and wheezing. Cardiovascular: Negative for chest pain and palpitations. Neurological: Negative for dizziness, syncope, cephalgia. GI / : negative  Musculoskeletal: see HPI       Objective:   Physical Exam   Constitutional: Oriented to person, place, and time. and appears well-developed and well-nourished. :   Head: Normocephalic and atraumatic. Eyes: EOM are normal.   Neck: Neck supple. Cardiovascular: Normal rate and regular rhythm. Pulmonary/Chest: Effort normal. No stridor. No respiratory distress, no wheezes. Abdominal:  No abnormal distension. Neurological: Alert and oriented to person, place, and time. Skin: Skin is warm and dry. Psychiatric: Normal mood and affect.  Behavior is normal. Thought content normal.    9/21/2021  5:06 PM

## 2021-09-22 ENCOUNTER — TREATMENT (OUTPATIENT)
Dept: PHYSICAL THERAPY | Age: 71
End: 2021-09-22
Payer: MEDICARE

## 2021-09-22 DIAGNOSIS — Z96.652 STATUS POST TOTAL LEFT KNEE REPLACEMENT: Primary | ICD-10-CM

## 2021-09-22 PROCEDURE — 97110 THERAPEUTIC EXERCISES: CPT | Performed by: PHYSICAL THERAPIST

## 2021-09-23 ENCOUNTER — TREATMENT (OUTPATIENT)
Dept: PHYSICAL THERAPY | Age: 71
End: 2021-09-23
Payer: MEDICARE

## 2021-09-23 DIAGNOSIS — Z96.652 STATUS POST TOTAL LEFT KNEE REPLACEMENT: Primary | ICD-10-CM

## 2021-09-23 PROCEDURE — 97110 THERAPEUTIC EXERCISES: CPT | Performed by: PHYSICAL THERAPIST

## 2021-09-23 PROCEDURE — 97530 THERAPEUTIC ACTIVITIES: CPT | Performed by: PHYSICAL THERAPIST

## 2021-09-23 NOTE — PROGRESS NOTES
[address] [facility] [address] [facility]   Phone: [phone]   Fax: [phone]      Physical Therapy Daily Treatment Note    Date: 2021  Patient Name: Marybeth Bhat  : 1950   MRN: [id number]  DOInjury: 2021   DOSx: 2021  Referring Provider: Citlalli German MD  [address] [place] [address]     Medical Diagnosis:     F13.459 (ICD-10-CM) - Status post total left knee replacement    Outcome Measure: LEFS 74%    S: Pt reports some restriction c knee extension compared to flexion, but is still making progress since manipulation. O:   Time 846926     Visit 8 Repeat outcome measure at mid point and end. Pain 4/10     Strength Right: Hip: 4+/5 globally, Knee: Flexion 4+/5,  Extension 4+/5  Left: Hip: 4/5 globally, Knee: Flexion 4/5,  Extension 4/5     Palpation Tender to palpation globally,  Moderate palpable edema noted. ROM Left:   AROM: 110° Flexion,  0° Extension     Modalities       Vaso   Manual            Stretch      IT band supine      HS supine      Quad Prone            Exercise      Bike 5 min  TE   Heel slides 3x10 10s holds supine   HEP  TE   Supine prone x3 min  TE   QS 5 sec hold x 30 c heel prop HEP 4 lbs TE   QS HEP TE    TE    TE   YTB TE          TE   TG squats x30 10s holds  TA         Step-ups - FWD 2x10  6 inxh TA   Step-ups - LAT x10 up/over 6 inch TA          TE                           A:  Tolerated well. Pt was progressed today c strength and stability to further improve functional mobility. She tolerated the session well c moderate fatigue and no incr in pain. She is progressing well overall.     P: Continue with rehab plan  Pranay Mcclendon, PT DPT [id number]    Treatment Charges: Mins Units   Initial Evaluation     Re-Evaluation     Ther Exercise         TE 25 2   Manual Therapy     MT     Ther Activities        TA 15 1   Gait Training          GT     Neuro Re-education NR     Modalities     Non-Billable Service Time     Other     Total Time/Units 40 3

## 2021-09-24 ENCOUNTER — TREATMENT (OUTPATIENT)
Dept: PHYSICAL THERAPY | Age: 71
End: 2021-09-24
Payer: MEDICARE

## 2021-09-24 DIAGNOSIS — Z96.652 STATUS POST TOTAL LEFT KNEE REPLACEMENT: Primary | ICD-10-CM

## 2021-09-24 PROCEDURE — 97110 THERAPEUTIC EXERCISES: CPT

## 2021-09-24 PROCEDURE — 97530 THERAPEUTIC ACTIVITIES: CPT

## 2021-09-24 NOTE — PROGRESS NOTES
[address] [facility]   Phone: [phone]   Fax: [phone]      Physical Therapy Daily Treatment Note    Date: 2021  Patient Name: Aida Rene  : 1950   MRN: [id number]  DOInjury: 2021   DOSx: 2021  Referring Provider: Donna Monroy MD  [address] [place] [address]     Medical Diagnosis:     E94.727 (ICD-10-CM) - Status post total left knee replacement    Outcome Measure: LEFS 74%    S: Pt feels some stiffness in the L knee upon arrival but believe she is making progress. O:   Time 920-1000     Visit 9 Repeat outcome measure at mid point and end. Pain 4/10     Strength Right: Hip: 4+/5 globally, Knee: Flexion 4+/5,  Extension 4+/5  Left: Hip: 4/5 globally, Knee: Flexion 4/5,  Extension 4/5     Palpation Tender to palpation globally,  Moderate palpable edema noted. ROM Left:   AROM: 110° Flexion,  0° Extension     Modalities       Vaso   Manual            Stretch      IT band supine      HS supine      Quad Prone            Exercise      Bike 5 min  TE   Heel slides 3x10 10s holds supine   HEP  TE   Supine prone x3 min  TE   QS 5 sec hold x 30 c heel prop HEP 4 lbs TE   QS HEP TE    TE    TE   YTB TE          TE   TG squats x30 10s holds  TA         Step-ups - FWD 2x10  6 inxh TA   Step-ups - LAT x10 up/over 6 inch TA         TKE/Resisted flexion x30 5s holds ea  TE                           A:  Tolerated well. After stretching performed pt was able to reach 113 degrees of flexion. VCs during stair negotiation in order to reduce compensations. She tolerated the session well c moderate fatigue and no incr in pain. She is progressing well overall. Will continue c heavy focus on improving LE mobility.      P: Continue with rehab plan  Alcides Aparicio PTA  [id number]    Treatment Charges: Mins Units   Initial Evaluation     Re-Evaluation     Ther Exercise         TE 25 2   Manual Therapy     MT     Ther Activities        TA 15 1   Gait Training GT     Neuro Re-education NR     Modalities     Non-Billable Service Time     Other     Total Time/Units 40 3

## 2021-09-25 DIAGNOSIS — M48.061 SPINAL STENOSIS, LUMBAR: Chronic | ICD-10-CM

## 2021-09-27 ENCOUNTER — TELEPHONE (OUTPATIENT)
Dept: ORTHOPEDIC SURGERY | Age: 71
End: 2021-09-27

## 2021-09-27 NOTE — TELEPHONE ENCOUNTER
Patient called stating she had increase in left knee pain over the weekend. States she did very well last week while attending PT, but was taking Percocet 5/325 1/2 tab prn, she is out of Percocet. She continued with her exercises at home and has PTx tomorrow prior to her FU appt here. She is asking for refill of Percocet to get thru PT sessions. She is now taking Tylenol 1000 mg tid, Naproxen 500 mg bid and her ASA 81 mg bid. Informed her [facility] does not E-scribe narcotics. Instructed her to discuss refill at [facility] tomorrow. Continue what she has been taking for pain until then. She verbalized agreement. Message sent to [facility].

## 2021-09-28 ENCOUNTER — TREATMENT (OUTPATIENT)
Dept: PHYSICAL THERAPY | Age: 71
End: 2021-09-28
Payer: MEDICARE

## 2021-09-28 ENCOUNTER — OFFICE VISIT (OUTPATIENT)
Dept: ORTHOPEDIC SURGERY | Age: 71
End: 2021-09-28

## 2021-09-28 VITALS — WEIGHT: 176 LBS | BODY MASS INDEX: 31.18 KG/M2 | HEIGHT: 63 IN

## 2021-09-28 DIAGNOSIS — Z96.652 STATUS POST TOTAL LEFT KNEE REPLACEMENT: Primary | ICD-10-CM

## 2021-09-28 PROCEDURE — 97530 THERAPEUTIC ACTIVITIES: CPT | Performed by: PHYSICAL THERAPIST

## 2021-09-28 PROCEDURE — 99024 POSTOP FOLLOW-UP VISIT: CPT | Performed by: ORTHOPAEDIC SURGERY

## 2021-09-28 PROCEDURE — 97110 THERAPEUTIC EXERCISES: CPT | Performed by: PHYSICAL THERAPIST

## 2021-09-28 RX ORDER — TRAMADOL HYDROCHLORIDE 50 MG/1
50 TABLET ORAL EVERY 8 HOURS PRN
Qty: 21 TABLET | Refills: 0 | Status: SHIPPED | OUTPATIENT
Start: 2021-09-28 | End: 2021-10-05

## 2021-09-28 NOTE — PROGRESS NOTES
Chief Complaint:   Chief Complaint   Patient presents with    Post-Op Check     Left knee manipulation 9/20/21. Left TKA 8/9/21. Pain keeps her up at night, finished pain medication (Percocet 5/325mg). Currently taking Naproxen twice a day and alternating with Tylenol. Alexandria Gasca is now over 6 weeks status post left total knee and 8 days status post EUA M UA with removal checkpoints, has been attending therapy and is making excellent progress with her motion, still having pain however especially at night which is not responding well to over-the-counter's and interfering with her sleep. She is full weightbearing using a cane only for balance. No fever chills sweats chest pain or dyspnea. Allergies; medications; past medical, surgical, family, and social history; and problem list have been reviewed today and updated as indicated in this encounter seen below. Exam: Left knee shows resolving medial ecchymosis, no effusion erythema, good alignment with excellent medial lateral and AP tissue balance with improving kinematics and a range of motion now from 0 to 105 degrees of flexion. Calf is soft nontender negative Homans. Percutaneous incisions of the medial distal femur and medial proximal tibia are healing without erythema fluctuance or drainage. Radiographs: Deferred benign clinical exam.    Rose Marie Banerjee was seen today for post-op check. Diagnoses and all orders for this visit:    Status post total left knee replacement  -     traMADol (ULTRAM) 50 MG tablet; Take 1 tablet by mouth every 8 hours as needed for Pain for up to 7 days. Patient was reassured she is doing well she seems to turn the corner, she will continue with therapeutic exercise increase other activities to tolerance, I did give her a prescription for Ultram as an alternative to the Percocet and she was counseled to continue with over-the-counter's primarily and use the Ultram only for breakthrough pain.   Questions asked and answered follow-up in a month for clinical exam.    Return in about 1 month (around 10/28/2021). Current Outpatient Medications   Medication Sig Dispense Refill    traMADol (ULTRAM) 50 MG tablet Take 1 tablet by mouth every 8 hours as needed for Pain for up to 7 days. 21 tablet 0    aspirin 81 MG EC tablet Take 81 mg by mouth 2 times daily      naproxen (NAPROSYN) 500 MG tablet TAKE 1 TABLET BY MOUTH 2 TIMES DAILY AS NEEDED FOR PAIN 60 tablet 0    Ascorbic Acid (VITAMIN C) 1000 MG tablet Take 1,000 mg by mouth daily       lamoTRIgine (LAMICTAL) 25 MG tablet Take 2 tablets by mouth daily 180 tablet 1    hydroCHLOROthiazide (HYDRODIURIL) 25 MG tablet Take 1 tablet by mouth daily 90 tablet 1    Ferrous Sulfate (IRON) 325 (65 Fe) MG TABS Take 1 tablet by mouth daily       famotidine (PEPCID) 20 MG tablet Take 20 mg by mouth nightly       Multiple Vitamin (MULTI-VITAMIN DAILY PO) Take by mouth       Vitamin D (CHOLECALCIFEROL) 25 MCG (1000 UT) TABS tablet Take 1,000 Units by mouth daily Vitamin D3       No current facility-administered medications for this visit.        Patient Active Problem List   Diagnosis    Depression, major, in remission ([address]    Anxiety disorder    MVP (mitral valve prolapse)    Osteoarthritis of both knees    Spinal stenosis, lumbar    History of bariatric surgery    Spondylolisthesis L5 on S1, grade 1    Lumbar disc herniation L5-S1    Osteoarthritis of hand    Chronic low back pain    History of cervical dysplasia    Kyphosis    Essential hypertension    Allergic rhinitis    Cat bite of hand, right, initial encounter    Gastroesophageal reflux disease with esophagitis without hemorrhage    Ganglion cyst of tendon sheath of right hand    Mood disorder of depressed type    Hx of colonic polyp    Bipolar depression ([address]    Moderate obesity    S/P total knee arthroplasty, left    Primary osteoarthritis of left knee       Past Medical History: Diagnosis Date    Allergic rhinitis     Anxiety disorder     Bipolar disorder (HCC)     Caffeine use 2015    Chronic low back pain 2015    Depression, major, in remission ([facility]     multiple hospitalizations    Endocervical polyp 2012    GERD (gastroesophageal reflux disease)     Heart murmur, systolic         History of bariatric surgery     gastric stapling    History of cervical dysplasia 2015    Hx of colonic polyp 3/18/2021    Colonoscopy 21 [place] no recurrent polyps repeat in 5 years    Hypertension          Insomnia 2015    Knee pain, right     severe OA per  Dr Humberto Wheatley stiffness, left 09/15/2021    with pain    Kyphosis 2015    MVP (mitral valve prolapse)     Neuropathy     cervical spondylosis    Obesity     Osteoarthritis     Osteoarthritis of both knees     Osteoarthritis of hand 2015    Pap smear abnormality of cervix with LGSIL     Skin cancer     Spinal stenosis, lumbar 2013 MRI    disc degeneration and bulges with canal and foraminal stenoses       Past Surgical History:   Procedure Laterality Date    BARIATRIC SURGERY      stapling of stomach    BREAST SURGERY      reduction     SECTION      CHOLECYSTECTOMY      COLONOSCOPY  2010    donald one polyp    EYE SURGERY Bilateral 2018    cataract    FOOT SURGERY      FRACTURE SURGERY Bilateral     feet    KNEE SURGERY Left 2021    LEFT KNEE MANIPULATION WITH FLUOROSCOPY REMOVE CHECKPOINT performed by Carin Castillo MD at [facility] Left 2021    LEFT TOTAL KNEE ARTHROPLASTY ROBOTIC ASSISTED performed by Carin Castillo MD at [facility] OR       No Known Allergies    Social History     Socioeconomic History    Marital status:      Spouse name: None    Number of children: None    Years of education: None    Highest education level: None   Occupational

## 2021-09-28 NOTE — PROGRESS NOTES
[address] [facility]   Phone: [phone]   Fax: [phone]      Physical Therapy Daily Treatment Note    Date: 2021  Patient Name: Lucille Field  : 1950   MRN: [id number]  DOInjury: 2021   DOSx: 2021  Referring Provider: Candance Lesches, MD  [address] [place] [address]     Medical Diagnosis:     D72.690 (ICD-10-CM) - Status post total left knee replacement    Outcome Measure: LEFS 74%    S: Pt feels that she stiffened over the weekend and has some stiffness today. O:   Time 0611     Visit 10 Repeat outcome measure at mid point and end. Pain 4/10     Strength Right: Hip: 4+/5 globally, Knee: Flexion 4+/5,  Extension 4+/5  Left: Hip: 4/5 globally, Knee: Flexion 4/5,  Extension 4/5     Palpation Tender to palpation globally,  Moderate palpable edema noted. ROM Left:   AROM: 110° Flexion,  0° Extension     Modalities       Vaso   Manual            Stretch      IT band supine      HS supine      Quad Prone            Exercise       TE   Heel slides 3x10 10s holds supine   HEP  TE    TE   c heel prop HEP 4 lbs TE   QS HEP TE    TE    TE   YTB TE          TE   TG squats x30 10s holds  TA   Seated chair knee flex x30 5s holds  TE   Step-ups - FWD 2x10  8 inch TA   Step-ups - LAT x20 up/over 8 inch TA          TE                           A:  Tolerated well. Pt was measured at 107* c OP today post session. She stated she had an incr in stiffness d/t not performing HEP as she did during the week.       P: Continue with rehab plan  Corbin Wharton, PT  DPT [id number]    Treatment Charges: Mins Units   Initial Evaluation     Re-Evaluation     Ther Exercise         TE 25 2   Manual Therapy     MT     Ther Activities        TA 15 1   Gait Training          GT     Neuro Re-education NR     Modalities     Non-Billable Service Time     Other     Total Time/Units 40 3

## 2021-09-30 ENCOUNTER — TREATMENT (OUTPATIENT)
Dept: PHYSICAL THERAPY | Age: 71
End: 2021-09-30
Payer: MEDICARE

## 2021-09-30 DIAGNOSIS — Z96.652 STATUS POST TOTAL LEFT KNEE REPLACEMENT: Primary | ICD-10-CM

## 2021-09-30 PROCEDURE — 97530 THERAPEUTIC ACTIVITIES: CPT

## 2021-09-30 PROCEDURE — 97110 THERAPEUTIC EXERCISES: CPT

## 2021-09-30 NOTE — PROGRESS NOTES
[address] [facility]   Phone: [phone]   Fax: [phone]      Physical Therapy Daily Treatment Note    Date: 2021  Patient Name: Jessie Hollis  : 1950   MRN: [id number]  DOInjury: 2021   DOSx: 2021  Referring Provider: Franki Messer MD  [address] [place], [place] [address]  [facility]     Medical Diagnosis:   P19.701 (ICD-10-CM) - Status post total left knee replacement    Outcome Measure: LEFS 74%    S:  Pt reports she feels stiffer upon arrival. States she took tramadol and hasn't noticed a change yet. O:   Time 915-920     Visit 11 Repeat outcome measure at mid point and end. Pain 4/10     Strength Right: Hip: 4+/5 globally, Knee: Flexion 4+/5,  Extension 4+/5  Left: Hip: 4/5 globally, Knee: Flexion 4/5,  Extension 4/5     Palpation Tender to palpation globally,  Moderate palpable edema noted. ROM Left:   AROM: 110° Flexion,  0° Extension     Modalities       Vaso   Manual            Stretch      IT band supine      HS supine      Quad Prone            Exercise       TE   Heel slides 3x10 10s holds supine   HEP  TE    TE   c heel prop HEP 4 lbs TE   QS HEP TE    TE    TE   YTB TE    TE         TG squats x30 10s holds  TA    TE   Step-ups - FWD 3x10  8 inch TA   Step-ups - LAT x20 up/over 8 inch TA                TE         Standing Marches  x30  TA   Standing hip abduction x30  TA   Standing hip extension  x30  TA   Standing hip SLR x30  TA   Standing HR x30  TA                     A:  Tolerated well. Continued to educate pt the importance of consistency while performing I HEPs at home. Pt was progressed this session c standing SLR activity. Moderate fatigue post treatment session but pt denies pain.     P: Continue with rehab plan  Portia Boeck, PTA  [id number]    Treatment Charges: Mins Units   Initial Evaluation     Re-Evaluation     Ther Exercise         TE 15 1   Manual Therapy     MT     Ther Activities        TA 25 2   Gait Training          GT     Neuro Re-education NR     Modalities     Non-Billable Service Time     Other     Total Time/Units 45 3

## 2021-10-04 RX ORDER — NAPROXEN 500 MG/1
500 TABLET ORAL 2 TIMES DAILY PRN
Qty: 60 TABLET | Refills: 0 | Status: SHIPPED
Start: 2021-10-04 | End: 2021-10-19 | Stop reason: SDUPTHER

## 2021-10-05 ENCOUNTER — TREATMENT (OUTPATIENT)
Dept: PHYSICAL THERAPY | Age: 71
End: 2021-10-05
Payer: MEDICARE

## 2021-10-05 DIAGNOSIS — Z96.652 STATUS POST TOTAL LEFT KNEE REPLACEMENT: Primary | ICD-10-CM

## 2021-10-05 PROCEDURE — 97110 THERAPEUTIC EXERCISES: CPT

## 2021-10-05 PROCEDURE — 97530 THERAPEUTIC ACTIVITIES: CPT

## 2021-10-05 NOTE — PROGRESS NOTES
[address] [facility]   Phone: [phone]   Fax: [phone]      Physical Therapy Daily Treatment Note    Date: 10/5/2021  Patient Name: Mihir Harp  : 1950   MRN: [id number]  DOInjury: 2021   DOSx: 2021  Referring Provider: No referring provider defined for this encounter. Medical Diagnosis:   Z96.652 (ICD-10-CM) - Status post total left knee replacement    Outcome Measure: LEFS 74%    S: Pt reports she feels stiff upon arrival today. States she was able to walk around for about 1.5 hour. O:   Time 800-     Visit 12 Repeat outcome measure at mid point and end. Pain 4/10     Strength Right: Hip: 4+/5 globally, Knee: Flexion 4+/5,  Extension 4+/5  Left: Hip: 4/5 globally, Knee: Flexion 4/5,  Extension 4/5     Palpation Tender to palpation globally,  Moderate palpable edema noted. ROM Left:   AROM: 110° Flexion,  0° Extension     Modalities       Vaso   Manual            Stretch      IT band supine      HS supine      Quad Prone            Exercise      Bike 5 min  TE   Heel slides 3x10 10s holds supine   HEP  TE    TE   c heel prop HEP 4 lbs TE   QS HEP TE    TE    TE   YTB TE    TE         TG squats x30 10s holds  TA    TE   Step-ups - FWD 3x10  8 inch TA   Step-ups - LAT x30 up/over 8 inch TA   Step-ups - up and over fwd  x30  8 inch  TA          TE          TA    TA    TA    TA    TA         Foam Marches  3 min   TA         A:  Tolerated well. Continued to focus on improving LE mobility/strength needed for daily ADL activity. She continues to exhibit increased stiffness. Educated pt on the need to consistently work on her I HEP in order to see consistent carryover c mobility.      P: Continue with rehab plan  Brigette Severe, PTA  [id number]    Treatment Charges: Mins Units   Initial Evaluation     Re-Evaluation     Ther Exercise         TE 15 1   Manual Therapy     MT     Ther Activities        TA 25 2   Gait Training          GT     Neuro Re-education NR Modalities     Non-Billable Service Time     Other     Total Time/Units 40 3

## 2021-10-07 ENCOUNTER — TREATMENT (OUTPATIENT)
Dept: PHYSICAL THERAPY | Age: 71
End: 2021-10-07
Payer: MEDICARE

## 2021-10-07 DIAGNOSIS — Z96.652 STATUS POST TOTAL LEFT KNEE REPLACEMENT: Primary | ICD-10-CM

## 2021-10-07 PROCEDURE — 97110 THERAPEUTIC EXERCISES: CPT

## 2021-10-07 PROCEDURE — 97530 THERAPEUTIC ACTIVITIES: CPT

## 2021-10-07 NOTE — PROGRESS NOTES
[address] [facility]   Phone: [phone]   Fax: [phone]      Physical Therapy Daily Treatment Note    Date: 10/7/2021  Patient Name: Edison Smith  : 1950   MRN: [id number]  DOInjury: 2021   DOSx: 2021  Referring Provider: Maxime Quintanilla MD  [address] [place] [address]     Medical Diagnosis:   Q07.730 (ICD-10-CM) - Status post total left knee replacement    Outcome Measure: LEFS 74%    S: Pt reports she still feels stiff. O:   Time 802-900     Visit 13 Repeat outcome measure at mid point and end. Pain 4/10     Strength Right: Hip: 4+/5 globally, Knee: Flexion 4+/5,  Extension 4+/5  Left: Hip: 4/5 globally, Knee: Flexion 4/5,  Extension 4/5     Palpation Tender to palpation globally,  Moderate palpable edema noted. ROM Left:   AROM: 110° Flexion,  0° Extension     Modalities       Vaso   Manual            Stretch      IT band supine      HS supine      Quad Prone            Exercise      Bike 5 min  TE   Heel slides 3x10 10s holds supine   HEP  TE    TE   c heel prop HEP 4 lbs TE   QS HEP TE    TE    TE   YTB TE    TE         TG squats x30 10s holds  TA    TE   Step-ups - FWD 3x10  8 inch TA   Step-ups - LAT x30 up/over 8 inch TA   Step-ups - up and over fwd  x30  8 inch  TA          TE          TA    TA    TA    TA    TA         Foam Marches  3 min   TA   Resisted ambulation x10  RTB TA               A:  Tolerated well. Progressed pt c resisted ambulation this treatment session. Moderate fatigue post treatment session. Continued to educate pt on the importance of I HEPs in order to further improve LE mobility.      P: Continue with rehab plan  Sheldon Raza, PTA  [id number]    Treatment Charges: Mins Units   Initial Evaluation     Re-Evaluation     Ther Exercise         TE 10 1   Manual Therapy     MT     Ther Activities        TA 48 3   Gait Training          GT     Neuro Re-education NR     Modalities     Non-Billable Service Time     Other Total Time/Units 58 4

## 2021-10-12 ASSESSMENT — LIFESTYLE VARIABLES
AUDIT-C TOTAL SCORE: 1
HOW OFTEN DURING THE LAST YEAR HAVE YOU FAILED TO DO WHAT WAS NORMALLY EXPECTED FROM YOU BECAUSE OF DRINKING: 0
HOW OFTEN DURING THE LAST YEAR HAVE YOU NEEDED AN ALCOHOLIC DRINK FIRST THING IN THE MORNING TO GET YOURSELF GOING AFTER A NIGHT OF HEAVY DRINKING: NEVER
AUDIT TOTAL SCORE: 0
HOW OFTEN DURING THE LAST YEAR HAVE YOU FAILED TO DO WHAT WAS NORMALLY EXPECTED FROM YOU BECAUSE OF DRINKING: NEVER
HOW OFTEN DURING THE LAST YEAR HAVE YOU BEEN UNABLE TO REMEMBER WHAT HAPPENED THE NIGHT BEFORE BECAUSE YOU HAD BEEN DRINKING: NEVER
AUDIT-C TOTAL SCORE: 0
HAVE YOU OR SOMEONE ELSE BEEN INJURED AS A RESULT OF YOUR DRINKING: NO
HOW OFTEN DO YOU HAVE SIX OR MORE DRINKS ON ONE OCCASION: 0
HOW MANY STANDARD DRINKS CONTAINING ALCOHOL DO YOU HAVE ON A TYPICAL DAY: 0
HOW OFTEN DURING THE LAST YEAR HAVE YOU FOUND THAT YOU WERE NOT ABLE TO STOP DRINKING ONCE YOU HAD STARTED: 0
HOW OFTEN DURING THE LAST YEAR HAVE YOU HAD A FEELING OF GUILT OR REMORSE AFTER DRINKING: 0
HOW OFTEN DO YOU HAVE A DRINK CONTAINING ALCOHOL: MONTHLY OR LESS
HAVE YOU OR SOMEONE ELSE BEEN INJURED AS A RESULT OF YOUR DRINKING: 0
HOW MANY STANDARD DRINKS CONTAINING ALCOHOL DO YOU HAVE ON A TYPICAL DAY: ONE OR TWO
HOW OFTEN DO YOU HAVE A DRINK CONTAINING ALCOHOL: 1
HOW OFTEN DURING THE LAST YEAR HAVE YOU FOUND THAT YOU WERE NOT ABLE TO STOP DRINKING ONCE YOU HAD STARTED: NEVER
HOW OFTEN DURING THE LAST YEAR HAVE YOU NEEDED AN ALCOHOLIC DRINK FIRST THING IN THE MORNING TO GET YOURSELF GOING AFTER A NIGHT OF HEAVY DRINKING: 0
HOW OFTEN DURING THE LAST YEAR HAVE YOU HAD A FEELING OF GUILT OR REMORSE AFTER DRINKING: NEVER
AUDIT TOTAL SCORE: 1
HOW OFTEN DO YOU HAVE SIX OR MORE DRINKS ON ONE OCCASION: NEVER
HOW OFTEN DURING THE LAST YEAR HAVE YOU BEEN UNABLE TO REMEMBER WHAT HAPPENED THE NIGHT BEFORE BECAUSE YOU HAD BEEN DRINKING: 0
HAS A RELATIVE, FRIEND, DOCTOR, OR ANOTHER HEALTH PROFESSIONAL EXPRESSED CONCERN ABOUT YOUR DRINKING OR SUGGESTED YOU CUT DOWN: NO
HAS A RELATIVE, FRIEND, DOCTOR, OR ANOTHER HEALTH PROFESSIONAL EXPRESSED CONCERN ABOUT YOUR DRINKING OR SUGGESTED YOU CUT DOWN: 0

## 2021-10-12 ASSESSMENT — PATIENT HEALTH QUESTIONNAIRE - PHQ9
SUM OF ALL RESPONSES TO PHQ QUESTIONS 1-9: 0
SUM OF ALL RESPONSES TO PHQ QUESTIONS 1-9: 0
1. LITTLE INTEREST OR PLEASURE IN DOING THINGS: 0
SUM OF ALL RESPONSES TO PHQ9 QUESTIONS 1 & 2: 0
SUM OF ALL RESPONSES TO PHQ QUESTIONS 1-9: 0
2. FEELING DOWN, DEPRESSED OR HOPELESS: 0

## 2021-10-14 ENCOUNTER — TREATMENT (OUTPATIENT)
Dept: PHYSICAL THERAPY | Age: 71
End: 2021-10-14
Payer: MEDICARE

## 2021-10-14 DIAGNOSIS — Z96.652 STATUS POST TOTAL LEFT KNEE REPLACEMENT: Primary | ICD-10-CM

## 2021-10-14 PROCEDURE — 97110 THERAPEUTIC EXERCISES: CPT | Performed by: PHYSICAL THERAPIST

## 2021-10-14 PROCEDURE — 97161 PT EVAL LOW COMPLEX 20 MIN: CPT | Performed by: PHYSICAL THERAPIST

## 2021-10-14 NOTE — PROGRESS NOTES
[address] [facility] [address] [facility]   Phone: [phone]   Fax: [phone]    Discharge Summary      Date:  10/14/2021   Patient: Maegan Bello                : 1950                      MRN: [id number]  Referring Provider: Vianca Wilder MD  [address] [place] [address]                                 Medical Diagnosis:      V13.840 (ICD-10-CM) - Status post total left knee replacement    ATTENDANCE:  Patient attended 15 of 17 scheduled treatments from 2021  to 10/14/2021. TREATMENTS RECEIVED:  Therapeutic activity, therapeutic exercise, neuromuscular reeducation, HEP, manual    INITIAL STATUS:  Observations: well nourished female, normal affect     Inspection: normal orthopedic exam, bruising noted globally, no sxs of infection or DVT.     Edema: moderate globally      Gait: antalgic gait, limp L LE, ambulates with wheeled walker     Joint/Motion:     Knee:     Left:   AROM: 65° Flexion,  0° Extension        Strength:     Knee:   Right: Hip: 4+/5 globally, Knee: Flexion 4+/5,  Extension 4+/5  Left: Hip: 4-/5 globally, Knee: Flexion 4-/5,  Extension 4-/5     Palpation: Tender to palpation globally,  Moderate palpable edema noted.        Special Tests/Functional Screens:  NT d/t post op     Comments: restricted ROM for timeline. FINAL STATUS:  Observations: well nourished female, normal affect     Inspection: normal orthopedic exam, bruising noted globally, no sxs of infection or DVT.     Edema: moderate globally      Gait: slight antalgic on L, ambulates c SC     Joint/Motion:     Knee:     Left:   AROM: 105° Flexion,  0° Extension        Strength:     Knee:   Right: Hip: 4+/5 globally, Knee: Flexion 4+/5,  Extension 4+/5  Left: Hip: 4+/5 globally, Knee: Flexion 4/5,  Extension 4+/5     Palpation: Tender to palpation globally,          Special Tests/Functional Screens:  NT d/t post op     Comments: restricted ROM for timeline.     OUTCOME MEASURE: LEFS 44%    GOALS: Long Term goals (4-6 weeks)  · Decrease reported pain to 0/10  · Increase ROM to 0-120  · Increase Strength to 4+/5 globally   · Able to perform/complete the following functions/tasks: Pt will ambulate for 30 min s AD or limitation, able to negotiate a flight of stairs recip s pain, limitation, or HR, able to perform 10 STS transfers s pain or limitation or HHA  · LEFS 0-30% impairment   · Independent with Home Exercise Programs      REASON FOR DISCHARGE: Pt has made progress in terms of ROM and strength, but continues to be limited in functional mobility and flexion ROM needed for ADLs. She was educated on the need to push into restriction c exercises to allow incr in ROM. She reports limiting pain when she increases her functional mobility and she was again educated that this is d/t ROM restrictions. At this time, she has requested transitioning to I HEP. She was instructed that this needs to focus on flexion ROM or her motion would continue to decrease. She states that gas prices are too high right now and she wants to progress c exercises at home. She will d/c from PT at this time. PATIENT EDUCATION/INSTRUCTIONS: continue HEP as instructed    RECOMMENDATIONS: call c questions or if additional services are warranted. Thank you for the opportunity to work with your patient. If you have questions or comments, please contact me at numbers listed above.       Francisco Dias 94 , DPT PT [id number] 10/14/2021

## 2021-10-14 NOTE — PROGRESS NOTES
[address] [facility]   Phone: [phone]   Fax: [phone]      Physical Therapy Daily Treatment Note    Date: 10/14/2021  Patient Name: Ela Pena  : 1950   MRN: [id number]  DOInjury: 2021   DOSx: 2021  Referring Provider: Kristie Waters MD  [address] [place] [address]     Medical Diagnosis:   V75.640 (ICD-10-CM) - Status post total left knee replacement    Outcome Measure: LEFS 74%    S: Pt reports that she continues to have stiffness limiting ADLs. She is being reevaluated today to determine progress and if additional sessions are warranted. O:   Time [id number]     Visit 15 Repeat outcome measure at mid point and end. Pain 4/10     Strength Right: Hip: 4+/5 globally, Knee: Flexion 4+/5,  Extension 4+/5  Left: Hip: 4/5 globally, Knee: Flexion 4/5,  Extension 4/5     Palpation Tender to palpation globally,  Moderate palpable edema noted. ROM Left:   AROM: 110° Flexion,  0° Extension     Modalities       Vaso   Manual            Stretch      IT band supine      HS supine      Quad Prone            Exercise      Bike 5 min  TE   Heel slides 3x10 10s holds supine   HEP  TE    TE   c heel prop HEP 4 lbs TE   QS HEP TE    TE    TE   YTB TE    TE         TG squats x30 10s holds  TE   Seated chair knee flex x30 5s holds  TE   8 inch TA   8 inch TA   8 inch  TA          TE          TA    TA    TA    TA    TA          TA   RTB TA               A:  Tolerated well. She was reevaluated and has requested to d/c PT care at this time. She was educated on the need to perform flexion activity at home or her motion will continue to regress. HEP was reviewed and pt will d/c at this time.     P: Continue with rehab plan  Eryn Rushing, PT  DPT [id number]    Treatment Charges: Mins Units   Initial Evaluation     Re-Evaluation 10 1   Ther Exercise         TE 30 2   Manual Therapy     MT     Ther Activities        TA     Gait Training          GT     Neuro Re-education NR Modalities     Non-Billable Service Time     Other     Total Time/Units 40 3

## 2021-10-19 ENCOUNTER — OFFICE VISIT (OUTPATIENT)
Dept: FAMILY MEDICINE CLINIC | Age: 71
End: 2021-10-19
Payer: MEDICARE

## 2021-10-19 VITALS
BODY MASS INDEX: 31.24 KG/M2 | SYSTOLIC BLOOD PRESSURE: 134 MMHG | HEIGHT: 63 IN | DIASTOLIC BLOOD PRESSURE: 76 MMHG | WEIGHT: 176.3 LBS | HEART RATE: 94 BPM | OXYGEN SATURATION: 97 % | RESPIRATION RATE: 16 BRPM | TEMPERATURE: 98.3 F

## 2021-10-19 DIAGNOSIS — Z71.89 ACP (ADVANCE CARE PLANNING): ICD-10-CM

## 2021-10-19 DIAGNOSIS — Z00.00 ROUTINE GENERAL MEDICAL EXAMINATION AT A HEALTH CARE FACILITY: ICD-10-CM

## 2021-10-19 DIAGNOSIS — R73.9 HYPERGLYCEMIA: ICD-10-CM

## 2021-10-19 DIAGNOSIS — Z13.6 SCREENING FOR CARDIOVASCULAR CONDITION: ICD-10-CM

## 2021-10-19 DIAGNOSIS — D64.9 ANEMIA, UNSPECIFIED TYPE: ICD-10-CM

## 2021-10-19 DIAGNOSIS — G89.29 CHRONIC PAIN OF LEFT KNEE: Primary | ICD-10-CM

## 2021-10-19 DIAGNOSIS — M25.562 CHRONIC PAIN OF LEFT KNEE: Primary | ICD-10-CM

## 2021-10-19 LAB
BASOPHILS ABSOLUTE: 0.05 E9/L (ref 0–0.2)
BASOPHILS RELATIVE PERCENT: 0.7 % (ref 0–2)
CHOLESTEROL, TOTAL: 216 MG/DL (ref 0–199)
EOSINOPHILS ABSOLUTE: 0.2 E9/L (ref 0.05–0.5)
EOSINOPHILS RELATIVE PERCENT: 2.8 % (ref 0–6)
HBA1C MFR BLD: 5.3 % (ref 4–5.6)
HCT VFR BLD CALC: 43.9 % (ref 34–48)
HDLC SERPL-MCNC: 67 MG/DL
HEMOGLOBIN: 14.5 G/DL (ref 11.5–15.5)
IMMATURE GRANULOCYTES #: 0.02 E9/L
IMMATURE GRANULOCYTES %: 0.3 % (ref 0–5)
LDL CHOLESTEROL CALCULATED: 133 MG/DL (ref 0–99)
LYMPHOCYTES ABSOLUTE: 1.27 E9/L (ref 1.5–4)
LYMPHOCYTES RELATIVE PERCENT: 17.9 % (ref 20–42)
MCH RBC QN AUTO: 32.6 PG (ref 26–35)
MCHC RBC AUTO-ENTMCNC: 33 % (ref 32–34.5)
MCV RBC AUTO: 98.7 FL (ref 80–99.9)
MONOCYTES ABSOLUTE: 0.58 E9/L (ref 0.1–0.95)
MONOCYTES RELATIVE PERCENT: 8.2 % (ref 2–12)
NEUTROPHILS ABSOLUTE: 4.98 E9/L (ref 1.8–7.3)
NEUTROPHILS RELATIVE PERCENT: 70.1 % (ref 43–80)
PDW BLD-RTO: 12.7 FL (ref 11.5–15)
PLATELET # BLD: 351 E9/L (ref 130–450)
PMV BLD AUTO: 8.8 FL (ref 7–12)
RBC # BLD: 4.45 E12/L (ref 3.5–5.5)
TRIGL SERPL-MCNC: 78 MG/DL (ref 0–149)
VLDLC SERPL CALC-MCNC: 16 MG/DL
WBC # BLD: 7.1 E9/L (ref 4.5–11.5)

## 2021-10-19 PROCEDURE — G0438 PPPS, INITIAL VISIT: HCPCS | Performed by: INTERNAL MEDICINE

## 2021-10-19 RX ORDER — NAPROXEN 500 MG/1
500 TABLET ORAL 2 TIMES DAILY PRN
Qty: 60 TABLET | Refills: 0 | Status: SHIPPED
Start: 2021-10-19 | End: 2021-12-07

## 2021-10-19 NOTE — PROGRESS NOTES
Medicare Annual Wellness Visit  Name: Harriet Brooks Date: 10/19/2021   MRN: [id number] Sex: Female   Age: 80 y.o. Ethnicity: Non- / Non    : 1950 Race: White (non-)      Aggie Ahumada is here for Medicare AWV and Flu Vaccine (pt declined)    Screenings for behavioral, psychosocial and functional/safety risks, and cognitive dysfunction are all negative except as indicated below. These results, as well as other patient data from the [address] [facility] [address] form, are documented in Flowsheets linked to this Encounter. No Known Allergies    Prior to Visit Medications    Medication Sig Taking?  Authorizing Provider   Acetaminophen (TYLENOL PO) Take 500 mg by mouth Take 2 tablet 3 times daily Yes Historical Provider, MD   naproxen (NAPROSYN) 500 MG tablet TAKE 1 TABLET BY MOUTH 2 TIMES DAILY AS NEEDED FOR PAIN Yes Shara Osorio MD   aspirin 81 MG EC tablet Take 81 mg by mouth 2 times daily Yes Historical Provider, MD   Ascorbic Acid (VITAMIN C) 1000 MG tablet Take 1,000 mg by mouth daily  Yes Historical Provider, MD   lamoTRIgine (LAMICTAL) 25 MG tablet Take 2 tablets by mouth daily Yes Shara Osorio MD   hydroCHLOROthiazide (HYDRODIURIL) 25 MG tablet Take 1 tablet by mouth daily Yes Shara Osorio MD   Ferrous Sulfate (IRON) 325 (65 Fe) MG TABS Take 1 tablet by mouth daily  Yes Historical Provider, MD   famotidine (PEPCID) 20 MG tablet Take 20 mg by mouth nightly  Yes Historical Provider, MD   Multiple Vitamin (MULTI-VITAMIN DAILY PO) Take by mouth  Yes Historical Provider, MD   Vitamin D (CHOLECALCIFEROL) 25 MCG (1000 UT) TABS tablet Take 1,000 Units by mouth daily Vitamin D3 Yes Historical Provider, MD       Past Medical History:   Diagnosis Date    Allergic rhinitis     Anxiety disorder     [facility]     Caffeine use 2015    Chronic low back pain 2015    [facility]     multiple hospitalizations    Endocervical polyp 2012    GERD (gastroesophageal reflux disease)     Heart murmur, systolic     Z0/6    History of bariatric surgery     gastric stapling    History of cervical dysplasia 2015    Hx of colonic polyp 3/18/2021    Colonoscopy 21 Naffah/Awaida no recurrent polyps repeat in 5 years    Hypertension          Insomnia 2015    Knee pain, right     severe OA per  Dr Adrianna Sorenson stiffness, left 09/15/2021    with pain    Kyphosis 2015    MVP (mitral valve prolapse)     Neuropathy     cervical spondylosis    Obesity     Osteoarthritis     Osteoarthritis of both knees     Osteoarthritis of hand 2015    Pap smear abnormality of cervix with LGSIL     Skin cancer     Spinal stenosis, lumbar 2013 MRI    disc degeneration and bulges with canal and foraminal stenoses       Past Surgical History:   Procedure Laterality Date    BARIATRIC SURGERY      stapling of stomach    BREAST SURGERY      reduction     SECTION      CHOLECYSTECTOMY      COLONOSCOPY  2010    donald one polyp    EYE SURGERY Bilateral 2018    cataract    FOOT SURGERY      FRACTURE SURGERY Bilateral     feet    KNEE SURGERY Left 2021    LEFT KNEE MANIPULATION WITH FLUOROSCOPY REMOVE CHECKPOINT performed by Buddy [facility]az [facility]    SKIN BIOPSY      TONSILLECTOMY      TOTAL KNEE ARTHROPLASTY Left 2021    LEFT TOTAL KNEE ARTHROPLASTY ROBOTIC ASSISTED performed by Buddy Paz MD at [facility] OR       Family History   Problem Relation Age of Onset    Heart Disease Mother     COPD Mother     Anxiety Disorder Mother     Asthma Mother     Other Mother         sarcoidosis    Diabetes Mother     Stroke Father     Obesity Father     Heart Disease Brother     Other Brother         suicide    Obesity Brother     Substance Abuse Brother     Substance Abuse Brother         hepatitis C    Mental Illness Brother     Cancer Paternal Uncle         throat    Diabetes Maternal Grandmother     Stroke Maternal Grandmother     Stroke Paternal Grandmother     Stroke Paternal Grandfather     Cancer Paternal Grandfather        CareTeam (Including outside providers/suppliers regularly involved in providing care):   Patient Care Team:  Kiana Spain MD as PCP - General (Internal Medicine)  Kiana Spain MD as PCP - REHABILITATION [facility] Provider    Wt Readings from Last 3 Encounters:   10/19/21 176 lb 4.8 oz (80 kg)   09/28/21 176 lb (79.8 kg)   09/21/21 175 lb (79.4 kg)     Vitals:    10/19/21 1151   BP: 134/76   Site: Left Upper Arm   Position: Sitting   Cuff Size: Medium Adult   Pulse: 94   Resp: 16   Temp: 98.3 °F (36.8 °C)   TempSrc: Temporal   SpO2: 97%   Weight: 176 lb 4.8 oz (80 kg)   Height: 5' 3\" (1.6 m)     Body mass index is 31.23 kg/m². Based upon direct observation of the patient, evaluation of cognition reveals she is having occasional trouble but does her bills and not getting lost driving. Patient's complete Health Risk Assessment and screening values have been reviewed and are found in Flowsheets. The following problems were reviewed today and where indicated follow up appointments were made and/or referrals ordered. Positive Risk Factor Screenings with Interventions:     Fall Risk:  2 or more falls in past year?: no  Fall with injury in past year?: (!) yes  Fall Risk Interventions:    · Home safety tips provided        General Health and ACP:  General  In general, how would you say your health is?: Very Good  In the past 7 days, have you experienced any of the following?  New or Increased Pain, New or Increased Fatigue, Loneliness, Social Isolation, Stress or Anger?: None of These  Do you get the social and emotional support that you need?: Yes  Do you have a Living Will?: (!) No  Advance Directives     Power of VIRGINIE & WHITE RADHA Will ACP-Advance Directive ACP-Power of     Not on File [place] on 06/24/12 Filed Not on File      General Health Risk Interventions:  · No Living Will: Advance Care Planning addressed with patient today    Health Habits/Nutrition:  Health Habits/Nutrition  Do you exercise for at least 20 minutes 2-3 times per week?: Yes  Have you lost any weight without trying in the past 3 months?: (!) Yes  Do you eat only one meal per day?: No  Have you seen the dentist within the past year?: Yes  Body mass index: (!) 31.23  Health Habits/Nutrition Interventions:  ·     Hearing/Vision:  No exam data present  Hearing/Vision  Do you or your family notice any trouble with your hearing that hasn't been managed with hearing aids?: No  Do you have difficulty driving, watching TV, or doing any of your daily activities because of your eyesight?: No  Have you had an eye exam within the past year?: (!) No  Hearing/Vision Interventions:  · none      Personalized Preventive Plan   Current Health Maintenance Status  Immunization History   Administered Date(s) Administered    Tdap (Boostrix, Adacel) 05/19/2015        Health Maintenance   Topic Date Due    COVID-19 Vaccine (1) Never done    Diabetes screen  Never done    Shingles Vaccine (1 of 2) Never done    Pneumococcal 65+ years Vaccine (1 of 1 - PPSV23) Never done    Breast cancer screen  05/13/2017    Annual Wellness Visit (AWV)  Never done    Flu vaccine (1) Never done    Potassium monitoring  08/04/2022    Creatinine monitoring  08/04/2022    DTaP/Tdap/Td vaccine (2 - Td or Tdap) 05/19/2025    Lipid screen  10/05/2025    Colon cancer screen colonoscopy  02/17/2026    DEXA (modify frequency per FRAX score)  Completed    Hepatitis A vaccine  Aged Out    Hepatitis B vaccine  Aged Out    Hib vaccine  Aged Out    Meningococcal (ACWY) vaccine  Aged Out    Hepatitis C screen  Discontinued     Recommendations for Mantex Due: see orders and patient instructions/AVS.  .   Recommended screening schedule for the next 5-10 years is provided to the patient in written form: see Patient Tasia Bell was seen today for medicare awv and flu vaccine. Diagnoses and all orders for this visit:    1. Chronic pain of left knee  We discussed GI prophylaxis at length. Intolerant of pantoprazole in the past and unwilling to try another PPI. She is to   con't famotidine and we will reconsider PPI if needs another NSAID refill  - naproxen (NAPROSYN) 500 MG tablet; Take 1 tablet by mouth 2 times daily as needed for Pain  Dispense: 60 tablet; Refill: 0    2. ACP (advance care planning)  Discussed in detail   - [facility] Referral to ACP Clinical Specialist    3. Routine general medical examination at a health care facility       4. Hyperglycemia     - Hemoglobin A1C; Future    5. Screening for cardiovascular condition     - Lipid Panel; Future    6. Anemia, unspecified type     - CBC WITH AUTO DIFFERENTIAL;  Future      She declines all vaccines

## 2021-10-19 NOTE — PATIENT INSTRUCTIONS
(Maybe you're afraid of having pain, losing your independence, or being kept alive by machines.)  · Where would you prefer to die? (Your home? A hospital? A nursing home?)  · Do you want to donate your organs when you die? · Do you want certain Druze practices performed before you die? When should you call for help? Be sure to contact your doctor if you have any questions. Where can you learn more? Go to https://[email or web address].[employer]. org and sign in to your Datometry account. Enter R264 in the Lookinhotels box to learn more about \"Advance Directives: Care Instructions. \"     If you do not have an account, please click on the \"Sign Up Now\" link. Current as of: March 17, 2021               Content Version: 13.0  © 8091-7078 Healthwise, Incorporated. Care instructions adapted under license by [facility] ([facility]). If you have questions about a medical condition or this instruction, always ask your healthcare professional. [address] any warranty or liability for your use of this information. Personalized Preventive Plan for Jose Angel Wineglass - 10/19/2021  Medicare offers a range of preventive health benefits. Some of the tests and screenings are paid in full while other may be subject to a deductible, co-insurance, and/or copay. Some of these benefits include a comprehensive review of your medical history including lifestyle, illnesses that may run in your family, and various assessments and screenings as appropriate. After reviewing your medical record and screening and assessments performed today your provider may have ordered immunizations, labs, imaging, and/or referrals for you. A list of these orders (if applicable) as well as your Preventive Care list are included within your After Visit Summary for your review.     Other Preventive Recommendations:    · A preventive eye exam performed by an eye specialist is recommended every 1-2 years to screen for is...\" And then follow it with, \"I would not be willing to live with . Efren Theo Efren Theo Efren Theo \" When you complete this sentence it helps your doctor understand your wishes. Talk openly and honestly with your doctor. This is the best way to understand the decisions you will need to make as your health changes. Know that you can always change your mind. Ask your doctor about commonly used life-support measures. These include tube feedings, breathing machines, and fluids given through a vein (IV). Understanding these treatments will help you decide whether you want them. You may choose to have these life-supporting treatments for a limited time. This allows a trial period to see whether they will help you. You may also decide that you want your doctor to take only certain measures to keep you alive. It may help to think about the big picture, like what makes life worth living for you or what your values and goals are. Talk to your doctor about how long you are likely to live. Your doctor may be able to give you an idea of what usually happens with your specific illness. Think about preparing papers that state your wishes. These papers are called advance directives. If you do this early and review them often, there will not be any confusion about what you want. You can change your instructions at any time. Which papers should you prepare? Advance directives are legal papers that tell doctors how you want to be cared for at the end of your life. You do not need a  to write these papers. Ask your doctor or your state health department for information on how to write your advance directives. They may have the forms for each of these types of papers. Make sure your doctor has a copy of these on file, and give a copy to a family member or close friend. Consider a do-not-resuscitate order (DNR). This order asks that no extra treatments be done if your heart stops or you stop breathing.  Extra treatments may include cardiopulmonary resuscitation (CPR), electrical shock to restart your heart, or a machine to breathe for you. If you decide to have a DNR order, ask your doctor to explain and write it. Place the order in your home where everyone can easily see it. Consider a living will. A living will explains your wishes about life support and other treatments at the end of your life if you become unable to speak for yourself. Living gallagher tell doctors to use or not use treatments that would keep you alive. You must have one or two witnesses or a notary present when you sign this form. A living will may be called something else in your state. Consider a medical power of . This form allows you to name a person to make decisions about your care if you are not able to. Most people ask a close friend or family member. Talk to this person about the kinds of treatments you want and those that you do not want. Make sure this person understands your wishes. A medical power of  may be called something else in your state. These legal papers are simple to change. Tell your doctor what you want to change, and ask him or her to make a note in your medical file. Give your family updated copies of the papers. Where can you learn more? Go to https://CitylabspeSquidbideb.Jail Education Solutions. org and sign in to your LoanTek account. Enter P184 in the [facility] box to learn more about \"Advance Care Planning: Care Instructions. \"     If you do not have an account, please click on the \"Sign Up Now\" link. Current as of: March 17, 2021               Content Version: 13.0  © 2006-2021 Healthwise, Incorporated. Care instructions adapted under license by [facility] ([facility]). If you have questions about a medical condition or this instruction, always ask your healthcare professional. [place] [address] any warranty or liability for your use of this information.     ·

## 2021-10-20 ENCOUNTER — CLINICAL DOCUMENTATION (OUTPATIENT)
Dept: SPIRITUAL SERVICES | Age: 71
End: 2021-10-20

## 2021-10-28 ENCOUNTER — CLINICAL DOCUMENTATION (OUTPATIENT)
Dept: SPIRITUAL SERVICES | Age: 71
End: 2021-10-28

## 2021-10-28 NOTE — ACP (ADVANCE CARE PLANNING)
Advance Care Planning   Advance Care Planning Note  Ambulatory Spiritual Care Services    Date:  10/28/2021    Received request from [facility] Provider. Consultation conversation participants:   Patient who understands ACP conversation     Goals of ACP Conversation:  Discuss advance care planning documents    Health Care Decision Makers:      Primary Decision Maker: Martha Rodriguez - [phone]    Secondary Decision Maker: Kira Ariza - [phone]    Secondary Decision Maker: Marian Grey - [phone]     Summary:  Completed [address]  Documented Next of Kin, per patient report    Advance Care Planning Documents (Patient Wishes)  Currently on file:   None    Assessment:   Had ACP Discussion with patient. Talked about her health care wishes and documented them in a POA and LW. Updated emergency contacts and health care decision makers. Documents have been signed, witnessed and scanned to medical records. Patient displayed strong Faith beliefs which, she states, will help her through all difficulties. Interventions:  Provided education on documents for clarity and greater understanding  Discussed and provided education on state decision maker hierarchy  Assisted in the completion of documents according to patient's wishes at this time  Encouraged ongoing ACP conversation with future decision makers and loved ones    Care Preferences Communicated:     Hospitalization:  If the patient's health worsens and it becomes clear that the chance of recovery is unlikely,     the patient prefers comfort-focused treatment without hospitalization. Ventilation:   If the patient, in their present state of health, suddenly became very ill and unable to breathe on their own,     the patient would desire the use of a ventilator (breathing machine).     If their health worsens and it becomes clear that the change of recovery is unlikely,     the patient would NOT desire the use of a ventilator (breathing machine). Resuscitation:  In the event the patient's heart stopped as a result of an underlying serious health condition, the patient communicates a preference for      a natural death (no CPR). Outcomes:  ACP Discussion: Completed  New advance directive completed. Returned original document(s) to patient, as well as copies for distribution to appointed agents  Copy of advance directive given to staff to scan into medical record. Routed ACP note to attending provider or other IDT member.   Teach Back Method used to verify the patient's and/or Healthcare Decision Maker's understanding of key information in the advance directive documents    Patient / Healthcare Decision Maker Instructions:  Review completed ACP document(s) and update, if needed, with changes health or future preferences    Electronically signed by Chaplain Ginny on 10/28/2021 at 10:33 AM.

## 2021-11-05 ENCOUNTER — OFFICE VISIT (OUTPATIENT)
Dept: ORTHOPEDIC SURGERY | Age: 71
End: 2021-11-05

## 2021-11-05 VITALS — HEIGHT: 63 IN | WEIGHT: 176 LBS | BODY MASS INDEX: 31.18 KG/M2

## 2021-11-05 DIAGNOSIS — Z96.652 STATUS POST TOTAL LEFT KNEE REPLACEMENT: Primary | ICD-10-CM

## 2021-11-05 PROCEDURE — 99024 POSTOP FOLLOW-UP VISIT: CPT | Performed by: ORTHOPAEDIC SURGERY

## 2021-11-06 NOTE — PROGRESS NOTES
mouth 2 times daily      Ascorbic Acid (VITAMIN C) 1000 MG tablet Take 1,000 mg by mouth daily       lamoTRIgine (LAMICTAL) 25 MG tablet Take 2 tablets by mouth daily 180 tablet 1    hydroCHLOROthiazide (HYDRODIURIL) 25 MG tablet Take 1 tablet by mouth daily 90 tablet 1    Ferrous Sulfate (IRON) 325 (65 Fe) MG TABS Take 1 tablet by mouth daily       famotidine (PEPCID) 20 MG tablet Take 20 mg by mouth nightly       Multiple Vitamin (MULTI-VITAMIN DAILY PO) Take by mouth       Vitamin D (CHOLECALCIFEROL) 25 MCG (1000 UT) TABS tablet Take 1,000 Units by mouth daily Vitamin D3       No current facility-administered medications for this visit.        Patient Active Problem List   Diagnosis    Depression, major, in remission ([address]    Anxiety disorder    MVP (mitral valve prolapse)    Osteoarthritis of both knees    Spinal stenosis, lumbar    History of bariatric surgery    Spondylolisthesis L5 on S1, grade 1    Lumbar disc herniation L5-S1    Osteoarthritis of hand    Chronic low back pain    History of cervical dysplasia    Kyphosis    Essential hypertension    Allergic rhinitis    Cat bite of hand, right, initial encounter    Gastroesophageal reflux disease with esophagitis without hemorrhage    Ganglion cyst of tendon sheath of right hand    Mood disorder of depressed type    Hx of colonic polyp    Bipolar depression ([address]    Moderate obesity    S/P total knee arthroplasty, left    Primary osteoarthritis of left knee       Past Medical History:   Diagnosis Date    Allergic rhinitis     Anxiety disorder     Bipolar disorder ([address]     Caffeine use 5/19/2015    Chronic low back pain 5/19/2015    Depression, major, in remission ([address]     multiple hospitalizations    Endocervical polyp 4/19/2012    GERD (gastroesophageal reflux disease)     Heart murmur, systolic     K3/6    History of bariatric surgery 1980's    gastric stapling    History of cervical dysplasia 5/19/2015    Hx of colonic polyp 3/18/2021    Colonoscopy 21 Naffah/Awaida no recurrent polyps repeat in 5 years    Hypertension          Insomnia 2015    Knee pain, right     severe OA per  Dr Cardenas Later stiffness, left 09/15/2021    with pain    Kyphosis 2015    MVP (mitral valve prolapse)     Neuropathy     cervical spondylosis    Obesity     Osteoarthritis     Osteoarthritis of both knees     Osteoarthritis of hand 2015    Pap smear abnormality of cervix with LGSIL     Skin cancer     Spinal stenosis, lumbar 2013 MRI    disc degeneration and bulges with canal and foraminal stenoses       Past Surgical History:   Procedure Laterality Date    BARIATRIC SURGERY      stapling of stomach    BREAST SURGERY      reduction     SECTION      CHOLECYSTECTOMY      COLONOSCOPY  2010    donald one polyp    EYE SURGERY Bilateral 2018    cataract    FOOT SURGERY      FRACTURE SURGERY Bilateral     feet    KNEE SURGERY Left 2021    LEFT KNEE MANIPULATION WITH FLUOROSCOPY REMOVE CHECKPOINT performed by Shannon Mcmahon MD at [address] [place] [address]      TOTAL KNEE ARTHROPLASTY Left 2021    LEFT TOTAL KNEE ARTHROPLASTY ROBOTIC ASSISTED performed by Shannon [facility]hon [facility] at [facility] OR       No Known Allergies    Social History     Socioeconomic History    Marital status:      Spouse name: None    Number of children: None    Years of education: None    Highest education level: None   Occupational History    None   Tobacco Use    Smoking status: Never Smoker    Smokeless tobacco: Never Used   Vaping Use    Vaping Use: Never used   Substance and Sexual Activity    Alcohol use: Yes     Comment:  rarely    Drug use: No    Sexual activity: Not Currently     Partners: Male   Other Topics Concern    None   Social History Narrative    None     Social Determinants of Health     Financial Resource Strain:     Difficulty of Paying Living Expenses:    Food Insecurity: No Food Insecurity    Worried About Running Out of Food in the Last Year: Never true    Chloe of Food in the Last Year: Never true   Transportation Needs: No Transportation Needs    Lack of Transportation (Medical): No    Lack of Transportation (Non-Medical): No   Physical Activity:     Days of Exercise per Week:     Minutes of Exercise per Session:    Stress:     Feeling of Stress :    Social Connections:     Frequency of Communication with Friends and Family:     Frequency of Social Gatherings with Friends and Family:     Attends Evangelical Services:     Active Member of Clubs or Organizations:     Attends Club or Organization Meetings:     Marital Status:    Intimate Partner Violence:     Fear of Current or Ex-Partner:     Emotionally Abused:     Physically Abused:     Sexually Abused:        Family History   Problem Relation Age of Onset    Heart Disease Mother     COPD Mother     Anxiety Disorder Mother     Asthma Mother     Other Mother         sarcoidosis    Diabetes Mother     Stroke Father     Obesity Father     Heart Disease Brother     Other Brother         suicide    Obesity Brother     Substance Abuse Brother     Substance Abuse Brother         hepatitis C    Mental Illness Brother     Cancer Paternal Uncle         throat    Diabetes Maternal Grandmother     Stroke Maternal Grandmother     Stroke Paternal Grandmother     Stroke Paternal Grandfather     Cancer Paternal Grandfather          Review of Systems  As follows except as previously noted in HPI:  Constitutional: Negative for chills, diaphoresis, fatigue, fever and unexpected weight change. Respiratory: Negative for cough, shortness of breath and wheezing. Cardiovascular: Negative for chest pain and palpitations. Neurological: Negative for dizziness, syncope, cephalgia.   GI / : negative  Musculoskeletal: see HPI       Objective:   Physical Exam   Constitutional: Oriented to person, place, and time. and appears well-developed and well-nourished. :   Head: Normocephalic and atraumatic. Eyes: EOM are normal.   Neck: Neck supple. Cardiovascular: Normal rate and regular rhythm. Pulmonary/Chest: Effort normal. No stridor. No respiratory distress, no wheezes. Abdominal:  No abnormal distension. Neurological: Alert and oriented to person, place, and time. Skin: Skin is warm and dry. Psychiatric: Normal mood and affect.  Behavior is normal. Thought content normal.    11/5/2021  8:02 PM

## 2021-12-07 DIAGNOSIS — G89.29 CHRONIC PAIN OF LEFT KNEE: ICD-10-CM

## 2021-12-07 DIAGNOSIS — M25.562 CHRONIC PAIN OF LEFT KNEE: ICD-10-CM

## 2021-12-07 RX ORDER — NAPROXEN 500 MG/1
500 TABLET ORAL 2 TIMES DAILY PRN
Qty: 60 TABLET | Refills: 0 | Status: SHIPPED
Start: 2021-12-07 | End: 2022-01-05

## 2022-01-04 DIAGNOSIS — G89.29 CHRONIC PAIN OF LEFT KNEE: ICD-10-CM

## 2022-01-04 DIAGNOSIS — M25.562 CHRONIC PAIN OF LEFT KNEE: ICD-10-CM

## 2022-01-05 RX ORDER — NAPROXEN 500 MG/1
TABLET ORAL
Qty: 60 TABLET | Refills: 0 | Status: SHIPPED
Start: 2022-01-05 | End: 2022-02-04

## 2022-01-09 DIAGNOSIS — I10 ESSENTIAL HYPERTENSION: ICD-10-CM

## 2022-01-09 DIAGNOSIS — F32.A MOOD DISORDER OF DEPRESSED TYPE: ICD-10-CM

## 2022-01-10 RX ORDER — LAMOTRIGINE 25 MG/1
TABLET ORAL
Qty: 180 TABLET | Refills: 1 | Status: SHIPPED
Start: 2022-01-10 | End: 2022-04-19 | Stop reason: SDUPTHER

## 2022-01-10 RX ORDER — HYDROCHLOROTHIAZIDE 25 MG/1
TABLET ORAL
Qty: 90 TABLET | Refills: 1 | Status: SHIPPED
Start: 2022-01-10 | End: 2022-04-19 | Stop reason: SDUPTHER

## 2022-01-27 DIAGNOSIS — M25.562 CHRONIC PAIN OF LEFT KNEE: ICD-10-CM

## 2022-01-27 DIAGNOSIS — G89.29 CHRONIC PAIN OF LEFT KNEE: ICD-10-CM

## 2022-02-04 RX ORDER — NAPROXEN 500 MG/1
TABLET ORAL
Qty: 60 TABLET | Refills: 0 | Status: SHIPPED
Start: 2022-02-04 | End: 2022-03-01

## 2022-02-28 DIAGNOSIS — M25.562 CHRONIC PAIN OF LEFT KNEE: ICD-10-CM

## 2022-02-28 DIAGNOSIS — G89.29 CHRONIC PAIN OF LEFT KNEE: ICD-10-CM

## 2022-03-01 RX ORDER — NAPROXEN 500 MG/1
TABLET ORAL
Qty: 60 TABLET | Refills: 0 | Status: SHIPPED
Start: 2022-03-01 | End: 2022-03-29

## 2022-03-27 DIAGNOSIS — G89.29 CHRONIC PAIN OF LEFT KNEE: ICD-10-CM

## 2022-03-27 DIAGNOSIS — M25.562 CHRONIC PAIN OF LEFT KNEE: ICD-10-CM

## 2022-03-29 RX ORDER — NAPROXEN 500 MG/1
TABLET ORAL
Qty: 60 TABLET | Refills: 0 | Status: SHIPPED
Start: 2022-03-29 | End: 2022-04-29

## 2022-04-19 ENCOUNTER — OFFICE VISIT (OUTPATIENT)
Dept: FAMILY MEDICINE CLINIC | Age: 72
End: 2022-04-19
Payer: MEDICARE

## 2022-04-19 VITALS
BODY MASS INDEX: 34.3 KG/M2 | HEIGHT: 63 IN | RESPIRATION RATE: 16 BRPM | SYSTOLIC BLOOD PRESSURE: 118 MMHG | TEMPERATURE: 97.9 F | OXYGEN SATURATION: 98 % | WEIGHT: 193.6 LBS | DIASTOLIC BLOOD PRESSURE: 60 MMHG | HEART RATE: 70 BPM

## 2022-04-19 DIAGNOSIS — G89.29 CHRONIC PAIN OF LEFT KNEE: Primary | ICD-10-CM

## 2022-04-19 DIAGNOSIS — I10 ESSENTIAL HYPERTENSION: ICD-10-CM

## 2022-04-19 DIAGNOSIS — E78.5 HYPERLIPIDEMIA, UNSPECIFIED HYPERLIPIDEMIA TYPE: ICD-10-CM

## 2022-04-19 DIAGNOSIS — F31.9 BIPOLAR DEPRESSION (HCC): ICD-10-CM

## 2022-04-19 DIAGNOSIS — M25.562 CHRONIC PAIN OF LEFT KNEE: Primary | ICD-10-CM

## 2022-04-19 LAB
ANION GAP SERPL CALCULATED.3IONS-SCNC: 9 MMOL/L (ref 7–16)
BUN BLDV-MCNC: 15 MG/DL (ref 6–23)
CALCIUM SERPL-MCNC: 10 MG/DL (ref 8.6–10.2)
CHLORIDE BLD-SCNC: 98 MMOL/L (ref 98–107)
CHOLESTEROL, TOTAL: 219 MG/DL (ref 0–199)
CO2: 31 MMOL/L (ref 22–29)
CREAT SERPL-MCNC: 0.8 MG/DL (ref 0.5–1)
GFR AFRICAN AMERICAN: >60
GFR NON-AFRICAN AMERICAN: >60 ML/MIN/1.73
GLUCOSE BLD-MCNC: 99 MG/DL (ref 74–99)
HDLC SERPL-MCNC: 76 MG/DL
LDL CHOLESTEROL CALCULATED: 129 MG/DL (ref 0–99)
POTASSIUM SERPL-SCNC: 4.1 MMOL/L (ref 3.5–5)
SODIUM BLD-SCNC: 138 MMOL/L (ref 132–146)
TRIGL SERPL-MCNC: 69 MG/DL (ref 0–149)
VLDLC SERPL CALC-MCNC: 14 MG/DL

## 2022-04-19 PROCEDURE — 99214 OFFICE O/P EST MOD 30 MIN: CPT | Performed by: INTERNAL MEDICINE

## 2022-04-19 RX ORDER — HYDROCHLOROTHIAZIDE 25 MG/1
TABLET ORAL
Qty: 90 TABLET | Refills: 1 | Status: SHIPPED | OUTPATIENT
Start: 2022-04-19

## 2022-04-19 RX ORDER — LAMOTRIGINE 25 MG/1
TABLET ORAL
Qty: 180 TABLET | Refills: 1 | Status: SHIPPED
Start: 2022-04-19 | End: 2022-09-21

## 2022-04-19 SDOH — ECONOMIC STABILITY: FOOD INSECURITY: WITHIN THE PAST 12 MONTHS, YOU WORRIED THAT YOUR FOOD WOULD RUN OUT BEFORE YOU GOT MONEY TO BUY MORE.: NEVER TRUE

## 2022-04-19 SDOH — ECONOMIC STABILITY: FOOD INSECURITY: WITHIN THE PAST 12 MONTHS, THE FOOD YOU BOUGHT JUST DIDN'T LAST AND YOU DIDN'T HAVE MONEY TO GET MORE.: NEVER TRUE

## 2022-04-19 ASSESSMENT — PATIENT HEALTH QUESTIONNAIRE - PHQ9
1. LITTLE INTEREST OR PLEASURE IN DOING THINGS: 0
2. FEELING DOWN, DEPRESSED OR HOPELESS: 0
SUM OF ALL RESPONSES TO PHQ QUESTIONS 1-9: 0
SUM OF ALL RESPONSES TO PHQ QUESTIONS 1-9: 0
SUM OF ALL RESPONSES TO PHQ9 QUESTIONS 1 & 2: 0
SUM OF ALL RESPONSES TO PHQ QUESTIONS 1-9: 0
SUM OF ALL RESPONSES TO PHQ QUESTIONS 1-9: 0

## 2022-04-19 ASSESSMENT — SOCIAL DETERMINANTS OF HEALTH (SDOH): HOW HARD IS IT FOR YOU TO PAY FOR THE VERY BASICS LIKE FOOD, HOUSING, MEDICAL CARE, AND HEATING?: NOT HARD AT ALL

## 2022-04-19 NOTE — PROGRESS NOTES
4/19/2022  Visit type: Established patient    Reason for Visit: Hypertension (f/u), Depression (f/u), and Health Maintenance (pt declines shingles vaccine and pneumococcal)      ASSESSMENT AND PLAN    1. Chronic pain of left knee  She is feeling better and will now use acetaminophen in place of naproxen, discussed GI risks with latter  2. Essential hypertension  Assessment & Plan:   Stable continue present management   Orders:  -     hydroCHLOROthiazide (HYDRODIURIL) 25 MG tablet; TAKE 1 TABLET BY MOUTH EVERY DAY, Disp-90 tablet, R-1Normal  -     Basic Metabolic Panel; Future  -     Lipid Panel; Future  3. Hyperlipidemia, unspecified hyperlipidemia type  Assessment & Plan:  Repeat lipids and I advised atorvastatin if no improvement. Patient will consider but presently is reluctant. We discussed statin benefits, risks, side effects in detail. 4. Bipolar depression ([facility] [address]  Assessment & Plan:   Patient is doing well presently and declines referral to a psychiatrist at this time. She eventually would like to get off of the Lamictal.  Orders:  -     lamoTRIgine (LAMICTAL) 25 MG tablet; TAKE 2 TABLETS BY MOUTH EVERY DAY, Disp-180 tablet, R-1Normal    Declines all vaccinations and mammogram at this time. Discussed that I will be ending my practice after 6/1/22 and provided names of PCPs that are accepting new patients. Patient is to select new PCP and schedule a visit.      ----------------------------------------------------------------------    SUBJECTIVE    Chief Complaint   Patient presents with    Hypertension     f/u    Depression     f/u   [facility] Maintenance     pt declines shingles vaccine and pneumococcal     HPI   Follow up hypertension    Patient feeling well, L knee pain is improved and she is using naproxen less than daily.   Mood has been good and patient has  Begun to wean herself slowly from Lamictal.    Review of Systems   Denies fever, chills, chest pain, and shortness of breath OBJECTIVE    /60 (Site: Left Upper Arm, Position: Sitting, Cuff Size: Medium Adult)   Pulse 70   Temp 97.9 °F (36.6 °C) (Temporal)   Resp 16   Ht 5' 3\" (1.6 m)   Wt 193 lb 9.6 oz (87.8 kg)   SpO2 98%   BMI 34.29 kg/m²   Physical Exam  Constitutional:       General: She is not in acute distress. HENT:      Mouth/Throat:      Pharynx: Oropharynx is clear. Cardiovascular:      Rate and Rhythm: Normal rate and regular rhythm. Heart sounds: Normal heart sounds. No murmur heard. No friction rub. No gallop. Pulmonary:      Breath sounds: Normal breath sounds. No stridor. No wheezing, rhonchi or rales. Musculoskeletal:      Right lower leg: No edema. Left lower leg: No edema. Neurological:      General: No focal deficit present. Mental Status: She is alert.         ---------------------------------------------------------------------------    I spent 30 minutes with patient with the majority of the time dedicated to education, counseling, and/or care coordination.

## 2022-04-20 NOTE — ASSESSMENT & PLAN NOTE
Patient is doing well presently and declines referral to a psychiatrist at this time.   She eventually would like to get off of the Lamictal.

## 2022-04-20 NOTE — ASSESSMENT & PLAN NOTE
Repeat lipids and I advised atorvastatin if no improvement. Patient will consider but presently is reluctant. We discussed statin benefits, risks, side effects in detail.

## 2022-04-25 ENCOUNTER — TELEPHONE (OUTPATIENT)
Dept: INTERNAL MEDICINE | Age: 72
End: 2022-04-25

## 2022-04-25 NOTE — TELEPHONE ENCOUNTER
----- Message from Marielle Zamora sent at 2022  4:04 PM EDT -----  Subject: Appointment Request    Reason for Call: New Patient Request Appointment    QUESTIONS  Type of Appointment? New Patient/New to Provider  Reason for appointment request? No appointments available during search  Additional Information for Provider? pt would like to get established with   Valere Dieter, screened green   ---------------------------------------------------------------------------  --------------  CALL BACK INFO  What is the best way for the office to contact you? OK to leave message on   voicemail  Preferred Call Back Phone Number? [phone]  ---------------------------------------------------------------------------  --------------  SCRIPT ANSWERS  Relationship to Patient? Self  Specialty Confirmation? Primary Care  Is this the first appointment to establish care for a ? No  Have you been diagnosed with, awaiting test results for, or told that you   are suspected of having COVID-19 (Coronavirus)? (If patient has tested   negative or was tested as a requirement for work, school, or travel and   not based on symptoms, answer no)? No  Within the past 10 days have you developed any of the following symptoms   (answer no if symptoms have been present longer than 10 days or began   more than 10 days ago)? Fever or Chills, Cough, Shortness of breath or   difficulty breathing, Loss of taste or smell, Sore throat, Nasal   congestion, Sneezing or runny nose, Fatigue or generalized body aches   (answer no if pain is specific to a body part e.g. back pain), Diarrhea,   Headache? No  Have you had close contact with someone with COVID-19 in the last 7 days? No  (Service Expert  click yes below to proceed with [facility] As Usual   Scheduling)?  Yes

## 2022-04-26 ENCOUNTER — TELEPHONE (OUTPATIENT)
Dept: FAMILY MEDICINE CLINIC | Age: 72
End: 2022-04-26

## 2022-04-26 NOTE — TELEPHONE ENCOUNTER
----- Message from Do Mary sent at 4/26/2022  9:23 AM EDT -----  Subject: Message to Provider    QUESTIONS  Information for Provider? Pt would like to see Dr Indiana Veronica but she is not   accepting new pts would like to know if she could switch to her since dr Guerita Grant is retiring.   ---------------------------------------------------------------------------  --------------  [address]  What is the best way for the office to contact you? OK to leave message on   voicemail  Preferred Call Back Phone Number? [phone]  ---------------------------------------------------------------------------  --------------  SCRIPT ANSWERS  Relationship to Patient?  Self

## 2022-04-26 NOTE — TELEPHONE ENCOUNTER
Called patient back and notified Dr. Joey Deng not accepting new patients at this time. Offered her other possible PCP's. States she wants to look them up first and verify on her insurance plan. Will call this nurse back after reviewing.

## 2022-04-28 DIAGNOSIS — G89.29 CHRONIC PAIN OF LEFT KNEE: ICD-10-CM

## 2022-04-28 DIAGNOSIS — M25.562 CHRONIC PAIN OF LEFT KNEE: ICD-10-CM

## 2022-04-29 RX ORDER — NAPROXEN 500 MG/1
TABLET ORAL
Qty: 60 TABLET | Refills: 0 | Status: SHIPPED
Start: 2022-04-29 | End: 2022-06-20 | Stop reason: SDUPTHER

## 2022-05-06 ASSESSMENT — KOOS JR
STRAIGHTENING KNEE FULLY: 0
KOOS JR TOTAL INTERVAL SCORE: 70.704
TWISING OR PIVOTING ON KNEE: 1
RISING FROM SITTING: 1
HOW SEVERE IS YOUR KNEE STIFFNESS AFTER FIRST WAKING IN MORNING: 2
STANDING UPRIGHT: 0
BENDING TO THE FLOOR TO PICK UP OBJECT: 0
GOING UP OR DOWN STAIRS: 2

## 2022-05-06 ASSESSMENT — PROMIS GLOBAL HEALTH SCALE
IN GENERAL, HOW WOULD YOU RATE YOUR PHYSICAL HEALTH [ON A SCALE OF 1 (POOR) TO 5 (EXCELLENT)]?: 3
IN GENERAL, WOULD YOU SAY YOUR HEALTH IS...[ON A SCALE OF 1 (POOR) TO 5 (EXCELLENT)]: 4
WHO IS THE PERSON COMPLETING THE PROMIS V1.1 SURVEY?: 0
IN GENERAL, WOULD YOU SAY YOUR QUALITY OF LIFE IS...[ON A SCALE OF 1 (POOR) TO 5 (EXCELLENT)]: 4
IN THE PAST 7 DAYS, HOW WOULD YOU RATE YOUR FATIGUE ON AVERAGE [ON A SCALE FROM 1 (NONE) TO 5 (VERY SEVERE)]?: 3
IN THE PAST 7 DAYS, HOW OFTEN HAVE YOU BEEN BOTHERED BY EMOTIONAL PROBLEMS, SUCH AS FEELING ANXIOUS, DEPRESSED, OR IRRITABLE [ON A SCALE FROM 1 (NEVER) TO 5 (ALWAYS)]?: 2
IN GENERAL, PLEASE RATE HOW WELL YOU CARRY OUT YOUR USUAL SOCIAL ACTIVITIES (INCLUDES ACTIVITIES AT HOME, AT WORK, AND IN YOUR COMMUNITY, AND RESPONSIBILITIES AS A PARENT, CHILD, SPOUSE, EMPLOYEE, FRIEND, ETC) [ON A SCALE OF 1 (POOR) TO 5 (EXCELLENT)]?: 3
IN GENERAL, HOW WOULD YOU RATE YOUR SATISFACTION WITH YOUR SOCIAL ACTIVITIES AND RELATIONSHIPS [ON A SCALE OF 1 (POOR) TO 5 (EXCELLENT)]?: 4
HOW IS THE PROMIS V1.1 BEING ADMINISTERED?: 2
SUM OF RESPONSES TO QUESTIONS 2, 4, 5, & 10: 14
SUM OF RESPONSES TO QUESTIONS 3, 6, 7, & 8: 15
IN THE PAST 7 DAYS, HOW WOULD YOU RATE YOUR PAIN ON AVERAGE [ON A SCALE FROM 0 (NO PAIN) TO 10 (WORST IMAGINABLE PAIN)]?: 5
IN GENERAL, HOW WOULD YOU RATE YOUR MENTAL HEALTH, INCLUDING YOUR MOOD AND YOUR ABILITY TO THINK [ON A SCALE OF 1 (POOR) TO 5 (EXCELLENT)]?: 4
TO WHAT EXTENT ARE YOU ABLE TO CARRY OUT YOUR EVERYDAY PHYSICAL ACTIVITIES SUCH AS WALKING, CLIMBING STAIRS, CARRYING GROCERIES, OR MOVING A CHAIR [ON A SCALE OF 1 (NOT AT ALL) TO 5 (COMPLETELY)]?: 4

## 2022-06-19 DIAGNOSIS — G89.29 CHRONIC PAIN OF LEFT KNEE: ICD-10-CM

## 2022-06-19 DIAGNOSIS — M25.562 CHRONIC PAIN OF LEFT KNEE: ICD-10-CM

## 2022-06-20 DIAGNOSIS — G89.29 CHRONIC PAIN OF LEFT KNEE: ICD-10-CM

## 2022-06-20 DIAGNOSIS — M25.562 CHRONIC PAIN OF LEFT KNEE: ICD-10-CM

## 2022-06-20 RX ORDER — NAPROXEN 500 MG/1
TABLET ORAL
Qty: 60 TABLET | Refills: 0 | OUTPATIENT
Start: 2022-06-20

## 2022-06-20 NOTE — TELEPHONE ENCOUNTER
Last seen 4/19/2022  Next appt Visit date not found  Spoke with pt and she stated she is going to establish with Darcie Michel.

## 2022-06-21 RX ORDER — NAPROXEN 500 MG/1
TABLET ORAL
Qty: 60 TABLET | Refills: 0 | OUTPATIENT
Start: 2022-06-21

## 2022-06-24 RX ORDER — NAPROXEN 500 MG/1
TABLET ORAL
Qty: 180 TABLET | Refills: 0 | Status: SHIPPED
Start: 2022-06-24 | End: 2022-10-03

## 2022-09-19 SDOH — HEALTH STABILITY: PHYSICAL HEALTH: ON AVERAGE, HOW MANY MINUTES DO YOU ENGAGE IN EXERCISE AT THIS LEVEL?: 0 MIN

## 2022-09-19 SDOH — HEALTH STABILITY: PHYSICAL HEALTH: ON AVERAGE, HOW MANY DAYS PER WEEK DO YOU ENGAGE IN MODERATE TO STRENUOUS EXERCISE (LIKE A BRISK WALK)?: 0 DAYS

## 2022-09-19 ASSESSMENT — SOCIAL DETERMINANTS OF HEALTH (SDOH)
WITHIN THE LAST YEAR, HAVE YOU BEEN AFRAID OF YOUR PARTNER OR EX-PARTNER?: NO
WITHIN THE LAST YEAR, HAVE YOU BEEN KICKED, HIT, SLAPPED, OR OTHERWISE PHYSICALLY HURT BY YOUR PARTNER OR EX-PARTNER?: NO
WITHIN THE LAST YEAR, HAVE YOU BEEN HUMILIATED OR EMOTIONALLY ABUSED IN OTHER WAYS BY YOUR PARTNER OR EX-PARTNER?: NO
WITHIN THE LAST YEAR, HAVE TO BEEN RAPED OR FORCED TO HAVE ANY KIND OF SEXUAL ACTIVITY BY YOUR PARTNER OR EX-PARTNER?: NO

## 2022-09-21 ENCOUNTER — OFFICE VISIT (OUTPATIENT)
Dept: FAMILY MEDICINE CLINIC | Age: 72
End: 2022-09-21
Payer: MEDICARE

## 2022-09-21 VITALS
BODY MASS INDEX: 35.79 KG/M2 | OXYGEN SATURATION: 95 % | DIASTOLIC BLOOD PRESSURE: 78 MMHG | HEART RATE: 84 BPM | TEMPERATURE: 98 F | WEIGHT: 202 LBS | SYSTOLIC BLOOD PRESSURE: 120 MMHG | HEIGHT: 63 IN | RESPIRATION RATE: 17 BRPM

## 2022-09-21 DIAGNOSIS — R53.83 FATIGUE, UNSPECIFIED TYPE: ICD-10-CM

## 2022-09-21 DIAGNOSIS — M43.10 SPONDYLOLISTHESIS, GRADE 1: Chronic | ICD-10-CM

## 2022-09-21 DIAGNOSIS — E78.5 HYPERLIPIDEMIA, UNSPECIFIED HYPERLIPIDEMIA TYPE: ICD-10-CM

## 2022-09-21 DIAGNOSIS — R73.01 IFG (IMPAIRED FASTING GLUCOSE): ICD-10-CM

## 2022-09-21 DIAGNOSIS — M17.0 PRIMARY OSTEOARTHRITIS OF BOTH KNEES: Chronic | ICD-10-CM

## 2022-09-21 DIAGNOSIS — E66.8 MODERATE OBESITY: ICD-10-CM

## 2022-09-21 DIAGNOSIS — Z90.49 HISTORY OF CHOLECYSTECTOMY: ICD-10-CM

## 2022-09-21 DIAGNOSIS — I10 ESSENTIAL HYPERTENSION: Primary | Chronic | ICD-10-CM

## 2022-09-21 DIAGNOSIS — F31.9 BIPOLAR DEPRESSION (HCC): ICD-10-CM

## 2022-09-21 DIAGNOSIS — Z12.31 SCREENING MAMMOGRAM FOR BREAST CANCER: ICD-10-CM

## 2022-09-21 DIAGNOSIS — I10 ESSENTIAL HYPERTENSION: Chronic | ICD-10-CM

## 2022-09-21 LAB
ALBUMIN SERPL-MCNC: 4.2 G/DL (ref 3.5–5.2)
ALP BLD-CCNC: 67 U/L (ref 35–104)
ALT SERPL-CCNC: 23 U/L (ref 0–32)
ANION GAP SERPL CALCULATED.3IONS-SCNC: 11 MMOL/L (ref 7–16)
AST SERPL-CCNC: 31 U/L (ref 0–31)
BASOPHILS ABSOLUTE: 0.04 E9/L (ref 0–0.2)
BASOPHILS RELATIVE PERCENT: 0.6 % (ref 0–2)
BILIRUB SERPL-MCNC: 0.5 MG/DL (ref 0–1.2)
BUN BLDV-MCNC: 23 MG/DL (ref 6–23)
CALCIUM SERPL-MCNC: 9.8 MG/DL (ref 8.6–10.2)
CHLORIDE BLD-SCNC: 102 MMOL/L (ref 98–107)
CHOLESTEROL, TOTAL: 210 MG/DL (ref 0–199)
CO2: 27 MMOL/L (ref 22–29)
CREAT SERPL-MCNC: 0.8 MG/DL (ref 0.5–1)
EOSINOPHILS ABSOLUTE: 0.16 E9/L (ref 0.05–0.5)
EOSINOPHILS RELATIVE PERCENT: 2.3 % (ref 0–6)
GFR AFRICAN AMERICAN: >60
GFR NON-AFRICAN AMERICAN: >60 ML/MIN/1.73
GLUCOSE BLD-MCNC: 93 MG/DL (ref 74–99)
HBA1C MFR BLD: 5.5 % (ref 4–5.6)
HCT VFR BLD CALC: 43 % (ref 34–48)
HDLC SERPL-MCNC: 78 MG/DL
HEMOGLOBIN: 14 G/DL (ref 11.5–15.5)
IMMATURE GRANULOCYTES #: 0.01 E9/L
IMMATURE GRANULOCYTES %: 0.1 % (ref 0–5)
LDL CHOLESTEROL CALCULATED: 118 MG/DL (ref 0–99)
LYMPHOCYTES ABSOLUTE: 1.54 E9/L (ref 1.5–4)
LYMPHOCYTES RELATIVE PERCENT: 21.8 % (ref 20–42)
MCH RBC QN AUTO: 31.9 PG (ref 26–35)
MCHC RBC AUTO-ENTMCNC: 32.6 % (ref 32–34.5)
MCV RBC AUTO: 97.9 FL (ref 80–99.9)
MONOCYTES ABSOLUTE: 0.76 E9/L (ref 0.1–0.95)
MONOCYTES RELATIVE PERCENT: 10.8 % (ref 2–12)
NEUTROPHILS ABSOLUTE: 4.55 E9/L (ref 1.8–7.3)
NEUTROPHILS RELATIVE PERCENT: 64.4 % (ref 43–80)
PDW BLD-RTO: 12.8 FL (ref 11.5–15)
PLATELET # BLD: 300 E9/L (ref 130–450)
PMV BLD AUTO: 9.1 FL (ref 7–12)
POTASSIUM SERPL-SCNC: 4.2 MMOL/L (ref 3.5–5)
RBC # BLD: 4.39 E12/L (ref 3.5–5.5)
SODIUM BLD-SCNC: 140 MMOL/L (ref 132–146)
TOTAL PROTEIN: 7.1 G/DL (ref 6.4–8.3)
TRIGL SERPL-MCNC: 71 MG/DL (ref 0–149)
TSH SERPL DL<=0.05 MIU/L-ACNC: 1.45 UIU/ML (ref 0.27–4.2)
URIC ACID, SERUM: 5 MG/DL (ref 2.4–5.7)
VLDLC SERPL CALC-MCNC: 14 MG/DL
WBC # BLD: 7.1 E9/L (ref 4.5–11.5)

## 2022-09-21 PROCEDURE — 99203 OFFICE O/P NEW LOW 30 MIN: CPT | Performed by: FAMILY MEDICINE

## 2022-09-21 PROCEDURE — 1123F ACP DISCUSS/DSCN MKR DOCD: CPT | Performed by: FAMILY MEDICINE

## 2022-09-21 ASSESSMENT — ENCOUNTER SYMPTOMS
ORTHOPNEA: 0
EYE DISCHARGE: 0
FACIAL SWELLING: 0
SHORTNESS OF BREATH: 0
COUGH: 0
ABDOMINAL DISTENTION: 0
ABDOMINAL PAIN: 0
ALLERGIC/IMMUNOLOGIC NEGATIVE: 1
EYE REDNESS: 0
RESPIRATORY NEGATIVE: 1
PHOTOPHOBIA: 0
WHEEZING: 0
EYE ITCHING: 0
CHEST TIGHTNESS: 0
SINUS PAIN: 0
CHOKING: 0
VOMITING: 0
BLOOD IN STOOL: 0
VOICE CHANGE: 0
EYE PAIN: 0
NAUSEA: 0
APNEA: 0
RHINORRHEA: 0
GASTROINTESTINAL NEGATIVE: 1
BACK PAIN: 1
CONSTIPATION: 0
SORE THROAT: 0
STRIDOR: 0
COLOR CHANGE: 0
RECTAL PAIN: 0
DIARRHEA: 0
BLURRED VISION: 0
SINUS PRESSURE: 0
TROUBLE SWALLOWING: 0
ANAL BLEEDING: 0

## 2022-09-21 NOTE — PROGRESS NOTES
Zita Brewer is a 71 y.o. female. HPI/Chief C/O:  Chief Complaint   Patient presents with    New Patient     Pt here to establish. Last PCP Dr. Elana Bryan. No Known Allergies  This patient is here to establish care as a new patient in our office  We will review all past medical history and go over past and current medications   We will review all medical records that are available to us  We will reconcile our care planning and treatment goals to past and present for this patient         Hypertension  This is a chronic problem. The current episode started more than 1 year ago. The problem is controlled. Associated symptoms include anxiety and malaise/fatigue. Pertinent negatives include no blurred vision, chest pain, headaches, neck pain, orthopnea, palpitations, peripheral edema, PND, shortness of breath or sweats. Risk factors for coronary artery disease include obesity, post-menopausal state and stress. Past treatments include lifestyle changes and diuretics. The current treatment provides significant improvement. Compliance problems include diet, exercise and psychosocial issues. There is no history of angina, kidney disease, CAD/MI, CVA, heart failure, left ventricular hypertrophy, PVD or retinopathy. There is no history of chronic renal disease, coarctation of the aorta, hyperaldosteronism, hypercortisolism, hyperparathyroidism, a hypertension causing med, pheochromocytoma, renovascular disease, sleep apnea or a thyroid problem. ROS:  Review of Systems   Constitutional:  Positive for fatigue and malaise/fatigue. Negative for activity change, appetite change, chills, diaphoresis, fever and unexpected weight change. HENT: Negative. Negative for congestion, dental problem, drooling, ear discharge, ear pain, facial swelling, hearing loss, mouth sores, nosebleeds, postnasal drip, rhinorrhea, sinus pressure, sinus pain, sneezing, sore throat, tinnitus, trouble swallowing and voice change. Eyes:  Negative for blurred vision, photophobia, pain, discharge, redness, itching and visual disturbance. Respiratory: Negative. Negative for apnea, cough, choking, chest tightness, shortness of breath, wheezing and stridor. Cardiovascular: Negative. Negative for chest pain, palpitations, orthopnea, leg swelling and PND. Gastrointestinal: Negative. Negative for abdominal distention, abdominal pain, anal bleeding, blood in stool, constipation, diarrhea, nausea, rectal pain and vomiting. Endocrine: Negative. Negative for cold intolerance, heat intolerance, polydipsia, polyphagia and polyuria. Genitourinary: Negative. Negative for decreased urine volume, difficulty urinating, dysuria, enuresis, flank pain, frequency, genital sores, hematuria and urgency. Musculoskeletal:  Positive for back pain. Negative for arthralgias, gait problem, joint swelling, myalgias, neck pain and neck stiffness. Skin: Negative. Negative for color change, pallor, rash and wound. Allergic/Immunologic: Negative. Negative for environmental allergies, food allergies and immunocompromised state. Neurological: Negative. Negative for dizziness, tremors, seizures, syncope, facial asymmetry, speech difficulty, weakness, light-headedness, numbness and headaches. Hematological: Negative. Negative for adenopathy. Does not bruise/bleed easily. Psychiatric/Behavioral: Negative. Negative for agitation, behavioral problems, confusion, decreased concentration, dysphoric mood, hallucinations, self-injury, sleep disturbance and suicidal ideas. The patient is not nervous/anxious and is not hyperactive.        Past Medical/Surgical Hx;  Reviewed with patient      Diagnosis Date    Allergic rhinitis     Anxiety disorder     Bipolar disorder ([facility] [address]     Caffeine use 5/19/2015    Chronic low back pain 5/19/2015    Depression, major, in remission [facility])     multiple hospitalizations    Endocervical polyp 4/19/2012    GERD (gastroesophageal reflux disease)     Heart murmur, systolic     M2/5    History of bariatric surgery     gastric stapling    History of cervical dysplasia 2015    Hx of colonic polyp 3/18/2021    Colonoscopy 21 [place] no recurrent polyps repeat in 5 years    Hypertension          Insomnia 2015    Knee pain, right     severe OA per  Dr Tia Downing    Knee stiffness, left 09/15/2021    with pain    Kyphosis 2015    MVP (mitral valve prolapse)     Neuropathy     cervical spondylosis    Obesity     Osteoarthritis     Osteoarthritis of both knees     Osteoarthritis of hand 2015    Pap smear abnormality of cervix with LGSIL     Skin cancer     Spinal stenosis, lumbar 2013 MRI    disc degeneration and bulges with canal and foraminal stenoses     Past Surgical History:   Procedure Laterality Date    BARIATRIC SURGERY      stapling of stomach    BREAST SURGERY      reduction     SECTION      CHOLECYSTECTOMY      COLONOSCOPY  2010    donald one polyp    EYE SURGERY Bilateral 2018    cataract    FOOT SURGERY      FRACTURE SURGERY Bilateral     feet    KNEE SURGERY Left 2021    LEFT KNEE MANIPULATION WITH FLUOROSCOPY REMOVE CHECKPOINT performed by Kierra Maldonado MD at [address] Left 2021    LEFT TOTAL KNEE ARTHROPLASTY ROBOTIC ASSISTED performed by Kierra Maldonado MD at [facility] OR       Past Family Hx:  Reviewed with patient      Problem Relation Age of Onset    Heart Disease Mother     COPD Mother     Anxiety Disorder Mother     Asthma Mother     Other Mother         sarcoidosis    Diabetes Mother     Stroke Father     Obesity Father     Heart Disease Brother     Other Brother         suicide    Obesity Brother     Substance Abuse Brother     Substance Abuse Brother         hepatitis C    Mental Illness Brother     Cancer Paternal Uncle         throat    Diabetes Maternal Grandmother     Stroke Maternal Grandmother     Stroke Paternal Grandmother     Stroke Paternal Grandfather     Cancer Paternal Grandfather        Social Hx:  Reviewed with patient  Social History     Tobacco Use    Smoking status: Never    Smokeless tobacco: Never   Substance Use Topics    Alcohol use: Yes     Comment:  rarely       OBJECTIVE  /78   Pulse 84   Temp 98 °F (36.7 °C) (Temporal)   Resp 17   Ht 5' 3\" (1.6 m)   Wt 202 lb (91.6 kg)   SpO2 95%   Breastfeeding No   BMI 35.78 kg/m²     Problem List:  Geraldine Arreguin does not have any pertinent problems on file. PHYS EX:  Physical Exam  Vitals and nursing note reviewed. Constitutional:       General: She is not in acute distress. Appearance: Normal appearance. She is well-developed. She is obese. She is not ill-appearing, toxic-appearing or diaphoretic. HENT:      Head: Normocephalic and atraumatic. Right Ear: External ear normal.      Left Ear: External ear normal.      Nose: Nose normal. No congestion or rhinorrhea. Mouth/Throat:      Mouth: Mucous membranes are moist.      Pharynx: No oropharyngeal exudate or posterior oropharyngeal erythema. Eyes:      General: No scleral icterus. Right eye: No discharge. Left eye: No discharge. Extraocular Movements: Extraocular movements intact. Conjunctiva/sclera: Conjunctivae normal.      Pupils: Pupils are equal, round, and reactive to light. Neck:      Thyroid: No thyromegaly. Vascular: No carotid bruit or JVD. Trachea: No tracheal deviation. Cardiovascular:      Rate and Rhythm: Normal rate and regular rhythm. Pulses: Normal pulses. Heart sounds: Heart sounds not distant. Murmur heard. Systolic murmur is present with a grade of 1/6. No diastolic murmur is present. No friction rub. No gallop. No S3 or S4 sounds. Pulmonary:      Effort: Pulmonary effort is normal. No respiratory distress. Breath sounds: Normal breath sounds. No stridor.  No wheezing, rhonchi or rales. Chest:      Chest wall: No tenderness. Abdominal:      General: Bowel sounds are normal. There is no distension. Palpations: Abdomen is soft. There is no mass. Tenderness: There is no abdominal tenderness. There is no right CVA tenderness, left CVA tenderness, guarding or rebound. Hernia: No hernia is present. Musculoskeletal:         General: Tenderness (lumbar pain) present. No swelling, deformity or signs of injury. Normal range of motion. Cervical back: Normal range of motion and neck supple. No rigidity. No muscular tenderness. Right lower leg: No edema. Left lower leg: No edema. Lymphadenopathy:      Cervical: No cervical adenopathy. Skin:     General: Skin is warm. Coloration: Skin is not jaundiced or pale. Findings: No bruising, erythema, lesion or rash. Neurological:      General: No focal deficit present. Mental Status: She is alert and oriented to person, place, and time. Cranial Nerves: No cranial nerve deficit. Sensory: No sensory deficit. Motor: No weakness or abnormal muscle tone. Coordination: Coordination normal.      Gait: Gait normal.      Deep Tendon Reflexes: Reflexes are normal and symmetric. Reflexes normal.       ASSESSMENT/PLAN  Flora De Santiago was seen today for new patient. Diagnoses and all orders for this visit:    Essential hypertension ---controlled   --patient is instructed on low to moderate sodium ( 2 to 2.5 grams ), daily    Also to increase potassium in the diet to about 3.5 grams daily    Literature is provided     -     Comprehensive Metabolic Panel; Future  -     CBC with Auto Differential; Future    Screening mammogram for breast cancer  -     CHIP DIGITAL SCREEN W OR WO CAD BILATERAL; Future  -     Comprehensive Metabolic Panel; Future  -     CBC with Auto Differential; Future    Spondylolisthesis L5 on S1, grade 1  -     Comprehensive Metabolic Panel;  Future  -     CBC with Auto Differential; Future  --PLAN--inject right and left PSIS x 2                1/2 cc xylocaine plus 1 cc depo medrol                Omt/ultra--Rx        Primary osteoarthritis of both knees  -     Comprehensive Metabolic Panel; Future  -     CBC with Auto Differential; Future  --OMT / ultra --Rx    Hyperlipidemia, unspecified hyperlipidemia type  -     Comprehensive Metabolic Panel; Future  -     Lipid Panel; Future  -     CBC with Auto Differential; Future  --Mediterranean diet, exercise, weight loss, vitamins    We have a long talk on cholesterol and importance of lowering it       Bipolar depression ([facility] [address]  -     Comprehensive Metabolic Panel; Future  -     CBC with Auto Differential; Future  Counseling is given for anxiety and depression   All questions were taken and answers are given        Moderate obesity  -     Comprehensive Metabolic Panel; Future  -     CBC with Auto Differential; Future  --talk diet and weight loss    IFG (impaired fasting glucose)  -     Comprehensive Metabolic Panel; Future  -     CBC with Auto Differential; Future  -     Hemoglobin A1C; Future    Fatigue, unspecified type  -     TSH; Future  -     Uric Acid; Future  -     Comprehensive Metabolic Panel;  Future  -     CBC with Auto Differential; Future    History of cholecystectomy  --stable on current care planning  -- continue treatment as we are meeting goals         Outpatient Encounter Medications as of 9/21/2022   Medication Sig Dispense Refill    naproxen (NAPROSYN) 500 MG tablet TAKE 1 TABLET BY MOUTH TWICE A DAY AS NEEDED FOR PAIN 180 tablet 0    hydroCHLOROthiazide (HYDRODIURIL) 25 MG tablet TAKE 1 TABLET BY MOUTH EVERY DAY 90 tablet 1    Ascorbic Acid (VITAMIN C) 1000 MG tablet Take 1,000 mg by mouth daily       famotidine (PEPCID) 20 MG tablet Take 20 mg by mouth nightly       Multiple Vitamin (MULTI-VITAMIN DAILY PO) Take by mouth       Vitamin D (CHOLECALCIFEROL) 25 MCG (1000 UT) TABS tablet Take 1,000 Units by mouth daily Vitamin D3      [DISCONTINUED] lamoTRIgine (LAMICTAL) 25 MG tablet TAKE 2 TABLETS BY MOUTH EVERY  tablet 1     No facility-administered encounter medications on file as of 9/21/2022. No follow-ups on file.         Reviewed recent labs related to [facility] current problems      Discussed importance of regular Health Maintenance follow up  Health Maintenance   Topic    COVID-19 Vaccine (1)    Shingles vaccine (1 of 2)    Pneumococcal 65+ years Vaccine (1 - PCV)    Breast cancer screen     Flu vaccine (1)    Annual Wellness Visit (AWV)     Depression Monitoring     DTaP/Tdap/Td vaccine (2 - Td or Tdap)    Colorectal Cancer Screen     Lipids     DEXA (modify frequency per FRAX score)     Hepatitis A vaccine     Hepatitis B vaccine     Hib vaccine     Meningococcal (ACWY) vaccine     Hepatitis C screen

## 2022-10-01 DIAGNOSIS — M25.562 CHRONIC PAIN OF LEFT KNEE: ICD-10-CM

## 2022-10-01 DIAGNOSIS — G89.29 CHRONIC PAIN OF LEFT KNEE: ICD-10-CM

## 2022-10-03 RX ORDER — NAPROXEN 500 MG/1
TABLET ORAL
Qty: 180 TABLET | Refills: 0 | Status: SHIPPED | OUTPATIENT
Start: 2022-10-03

## 2022-12-12 DIAGNOSIS — I10 ESSENTIAL HYPERTENSION: ICD-10-CM

## 2022-12-12 NOTE — TELEPHONE ENCOUNTER
Last seen 4/19/2022  Next appt Visit date not found    This MA called and left message for pt to call the office to schedule an appointment for medication refills.      Electronically signed by Jihan Scott MA on 12/12/22 at 9:02 AM EST

## 2022-12-13 RX ORDER — HYDROCHLOROTHIAZIDE 25 MG/1
TABLET ORAL
Qty: 90 TABLET | Refills: 1 | OUTPATIENT
Start: 2022-12-13

## 2022-12-13 NOTE — TELEPHONE ENCOUNTER
2nd attempt, This MA called and left message for pt to call the office and schedule an appointment for medication refills.      Electronically signed by Jagruti Lizarraga MA on 12/13/22 at 12:48 PM EST

## 2022-12-30 DIAGNOSIS — G89.29 CHRONIC PAIN OF LEFT KNEE: ICD-10-CM

## 2022-12-30 DIAGNOSIS — M25.562 CHRONIC PAIN OF LEFT KNEE: ICD-10-CM

## 2022-12-30 NOTE — TELEPHONE ENCOUNTER
Last seen 4/19/2022  Next appt Visit date not found    Attempted to contact pt, no answer, left detailed vm.      Electronically signed by Femi Rivera on 12/30/22 at 2:31 PM EST

## 2023-01-06 RX ORDER — NAPROXEN 500 MG/1
TABLET ORAL
Qty: 180 TABLET | Refills: 0 | Status: SHIPPED | OUTPATIENT
Start: 2023-01-06

## 2023-01-17 ENCOUNTER — OFFICE VISIT (OUTPATIENT)
Dept: FAMILY MEDICINE CLINIC | Age: 73
End: 2023-01-17
Payer: MEDICARE

## 2023-01-17 VITALS
SYSTOLIC BLOOD PRESSURE: 122 MMHG | BODY MASS INDEX: 35.61 KG/M2 | RESPIRATION RATE: 18 BRPM | TEMPERATURE: 97.9 F | HEIGHT: 63 IN | OXYGEN SATURATION: 98 % | HEART RATE: 75 BPM | WEIGHT: 201 LBS | DIASTOLIC BLOOD PRESSURE: 72 MMHG

## 2023-01-17 DIAGNOSIS — Z00.00 MEDICARE ANNUAL WELLNESS VISIT, SUBSEQUENT: ICD-10-CM

## 2023-01-17 DIAGNOSIS — Z00.00 ENCOUNTER FOR MEDICARE ANNUAL WELLNESS EXAM: Primary | ICD-10-CM

## 2023-01-17 DIAGNOSIS — R53.83 OTHER FATIGUE: ICD-10-CM

## 2023-01-17 DIAGNOSIS — E78.00 PURE HYPERCHOLESTEROLEMIA: ICD-10-CM

## 2023-01-17 DIAGNOSIS — R73.03 PREDIABETES: ICD-10-CM

## 2023-01-17 PROCEDURE — G0439 PPPS, SUBSEQ VISIT: HCPCS | Performed by: FAMILY MEDICINE

## 2023-01-17 PROCEDURE — 3074F SYST BP LT 130 MM HG: CPT | Performed by: FAMILY MEDICINE

## 2023-01-17 PROCEDURE — 1123F ACP DISCUSS/DSCN MKR DOCD: CPT | Performed by: FAMILY MEDICINE

## 2023-01-17 PROCEDURE — 3078F DIAST BP <80 MM HG: CPT | Performed by: FAMILY MEDICINE

## 2023-01-17 ASSESSMENT — PATIENT HEALTH QUESTIONNAIRE - PHQ9
SUM OF ALL RESPONSES TO PHQ9 QUESTIONS 1 & 2: 0
4. FEELING TIRED OR HAVING LITTLE ENERGY: 0
8. MOVING OR SPEAKING SO SLOWLY THAT OTHER PEOPLE COULD HAVE NOTICED. OR THE OPPOSITE, BEING SO FIGETY OR RESTLESS THAT YOU HAVE BEEN MOVING AROUND A LOT MORE THAN USUAL: 0
9. THOUGHTS THAT YOU WOULD BE BETTER OFF DEAD, OR OF HURTING YOURSELF: 0
1. LITTLE INTEREST OR PLEASURE IN DOING THINGS: 0
5. POOR APPETITE OR OVEREATING: 0
SUM OF ALL RESPONSES TO PHQ QUESTIONS 1-9: 0
3. TROUBLE FALLING OR STAYING ASLEEP: 0
2. FEELING DOWN, DEPRESSED OR HOPELESS: 0
6. FEELING BAD ABOUT YOURSELF - OR THAT YOU ARE A FAILURE OR HAVE LET YOURSELF OR YOUR FAMILY DOWN: 0
SUM OF ALL RESPONSES TO PHQ QUESTIONS 1-9: 0
10. IF YOU CHECKED OFF ANY PROBLEMS, HOW DIFFICULT HAVE THESE PROBLEMS MADE IT FOR YOU TO DO YOUR WORK, TAKE CARE OF THINGS AT HOME, OR GET ALONG WITH OTHER PEOPLE: 0
7. TROUBLE CONCENTRATING ON THINGS, SUCH AS READING THE NEWSPAPER OR WATCHING TELEVISION: 0
SUM OF ALL RESPONSES TO PHQ QUESTIONS 1-9: 0
SUM OF ALL RESPONSES TO PHQ QUESTIONS 1-9: 0

## 2023-01-17 ASSESSMENT — LIFESTYLE VARIABLES
HOW OFTEN DO YOU HAVE A DRINK CONTAINING ALCOHOL: 2-4 TIMES A MONTH
HOW MANY STANDARD DRINKS CONTAINING ALCOHOL DO YOU HAVE ON A TYPICAL DAY: 1 OR 2

## 2023-01-17 NOTE — PROGRESS NOTES
The 10-year ASCVD risk score (Manan ARGUELLO, et al., 2019) is: 12.3%    Values used to calculate the score:      Age: 71 years      Sex: Female      Is Non- : No      Diabetic: No      Tobacco smoker: No      Systolic Blood Pressure: 843 mmHg      Is BP treated: Yes      HDL Cholesterol: 78 mg/dL      Total Cholesterol: 210 mg/dL Medicare Annual Wellness Visit    Tammy Osman is here for Medicare AWV (AWV) and Discuss Medications (Pt here to follow up on stopping HCTZ)    Assessment & Plan   Encounter for Medicare annual wellness exam  -     Comprehensive Metabolic Panel; Future  -     CBC with Auto Differential; Future  Pure hypercholesterolemia  -     Comprehensive Metabolic Panel; Future  -     Lipid Panel; Future  -     CBC with Auto Differential; Future  Prediabetes  -     Comprehensive Metabolic Panel; Future  -     CBC with Auto Differential; Future  -     Hemoglobin A1C; Future  Other fatigue  -     TSH; Future  -     Comprehensive Metabolic Panel; Future  -     CBC with Auto Differential; Future    Recommendations for Preventive Services Due: see orders and patient instructions/AVS.  Recommended screening schedule for the next 5-10 years is provided to the patient in written form: see Patient Instructions/AVS.     No follow-ups on file. Subjective   The following acute and/or chronic problems were also addressed today:    Patient's complete Health Risk Assessment and screening values have been reviewed and are found in Flowsheets. The following problems were reviewed today and where indicated follow up appointments were made and/or referrals ordered.     Positive Risk Factor Screenings with Interventions:                 Weight and Activity:  Physical Activity: Insufficiently Active    Days of Exercise per Week: 1 day    Minutes of Exercise per Session: 30 min     On average, how many days per week do you engage in moderate to strenuous exercise (like a brisk walk)?: 1 day  Have you lost any weight without trying in the past 3 months?: No  Body mass index: (!) 35.6  Obesity Interventions:  Patient declines any further evaluation or treatment    Obesity Counseling: Patient was asked about her current diet and exercise habits, and personalized advice was provided regarding recommended lifestyle changes. Patient's comorbid health conditions associated with elevated BMI were discussed, as well as the likely benefits of weight loss. Based upon patient's motivation to change her behavior, the following plan was agreed upon to work toward a weight loss goal of 30 pounds: lower carbohydrate diet. Educational materials for weight loss were provided. Patient will follow-up in 6 month(s) with PCP. Provider spent 30  minutes counseling patient. Dentist Screen:  Have you seen the dentist within the past year?: (!) No    Intervention:  No acute issues      Vision Screen:  Do you have difficulty driving, watching TV, or doing any of your daily activities because of your eyesight?: No  Have you had an eye exam within the past year?: (!) No  No results found. Interventions:    No acute issues         CV Risk Counseling:  Patient was asked about her current diet and exercise habits, and personalized advice was provided regarding recommended lifestyle changes. Patient's individual cardiovascular disease risk factors, including advanced age (> 54 for men, > 72 for women), dyslipidemia, and obesity (BMI >= 30), were discussed, as well as the likely benefits of lifestyle changes. Based upon patient's motivation to change her behavior, the following plan was agreed upon to work toward lowering cardiovascular disease risk: Mediterranean diet. Aspirin use for primary prevention of cardiovascular disease for men 45-79 and women 55-79: Not indicated. Educational materials for lifestyle changes were provided. Patient will follow-up in 6 month(s) with PCP.  Provider spent 30  minutes counseling patient. Objective   Vitals:    01/17/23 1043   BP: 122/72   Pulse: 75   Resp: 18   Temp: 97.9 °F (36.6 °C)   TempSrc: Temporal   SpO2: 98%   Weight: 201 lb (91.2 kg)   Height: 5' 3\" (1.6 m)      Body mass index is 35.61 kg/m². General Appearance: alert and oriented to person, place and time, well developed and well- nourished, in no acute distress  Skin: warm and dry, no rash or erythema  Head: normocephalic and atraumatic  Eyes: pupils equal, round, and reactive to light, extraocular eye movements intact, conjunctivae normal  ENT: tympanic membrane, external ear and ear canal normal bilaterally, nose without deformity, nasal mucosa and turbinates normal without polyps  Neck: supple and non-tender without mass, no thyromegaly or thyroid nodules, no cervical lymphadenopathy  Pulmonary/Chest: clear to auscultation bilaterally- no wheezes, rales or rhonchi, normal air movement, no respiratory distress  Cardiovascular: normal rate, regular rhythm, normal S1 and S2, Gr I / VI systolic murmur , rubs, clicks, or gallops, distal pulses intact, no carotid bruits  Abdomen: soft, non-tender, non-distended, normal bowel sounds, no masses or organomegaly  Breast: appear normal, no suspicious masses, no skin or nipple changes or axillary nodes  Extremities: no cyanosis, clubbing or edema  Musculoskeletal: normal range of motion, no joint swelling, deformity or tenderness  Neurologic: reflexes normal and symmetric, no cranial nerve deficit, gait, coordination and speech normal       No Known Allergies  Prior to Visit Medications    Medication Sig Taking?  Authorizing Provider   naproxen (NAPROSYN) 500 MG tablet TAKE 1 TABLET BY MOUTH TWICE A DAY AS NEEDED FOR PAIN Yes Edmund Rivas,    Ascorbic Acid (VITAMIN C) 1000 MG tablet Take 1,000 mg by mouth daily  Yes Historical Provider, MD   famotidine (PEPCID) 20 MG tablet Take 20 mg by mouth nightly  Yes Historical Provider, MD   Multiple Vitamin (MULTI-VITAMIN DAILY PO) Take by mouth  Yes Historical Provider, MD   Vitamin D (CHOLECALCIFEROL) 25 MCG (1000 UT) TABS tablet Take 1,000 Units by mouth daily Vitamin D3 Yes Historical Provider, MD   hydroCHLOROthiazide (HYDRODIURIL) 25 MG tablet TAKE 1 TABLET BY MOUTH EVERY DAY  Patient not taking: Reported on 1/17/2023  Vincent Johnson MD       [facility] (Including outside providers/suppliers regularly involved in providing care):   Patient Care Team:  Edmund Rivas DO as PCP - General (Family Medicine)  Edmund Rivas DO as PCP - [facility] Empaneled Provider     Reviewed and updated this visit:  Tobacco  Allergies  Meds  Problems  Med Hx  Surg Hx  Soc Hx  Fam Hx

## 2023-01-17 NOTE — PATIENT INSTRUCTIONS
Learning About Dental Care for Older Adults  Dental care for older adults: Overview  Dental care for older people is much the same as for younger adults. But older adults do have concerns that younger adults do not. Older adults may have problems with gum disease and decay on the roots of their teeth. They may need missing teeth replaced or broken fillings fixed. Or they may have dentures that need to be cared for. Some older adults may have trouble holding a toothbrush. You can help remind the person you are caring for to brush and floss their teeth or to clean their dentures. In some cases, you may need to do the brushing and other dental care tasks. People who have trouble using their hands or who have dementia may need this extra help. How can you help with dental care? Normal dental care  To keep the teeth and gums healthy:  Brush the teeth with fluoride toothpaste twice a day--in the morning and at night--and floss at least once a day. Plaque can quickly build up on the teeth of older adults. Watch for the signs of gum disease. These signs include gums that bleed after brushing or after eating hard foods, such as apples. See a dentist regularly. Many experts recommend checkups every 6 months. Keep the dentist up to date on any new medications the person is taking. Encourage a balanced diet that includes whole grains, vegetables, and fruits, and that is low in saturated fat and sodium. Encourage the person you're caring for not to use tobacco products. They can affect dental and general health. Many older adults have a fixed income and feel that they can't afford dental care. But most [facility] and [facility] have programs in which dentists help older adults by lowering fees. Contact your area's public health offices or  for information about dental care in your area.   Using a toothbrush  Older adults with arthritis sometimes have trouble brushing their teeth because they can't easily hold the toothbrush. Their hands and fingers may be stiff, painful, or weak. If this is the case, you can: Offer an electric toothbrush. Enlarge the handle of a non-electric toothbrush by wrapping a sponge, an elastic bandage, or adhesive tape around it. Push the toothbrush handle through a ball made of rubber or soft foam.  Make the handle longer and thicker by taping Popsicle sticks or tongue depressors to it. You may also be able to buy special toothbrushes, toothpaste dispensers, and floss holders. Your doctor may recommend a soft-bristle toothbrush if the person you care for bleeds easily. Bleeding can happen because of a health problem or from certain medicines. A toothpaste for sensitive teeth may help if the person you care for has sensitive teeth. How do you brush and floss someone's teeth? If the person you are caring for has a hard time cleaning their teeth on their own, you may need to brush and floss their teeth for them. It may be easiest to have the person sit and face away from you, and to sit or stand behind them. That way you can steady their head against your arm as you reach around to floss and brush their teeth. Choose a place that has good lighting and is comfortable for both of you. Before you begin, gather your supplies. You will need gloves, floss, a toothbrush, and a container to hold water if you are not near a sink. Wash and dry your hands well and put on gloves. Start by flossing:  Gently work a piece of floss between each of the teeth toward the gums. A plastic flossing tool may make this easier, and they are available at most [facility]. Curve the floss around each tooth into a U-shape and gently slide it under the gum line. Move the floss firmly up and down several times to scrape off the plaque. After you've finished flossing, throw away the used floss and begin brushing:  Wet the brush and apply toothpaste. Place the brush at a 45-degree angle where the teeth meet the gums. Press firmly, and move the brush in small circles over the surface of the teeth. Be careful not to brush too hard. Vigorous brushing can make the gums pull away from the teeth and can scratch the tooth enamel. Brush all surfaces of the teeth, on the tongue side and on the cheek side. Pay special attention to the front teeth and all surfaces of the back teeth. Brush chewing surfaces with short back-and-forth strokes. After you've finished, help the person rinse the remaining toothpaste from their mouth. Where can you learn more? Go to http://www.woods.com/ and enter F944 to learn more about \"Learning About Dental Care for Older Adults. \"  Current as of: June 16, 2022               Content Version: 13.5  © 2006-2022 Healthwise, [employer] Care instructions adapted under license by [facility] [place] ([facility]). If you have questions about a medical condition or this instruction, always ask your healthcare professional. [place] [address] any warranty or liability for your use of this information. Learning About Vision Tests  What are vision tests? The four most common vision tests are visual acuity tests, refraction, visual field tests, and color vision tests. Visual acuity (sharpness) tests  These tests are used: To see if you need glasses or contact lenses. To monitor an eye problem. To check an eye injury. Visual acuity tests are done as part of routine exams. You may also have this test when you get your 's license or apply for some types of jobs. Visual field tests  These tests are used: To check for vision loss in any area of your range of vision. To screen for certain eye diseases. To look for nerve damage after a stroke, head injury, or other problem that could reduce blood flow to the brain. Refraction and color tests  A refraction test is done to find the right prescription for glasses and contact lenses.   A color vision test is done to check for color blindness. Color vision is often tested as part of a routine exam. You may also have this test when you apply for a job where recognizing different colors is important, such as , electronics, or the [employer]. How are vision tests done? Visual acuity test   You cover one eye at a time. You read aloud from a wall chart across the room. You read aloud from a small card that you hold in your hand. Refraction   You look into a special device. The device puts lenses of different strengths in front of each eye to see how strong your glasses or contact lenses need to be. Visual field tests   Your doctor may have you look through special machines. Or your doctor may simply have you stare straight ahead while they move a finger into and out of your field of vision. Color vision test   You look at pieces of printed test patterns in various colors. You say what number or symbol you see. Your doctor may have you trace the number or symbol using a pointer. How do these tests feel? There is very little chance of having a problem from this test. If dilating drops are used for a vision test, they may make the eyes sting and cause a medicine taste in the mouth. Follow-up care is a key part of your treatment and safety. Be sure to make and go to all appointments, and call your doctor if you are having problems. It's also a good idea to know your test results and keep a list of the medicines you take. Where can you learn more? Go to http://www.brown.com/ and enter G551 to learn more about \"Learning About Vision Tests. \"  Current as of: October 12, 2022               Content Version: 13.5  © 1265-4529 Healthwise, [employer]. Care instructions adapted under license by [facility] [address] If you have questions about a medical condition or this instruction, always ask your healthcare professional. [place] [address] any warranty or liability for your use of this information. Advance Directives: Care Instructions  Overview  An advance directive is a legal way to state your wishes at the end of your life. It tells your family and your doctor what to do if you can't say what you want. There are two main types of advance directives. You can change them any time your wishes change. Living will. This form tells your family and your doctor your wishes about life support and other treatment. The form is also called a declaration. Medical power of . This form lets you name a person to make treatment decisions for you when you can't speak for yourself. This person is called a health care agent (health care proxy, health care surrogate). The form is also called a durable power of  for health care. If you do not have an advance directive, decisions about your medical care may be made by a family member, or by a doctor or a  who doesn't know you. It may help to think of an advance directive as a gift to the people who care for you. If you have one, they won't have to make tough decisions by themselves. For more information, including forms for your state, see the [address] website (www.caringinfo.org/planning/advance-directives/). Follow-up care is a key part of your treatment and safety. Be sure to make and go to all appointments, and call your doctor if you are having problems. It's also a good idea to know your test results and keep a list of the medicines you take. What should you include in an advance directive? Many states have a unique advance directive form. (It may ask you to address specific issues.) Or you might use a universal form that's approved by many states. If your form doesn't tell you what to address, it may be hard to know what to include in your advance directive. Use the questions below to help you get started. Who do you want to make decisions about your medical care if you are not able to?   What life-support measures do you want if you have a serious illness that gets worse over time or can't be cured? What are you most afraid of that might happen? (Maybe you're afraid of having pain, losing your independence, or being kept alive by machines.)  Where would you prefer to die? (Your home? A hospital? A nursing home?)  Do you want to donate your organs when you die? Do you want certain Caodaism practices performed before you die? When should you call for help? Be sure to contact your doctor if you have any questions. Where can you learn more? Go to http://www.brown.com/ and enter R264 to learn more about \"Advance Directives: Care Instructions. \"  Current as of: June 16, 2022               Content Version: 13.5  © 4773-6389 Healthwise, Incorporated. Care instructions adapted under license by [facility] ([facility]). If you have questions about a medical condition or this instruction, always ask your healthcare professional. [place] [address] any warranty or liability for your use of this information. Personalized Preventive Plan for Jessie Hollis - 1/17/2023  Medicare offers a range of preventive health benefits. Some of the tests and screenings are paid in full while other may be subject to a deductible, co-insurance, and/or copay. Some of these benefits include a comprehensive review of your medical history including lifestyle, illnesses that may run in your family, and various assessments and screenings as appropriate. After reviewing your medical record and screening and assessments performed today your provider may have ordered immunizations, labs, imaging, and/or referrals for you. A list of these orders (if applicable) as well as your Preventive Care list are included within your After Visit Summary for your review.     Other Preventive Recommendations:    A preventive eye exam performed by an eye specialist is recommended every 1-2 years to screen for glaucoma; cataracts, macular degeneration, and other eye disorders. A preventive dental visit is recommended every 6 months. Try to get at least 150 minutes of exercise per week or 10,000 steps per day on a pedometer . Order or download the FREE \"Exercise & Physical Activity: Your Everyday Guide\" from The "[employer] Data on Aging. Call 9-942.857.6045 or search The "[employer] Data on Aging online. You need 1647-5558 mg of calcium and 6034-3616 IU of vitamin D per day. It is possible to meet your calcium requirement with diet alone, but a vitamin D supplement is usually necessary to meet this goal.  When exposed to the sun, use a sunscreen that protects against both UVA and UVB radiation with an SPF of 30 or greater. Reapply every 2 to 3 hours or after sweating, drying off with a towel, or swimming. Always wear a seat belt when traveling in a car. Always wear a helmet when riding a bicycle or motorcycle.

## 2024-01-26 ENCOUNTER — TELEPHONE (OUTPATIENT)
Dept: FAMILY MEDICINE CLINIC | Age: 74
End: 2024-01-26

## 2024-01-26 ENCOUNTER — OFFICE VISIT (OUTPATIENT)
Dept: FAMILY MEDICINE CLINIC | Age: 74
End: 2024-01-26
Payer: MEDICARE

## 2024-01-26 VITALS
WEIGHT: 203.6 LBS | DIASTOLIC BLOOD PRESSURE: 78 MMHG | HEIGHT: 63 IN | HEART RATE: 87 BPM | RESPIRATION RATE: 18 BRPM | TEMPERATURE: 97.9 F | OXYGEN SATURATION: 96 % | SYSTOLIC BLOOD PRESSURE: 134 MMHG | BODY MASS INDEX: 36.07 KG/M2

## 2024-01-26 DIAGNOSIS — M51.26 LUMBAR DISC HERNIATION: Chronic | ICD-10-CM

## 2024-01-26 DIAGNOSIS — Z53.20 REFUSAL OF TREATMENT: Primary | ICD-10-CM

## 2024-01-26 DIAGNOSIS — E78.5 DYSLIPIDEMIA: ICD-10-CM

## 2024-01-26 DIAGNOSIS — M54.50 LUMBAR PAIN: ICD-10-CM

## 2024-01-26 DIAGNOSIS — R73.03 PREDIABETES: ICD-10-CM

## 2024-01-26 DIAGNOSIS — R53.83 OTHER FATIGUE: ICD-10-CM

## 2024-01-26 DIAGNOSIS — M25.551 PAIN OF RIGHT HIP: ICD-10-CM

## 2024-01-26 DIAGNOSIS — Z12.31 SCREENING MAMMOGRAM FOR BREAST CANCER: ICD-10-CM

## 2024-01-26 DIAGNOSIS — F31.9 BIPOLAR DEPRESSION (HCC): ICD-10-CM

## 2024-01-26 DIAGNOSIS — M43.10 SPONDYLOLISTHESIS, GRADE 1: Primary | Chronic | ICD-10-CM

## 2024-01-26 PROCEDURE — 99213 OFFICE O/P EST LOW 20 MIN: CPT | Performed by: FAMILY MEDICINE

## 2024-01-26 PROCEDURE — 3075F SYST BP GE 130 - 139MM HG: CPT | Performed by: FAMILY MEDICINE

## 2024-01-26 PROCEDURE — 3078F DIAST BP <80 MM HG: CPT | Performed by: FAMILY MEDICINE

## 2024-01-26 PROCEDURE — 1123F ACP DISCUSS/DSCN MKR DOCD: CPT | Performed by: FAMILY MEDICINE

## 2024-01-26 RX ORDER — COVID-19 ANTIGEN TEST
220 KIT MISCELLANEOUS DAILY
COMMUNITY

## 2024-01-26 RX ORDER — BACLOFEN 10 MG/1
10 TABLET ORAL 3 TIMES DAILY
Qty: 90 TABLET | Refills: 0 | Status: SHIPPED | OUTPATIENT
Start: 2024-01-26

## 2024-01-26 SDOH — ECONOMIC STABILITY: FOOD INSECURITY: WITHIN THE PAST 12 MONTHS, THE FOOD YOU BOUGHT JUST DIDN'T LAST AND YOU DIDN'T HAVE MONEY TO GET MORE.: NEVER TRUE

## 2024-01-26 SDOH — ECONOMIC STABILITY: FOOD INSECURITY: WITHIN THE PAST 12 MONTHS, YOU WORRIED THAT YOUR FOOD WOULD RUN OUT BEFORE YOU GOT MONEY TO BUY MORE.: NEVER TRUE

## 2024-01-26 SDOH — ECONOMIC STABILITY: INCOME INSECURITY: HOW HARD IS IT FOR YOU TO PAY FOR THE VERY BASICS LIKE FOOD, HOUSING, MEDICAL CARE, AND HEATING?: NOT HARD AT ALL

## 2024-01-26 SDOH — ECONOMIC STABILITY: HOUSING INSECURITY
IN THE LAST 12 MONTHS, WAS THERE A TIME WHEN YOU DID NOT HAVE A STEADY PLACE TO SLEEP OR SLEPT IN A SHELTER (INCLUDING NOW)?: NO

## 2024-01-26 ASSESSMENT — PATIENT HEALTH QUESTIONNAIRE - PHQ9
7. TROUBLE CONCENTRATING ON THINGS, SUCH AS READING THE NEWSPAPER OR WATCHING TELEVISION: 0
SUM OF ALL RESPONSES TO PHQ QUESTIONS 1-9: 0
2. FEELING DOWN, DEPRESSED OR HOPELESS: 0
SUM OF ALL RESPONSES TO PHQ QUESTIONS 1-9: 0
5. POOR APPETITE OR OVEREATING: 0
SUM OF ALL RESPONSES TO PHQ QUESTIONS 1-9: 0
8. MOVING OR SPEAKING SO SLOWLY THAT OTHER PEOPLE COULD HAVE NOTICED. OR THE OPPOSITE, BEING SO FIGETY OR RESTLESS THAT YOU HAVE BEEN MOVING AROUND A LOT MORE THAN USUAL: 0
1. LITTLE INTEREST OR PLEASURE IN DOING THINGS: 0
3. TROUBLE FALLING OR STAYING ASLEEP: 0
SUM OF ALL RESPONSES TO PHQ9 QUESTIONS 1 & 2: 0
10. IF YOU CHECKED OFF ANY PROBLEMS, HOW DIFFICULT HAVE THESE PROBLEMS MADE IT FOR YOU TO DO YOUR WORK, TAKE CARE OF THINGS AT HOME, OR GET ALONG WITH OTHER PEOPLE: 0
4. FEELING TIRED OR HAVING LITTLE ENERGY: 0
SUM OF ALL RESPONSES TO PHQ QUESTIONS 1-9: 0
6. FEELING BAD ABOUT YOURSELF - OR THAT YOU ARE A FAILURE OR HAVE LET YOURSELF OR YOUR FAMILY DOWN: 0
SUM OF ALL RESPONSES TO PHQ9 QUESTIONS 1 & 2: 0
9. THOUGHTS THAT YOU WOULD BE BETTER OFF DEAD, OR OF HURTING YOURSELF: 0
1. LITTLE INTEREST OR PLEASURE IN DOING THINGS: 0
SUM OF ALL RESPONSES TO PHQ QUESTIONS 1-9: 0
2. FEELING DOWN, DEPRESSED OR HOPELESS: 0
SUM OF ALL RESPONSES TO PHQ QUESTIONS 1-9: 0

## 2024-01-26 ASSESSMENT — ENCOUNTER SYMPTOMS
RHINORRHEA: 0
EYE PAIN: 0
ANAL BLEEDING: 0
BACK PAIN: 1
CHEST TIGHTNESS: 0
CHOKING: 0
RECTAL PAIN: 0
NAUSEA: 0
SORE THROAT: 0
VOMITING: 0
ORTHOPNEA: 0
SINUS PRESSURE: 0
BLOOD IN STOOL: 0
GASTROINTESTINAL NEGATIVE: 1
EYE ITCHING: 0
CONSTIPATION: 0
EYE REDNESS: 0
FACIAL SWELLING: 0
RESPIRATORY NEGATIVE: 1
VOICE CHANGE: 0
TROUBLE SWALLOWING: 0
STRIDOR: 0
ABDOMINAL PAIN: 0
BLURRED VISION: 0
EYE DISCHARGE: 0
DIARRHEA: 0
COLOR CHANGE: 0
SHORTNESS OF BREATH: 0
WHEEZING: 0
SINUS PAIN: 0
ALLERGIC/IMMUNOLOGIC NEGATIVE: 1
PHOTOPHOBIA: 0

## 2024-01-26 ASSESSMENT — LIFESTYLE VARIABLES
HOW MANY STANDARD DRINKS CONTAINING ALCOHOL DO YOU HAVE ON A TYPICAL DAY: 3 OR 4
HOW OFTEN DO YOU HAVE A DRINK CONTAINING ALCOHOL: 2-4 TIMES A MONTH

## 2024-01-26 NOTE — TELEPHONE ENCOUNTER
Upon check out, I offered to schedule the pt for her mammogram and pt states she is not doing that. Pt states she hasn't done it and won't do it.     Electronically signed by ELVIS CLIFTON MA on 1/26/24 at 3:55 PM EST

## 2024-01-26 NOTE — PROGRESS NOTES
present.      No friction rub. No gallop. No S3 or S4 sounds.   Pulmonary:      Effort: Pulmonary effort is normal. No respiratory distress.      Breath sounds: Normal breath sounds. No stridor. No wheezing, rhonchi or rales.   Chest:      Chest wall: No tenderness.   Abdominal:      General: Bowel sounds are normal. There is no distension.      Palpations: Abdomen is soft. There is no mass.      Tenderness: There is no abdominal tenderness. There is no right CVA tenderness, left CVA tenderness, guarding or rebound.      Hernia: No hernia is present.   Musculoskeletal:         General: Tenderness (lumbar pain and right hip pain) present. No swelling, deformity or signs of injury. Normal range of motion.      Cervical back: Normal range of motion and neck supple. No rigidity. No muscular tenderness.      Right lower leg: No edema.      Left lower leg: No edema.   Lymphadenopathy:      Cervical: No cervical adenopathy.   Skin:     General: Skin is warm.      Coloration: Skin is not jaundiced or pale.      Findings: No bruising, erythema, lesion or rash.   Neurological:      General: No focal deficit present.      Mental Status: She is alert and oriented to person, place, and time.      Cranial Nerves: No cranial nerve deficit.      Sensory: No sensory deficit.      Motor: No weakness or abnormal muscle tone.      Coordination: Coordination normal.      Gait: Gait normal.      Deep Tendon Reflexes: Reflexes are normal and symmetric. Reflexes normal.         ASSESSMENT/PLAN  Eden was seen today for fall.    Diagnoses and all orders for this visit:    Spondylolisthesis L5 on S1, grade 1  Long talk on treatment and prevention  Literature is given   --PLAN--inject right and left PSIS x 2                1/2 cc xylocaine plus 1 cc depo medrol                Omt/ultra--Rx        Screening mammogram for breast cancer  -     CHIP DIGITAL SCREEN BILATERAL PER PROTOCOL; Future  -     Comprehensive Metabolic Panel; Future  -

## 2024-01-29 RX ORDER — BACLOFEN 10 MG/1
10 TABLET ORAL 3 TIMES DAILY
Qty: 90 TABLET | Refills: 0 | Status: SHIPPED | OUTPATIENT
Start: 2024-01-29

## 2024-01-29 NOTE — TELEPHONE ENCOUNTER
----- Message from Barb Brito sent at 1/29/2024 11:58 AM EST -----  Subject: Medication Problem     Medication: baclofen (LIORESAL) 10 MG tablet  Dosage: Take 1 tablet by mouth 3 times daily  Ordering Provider: Edmund Morocho    Question/Problem: Patient stated that the baclofen (LIORESAL) 10 MG tablet   is causing Nausea, vivid dreams, Fatigue and Diarrhea. She is requesting a   [facility] PCP to discuss her symptoms from the medication?      Pharmacy: [facility] #[id number] - [place], [place] - [address] - P   [phone] - F [phone]    ---------------------------------------------------------------------------  --------------  CALL BACK INFO  [phone]; OK to leave message on voicemail  ---------------------------------------------------------------------------  --------------    SCRIPT ANSWERS  Relationship to Patient: Self  Are you having trouble breathing: No  Do you have swelling of your face, tongue, or anywhere: No  (Is the patient requesting to be seen urgently for their symptoms?): No  (If answered No to all RN Triage questions send Message to Provider): Yes

## 2024-02-03 DIAGNOSIS — M25.562 CHRONIC PAIN OF LEFT KNEE: ICD-10-CM

## 2024-02-03 DIAGNOSIS — G89.29 CHRONIC PAIN OF LEFT KNEE: ICD-10-CM

## 2024-02-05 DIAGNOSIS — G89.29 CHRONIC PAIN OF LEFT KNEE: ICD-10-CM

## 2024-02-05 DIAGNOSIS — M25.562 CHRONIC PAIN OF LEFT KNEE: ICD-10-CM

## 2024-02-05 RX ORDER — NAPROXEN 500 MG/1
500 TABLET ORAL 2 TIMES DAILY
Qty: 180 TABLET | Refills: 0 | Status: SHIPPED | OUTPATIENT
Start: 2024-02-05

## 2024-02-05 RX ORDER — NAPROXEN 500 MG/1
TABLET ORAL
Qty: 180 TABLET | Refills: 0 | Status: SHIPPED | OUTPATIENT
Start: 2024-02-05

## 2024-07-15 ENCOUNTER — APPOINTMENT (OUTPATIENT)
Dept: GENERAL RADIOLOGY | Age: 74
End: 2024-07-15
Payer: MEDICARE

## 2024-07-15 ENCOUNTER — HOSPITAL ENCOUNTER (EMERGENCY)
Age: 74
Discharge: HOME | End: 2024-07-15
Payer: MEDICARE

## 2024-07-15 VITALS
WEIGHT: 203 LBS | TEMPERATURE: 98.3 F | DIASTOLIC BLOOD PRESSURE: 100 MMHG | SYSTOLIC BLOOD PRESSURE: 160 MMHG | RESPIRATION RATE: 18 BRPM | BODY MASS INDEX: 35.96 KG/M2 | HEART RATE: 89 BPM | OXYGEN SATURATION: 98 %

## 2024-07-15 DIAGNOSIS — L03.116 CELLULITIS OF LEFT LOWER EXTREMITY: Primary | ICD-10-CM

## 2024-07-15 PROCEDURE — 73610 X-RAY EXAM OF ANKLE: CPT

## 2024-07-15 PROCEDURE — 99283 EMERGENCY DEPT VISIT LOW MDM: CPT

## 2024-07-15 PROCEDURE — 73590 X-RAY EXAM OF LOWER LEG: CPT

## 2024-07-15 RX ORDER — CEPHALEXIN 500 MG/1
500 CAPSULE ORAL 4 TIMES DAILY
Qty: 28 CAPSULE | Refills: 0 | Status: SHIPPED | OUTPATIENT
Start: 2024-07-15 | End: 2024-07-22

## 2024-07-15 RX ORDER — SULFAMETHOXAZOLE AND TRIMETHOPRIM 800; 160 MG/1; MG/1
1 TABLET ORAL 2 TIMES DAILY
Qty: 14 TABLET | Refills: 0 | Status: SHIPPED | OUTPATIENT
Start: 2024-07-15 | End: 2024-07-22

## 2024-07-15 ASSESSMENT — PAIN - FUNCTIONAL ASSESSMENT: PAIN_FUNCTIONAL_ASSESSMENT: 0-10

## 2024-07-15 ASSESSMENT — PAIN DESCRIPTION - ORIENTATION: ORIENTATION: LEFT;LOWER

## 2024-07-15 ASSESSMENT — PAIN DESCRIPTION - FREQUENCY: FREQUENCY: CONTINUOUS

## 2024-07-15 ASSESSMENT — PAIN DESCRIPTION - LOCATION: LOCATION: LEG

## 2024-07-15 ASSESSMENT — PAIN DESCRIPTION - ONSET: ONSET: ON-GOING

## 2024-07-15 ASSESSMENT — PAIN DESCRIPTION - PAIN TYPE: TYPE: ACUTE PAIN

## 2024-07-15 ASSESSMENT — LIFESTYLE VARIABLES
HOW MANY STANDARD DRINKS CONTAINING ALCOHOL DO YOU HAVE ON A TYPICAL DAY: PATIENT DOES NOT DRINK
HOW OFTEN DO YOU HAVE A DRINK CONTAINING ALCOHOL: NEVER

## 2024-07-15 ASSESSMENT — PAIN DESCRIPTION - DESCRIPTORS: DESCRIPTORS: ACHING;DISCOMFORT;SORE;TENDER;THROBBING

## 2024-07-15 ASSESSMENT — PAIN SCALES - GENERAL: PAINLEVEL_OUTOF10: 10

## 2024-07-15 NOTE — ED PROVIDER NOTES
Independent MARCELLO Visit.        HPI:  7/15/24, Time: 5:48 PM EDT         Eden Parikh is a 73 y.o. female presenting to the ED for wound to the left lower leg, beginning 1 week ago after falling through her deck.  The complaint has been persistent, moderate in severity, and worsened by nothing.  Patient reports that she has been cleaning the area and Neosporin on it however over the last day or so it has become more erythematous and firm over the wound which is what prompted evaluation the emergency department today.  States that the overall ecchymosis and swelling has improved.  Last immunization was about 5 years ago.  Afebrile that recent travel or sick contact.  Patient denies all other symptoms at this time.    Review of Systems:   A complete review of systems was performed and pertinent positives and negatives are stated within HPI, all other systems reviewed and are negative.          --------------------------------------------- PAST HISTORY ---------------------------------------------  Past Medical History:  has a past medical history of Allergic rhinitis, Anxiety disorder, Bipolar disorder ([facility]), Caffeine use, Chronic low back pain, Depression, major, in remission ([facility]), Endocervical polyp, GERD (gastroesophageal reflux disease), Heart murmur, systolic, History of bariatric surgery, History of cervical dysplasia, Hx of colonic polyp, Hypertension, Insomnia, Knee pain, right, Knee stiffness, left, Kyphosis, MVP (mitral valve prolapse), Neuropathy, Obesity, Osteoarthritis, Osteoarthritis of both knees, Osteoarthritis of hand, Pap smear abnormality of cervix with LGSIL, Skin cancer, and Spinal stenosis, lumbar.    Past Surgical History:  has a past surgical history that includes Tonsillectomy; Foot surgery;  section; Bariatric Surgery (); Breast surgery; Cholecystectomy; Colonoscopy (2010); skin biopsy; fracture surgery (Bilateral); eye surgery (Bilateral, 2018); Total knee arthroplasty

## 2024-07-18 ENCOUNTER — PATIENT MESSAGE (OUTPATIENT)
Dept: FAMILY MEDICINE CLINIC | Age: 74
End: 2024-07-18

## 2024-07-18 NOTE — TELEPHONE ENCOUNTER
From: Eden Parikh  To: Dr. Edmund Rivas  Sent: 7/18/2024 8:34 AM EDT  Subject: Appointment Request    Appointment Request From: Eden Parikh    With Provider: Edmund Rivas DO [[facility]]    Preferred Date Range: 7/18/2024 – 7/24/2024    Preferred Times: Any Time    Reason for visit: Request an Appointment    Comments:  7/15 ER recommended follow appt in 3 days for injury to my leg/cellulitis.

## 2024-07-27 ENCOUNTER — HOSPITAL ENCOUNTER (EMERGENCY)
Age: 74
Discharge: HOME | End: 2024-07-27
Payer: MEDICARE

## 2024-07-27 VITALS
BODY MASS INDEX: 32.24 KG/M2 | DIASTOLIC BLOOD PRESSURE: 62 MMHG | SYSTOLIC BLOOD PRESSURE: 142 MMHG | RESPIRATION RATE: 14 BRPM | OXYGEN SATURATION: 96 % | WEIGHT: 182 LBS | HEART RATE: 95 BPM | TEMPERATURE: 99 F

## 2024-07-27 DIAGNOSIS — S81.802D TRAUMATIC OPEN WOUND OF LEFT LOWER LEG WITH DELAYED HEALING: Primary | ICD-10-CM

## 2024-07-27 PROCEDURE — 6360000002 HC RX W HCPCS: Performed by: PHYSICIAN ASSISTANT

## 2024-07-27 PROCEDURE — 99284 EMERGENCY DEPT VISIT MOD MDM: CPT

## 2024-07-27 PROCEDURE — 90471 IMMUNIZATION ADMIN: CPT | Performed by: PHYSICIAN ASSISTANT

## 2024-07-27 PROCEDURE — 90714 TD VACC NO PRESV 7 YRS+ IM: CPT | Performed by: PHYSICIAN ASSISTANT

## 2024-07-27 RX ORDER — HYDROCODONE BITARTRATE AND ACETAMINOPHEN 5; 325 MG/1; MG/1
1 TABLET ORAL EVERY 6 HOURS PRN
Qty: 10 TABLET | Refills: 0 | Status: SHIPPED | OUTPATIENT
Start: 2024-07-27 | End: 2024-07-30

## 2024-07-27 RX ORDER — CEPHALEXIN 500 MG/1
500 CAPSULE ORAL 4 TIMES DAILY
Qty: 28 CAPSULE | Refills: 0 | Status: SHIPPED | OUTPATIENT
Start: 2024-07-27 | End: 2024-08-03

## 2024-07-27 RX ORDER — HYDROCODONE BITARTRATE AND ACETAMINOPHEN 5; 325 MG/1; MG/1
1 TABLET ORAL EVERY 6 HOURS PRN
Qty: 12 TABLET | Refills: 0 | Status: SHIPPED | OUTPATIENT
Start: 2024-07-27 | End: 2024-07-30

## 2024-07-27 RX ADMIN — CLOSTRIDIUM TETANI TOXOID ANTIGEN (FORMALDEHYDE INACTIVATED) AND CORYNEBACTERIUM DIPHTHERIAE TOXOID ANTIGEN (FORMALDEHYDE INACTIVATED) 0.5 ML: 5; 2 INJECTION, SUSPENSION INTRAMUSCULAR at 15:02

## 2024-07-27 ASSESSMENT — PAIN - FUNCTIONAL ASSESSMENT
PAIN_FUNCTIONAL_ASSESSMENT: 0-10
PAIN_FUNCTIONAL_ASSESSMENT: PREVENTS OR INTERFERES SOME ACTIVE ACTIVITIES AND ADLS

## 2024-07-27 ASSESSMENT — PAIN DESCRIPTION - PAIN TYPE: TYPE: ACUTE PAIN

## 2024-07-27 ASSESSMENT — PAIN SCALES - GENERAL: PAINLEVEL_OUTOF10: 2

## 2024-07-27 ASSESSMENT — PAIN DESCRIPTION - ONSET: ONSET: ON-GOING

## 2024-07-27 ASSESSMENT — PAIN DESCRIPTION - DESCRIPTORS: DESCRIPTORS: TENDER;DISCOMFORT

## 2024-07-27 ASSESSMENT — PAIN DESCRIPTION - FREQUENCY: FREQUENCY: CONTINUOUS

## 2024-07-27 ASSESSMENT — PAIN DESCRIPTION - ORIENTATION: ORIENTATION: LEFT;LOWER

## 2024-07-27 NOTE — ED PROVIDER NOTES
Independent MARCELLO Visit.           [facility] EMERGENCY DEPARTMENT  ED  Encounter Note  Admit Date/RoomTime: 2024  2:22 PM  ED Room: 10/10  NAME: Eden Parikh  : 1950  MRN: [id number]  PCP: Edmund Rivas DO    CHIEF COMPLAINT     Wound Check (Dx with cellulitis 1 week ago, left lower leg. Getting better, but not gone. Wants rechecked.)    HISTORY OF PRESENT ILLNESS        Eden Parikh is a 74 y.o. female who presents to the ED by private vehicle for wound check left lower leg, beginning a few week(s) ago. The complaint has been persistent and are moderate in severity.  The patient states that the original injury occurred over 3 weeks ago when she slipped and fell on her deck.  She states that she thought she just really scraped the skin.  She had persistent wound at the site with some mild swelling and redness.  The patient was seen and treated here 2 weeks ago where she was placed on Keflex and Bactrim.  She is taken the antibiotics as directed but really just thinks the wound is not looking any better.  She has been trying to get into her PCP without any luck.  The patient is not a diabetic.  Denies any fever, chills or vomiting        REVIEW OF SYSTEMS     Pertinent positives and negatives are stated within HPI, all other systems reviewed and are negative.    Past Medical History:  has a past medical history of Allergic rhinitis, Anxiety disorder, Bipolar disorder ([facility]), Caffeine use, Chronic low back pain, Depression, major, in remission ([facility]), Endocervical polyp, GERD (gastroesophageal reflux disease), Heart murmur, systolic, History of bariatric surgery, History of cervical dysplasia, Hx of colonic polyp, Hypertension, Insomnia, Knee pain, right, Knee stiffness, left, Kyphosis, MVP (mitral valve prolapse), Neuropathy, Obesity, Osteoarthritis, Osteoarthritis of both knees, Osteoarthritis of hand, Pap smear abnormality of cervix with LGSIL, Skin cancer,

## 2024-07-29 ENCOUNTER — APPOINTMENT (OUTPATIENT)
Dept: GENERAL RADIOLOGY | Age: 74
End: 2024-07-29
Payer: MEDICARE

## 2024-07-29 ENCOUNTER — HOSPITAL ENCOUNTER (EMERGENCY)
Age: 74
Discharge: HOME | End: 2024-07-29
Attending: STUDENT IN AN ORGANIZED HEALTH CARE EDUCATION/TRAINING PROGRAM
Payer: MEDICARE

## 2024-07-29 VITALS
TEMPERATURE: 98.8 F | DIASTOLIC BLOOD PRESSURE: 60 MMHG | HEIGHT: 63 IN | SYSTOLIC BLOOD PRESSURE: 156 MMHG | RESPIRATION RATE: 14 BRPM | OXYGEN SATURATION: 97 % | HEART RATE: 80 BPM | WEIGHT: 182 LBS | BODY MASS INDEX: 32.25 KG/M2

## 2024-07-29 DIAGNOSIS — S81.802D WOUND OF LEFT LOWER EXTREMITY, SUBSEQUENT ENCOUNTER: Primary | ICD-10-CM

## 2024-07-29 LAB
ANION GAP SERPL CALCULATED.3IONS-SCNC: 11 MMOL/L (ref 7–16)
BASOPHILS # BLD: 0.04 K/UL (ref 0–0.2)
BASOPHILS NFR BLD: 1 % (ref 0–2)
BUN SERPL-MCNC: 27 MG/DL (ref 6–23)
CALCIUM SERPL-MCNC: 9.7 MG/DL (ref 8.6–10.2)
CHLORIDE SERPL-SCNC: 102 MMOL/L (ref 98–107)
CO2 SERPL-SCNC: 27 MMOL/L (ref 22–29)
CREAT SERPL-MCNC: 0.9 MG/DL (ref 0.5–1)
EOSINOPHIL # BLD: 0.32 K/UL (ref 0.05–0.5)
EOSINOPHILS RELATIVE PERCENT: 4 % (ref 0–6)
ERYTHROCYTE [DISTWIDTH] IN BLOOD BY AUTOMATED COUNT: 12.2 % (ref 11.5–15)
GFR, ESTIMATED: 67 ML/MIN/1.73M2
GLUCOSE SERPL-MCNC: 105 MG/DL (ref 74–99)
HCT VFR BLD AUTO: 38.6 % (ref 34–48)
HGB BLD-MCNC: 13 G/DL (ref 11.5–15.5)
IMM GRANULOCYTES # BLD AUTO: <0.03 K/UL (ref 0–0.58)
IMM GRANULOCYTES NFR BLD: 0 % (ref 0–5)
LACTATE BLDV-SCNC: 0.7 MMOL/L (ref 0.5–2.2)
LYMPHOCYTES NFR BLD: 1.52 K/UL (ref 1.5–4)
LYMPHOCYTES RELATIVE PERCENT: 21 % (ref 20–42)
MCH RBC QN AUTO: 31.4 PG (ref 26–35)
MCHC RBC AUTO-ENTMCNC: 33.7 G/DL (ref 32–34.5)
MCV RBC AUTO: 93.2 FL (ref 80–99.9)
MONOCYTES NFR BLD: 0.65 K/UL (ref 0.1–0.95)
MONOCYTES NFR BLD: 9 % (ref 2–12)
NEUTROPHILS NFR BLD: 65 % (ref 43–80)
NEUTS SEG NFR BLD: 4.73 K/UL (ref 1.8–7.3)
PLATELET # BLD AUTO: 316 K/UL (ref 130–450)
PMV BLD AUTO: 8.5 FL (ref 7–12)
POTASSIUM SERPL-SCNC: 4.3 MMOL/L (ref 3.5–5)
RBC # BLD AUTO: 4.14 M/UL (ref 3.5–5.5)
SODIUM SERPL-SCNC: 140 MMOL/L (ref 132–146)
WBC OTHER # BLD: 7.3 K/UL (ref 4.5–11.5)

## 2024-07-29 PROCEDURE — 85025 COMPLETE CBC W/AUTO DIFF WBC: CPT

## 2024-07-29 PROCEDURE — 73590 X-RAY EXAM OF LOWER LEG: CPT

## 2024-07-29 PROCEDURE — 99284 EMERGENCY DEPT VISIT MOD MDM: CPT

## 2024-07-29 PROCEDURE — 83605 ASSAY OF LACTIC ACID: CPT

## 2024-07-29 PROCEDURE — 80048 BASIC METABOLIC PNL TOTAL CA: CPT

## 2024-07-29 ASSESSMENT — PAIN DESCRIPTION - DESCRIPTORS: DESCRIPTORS: ACHING

## 2024-07-29 ASSESSMENT — PAIN SCALES - GENERAL: PAINLEVEL_OUTOF10: 3

## 2024-07-29 ASSESSMENT — PAIN DESCRIPTION - FREQUENCY: FREQUENCY: CONTINUOUS

## 2024-07-29 ASSESSMENT — PAIN - FUNCTIONAL ASSESSMENT
PAIN_FUNCTIONAL_ASSESSMENT: ACTIVITIES ARE NOT PREVENTED
PAIN_FUNCTIONAL_ASSESSMENT: 0-10

## 2024-07-29 ASSESSMENT — PAIN DESCRIPTION - PAIN TYPE: TYPE: ACUTE PAIN

## 2024-07-29 ASSESSMENT — PAIN DESCRIPTION - LOCATION: LOCATION: LEG

## 2024-07-29 ASSESSMENT — PAIN DESCRIPTION - ONSET: ONSET: ON-GOING

## 2024-07-29 ASSESSMENT — PAIN DESCRIPTION - ORIENTATION: ORIENTATION: LEFT;LOWER

## 2024-07-29 NOTE — ED PROVIDER NOTES
Department of Emergency Medicine   ED  Provider Note  Admit Date/RoomTime: 7/29/2024  7:47 PM  ED Room: 06/06              [facility]     Eden Parikh is a 74 y.o. female with a PMHx significant for {bbpmh:34041} who presents for evaluation of ***, beginning ***.  The complaint has been {Desc; intermittent/persistent/constant:01123}, {DESC; MILD/MOD/SEVERE:96440} in severity, and worsened by {Modify factor:97032}.   The patient states that ***.     Review of Systems     Physical Exam     Procedures    MDM             Differential diagnosis: ***  Review of ED/ Outpatient Records: on chart review the patient was seen and evaluated on 07/27/2024 for evaluation of traumatic open wound of the left lower leg with delayed healing.  It looks like the patient was discharged home on Keflex and hydrocodone.  Tetanus was updated.  Upon further chart review patient was seen eval on 07/15/2024 for cellulitis of lower extremity.  Historians that case was discussed with: ***  My EKG interpretation: See ED course  Imaging Non-plain film images such as CT, Ultrasound and MRI are read by the radiologist. Plain radiographic images are visualized and preliminarily interpreted by the ED provider:***  Discussed with other providers: ***  Tests Considered but not ordered: ***  Decision making tools/risks stratification / MDM / Independent Interpretation of labs: Eden Parikh presents to the ED for ***.  History from ***, with *** limitations. Workup in the ED revealed  ***.   Social Determinants of health impacting treatment or disposition: ***  CODE status and Discussions: ***        Patient continues to be non-toxic on re-evaluation. Findings were discussed with the patient and reasons to immediately return to the ED were articulated to them. They will follow-up with their PCP,  *** and {Specialties; internal medicine subspecialties:87651}.   I am the Primary Clinician of Record.      Patient requires continued workup and 
electronic

## 2024-08-05 ASSESSMENT — ENCOUNTER SYMPTOMS
BACK PAIN: 0
NAUSEA: 0
VOMITING: 0
EYE REDNESS: 0
SHORTNESS OF BREATH: 0
WHEEZING: 0
ABDOMINAL DISTENTION: 0
SORE THROAT: 0
EYE DISCHARGE: 0
DIARRHEA: 0
EYE PAIN: 0
SINUS PRESSURE: 0
COUGH: 0

## 2024-08-07 ENCOUNTER — HOSPITAL ENCOUNTER (OUTPATIENT)
Dept: WOUND CARE | Age: 74
Discharge: HOME | End: 2024-08-07
Attending: PODIATRIST
Payer: MEDICARE

## 2024-08-07 VITALS
SYSTOLIC BLOOD PRESSURE: 130 MMHG | TEMPERATURE: 97 F | HEART RATE: 74 BPM | DIASTOLIC BLOOD PRESSURE: 72 MMHG | RESPIRATION RATE: 16 BRPM

## 2024-08-07 PROCEDURE — 87205 SMEAR GRAM STAIN: CPT

## 2024-08-07 PROCEDURE — 11043 DBRDMT MUSC&/FSCA 1ST 20/<: CPT

## 2024-08-07 PROCEDURE — 99213 OFFICE O/P EST LOW 20 MIN: CPT

## 2024-08-07 PROCEDURE — 87070 CULTURE OTHR SPECIMN AEROBIC: CPT

## 2024-08-07 PROCEDURE — 86403 PARTICLE AGGLUT ANTBDY SCRN: CPT

## 2024-08-07 RX ORDER — VITAMIN B COMPLEX
1 CAPSULE ORAL DAILY
COMMUNITY

## 2024-08-07 RX ORDER — LIDOCAINE HYDROCHLORIDE 40 MG/ML
SOLUTION TOPICAL ONCE
Status: DISCONTINUED | OUTPATIENT
Start: 2024-08-07 | End: 2024-08-08 | Stop reason: HOSPADM

## 2024-08-07 NOTE — PROGRESS NOTES
tibial 1 L posterior tibial 1     Assessment:     Problem List Items Addressed This Visit    None      Pre Debridement Measurements:  Are located in the Wound/Ulcer Documentation Flow Sheet  Post Debridement Measurements:  Wound/Ulcer Descriptions are Pre Debridement except measurements:     Wound 08/07/24 Leg Left new #1 left leg (Active)   Wound Image   08/07/24 0837   Wound Length (cm) 3 cm 08/07/24 0837   Wound Width (cm) 2 cm 08/07/24 0837   Wound Depth (cm) 0.8 cm 08/07/24 0837   Wound Surface Area (cm^2) 6 cm^2 08/07/24 0837   Wound Volume (cm^3) 4.8 cm^3 08/07/24 0837   Post-Procedure Length (cm) 3 cm 08/07/24 0845   Post-Procedure Width (cm) 2 cm 08/07/24 0845   Post-Procedure Depth (cm) 1.5 cm 08/07/24 0845   Post-Procedure Surface Area (cm^2) 6 cm^2 08/07/24 0845   Post-Procedure Volume (cm^3) 9 cm^3 08/07/24 0845   Undermining Starts ___ O'Clock 12 08/07/24 0837   Undermining Ends___ O'Clock 12 08/07/24 0837   Undermining Maxium Distance (cm) 3.8@2 08/07/24 0837   Wound Assessment Purple/maroon 08/07/24 0837   Drainage Amount Moderate (25-50%) 08/07/24 0837   Drainage Description Serosanguinous 08/07/24 0837   Odor None 08/07/24 0837   Breann-wound Assessment Fragile 08/07/24 0837   Margins Defined edges 08/07/24 0837   Number of days: 0     Incision 08/09/21 Knee Anterior;Left (Active)   Number of days: 1094       Incision 09/20/21 Knee Left (Active)   Number of days: 1052       Procedure Note  Indications:  Based on my examination of this patient's wound(s)/ulcer(s) today, debridement is required to promote healing and evaluate the wound base.    Performed by: Brown Girard DPM    Consent obtained:  Yes    Time out taken:  Yes    Pain Control: Anesthetic  Anesthetic: 4% Lidocaine Liquid Topical     Debridement:Excisional Debridement    Using #15 blade scalpel the wound(s)/ulcer(s) was/were sharply debrided down through and including the removal of subcutaneous tissue.        Devitalized Tissue

## 2024-08-07 NOTE — WOUND CARE
Order for V.A.C.®  Negative Pressure Wound Therapy  Please fax this form to [facility] at [phone]  [facility] Customer Service: [phone]      Ordering Center:   [facility]  [address], [address]  [facility] [address] [phone]  WOUND CARE Dept: [phone]   FAX NUMBER [phone]  Patient Information:   Eden Parikh  [address]  [facility] [address]   [phone]   : 1950  AGE: 74 y.o.     GENDER: female   TODAYS DATE:  2024  Insurance:   PRIMARY INSURANCE:  Plan: SUMMACARE-MEDICARE ADVANTAGE  Coverage: SUMMACARE-MEDICARE ADVANTAGE  Effective Date: 2016  [id number] - (Medicare Managed)    Payer/Plan Subscr  Sex Relation Sub. Ins. ID Effective Group Num   1. [facility] EDEN PARIKH* 1950 Female Self [id number]                                   [address]       Patient Wound Information:     Duration of the V.A.C.®  Negative Pressure Wound Therapy: 3 Month which includes up to 15 dressings per wound and up to 10 canisters per month    Goal at the completion of V.A.C.®  Negative Pressure Therapy: Assist in granulation tissue formation     Will HomeCare provide V.A.C.®  Therapy?  Yes Homecare will provide VAC Therapy. The date in which Homecare will initiate VAC Therapy is asa     Equipment for Delivery:     Delivery Location V.A.C.® Therapy Pump: Patient's Home Address located under Patient Information on the top of this form    Up to 15 dressings per wound, per month  VAC Canisters: Up to 10 canisters per month  V.A.C.® Dressing: DERMATEC DRAPE with VAC GRANUFOAM Dressing Kit and Other VAC Dressing white foam     Clinical Information by Wound Type:     Was NPWT initiated in an inpatient facility? No or  Has the patient been on NPWT anytime during the last 60 days? No     Is the patient’s nutritional status compromised? No    Please stella all dressings that apply  Please indicate all other therapies that have been previously tried and/or failed to

## 2024-08-07 NOTE — DISCHARGE INSTRUCTIONS
Visit Discharge/Physician Orders    Discharge condition: Stable    Assessment of pain at discharge:    Anesthetic used:     Discharge to: Home    Left via:Private automobile    Accompanied by: accompanied by self    ECF/HHA: refer to home care     Dressing Orders: clean left leg with saline, pack with alginate with ag, dry dressing, KERLIX AND COBAN, CHANGE IF NEEDED  WOUND VAC ORDERED, WHEN OBTAINED PACK WITH WHITE FOAM, THEN BLACK FOAM, 125MMHG PRESSURE CHANGE MON, WED, FRI    Treatment Orders: X1 CULTURE     [facility] followup visit _______________1 WEEK______________  (Please note your next appointment above and if you are unable to keep, kindly give a 24 hour notice. Thank you.)    Physician signature:__________________________      If you experience any of the following, please call the Wound Care Center during business hours:    * Increase in Pain  * Temperature over 101  * Increase in drainage from your wound  * Drainage with a foul odor  * Bleeding  * Increase in swelling  * Need for compression bandage changes due to slippage, breakthrough drainage.    If you need medical attention outside of the business hours of the Wound Care Centers please contact your PCP or go to the nearest emergency room.

## 2024-08-09 NOTE — WOUND CARE
Wound Care Supplies      Supply Company:     [employer] [facility] [employer] [place]  p:[phone] f: [phone]     Ordering Center:     [facility] [address]  [facility] [address] [phone]  WOUND CARE Dept: [phone]   FAX NUMBER [phone]    Patient Information:      Eden Parikh  [address]  [facility] [address]   [phone]   : 1950  AGE: 74 y.o.     GENDER: female   EPISODE DATE: 2024    Insurance:      PRIMARY INSURANCE:  Plan: SUMMACARE-MEDICARE ADVANTAGE  Coverage: SUMMACARE-MEDICARE ADVANTAGE  Effective Date: 2016  Group Number: [unfilled]  Subscriber Number: [id number] - (Medicare Managed)    Payer/Plan Subscr  Sex Relation Sub. Ins. ID Effective Group Num   [facility]* 1950 Female Self [id number]                                   [address]       Patient Wound Information:      Problem List Items Addressed This Visit    None      WOUNDS REQUIRING DRESSING SUPPLIES:     Wound 24 Leg Left new #1 left leg (Active)   Wound Image   24 0837   Wound Etiology Traumatic 24 0837   Dressing Status New dressing applied 24 0915   Wound Cleansed Vashe 24 0915   Dressing/Treatment Alginate with Ag;ABD;Gauze dressing/dressing sponge;Roll gauze;Packing 24 0915   Wound Length (cm) 3 cm 24 0837   Wound Width (cm) 2 cm 24 0837   Wound Depth (cm) 0.8 cm 24 0837   Wound Surface Area (cm^2) 6 cm^2 24 0837   Wound Volume (cm^3) 4.8 cm^3 24 0837   Post-Procedure Length (cm) 3 cm 24 0845   Post-Procedure Width (cm) 2 cm 24 0845   Post-Procedure Depth (cm) 1.5 cm 24 0845   Post-Procedure Surface Area (cm^2) 6 cm^2 24 0845   Post-Procedure Volume (cm^3) 9 cm^3 24 0845   Undermining Starts ___ O'Clock 12 24 0837   Undermining Ends___ O'Clock 24 0837   Undermining Maxium Distance (cm)

## 2024-08-10 LAB
MICROORGANISM SPEC CULT: ABNORMAL
MICROORGANISM/AGENT SPEC: ABNORMAL
SERVICE CMNT-IMP: ABNORMAL
SPECIMEN DESCRIPTION: ABNORMAL

## 2024-08-14 ENCOUNTER — HOSPITAL ENCOUNTER (OUTPATIENT)
Dept: WOUND CARE | Age: 74
Discharge: HOME | End: 2024-08-14
Attending: PODIATRIST
Payer: MEDICARE

## 2024-08-14 VITALS
SYSTOLIC BLOOD PRESSURE: 144 MMHG | DIASTOLIC BLOOD PRESSURE: 62 MMHG | HEART RATE: 77 BPM | RESPIRATION RATE: 16 BRPM | TEMPERATURE: 97.8 F

## 2024-08-14 DIAGNOSIS — R68.89 GENERAL SYMPTOM: Primary | ICD-10-CM

## 2024-08-14 PROCEDURE — 11042 DBRDMT SUBQ TIS 1ST 20SQCM/<: CPT

## 2024-08-14 NOTE — PROGRESS NOTES
Note  Indications:  Based on my examination of this patient's wound(s)/ulcer(s) today, debridement is required to promote healing and evaluate the wound base.    Performed by: rBown Girard DPM    Consent obtained:  Yes    Time out taken:  Yes    Pain Control: Anesthetic  Anesthetic: 4% Lidocaine Liquid Topical     Debridement:Excisional Debridement    Using #15 blade scalpel the wound(s)/ulcer(s) was/were sharply debrided down through and including the removal of subcutaneous tissue.        Devitalized Tissue Debrided:  fibrin, biofilm, slough, and necrotic/eschar to stimulate bleeding to promote healing, post debridement good bleeding base and wound edges noted    Wound/Ulcer #: 1    Percent of Wound/Ulcer Debrided: 100%    Total Surface Area Debrided:  6 sq cm     Estimated Blood Loss:  Minimal  Hemostasis Achieved:  by pressure    Procedural Pain:  5  / 10   Post Procedural Pain:  6 / 10     Response to treatment:  Well tolerated by patient.     A culture was done.      Plan:     Pt is not a smoker   - Discussed relationship of smoking and negative affects on wound healing   - Emphasized importance of tobacco avoidace/cessation   - Script for nicotine patch given to patient   - Information regarding support groups for smoking cessation given    In my professional opinion and based off the information that is available at this time this patient has appropriate indication for HBO Therapy: Maybe    Treatment Note please see attached Discharge Instructions    Written patient dismissal instructions given to patient and signed by patient or POA.         Discharge Instructions         Visit Discharge/Physician Orders     Discharge condition: Stable     Assessment of pain at discharge:     Anesthetic used:      Discharge to: Home     Left via:Private automobile     Accompanied by: accompanied by self     ECF/HHA: [facility]      Dressing Orders: clean left leg with saline, pack with alginate with ag, dry

## 2024-08-14 NOTE — DISCHARGE INSTRUCTIONS
Visit Discharge/Physician Orders     Discharge condition: Stable     Assessment of pain at discharge:     Anesthetic used:      Discharge to: Home     Left via:Private automobile     Accompanied by: accompanied by self     ECF/HHA: [facility]      Dressing Orders: clean left leg with saline, pack with alginate with ag, dry dressing, KERLIX AND COBAN, CHANGE IF NEEDED  WOUND VAC ORDERED, WHEN OBTAINED PACK WITH BLACK FOAM, 125MMHG PRESSURE CHANGE MON, WED, FRI (WHITE FOAM ORDERED FOR PACKING, ORDER PENDING)     Treatment Orders:  CULTURE REVIEWED      C followup visit _______________1 WEEK______________  (Please note your next appointment above and if you are unable to keep, kindly give a 24 hour notice. Thank you.)     Physician signature:__________________________        If you experience any of the following, please call the Wound Care Center during business hours:     * Increase in Pain  * Temperature over 101  * Increase in drainage from your wound  * Drainage with a foul odor  * Bleeding  * Increase in swelling  * Need for compression bandage changes due to slippage, breakthrough drainage.     If you need medical attention outside of the business hours of the Wound Care Centers please contact your PCP or go to the nearest emergency room.

## 2024-08-21 ENCOUNTER — HOSPITAL ENCOUNTER (OUTPATIENT)
Dept: WOUND CARE | Age: 74
Discharge: HOME | End: 2024-08-21
Attending: PODIATRIST
Payer: MEDICARE

## 2024-08-21 VITALS
SYSTOLIC BLOOD PRESSURE: 142 MMHG | RESPIRATION RATE: 18 BRPM | HEIGHT: 63 IN | HEART RATE: 72 BPM | WEIGHT: 182 LBS | TEMPERATURE: 97.4 F | DIASTOLIC BLOOD PRESSURE: 74 MMHG | BODY MASS INDEX: 32.25 KG/M2

## 2024-08-21 PROCEDURE — 11042 DBRDMT SUBQ TIS 1ST 20SQCM/<: CPT

## 2024-08-21 NOTE — PROGRESS NOTES
Wound Healing Center  History and Physical/Consultation  Podiatry    Referring Physician : Edmund Rivas DO  Eden Parikh  MEDICAL RECORD NUMBER:  [id number]  AGE: 74 y.o.   GENDER: female  : 1950  EPISODE DATE:  2024  Subjective:     Chief Complaint   Patient presents with    Wound Check     Left leg         HISTORY of PRESENT ILLNESS HPI     Eden Parikh is a 74 y.o. female who presents today for wound/ulcer evaluation.   History of Wound Context:  The patient has had a wound of trauma ulcer left leg which was first noted approximately 2024.  This has been treated antibiotic. On their initial visit to the wound healing center, 24 ,  the patient has noted that the wound has not been improving.  The patient has had similar previous wounds in the past.      Pt is not on abx at time of initial visit.      Wound/Ulcer Pain Timing/Severity: constant  Quality of pain: sharp  Severity:  7 / 10   Modifying Factors: Pain worsens with walking  Associated Signs/Symptoms: edema, erythema, drainage, and numbness    Ulcer Identification:  Ulcer Type: traumatic  Contributing Factors: edema, venous stasis, and lymphedema    Diabetic/Pressure/Non Pressure Ulcers onl y:  Ulcer: Non-Pressure ulcer, fat layer exposed    If patient has diabetic lower extremity wounds  Austin Classification of diabetic lower extremity wounds:    Grade Description   []  0 No open wound   []  1 Superficial ulcer involving the full skin thickness   [x]  2 Deep ulcer involves ligament, tendon, joint capsule, or fascia  No bone involvement or abscess presence   []  3 Deep Ulcer with abcess formation and/or osteomyelitis   []  4 Localized gangrene   []  5 Extensive gangrene of the foot     Wound: Contusion    24  Debrided, yeast medication, needs VAC    24  Debrided, cont vac, healing well        PAST MEDICAL HISTORY      Diagnosis Date    Allergic rhinitis     Anxiety disorder     Bipolar disorder (HCC)

## 2024-08-28 ENCOUNTER — HOSPITAL ENCOUNTER (OUTPATIENT)
Dept: WOUND CARE | Age: 74
Discharge: HOME | End: 2024-08-28
Attending: PODIATRIST
Payer: MEDICARE

## 2024-08-28 VITALS
HEART RATE: 84 BPM | TEMPERATURE: 97.3 F | DIASTOLIC BLOOD PRESSURE: 58 MMHG | SYSTOLIC BLOOD PRESSURE: 132 MMHG | RESPIRATION RATE: 16 BRPM

## 2024-08-28 DIAGNOSIS — L97.922 NON-PRESSURE CHRONIC ULCER OF LEFT LOWER LEG, WITH FAT LAYER EXPOSED (HCC): Primary | ICD-10-CM

## 2024-08-28 PROCEDURE — 11042 DBRDMT SUBQ TIS 1ST 20SQCM/<: CPT

## 2024-08-28 RX ORDER — LIDOCAINE HYDROCHLORIDE 40 MG/ML
SOLUTION TOPICAL ONCE
Status: DISCONTINUED | OUTPATIENT
Start: 2024-08-28 | End: 2024-08-29 | Stop reason: HOSPADM

## 2024-08-28 NOTE — WOUND CARE
Insurance Verification Request            Ordering Center:     [facility]  [address] [address]  Telephone: [phone]       FAX [phone]    Facility NPI: [id number]  Facility Tax ID [id number]    eNttie Still RN    Provider Information:     Provider's Name and NPI Provider's Name: Dr. Brown Girard    NPI: [id number]    Patient Information:      Edenkimberly Parikh  [address] [facility] [address]   [phone]   : 1950  AGE: 74 y.o.     GENDER: female   TODAYS DATE:  2024    Application/product Information:     Benefit Verification Request:    Reason for Request: New Wound    [employer] Fax # [phone]    Trunk/Arms/Legs 94267 (1st 25 sq cm)    Apligraf, NuShield, and Puraply AM    Has patient been treated with any other Skin Substitute for this Wound:   No    If yes, How many previous applications:  na    WOUND #: 1    Total Square CM: 16.2    Procedure setting: Hospital Outpatient Department POS 22    Is the Patient currently in a skilled nursing facility: no    Is the wound work related: No    Procedure date: asap      Insurance:        PRIMARY INSURANCE:  Plan: SUMMACARE-MEDICARE ADVANTAGE  Coverage: SUMMACARE-MEDICARE ADVANTAGE  Effective Date: 2016  Group Number: [unfilled]  Subscriber Number: [id number] - (Medicare Managed)    Payer/Plan Subscr  Sex Relation Sub. Ins. ID Effective Group Num   1. [facility]* CHRISTINA PARIKHZABETH* 1950 Female Self [id number]                                   [address]       Patient Information:     Dx Codes:   Diabetic Ulcer [] Yes   [] No,   Venous Ulcer [] Yes   [] No,   Other [x] Yes   [] No    Wound ICD-10 Code: L97.922     Problem List Items Addressed This Visit          Other    Non-pressure chronic ulcer of left lower leg, with fat layer exposed (HCC) - Primary    Relevant Orders    Vascular ankle brachial index (KARSON)       No data recorded     Is the  Patient a Diabetic: No    HgBA1c:    Lab Results   Component Value Date/Time    LABA1C 5.5 09/21/2022 12:00 PM        Wound 08/07/24 Leg Left new #1 left leg (Active)   Wound Image   08/07/24 0837   Wound Etiology Traumatic 08/07/24 0837   Dressing Status New dressing applied 08/28/24 0950   Wound Cleansed Cleansed with saline 08/28/24 0950   Dressing/Treatment Alginate with Ag;ABD;Coban/self-adherent bandages;Roll gauze 08/28/24 0950   Wound Length (cm) 5.4 cm 08/28/24 0904   Wound Width (cm) 3 cm 08/28/24 0904   Wound Depth (cm) 1.3 cm 08/28/24 0904   Wound Surface Area (cm^2) 16.2 cm^2 08/28/24 0904   Change in Wound Size % (l*w) -170 08/28/24 0904   Wound Volume (cm^3) 21.06 cm^3 08/28/24 0904   Wound Healing % -339 08/28/24 0904   Post-Procedure Length (cm) 5.4 cm 08/28/24 0937   Post-Procedure Width (cm) 3 cm 08/28/24 0937   Post-Procedure Depth (cm) 1.4 cm 08/28/24 0937   Post-Procedure Surface Area (cm^2) 16.2 cm^2 08/28/24 0937   Post-Procedure Volume (cm^3) 22.68 cm^3 08/28/24 0937   Undermining Starts ___ O'Clock 12 08/28/24 0904   Undermining Ends___ O'Clock 12 08/28/24 0904   Undermining Maxium Distance (cm) 1.4@9 08/28/24 0904   Wound Assessment Fibrin;Pink/red 08/28/24 0904   Drainage Amount Moderate (25-50%) 08/28/24 0904   Drainage Description Yellow;Brown 08/28/24 0904   Odor None 08/28/24 0904   Breann-wound Assessment Fragile 08/28/24 0904   Margins Defined edges 08/28/24 0904   Number of days: 21       Provider Information:         Electronically signed by Nettie Still RN on 8/28/2024 at 3:55 PM

## 2024-08-28 NOTE — PROGRESS NOTES
Wound Healing Center  History and Physical/Consultation  Podiatry    Referring Physician : Edmund Rivas DO  Eden Parikh  MEDICAL RECORD NUMBER:  [id number]  AGE: 74 y.o.   GENDER: female  : 1950  EPISODE DATE:  2024  Subjective:     Chief Complaint   Patient presents with    Wound Check     \"My wound is on my left leg\"         HISTORY of PRESENT ILLNESS HPI     Eden Parikh is a 74 y.o. female who presents today for wound/ulcer evaluation.   History of Wound Context:  The patient has had a wound of trauma ulcer left leg which was first noted approximately 2024.  This has been treated antibiotic. On their initial visit to the wound healing center, 24 ,  the patient has noted that the wound has not been improving.  The patient has had similar previous wounds in the past.      Pt is not on abx at time of initial visit.      Wound/Ulcer Pain Timing/Severity: constant  Quality of pain: sharp  Severity:  7 / 10   Modifying Factors: Pain worsens with walking  Associated Signs/Symptoms: edema, erythema, drainage, and numbness    Ulcer Identification:  Ulcer Type: traumatic  Contributing Factors: edema, venous stasis, and lymphedema    Diabetic/Pressure/Non Pressure Ulcers onl y:  Ulcer: Non-Pressure ulcer, fat layer exposed    If patient has diabetic lower extremity wounds  [place] Classification of diabetic lower extremity wounds:    Grade Description   []  0 No open wound   []  1 Superficial ulcer involving the full skin thickness   [x]  2 Deep ulcer involves ligament, tendon, joint capsule, or fascia  No bone involvement or abscess presence   []  3 Deep Ulcer with abcess formation and/or osteomyelitis   []  4 Localized gangrene   []  5 Extensive gangrene of the foot     Wound: Contusion    24  Debrided, yeast medication, needs VAC    24  Debrided, cont vac, healing well    24    Debrided, healing well        PAST MEDICAL HISTORY      Diagnosis Date    Allergic

## 2024-08-28 NOTE — PLAN OF CARE
Problem: Cognitive:  Goal: Knowledge of wound care  Description: Knowledge of wound care  Outcome: Progressing  Goal: Understands risk factors for wounds  Description: Understands risk factors for wounds  Outcome: Progressing     Problem: Wound:  Goal: Will show signs of wound healing; wound closure and no evidence of infection  Description: Will show signs of wound healing; wound closure and no evidence of infection  Outcome: Progressing     Problem: Venous:  Goal: Signs of wound healing will improve  Description: Signs of wound healing will improve  Outcome: Progressing     Problem: Compression therapy:  Goal: Will be free from complications associated with compression therapy  Description: Will be free from complications associated with compression therapy  Outcome: Progressing

## 2024-09-04 ENCOUNTER — HOSPITAL ENCOUNTER (OUTPATIENT)
Dept: WOUND CARE | Age: 74
Discharge: HOME | End: 2024-09-04
Attending: PODIATRIST
Payer: MEDICARE

## 2024-09-04 VITALS
HEART RATE: 80 BPM | TEMPERATURE: 96.2 F | SYSTOLIC BLOOD PRESSURE: 158 MMHG | DIASTOLIC BLOOD PRESSURE: 78 MMHG | RESPIRATION RATE: 16 BRPM

## 2024-09-04 PROCEDURE — 87205 SMEAR GRAM STAIN: CPT

## 2024-09-04 PROCEDURE — 87070 CULTURE OTHR SPECIMN AEROBIC: CPT

## 2024-09-04 PROCEDURE — 11043 DBRDMT MUSC&/FSCA 1ST 20/<: CPT

## 2024-09-04 PROCEDURE — 87077 CULTURE AEROBIC IDENTIFY: CPT

## 2024-09-04 RX ORDER — LIDOCAINE HYDROCHLORIDE 40 MG/ML
SOLUTION TOPICAL ONCE
OUTPATIENT
Start: 2024-09-04 | End: 2024-09-04

## 2024-09-04 RX ORDER — CLOBETASOL PROPIONATE 0.5 MG/G
OINTMENT TOPICAL ONCE
OUTPATIENT
Start: 2024-09-04 | End: 2024-09-04

## 2024-09-04 RX ORDER — LIDOCAINE HYDROCHLORIDE 40 MG/ML
SOLUTION TOPICAL ONCE
Status: CANCELLED | OUTPATIENT
Start: 2024-09-04 | End: 2024-09-04

## 2024-09-04 RX ORDER — LIDOCAINE HYDROCHLORIDE 20 MG/ML
JELLY TOPICAL ONCE
Status: CANCELLED | OUTPATIENT
Start: 2024-09-04 | End: 2024-09-04

## 2024-09-04 RX ORDER — NEOMYCIN/BACITRACIN/POLYMYXINB 3.5-400-5K
OINTMENT (GRAM) TOPICAL ONCE
Status: CANCELLED | OUTPATIENT
Start: 2024-09-04 | End: 2024-09-04

## 2024-09-04 RX ORDER — SODIUM CHLOR/HYPOCHLOROUS ACID 0.033 %
SOLUTION, IRRIGATION IRRIGATION ONCE
Status: CANCELLED | OUTPATIENT
Start: 2024-09-04 | End: 2024-09-04

## 2024-09-04 RX ORDER — LIDOCAINE 40 MG/G
CREAM TOPICAL ONCE
Status: CANCELLED | OUTPATIENT
Start: 2024-09-04 | End: 2024-09-04

## 2024-09-04 RX ORDER — GENTAMICIN SULFATE 1 MG/G
OINTMENT TOPICAL ONCE
OUTPATIENT
Start: 2024-09-04 | End: 2024-09-04

## 2024-09-04 RX ORDER — TRIAMCINOLONE ACETONIDE 1 MG/G
OINTMENT TOPICAL ONCE
Status: CANCELLED | OUTPATIENT
Start: 2024-09-04 | End: 2024-09-04

## 2024-09-04 RX ORDER — MUPIROCIN 20 MG/G
OINTMENT TOPICAL ONCE
OUTPATIENT
Start: 2024-09-04 | End: 2024-09-04

## 2024-09-04 RX ORDER — GENTAMICIN SULFATE 1 MG/G
OINTMENT TOPICAL ONCE
Status: CANCELLED | OUTPATIENT
Start: 2024-09-04 | End: 2024-09-04

## 2024-09-04 RX ORDER — BETAMETHASONE DIPROPIONATE 0.5 MG/G
CREAM TOPICAL ONCE
Status: CANCELLED | OUTPATIENT
Start: 2024-09-04 | End: 2024-09-04

## 2024-09-04 RX ORDER — BETAMETHASONE DIPROPIONATE 0.5 MG/G
CREAM TOPICAL ONCE
OUTPATIENT
Start: 2024-09-04 | End: 2024-09-04

## 2024-09-04 RX ORDER — LIDOCAINE 50 MG/G
OINTMENT TOPICAL ONCE
Status: CANCELLED | OUTPATIENT
Start: 2024-09-04 | End: 2024-09-04

## 2024-09-04 RX ORDER — CLOBETASOL PROPIONATE 0.5 MG/G
OINTMENT TOPICAL ONCE
Status: CANCELLED | OUTPATIENT
Start: 2024-09-04 | End: 2024-09-04

## 2024-09-04 RX ORDER — GINSENG 100 MG
CAPSULE ORAL ONCE
Status: CANCELLED | OUTPATIENT
Start: 2024-09-04 | End: 2024-09-04

## 2024-09-04 RX ORDER — GINSENG 100 MG
CAPSULE ORAL ONCE
OUTPATIENT
Start: 2024-09-04 | End: 2024-09-04

## 2024-09-04 RX ORDER — NEOMYCIN/BACITRACIN/POLYMYXINB 3.5-400-5K
OINTMENT (GRAM) TOPICAL ONCE
OUTPATIENT
Start: 2024-09-04 | End: 2024-09-04

## 2024-09-04 RX ORDER — BACITRACIN ZINC AND POLYMYXIN B SULFATE 500; 1000 [USP'U]/G; [USP'U]/G
OINTMENT TOPICAL ONCE
Status: CANCELLED | OUTPATIENT
Start: 2024-09-04 | End: 2024-09-04

## 2024-09-04 RX ORDER — SILVER SULFADIAZINE 10 MG/G
CREAM TOPICAL ONCE
Status: CANCELLED | OUTPATIENT
Start: 2024-09-04 | End: 2024-09-04

## 2024-09-04 RX ORDER — TRIAMCINOLONE ACETONIDE 1 MG/G
OINTMENT TOPICAL ONCE
OUTPATIENT
Start: 2024-09-04 | End: 2024-09-04

## 2024-09-04 RX ORDER — LIDOCAINE HYDROCHLORIDE 20 MG/ML
JELLY TOPICAL ONCE
OUTPATIENT
Start: 2024-09-04 | End: 2024-09-04

## 2024-09-04 RX ORDER — MUPIROCIN 20 MG/G
OINTMENT TOPICAL ONCE
Status: CANCELLED | OUTPATIENT
Start: 2024-09-04 | End: 2024-09-04

## 2024-09-04 RX ORDER — BACITRACIN ZINC AND POLYMYXIN B SULFATE 500; 1000 [USP'U]/G; [USP'U]/G
OINTMENT TOPICAL ONCE
OUTPATIENT
Start: 2024-09-04 | End: 2024-09-04

## 2024-09-04 RX ORDER — SILVER SULFADIAZINE 10 MG/G
CREAM TOPICAL ONCE
OUTPATIENT
Start: 2024-09-04 | End: 2024-09-04

## 2024-09-04 RX ORDER — LIDOCAINE 50 MG/G
OINTMENT TOPICAL ONCE
OUTPATIENT
Start: 2024-09-04 | End: 2024-09-04

## 2024-09-04 RX ORDER — SODIUM CHLOR/HYPOCHLOROUS ACID 0.033 %
SOLUTION, IRRIGATION IRRIGATION ONCE
OUTPATIENT
Start: 2024-09-04 | End: 2024-09-04

## 2024-09-04 RX ORDER — LIDOCAINE 40 MG/G
CREAM TOPICAL ONCE
OUTPATIENT
Start: 2024-09-04 | End: 2024-09-04

## 2024-09-04 RX ORDER — LIDOCAINE HYDROCHLORIDE 40 MG/ML
SOLUTION TOPICAL ONCE
Status: DISCONTINUED | OUTPATIENT
Start: 2024-09-04 | End: 2024-09-05 | Stop reason: HOSPADM

## 2024-09-04 NOTE — PROGRESS NOTES
Wound Healing Center  History and Physical/Consultation  Podiatry    Referring Physician : Edmund Rivas DO  Eden Parikh  MEDICAL RECORD NUMBER:  [id number]  AGE: 74 y.o.   GENDER: female  : 1950  EPISODE DATE:  2024  Subjective:     Chief Complaint   Patient presents with    Wound Check     \"My wound feels good\" \"its on my left leg\"          HISTORY of PRESENT ILLNESS HPI     Eden Parikh is a 74 y.o. female who presents today for wound/ulcer evaluation.   History of Wound Context:  The patient has had a wound of trauma ulcer left leg which was first noted approximately 2024.  This has been treated antibiotic. On their initial visit to the wound healing center, 24 ,  the patient has noted that the wound has not been improving.  The patient has had similar previous wounds in the past.      Pt is not on abx at time of initial visit.      Wound/Ulcer Pain Timing/Severity: constant  Quality of pain: sharp  Severity:  7 / 10   Modifying Factors: Pain worsens with walking  Associated Signs/Symptoms: edema, erythema, drainage, and numbness    Ulcer Identification:  Ulcer Type: traumatic  Contributing Factors: edema, venous stasis, and lymphedema    Diabetic/Pressure/Non Pressure Ulcers onl y:  Ulcer: Non-Pressure ulcer, fat layer exposed    If patient has diabetic lower extremity wounds  Austin Classification of diabetic lower extremity wounds:    Grade Description   []  0 No open wound   []  1 Superficial ulcer involving the full skin thickness   [x]  2 Deep ulcer involves ligament, tendon, joint capsule, or fascia  No bone involvement or abscess presence   []  3 Deep Ulcer with abcess formation and/or osteomyelitis   []  4 Localized gangrene   []  5 Extensive gangrene of the foot     Wound: Contusion    24  Debrided, yeast medication, needs VAC    24  Debrided, cont vac, healing well    24    Debrided, healing well    24  Debrided, cont vac, culture taken

## 2024-09-08 LAB
MICROORGANISM SPEC CULT: ABNORMAL
MICROORGANISM SPEC CULT: ABNORMAL
MICROORGANISM/AGENT SPEC: ABNORMAL
SERVICE CMNT-IMP: ABNORMAL
SPECIMEN DESCRIPTION: ABNORMAL

## 2024-09-11 ENCOUNTER — HOSPITAL ENCOUNTER (OUTPATIENT)
Dept: WOUND CARE | Age: 74
Discharge: HOME | End: 2024-09-11
Attending: PODIATRIST
Payer: MEDICARE

## 2024-09-11 VITALS
RESPIRATION RATE: 16 BRPM | HEART RATE: 83 BPM | TEMPERATURE: 97.4 F | SYSTOLIC BLOOD PRESSURE: 151 MMHG | WEIGHT: 182 LBS | BODY MASS INDEX: 32.25 KG/M2 | DIASTOLIC BLOOD PRESSURE: 56 MMHG | HEIGHT: 63 IN

## 2024-09-11 DIAGNOSIS — L97.922 NON-PRESSURE CHRONIC ULCER OF LEFT LOWER LEG, WITH FAT LAYER EXPOSED (HCC): Primary | ICD-10-CM

## 2024-09-11 PROCEDURE — 11042 DBRDMT SUBQ TIS 1ST 20SQCM/<: CPT

## 2024-09-11 RX ORDER — BACITRACIN ZINC AND POLYMYXIN B SULFATE 500; 1000 [USP'U]/G; [USP'U]/G
OINTMENT TOPICAL ONCE
OUTPATIENT
Start: 2024-09-11 | End: 2024-09-11

## 2024-09-11 RX ORDER — BETAMETHASONE DIPROPIONATE 0.5 MG/G
CREAM TOPICAL ONCE
OUTPATIENT
Start: 2024-09-11 | End: 2024-09-11

## 2024-09-11 RX ORDER — LIDOCAINE HYDROCHLORIDE 20 MG/ML
JELLY TOPICAL ONCE
OUTPATIENT
Start: 2024-09-11 | End: 2024-09-11

## 2024-09-11 RX ORDER — LIDOCAINE HYDROCHLORIDE 40 MG/ML
SOLUTION TOPICAL ONCE
Status: COMPLETED | OUTPATIENT
Start: 2024-09-11 | End: 2024-09-11

## 2024-09-11 RX ORDER — SILVER SULFADIAZINE 10 MG/G
CREAM TOPICAL ONCE
OUTPATIENT
Start: 2024-09-11 | End: 2024-09-11

## 2024-09-11 RX ORDER — MUPIROCIN 20 MG/G
OINTMENT TOPICAL ONCE
OUTPATIENT
Start: 2024-09-11 | End: 2024-09-11

## 2024-09-11 RX ORDER — LIDOCAINE 50 MG/G
OINTMENT TOPICAL ONCE
OUTPATIENT
Start: 2024-09-11 | End: 2024-09-11

## 2024-09-11 RX ORDER — GINSENG 100 MG
CAPSULE ORAL ONCE
OUTPATIENT
Start: 2024-09-11 | End: 2024-09-11

## 2024-09-11 RX ORDER — LIDOCAINE HYDROCHLORIDE 40 MG/ML
SOLUTION TOPICAL ONCE
OUTPATIENT
Start: 2024-09-11 | End: 2024-09-11

## 2024-09-11 RX ORDER — NEOMYCIN/BACITRACIN/POLYMYXINB 3.5-400-5K
OINTMENT (GRAM) TOPICAL ONCE
OUTPATIENT
Start: 2024-09-11 | End: 2024-09-11

## 2024-09-11 RX ORDER — LIDOCAINE 40 MG/G
CREAM TOPICAL ONCE
OUTPATIENT
Start: 2024-09-11 | End: 2024-09-11

## 2024-09-11 RX ORDER — DOXYCYCLINE HYCLATE 100 MG
100 TABLET ORAL 2 TIMES DAILY
Qty: 20 TABLET | Refills: 0 | Status: SHIPPED | OUTPATIENT
Start: 2024-09-11 | End: 2024-09-21

## 2024-09-11 RX ORDER — GENTAMICIN SULFATE 1 MG/G
OINTMENT TOPICAL ONCE
OUTPATIENT
Start: 2024-09-11 | End: 2024-09-11

## 2024-09-11 RX ORDER — CLOBETASOL PROPIONATE 0.5 MG/G
OINTMENT TOPICAL ONCE
OUTPATIENT
Start: 2024-09-11 | End: 2024-09-11

## 2024-09-11 RX ORDER — TRIAMCINOLONE ACETONIDE 1 MG/G
OINTMENT TOPICAL ONCE
OUTPATIENT
Start: 2024-09-11 | End: 2024-09-11

## 2024-09-11 RX ORDER — SODIUM CHLOR/HYPOCHLOROUS ACID 0.033 %
SOLUTION, IRRIGATION IRRIGATION ONCE
OUTPATIENT
Start: 2024-09-11 | End: 2024-09-11

## 2024-09-11 RX ADMIN — LIDOCAINE HYDROCHLORIDE 10 ML: 40 SOLUTION TOPICAL at 09:52

## 2024-09-11 ASSESSMENT — PAIN DESCRIPTION - DESCRIPTORS: DESCRIPTORS: ACHING;DISCOMFORT;BURNING

## 2024-09-11 ASSESSMENT — PAIN DESCRIPTION - PAIN TYPE: TYPE: CHRONIC PAIN

## 2024-09-11 ASSESSMENT — PAIN DESCRIPTION - ORIENTATION: ORIENTATION: LEFT

## 2024-09-11 ASSESSMENT — PAIN DESCRIPTION - FREQUENCY: FREQUENCY: INTERMITTENT

## 2024-09-11 ASSESSMENT — PAIN DESCRIPTION - ONSET: ONSET: ON-GOING

## 2024-09-11 ASSESSMENT — PAIN - FUNCTIONAL ASSESSMENT: PAIN_FUNCTIONAL_ASSESSMENT: ACTIVITIES ARE NOT PREVENTED

## 2024-09-11 ASSESSMENT — PAIN DESCRIPTION - LOCATION: LOCATION: LEG

## 2024-09-11 ASSESSMENT — PAIN SCALES - GENERAL: PAINLEVEL_OUTOF10: 4

## 2024-09-16 ENCOUNTER — PATIENT MESSAGE (OUTPATIENT)
Dept: FAMILY MEDICINE CLINIC | Age: 74
End: 2024-09-16

## 2024-09-16 ENCOUNTER — TELEPHONE (OUTPATIENT)
Dept: FAMILY MEDICINE CLINIC | Age: 74
End: 2024-09-16

## 2024-09-16 LAB
ALBUMIN: NORMAL
ALP BLD-CCNC: NORMAL U/L
ALT SERPL-CCNC: NORMAL U/L
ANION GAP SERPL CALCULATED.3IONS-SCNC: NORMAL MMOL/L
AST SERPL-CCNC: NORMAL U/L
BASOPHILS ABSOLUTE: NORMAL
BASOPHILS RELATIVE PERCENT: NORMAL
BILIRUB SERPL-MCNC: NORMAL MG/DL
BUN BLDV-MCNC: NORMAL MG/DL
CALCIUM SERPL-MCNC: NORMAL MG/DL
CHLORIDE BLD-SCNC: NORMAL MMOL/L
CHOLESTEROL, TOTAL: 158 MG/DL
CHOLESTEROL/HDL RATIO: NORMAL
CO2: NORMAL
CREAT SERPL-MCNC: NORMAL MG/DL
EOSINOPHILS ABSOLUTE: NORMAL
EOSINOPHILS RELATIVE PERCENT: NORMAL
ESTIMATED AVERAGE GLUCOSE: NORMAL
GFR, ESTIMATED: NORMAL
GLUCOSE BLD-MCNC: NORMAL MG/DL
HBA1C MFR BLD: 5.6 %
HCT VFR BLD CALC: NORMAL %
HDLC SERPL-MCNC: 56 MG/DL (ref 35–70)
HEMOGLOBIN: NORMAL
LDL CHOLESTEROL: 88
LYMPHOCYTES ABSOLUTE: NORMAL
LYMPHOCYTES RELATIVE PERCENT: NORMAL
MCH RBC QN AUTO: NORMAL PG
MCHC RBC AUTO-ENTMCNC: NORMAL G/DL
MCV RBC AUTO: NORMAL FL
MONOCYTES ABSOLUTE: NORMAL
MONOCYTES RELATIVE PERCENT: NORMAL
NEUTROPHILS ABSOLUTE: NORMAL
NEUTROPHILS RELATIVE PERCENT: NORMAL
NONHDLC SERPL-MCNC: NORMAL MG/DL
PDW BLD-RTO: NORMAL %
PLATELET # BLD: NORMAL 10*3/UL
PMV BLD AUTO: NORMAL FL
POTASSIUM SERPL-SCNC: NORMAL MMOL/L
RBC # BLD: NORMAL 10*6/UL
SODIUM BLD-SCNC: NORMAL MMOL/L
TOTAL PROTEIN: NORMAL
TRIGL SERPL-MCNC: 56 MG/DL
TSH SERPL DL<=0.05 MIU/L-ACNC: NORMAL M[IU]/L
URIC ACID: NORMAL
VLDLC SERPL CALC-MCNC: NORMAL MG/DL
WBC # BLD: NORMAL 10*3/UL

## 2024-09-16 RX ORDER — NYSTATIN 100000 [USP'U]/G
POWDER TOPICAL
Qty: 60 G | Refills: 1 | Status: SHIPPED | OUTPATIENT
Start: 2024-09-16

## 2024-09-17 ENCOUNTER — HOSPITAL ENCOUNTER (OUTPATIENT)
Dept: INTERVENTIONAL RADIOLOGY/VASCULAR | Age: 74
Discharge: HOME | End: 2024-09-19
Attending: PODIATRIST
Payer: MEDICARE

## 2024-09-17 DIAGNOSIS — L97.922 NON-PRESSURE CHRONIC ULCER OF LEFT LOWER LEG, WITH FAT LAYER EXPOSED (HCC): ICD-10-CM

## 2024-09-17 PROCEDURE — 93922 UPR/L XTREMITY ART 2 LEVELS: CPT

## 2024-09-18 ENCOUNTER — HOSPITAL ENCOUNTER (OUTPATIENT)
Dept: WOUND CARE | Age: 74
Discharge: HOME | End: 2024-09-18
Attending: PODIATRIST
Payer: MEDICARE

## 2024-09-18 VITALS
HEART RATE: 72 BPM | SYSTOLIC BLOOD PRESSURE: 133 MMHG | DIASTOLIC BLOOD PRESSURE: 57 MMHG | TEMPERATURE: 98.6 F | BODY MASS INDEX: 32.25 KG/M2 | HEIGHT: 63 IN | WEIGHT: 182 LBS | RESPIRATION RATE: 20 BRPM

## 2024-09-18 DIAGNOSIS — L97.922 NON-PRESSURE CHRONIC ULCER OF LEFT LOWER LEG, WITH FAT LAYER EXPOSED (HCC): Primary | ICD-10-CM

## 2024-09-18 PROCEDURE — 15271 SKIN SUB GRAFT TRNK/ARM/LEG: CPT

## 2024-09-18 RX ORDER — LIDOCAINE HYDROCHLORIDE 40 MG/ML
SOLUTION TOPICAL ONCE
Status: COMPLETED | OUTPATIENT
Start: 2024-09-18 | End: 2024-09-18

## 2024-09-18 RX ORDER — SILVER SULFADIAZINE 10 MG/G
CREAM TOPICAL ONCE
OUTPATIENT
Start: 2024-09-18 | End: 2024-09-18

## 2024-09-18 RX ORDER — GINSENG 100 MG
CAPSULE ORAL ONCE
OUTPATIENT
Start: 2024-09-18 | End: 2024-09-18

## 2024-09-18 RX ORDER — LIDOCAINE HYDROCHLORIDE 20 MG/ML
JELLY TOPICAL ONCE
OUTPATIENT
Start: 2024-09-18 | End: 2024-09-18

## 2024-09-18 RX ORDER — MUPIROCIN 20 MG/G
OINTMENT TOPICAL ONCE
OUTPATIENT
Start: 2024-09-18 | End: 2024-09-18

## 2024-09-18 RX ORDER — BETAMETHASONE DIPROPIONATE 0.5 MG/G
CREAM TOPICAL ONCE
OUTPATIENT
Start: 2024-09-18 | End: 2024-09-18

## 2024-09-18 RX ORDER — LIDOCAINE 40 MG/G
CREAM TOPICAL ONCE
OUTPATIENT
Start: 2024-09-18 | End: 2024-09-18

## 2024-09-18 RX ORDER — LIDOCAINE 50 MG/G
OINTMENT TOPICAL ONCE
OUTPATIENT
Start: 2024-09-18 | End: 2024-09-18

## 2024-09-18 RX ORDER — GENTAMICIN SULFATE 1 MG/G
OINTMENT TOPICAL ONCE
OUTPATIENT
Start: 2024-09-18 | End: 2024-09-18

## 2024-09-18 RX ORDER — BACITRACIN ZINC AND POLYMYXIN B SULFATE 500; 1000 [USP'U]/G; [USP'U]/G
OINTMENT TOPICAL ONCE
OUTPATIENT
Start: 2024-09-18 | End: 2024-09-18

## 2024-09-18 RX ORDER — SODIUM CHLOR/HYPOCHLOROUS ACID 0.033 %
SOLUTION, IRRIGATION IRRIGATION ONCE
OUTPATIENT
Start: 2024-09-18 | End: 2024-09-18

## 2024-09-18 RX ORDER — CLOBETASOL PROPIONATE 0.5 MG/G
OINTMENT TOPICAL ONCE
OUTPATIENT
Start: 2024-09-18 | End: 2024-09-18

## 2024-09-18 RX ORDER — LIDOCAINE HYDROCHLORIDE 40 MG/ML
SOLUTION TOPICAL ONCE
OUTPATIENT
Start: 2024-09-18 | End: 2024-09-18

## 2024-09-18 RX ORDER — NEOMYCIN/BACITRACIN/POLYMYXINB 3.5-400-5K
OINTMENT (GRAM) TOPICAL ONCE
OUTPATIENT
Start: 2024-09-18 | End: 2024-09-18

## 2024-09-18 RX ORDER — TRIAMCINOLONE ACETONIDE 1 MG/G
OINTMENT TOPICAL ONCE
OUTPATIENT
Start: 2024-09-18 | End: 2024-09-18

## 2024-09-18 RX ADMIN — LIDOCAINE HYDROCHLORIDE 5 ML: 40 SOLUTION TOPICAL at 09:47

## 2024-09-18 ASSESSMENT — PAIN DESCRIPTION - DESCRIPTORS: DESCRIPTORS: THROBBING

## 2024-09-18 ASSESSMENT — PAIN SCALES - GENERAL: PAINLEVEL_OUTOF10: 3

## 2024-09-24 ENCOUNTER — TELEPHONE (OUTPATIENT)
Dept: WOUND CARE | Age: 74
End: 2024-09-24

## 2024-09-25 ENCOUNTER — HOSPITAL ENCOUNTER (OUTPATIENT)
Dept: WOUND CARE | Age: 74
Discharge: HOME | End: 2024-09-25
Attending: PODIATRIST
Payer: MEDICARE

## 2024-09-25 VITALS
TEMPERATURE: 96.6 F | RESPIRATION RATE: 18 BRPM | DIASTOLIC BLOOD PRESSURE: 70 MMHG | SYSTOLIC BLOOD PRESSURE: 140 MMHG | HEART RATE: 80 BPM

## 2024-09-25 DIAGNOSIS — L97.922 NON-PRESSURE CHRONIC ULCER OF LEFT LOWER LEG, WITH FAT LAYER EXPOSED (HCC): Primary | ICD-10-CM

## 2024-09-25 DIAGNOSIS — R53.83 OTHER FATIGUE: ICD-10-CM

## 2024-09-25 DIAGNOSIS — Z12.31 SCREENING MAMMOGRAM FOR BREAST CANCER: ICD-10-CM

## 2024-09-25 DIAGNOSIS — E78.5 DYSLIPIDEMIA: ICD-10-CM

## 2024-09-25 DIAGNOSIS — R73.03 PREDIABETES: ICD-10-CM

## 2024-09-25 PROCEDURE — 15271 SKIN SUB GRAFT TRNK/ARM/LEG: CPT

## 2024-09-25 RX ORDER — LIDOCAINE 50 MG/G
OINTMENT TOPICAL ONCE
OUTPATIENT
Start: 2024-09-25 | End: 2024-09-25

## 2024-09-25 RX ORDER — LIDOCAINE HYDROCHLORIDE 20 MG/ML
JELLY TOPICAL ONCE
OUTPATIENT
Start: 2024-09-25 | End: 2024-09-25

## 2024-09-25 RX ORDER — GINSENG 100 MG
CAPSULE ORAL ONCE
OUTPATIENT
Start: 2024-09-25 | End: 2024-09-25

## 2024-09-25 RX ORDER — LIDOCAINE HYDROCHLORIDE 40 MG/ML
SOLUTION TOPICAL ONCE
OUTPATIENT
Start: 2024-09-25 | End: 2024-09-25

## 2024-09-25 RX ORDER — LIDOCAINE HYDROCHLORIDE 40 MG/ML
SOLUTION TOPICAL ONCE
Status: COMPLETED | OUTPATIENT
Start: 2024-09-25 | End: 2024-09-25

## 2024-09-25 RX ORDER — LIDOCAINE 40 MG/G
CREAM TOPICAL ONCE
OUTPATIENT
Start: 2024-09-25 | End: 2024-09-25

## 2024-09-25 RX ORDER — MUPIROCIN 20 MG/G
OINTMENT TOPICAL ONCE
OUTPATIENT
Start: 2024-09-25 | End: 2024-09-25

## 2024-09-25 RX ORDER — TRIAMCINOLONE ACETONIDE 1 MG/G
OINTMENT TOPICAL ONCE
OUTPATIENT
Start: 2024-09-25 | End: 2024-09-25

## 2024-09-25 RX ORDER — SODIUM CHLOR/HYPOCHLOROUS ACID 0.033 %
SOLUTION, IRRIGATION IRRIGATION ONCE
OUTPATIENT
Start: 2024-09-25 | End: 2024-09-25

## 2024-09-25 RX ORDER — BACITRACIN ZINC AND POLYMYXIN B SULFATE 500; 1000 [USP'U]/G; [USP'U]/G
OINTMENT TOPICAL ONCE
OUTPATIENT
Start: 2024-09-25 | End: 2024-09-25

## 2024-09-25 RX ORDER — BETAMETHASONE DIPROPIONATE 0.5 MG/G
CREAM TOPICAL ONCE
OUTPATIENT
Start: 2024-09-25 | End: 2024-09-25

## 2024-09-25 RX ORDER — NEOMYCIN/BACITRACIN/POLYMYXINB 3.5-400-5K
OINTMENT (GRAM) TOPICAL ONCE
OUTPATIENT
Start: 2024-09-25 | End: 2024-09-25

## 2024-09-25 RX ORDER — CLOBETASOL PROPIONATE 0.5 MG/G
OINTMENT TOPICAL ONCE
OUTPATIENT
Start: 2024-09-25 | End: 2024-09-25

## 2024-09-25 RX ORDER — SILVER SULFADIAZINE 10 MG/G
CREAM TOPICAL ONCE
OUTPATIENT
Start: 2024-09-25 | End: 2024-09-25

## 2024-09-25 RX ORDER — GENTAMICIN SULFATE 1 MG/G
OINTMENT TOPICAL ONCE
OUTPATIENT
Start: 2024-09-25 | End: 2024-09-25

## 2024-09-25 RX ADMIN — LIDOCAINE HYDROCHLORIDE 5 ML: 40 SOLUTION TOPICAL at 10:44

## 2024-09-25 ASSESSMENT — PAIN DESCRIPTION - ORIENTATION: ORIENTATION: LEFT

## 2024-09-25 ASSESSMENT — PAIN SCALES - GENERAL: PAINLEVEL_OUTOF10: 3

## 2024-09-25 ASSESSMENT — PAIN - FUNCTIONAL ASSESSMENT: PAIN_FUNCTIONAL_ASSESSMENT: ACTIVITIES ARE NOT PREVENTED

## 2024-09-25 ASSESSMENT — PAIN DESCRIPTION - DESCRIPTORS: DESCRIPTORS: DULL

## 2024-09-25 ASSESSMENT — PAIN DESCRIPTION - LOCATION: LOCATION: LEG

## 2024-09-27 NOTE — DISCHARGE INSTRUCTIONS
Visit Discharge/Physician Orders     Discharge condition: Stable     Assessment of pain at discharge:     Anesthetic used:      Discharge to: Home     Left via:Private automobile     Accompanied by: accompanied by self     ECF/H[facility]: [facility]      Dressing Orders: Graft applied, leave steri strips and versatel intact, then apply WOUND VAC 125MMHG PRESSURE CHANGE MON, WED, FRI (BLACK FOAM)  Dry dressing in clinic   Treatment Orders:       [facility] followup visit _______________1 WEEK______________  (Please note your next appointment above and if you are unable to keep, kindly give a 24 hour notice. Thank you.)     Physician signature:__________________________        If you experience any of the following, please call the Wound Care Center during business hours:     * Increase in Pain  * Temperature over 101  * Increase in drainage from your wound  * Drainage with a foul odor  * Bleeding  * Increase in swelling  * Need for compression bandage changes due to slippage, breakthrough drainage.     If you need medical attention outside of the business hours of the Wound Care Centers please contact your PCP or go to the nearest emergency room.

## 2024-10-02 ENCOUNTER — HOSPITAL ENCOUNTER (OUTPATIENT)
Dept: WOUND CARE | Age: 74
Discharge: HOME | End: 2024-10-02
Attending: PODIATRIST
Payer: MEDICARE

## 2024-10-02 VITALS
RESPIRATION RATE: 18 BRPM | BODY MASS INDEX: 32.25 KG/M2 | SYSTOLIC BLOOD PRESSURE: 142 MMHG | TEMPERATURE: 97.2 F | WEIGHT: 182 LBS | DIASTOLIC BLOOD PRESSURE: 60 MMHG | HEART RATE: 69 BPM | HEIGHT: 63 IN

## 2024-10-02 DIAGNOSIS — L97.922 NON-PRESSURE CHRONIC ULCER OF LEFT LOWER LEG, WITH FAT LAYER EXPOSED (HCC): Primary | ICD-10-CM

## 2024-10-02 PROCEDURE — 15271 SKIN SUB GRAFT TRNK/ARM/LEG: CPT

## 2024-10-02 RX ORDER — LIDOCAINE HYDROCHLORIDE 20 MG/ML
JELLY TOPICAL ONCE
OUTPATIENT
Start: 2024-10-02 | End: 2024-10-02

## 2024-10-02 RX ORDER — SODIUM CHLOR/HYPOCHLOROUS ACID 0.033 %
SOLUTION, IRRIGATION IRRIGATION ONCE
OUTPATIENT
Start: 2024-10-02 | End: 2024-10-02

## 2024-10-02 RX ORDER — NEOMYCIN/BACITRACIN/POLYMYXINB 3.5-400-5K
OINTMENT (GRAM) TOPICAL ONCE
OUTPATIENT
Start: 2024-10-02 | End: 2024-10-02

## 2024-10-02 RX ORDER — BETAMETHASONE DIPROPIONATE 0.5 MG/G
CREAM TOPICAL ONCE
OUTPATIENT
Start: 2024-10-02 | End: 2024-10-02

## 2024-10-02 RX ORDER — LIDOCAINE HYDROCHLORIDE 40 MG/ML
SOLUTION TOPICAL ONCE
OUTPATIENT
Start: 2024-10-02 | End: 2024-10-02

## 2024-10-02 RX ORDER — SILVER SULFADIAZINE 10 MG/G
CREAM TOPICAL ONCE
OUTPATIENT
Start: 2024-10-02 | End: 2024-10-02

## 2024-10-02 RX ORDER — TRIAMCINOLONE ACETONIDE 1 MG/G
OINTMENT TOPICAL ONCE
OUTPATIENT
Start: 2024-10-02 | End: 2024-10-02

## 2024-10-02 RX ORDER — MUPIROCIN 20 MG/G
OINTMENT TOPICAL ONCE
OUTPATIENT
Start: 2024-10-02 | End: 2024-10-02

## 2024-10-02 RX ORDER — LIDOCAINE HYDROCHLORIDE 40 MG/ML
SOLUTION TOPICAL ONCE
Status: COMPLETED | OUTPATIENT
Start: 2024-10-02 | End: 2024-10-02

## 2024-10-02 RX ORDER — GINSENG 100 MG
CAPSULE ORAL ONCE
OUTPATIENT
Start: 2024-10-02 | End: 2024-10-02

## 2024-10-02 RX ORDER — BACITRACIN ZINC AND POLYMYXIN B SULFATE 500; 1000 [USP'U]/G; [USP'U]/G
OINTMENT TOPICAL ONCE
OUTPATIENT
Start: 2024-10-02 | End: 2024-10-02

## 2024-10-02 RX ORDER — LIDOCAINE 40 MG/G
CREAM TOPICAL ONCE
OUTPATIENT
Start: 2024-10-02 | End: 2024-10-02

## 2024-10-02 RX ORDER — CLOBETASOL PROPIONATE 0.5 MG/G
OINTMENT TOPICAL ONCE
OUTPATIENT
Start: 2024-10-02 | End: 2024-10-02

## 2024-10-02 RX ORDER — GENTAMICIN SULFATE 1 MG/G
OINTMENT TOPICAL ONCE
OUTPATIENT
Start: 2024-10-02 | End: 2024-10-02

## 2024-10-02 RX ORDER — LIDOCAINE 50 MG/G
OINTMENT TOPICAL ONCE
OUTPATIENT
Start: 2024-10-02 | End: 2024-10-02

## 2024-10-02 RX ADMIN — LIDOCAINE HYDROCHLORIDE 10 ML: 40 SOLUTION TOPICAL at 10:26

## 2024-10-02 ASSESSMENT — PAIN DESCRIPTION - DESCRIPTORS: DESCRIPTORS: BURNING

## 2024-10-02 ASSESSMENT — PAIN DESCRIPTION - LOCATION: LOCATION: LEG

## 2024-10-02 ASSESSMENT — PAIN DESCRIPTION - PAIN TYPE: TYPE: CHRONIC PAIN

## 2024-10-02 ASSESSMENT — PAIN SCALES - GENERAL: PAINLEVEL_OUTOF10: 2

## 2024-10-02 ASSESSMENT — PAIN DESCRIPTION - ORIENTATION: ORIENTATION: LEFT

## 2024-10-02 ASSESSMENT — PAIN DESCRIPTION - ONSET: ONSET: ON-GOING

## 2024-10-02 ASSESSMENT — PAIN DESCRIPTION - FREQUENCY: FREQUENCY: INTERMITTENT

## 2024-10-02 ASSESSMENT — PAIN - FUNCTIONAL ASSESSMENT: PAIN_FUNCTIONAL_ASSESSMENT: ACTIVITIES ARE NOT PREVENTED

## 2024-10-02 NOTE — PROGRESS NOTES
Wound Healing Center  History and Physical/Consultation  Podiatry    Referring Physician : Edmund Rivas DO  Eden Parikh  MEDICAL RECORD NUMBER:  [id number]  AGE: 74 y.o.   GENDER: female  : 1950  EPISODE DATE:  10/2/2024  Subjective:     Chief Complaint   Patient presents with    Wound Check         HISTORY of PRESENT ILLNESS HPI     Eden Parikh is a 74 y.o. female who presents today for wound/ulcer evaluation.   History of Wound Context:  The patient has had a wound of trauma ulcer left leg which was first noted approximately 2024.  This has been treated antibiotic. On their initial visit to the wound healing center, 24 ,  the patient has noted that the wound has not been improving.  The patient has had similar previous wounds in the past.      Pt is not on abx at time of initial visit.      Wound/Ulcer Pain Timing/Severity: constant  Quality of pain: sharp  Severity:  7 / 10   Modifying Factors: Pain worsens with walking  Associated Signs/Symptoms: edema, erythema, drainage, and numbness    Ulcer Identification:  Ulcer Type: traumatic  Contributing Factors: edema, venous stasis, and lymphedema    Diabetic/Pressure/Non Pressure Ulcers onl y:  Ulcer: Non-Pressure ulcer, fat layer exposed    If patient has diabetic lower extremity wounds  Austin Classification of diabetic lower extremity wounds:    Grade Description   []  0 No open wound   []  1 Superficial ulcer involving the full skin thickness   [x]  2 Deep ulcer involves ligament, tendon, joint capsule, or fascia  No bone involvement or abscess presence   []  3 Deep Ulcer with abcess formation and/or osteomyelitis   []  4 Localized gangrene   []  5 Extensive gangrene of the foot     Wound: Contusion    24  Debrided, yeast medication, needs VAC    24  Debrided, cont vac, healing well    24    Debrided, healing well    24  Debrided, cont vac, culture taken    24  Debrided, cont

## 2024-10-08 NOTE — DISCHARGE INSTRUCTIONS
Visit Discharge/Physician Orders     Discharge condition: Stable     Assessment of pain at discharge:     Anesthetic used:      Discharge to: Home     Left via:Private automobile     Accompanied by: accompanied by self     ECF/HHA: [facility]      Dressing Orders: Graft applied, leave steri strips and versatel intact, then apply WOUND VAC 125MMHG PRESSURE, plain alginate around maria elena wound CHANGE MON, WED, FRI (BLACK FOAM)        Treatment Orders:  elevate lower legs above level of heart to reduce edema      [facility] followup visit _______________1 WEEK______________  (Please note your next appointment above and if you are unable to keep, kindly give a 24 hour notice. Thank you.)     Physician signature:__________________________        If you experience any of the following, please call the Wound Care Center during business hours:     * Increase in Pain  * Temperature over 101  * Increase in drainage from your wound  * Drainage with a foul odor  * Bleeding  * Increase in swelling  * Need for compression bandage changes due to slippage, breakthrough drainage.     If you need medical attention outside of the business hours of the Wound Care Centers please contact your PCP or go to the nearest emergency room.

## 2024-10-09 ENCOUNTER — HOSPITAL ENCOUNTER (OUTPATIENT)
Dept: WOUND CARE | Age: 74
Discharge: HOME | End: 2024-10-09
Attending: PODIATRIST
Payer: MEDICARE

## 2024-10-09 VITALS
SYSTOLIC BLOOD PRESSURE: 150 MMHG | RESPIRATION RATE: 16 BRPM | TEMPERATURE: 96.9 F | DIASTOLIC BLOOD PRESSURE: 60 MMHG | HEART RATE: 70 BPM

## 2024-10-09 DIAGNOSIS — L97.922 NON-PRESSURE CHRONIC ULCER OF LEFT LOWER LEG, WITH FAT LAYER EXPOSED (HCC): Primary | ICD-10-CM

## 2024-10-09 PROCEDURE — 15271 SKIN SUB GRAFT TRNK/ARM/LEG: CPT

## 2024-10-09 RX ORDER — LIDOCAINE HYDROCHLORIDE 20 MG/ML
JELLY TOPICAL ONCE
OUTPATIENT
Start: 2024-10-09 | End: 2024-10-09

## 2024-10-09 RX ORDER — SODIUM CHLOR/HYPOCHLOROUS ACID 0.033 %
SOLUTION, IRRIGATION IRRIGATION ONCE
OUTPATIENT
Start: 2024-10-09 | End: 2024-10-09

## 2024-10-09 RX ORDER — BETAMETHASONE DIPROPIONATE 0.5 MG/G
CREAM TOPICAL ONCE
OUTPATIENT
Start: 2024-10-09 | End: 2024-10-09

## 2024-10-09 RX ORDER — BACITRACIN ZINC AND POLYMYXIN B SULFATE 500; 1000 [USP'U]/G; [USP'U]/G
OINTMENT TOPICAL ONCE
OUTPATIENT
Start: 2024-10-09 | End: 2024-10-09

## 2024-10-09 RX ORDER — LIDOCAINE 40 MG/G
CREAM TOPICAL ONCE
OUTPATIENT
Start: 2024-10-09 | End: 2024-10-09

## 2024-10-09 RX ORDER — LIDOCAINE HYDROCHLORIDE 40 MG/ML
SOLUTION TOPICAL ONCE
OUTPATIENT
Start: 2024-10-09 | End: 2024-10-09

## 2024-10-09 RX ORDER — GENTAMICIN SULFATE 1 MG/G
OINTMENT TOPICAL ONCE
OUTPATIENT
Start: 2024-10-09 | End: 2024-10-09

## 2024-10-09 RX ORDER — MUPIROCIN 20 MG/G
OINTMENT TOPICAL ONCE
OUTPATIENT
Start: 2024-10-09 | End: 2024-10-09

## 2024-10-09 RX ORDER — LIDOCAINE 50 MG/G
OINTMENT TOPICAL ONCE
OUTPATIENT
Start: 2024-10-09 | End: 2024-10-09

## 2024-10-09 RX ORDER — NEOMYCIN/BACITRACIN/POLYMYXINB 3.5-400-5K
OINTMENT (GRAM) TOPICAL ONCE
OUTPATIENT
Start: 2024-10-09 | End: 2024-10-09

## 2024-10-09 RX ORDER — SILVER SULFADIAZINE 10 MG/G
CREAM TOPICAL ONCE
OUTPATIENT
Start: 2024-10-09 | End: 2024-10-09

## 2024-10-09 RX ORDER — TRIAMCINOLONE ACETONIDE 1 MG/G
OINTMENT TOPICAL ONCE
OUTPATIENT
Start: 2024-10-09 | End: 2024-10-09

## 2024-10-09 RX ORDER — GINSENG 100 MG
CAPSULE ORAL ONCE
OUTPATIENT
Start: 2024-10-09 | End: 2024-10-09

## 2024-10-09 RX ORDER — CLOBETASOL PROPIONATE 0.5 MG/G
OINTMENT TOPICAL ONCE
OUTPATIENT
Start: 2024-10-09 | End: 2024-10-09

## 2024-10-09 RX ORDER — LIDOCAINE HYDROCHLORIDE 40 MG/ML
SOLUTION TOPICAL ONCE
Status: COMPLETED | OUTPATIENT
Start: 2024-10-09 | End: 2024-10-09

## 2024-10-09 RX ADMIN — LIDOCAINE HYDROCHLORIDE 6 ML: 40 SOLUTION TOPICAL at 10:52

## 2024-10-09 ASSESSMENT — PAIN - FUNCTIONAL ASSESSMENT: PAIN_FUNCTIONAL_ASSESSMENT: ACTIVITIES ARE NOT PREVENTED

## 2024-10-09 ASSESSMENT — PAIN DESCRIPTION - DESCRIPTORS: DESCRIPTORS: DULL

## 2024-10-09 ASSESSMENT — PAIN DESCRIPTION - ORIENTATION: ORIENTATION: LEFT

## 2024-10-09 ASSESSMENT — PAIN DESCRIPTION - LOCATION: LOCATION: LEG

## 2024-10-09 ASSESSMENT — PAIN SCALES - GENERAL: PAINLEVEL_OUTOF10: 3

## 2024-10-09 NOTE — PROGRESS NOTES
Wound Healing Center  History and Physical/Consultation  Podiatry    Referring Physician : Edmund Rivas DO  Eden Parikh  MEDICAL RECORD NUMBER:  [id number]  AGE: 74 y.o.   GENDER: female  : 1950  EPISODE DATE:  10/9/2024  Subjective:     Chief Complaint   Patient presents with    Wound Check     \"My wound is on my left leg and doesn't really bother me too much\"         HISTORY of PRESENT ILLNESS HPI     Eden Parikh is a 74 y.o. female who presents today for wound/ulcer evaluation.   History of Wound Context:  The patient has had a wound of trauma ulcer left leg which was first noted approximately 2024.  This has been treated antibiotic. On their initial visit to the wound healing center, 24 ,  the patient has noted that the wound has not been improving.  The patient has had similar previous wounds in the past.      Pt is not on abx at time of initial visit.      Wound/Ulcer Pain Timing/Severity: constant  Quality of pain: sharp  Severity:  7 / 10   Modifying Factors: Pain worsens with walking  Associated Signs/Symptoms: edema, erythema, drainage, and numbness    Ulcer Identification:  Ulcer Type: traumatic  Contributing Factors: edema, venous stasis, and lymphedema    Diabetic/Pressure/Non Pressure Ulcers onl y:  Ulcer: Non-Pressure ulcer, fat layer exposed    If patient has diabetic lower extremity wounds  Austin Classification of diabetic lower extremity wounds:    Grade Description   []  0 No open wound   []  1 Superficial ulcer involving the full skin thickness   [x]  2 Deep ulcer involves ligament, tendon, joint capsule, or fascia  No bone involvement or abscess presence   []  3 Deep Ulcer with abcess formation and/or osteomyelitis   []  4 Localized gangrene   []  5 Extensive gangrene of the foot     Wound: Contusion    24  Debrided, yeast medication, needs VAC    24  Debrided, cont vac, healing well    24    Debrided, healing well    24  Debrided,

## 2024-10-16 ENCOUNTER — HOSPITAL ENCOUNTER (OUTPATIENT)
Dept: WOUND CARE | Age: 74
Discharge: HOME | End: 2024-10-16
Attending: PODIATRIST
Payer: MEDICARE

## 2024-10-16 VITALS
BODY MASS INDEX: 33.49 KG/M2 | TEMPERATURE: 96.5 F | WEIGHT: 182 LBS | HEIGHT: 62 IN | RESPIRATION RATE: 17 BRPM | DIASTOLIC BLOOD PRESSURE: 51 MMHG | SYSTOLIC BLOOD PRESSURE: 120 MMHG | HEART RATE: 66 BPM

## 2024-10-16 DIAGNOSIS — L97.922 NON-PRESSURE CHRONIC ULCER OF LEFT LOWER LEG, WITH FAT LAYER EXPOSED (HCC): Primary | ICD-10-CM

## 2024-10-16 PROCEDURE — C5271 LOW COST SKIN SUBSTITUTE APP: HCPCS

## 2024-10-16 RX ORDER — SODIUM CHLOR/HYPOCHLOROUS ACID 0.033 %
SOLUTION, IRRIGATION IRRIGATION ONCE
OUTPATIENT
Start: 2024-10-16 | End: 2024-10-16

## 2024-10-16 RX ORDER — BACITRACIN ZINC AND POLYMYXIN B SULFATE 500; 1000 [USP'U]/G; [USP'U]/G
OINTMENT TOPICAL ONCE
OUTPATIENT
Start: 2024-10-16 | End: 2024-10-16

## 2024-10-16 RX ORDER — LIDOCAINE 40 MG/G
CREAM TOPICAL ONCE
OUTPATIENT
Start: 2024-10-16 | End: 2024-10-16

## 2024-10-16 RX ORDER — LIDOCAINE HYDROCHLORIDE 20 MG/ML
JELLY TOPICAL ONCE
OUTPATIENT
Start: 2024-10-16 | End: 2024-10-16

## 2024-10-16 RX ORDER — LIDOCAINE HYDROCHLORIDE 40 MG/ML
SOLUTION TOPICAL ONCE
Status: COMPLETED | OUTPATIENT
Start: 2024-10-16 | End: 2024-10-16

## 2024-10-16 RX ORDER — LIDOCAINE HYDROCHLORIDE 40 MG/ML
SOLUTION TOPICAL ONCE
OUTPATIENT
Start: 2024-10-16 | End: 2024-10-16

## 2024-10-16 RX ORDER — GENTAMICIN SULFATE 1 MG/G
OINTMENT TOPICAL ONCE
OUTPATIENT
Start: 2024-10-16 | End: 2024-10-16

## 2024-10-16 RX ORDER — BETAMETHASONE DIPROPIONATE 0.5 MG/G
CREAM TOPICAL ONCE
OUTPATIENT
Start: 2024-10-16 | End: 2024-10-16

## 2024-10-16 RX ORDER — SILVER SULFADIAZINE 10 MG/G
CREAM TOPICAL ONCE
OUTPATIENT
Start: 2024-10-16 | End: 2024-10-16

## 2024-10-16 RX ORDER — CLOBETASOL PROPIONATE 0.5 MG/G
OINTMENT TOPICAL ONCE
OUTPATIENT
Start: 2024-10-16 | End: 2024-10-16

## 2024-10-16 RX ORDER — TRIAMCINOLONE ACETONIDE 1 MG/G
OINTMENT TOPICAL ONCE
OUTPATIENT
Start: 2024-10-16 | End: 2024-10-16

## 2024-10-16 RX ORDER — LIDOCAINE 50 MG/G
OINTMENT TOPICAL ONCE
OUTPATIENT
Start: 2024-10-16 | End: 2024-10-16

## 2024-10-16 RX ORDER — MUPIROCIN 20 MG/G
OINTMENT TOPICAL ONCE
OUTPATIENT
Start: 2024-10-16 | End: 2024-10-16

## 2024-10-16 RX ORDER — GINSENG 100 MG
CAPSULE ORAL ONCE
OUTPATIENT
Start: 2024-10-16 | End: 2024-10-16

## 2024-10-16 RX ORDER — NEOMYCIN/BACITRACIN/POLYMYXINB 3.5-400-5K
OINTMENT (GRAM) TOPICAL ONCE
OUTPATIENT
Start: 2024-10-16 | End: 2024-10-16

## 2024-10-16 RX ADMIN — LIDOCAINE HYDROCHLORIDE 7 ML: 40 SOLUTION TOPICAL at 11:04

## 2024-10-16 ASSESSMENT — PAIN DESCRIPTION - LOCATION: LOCATION: LEG

## 2024-10-16 ASSESSMENT — PAIN DESCRIPTION - DESCRIPTORS: DESCRIPTORS: BURNING;ACHING

## 2024-10-16 ASSESSMENT — PAIN DESCRIPTION - ORIENTATION: ORIENTATION: LEFT

## 2024-10-16 NOTE — PROGRESS NOTES
Etiology Traumatic 08/07/24 0837   Dressing Status New dressing applied 10/09/24 1107   Wound Cleansed Cleansed with saline 10/09/24 1107   Dressing/Treatment ABD;Roll gauze 09/25/24 1113   Wound Length (cm) 3.6 cm 10/16/24 1054   Wound Width (cm) 2 cm 10/16/24 1054   Wound Depth (cm) 0.2 cm 10/16/24 1054   Wound Surface Area (cm^2) 7.2 cm^2 10/16/24 1054   Change in Wound Size % (l*w) -20 10/16/24 1054   Wound Volume (cm^3) 1.44 cm^3 10/16/24 1054   Wound Healing % 70 10/16/24 1054   Post-Procedure Length (cm) 3.6 cm 10/16/24 1111   Post-Procedure Width (cm) 2 cm 10/16/24 1111   Post-Procedure Depth (cm) 0.3 cm 10/16/24 1111   Post-Procedure Surface Area (cm^2) 7.2 cm^2 10/16/24 1111   Post-Procedure Volume (cm^3) 2.16 cm^3 10/16/24 1111   Undermining Starts ___ O'Clock 6 09/25/24 1036   Undermining Ends___ O'Clock 11 09/25/24 1036   Undermining Maxium Distance (cm) .3@7 09/25/24 1036   Wound Assessment Fibrin;Pink/red 10/16/24 1054   Drainage Amount Moderate (25-50%) 10/16/24 1054   Drainage Description Brown;Yellow 10/16/24 1054   Odor Mild 10/16/24 1054   Breann-wound Assessment Fragile;Excoriated 10/16/24 1054   Margins Defined edges 10/09/24 1048   Number of days: 70     Incision 08/09/21 Knee Anterior;Left (Active)   Number of days: 1164       Incision 09/20/21 Knee Left (Active)   Number of days: 1122       Procedure Note  Indications:  Based on my examination of this patient's wound(s)/ulcer(s) today, debridement is required to promote healing and evaluate the wound base.    Performed by: Brown Girard DPM    Consent obtained:  Yes    Time out taken:  Yes    Pain Control: Anesthetic  Anesthetic: 4% Lidocaine Liquid Topical     Debridement:Excisional Debridement    Using #15 blade scalpel the wound(s)/ulcer(s) was/were sharply debrided down through and including the removal of subcutaneous tissue.        Devitalized Tissue Debrided:  fibrin, biofilm, slough, and necrotic/eschar to stimulate bleeding to

## 2024-10-22 DIAGNOSIS — M25.562 CHRONIC PAIN OF LEFT KNEE: ICD-10-CM

## 2024-10-22 DIAGNOSIS — G89.29 CHRONIC PAIN OF LEFT KNEE: ICD-10-CM

## 2024-10-22 RX ORDER — NAPROXEN 500 MG/1
500 TABLET ORAL 2 TIMES DAILY
Qty: 180 TABLET | Refills: 1 | Status: SHIPPED | OUTPATIENT
Start: 2024-10-22

## 2024-10-22 NOTE — TELEPHONE ENCOUNTER
Last Appointment:  1/26/2024  Future Appointments   Date Time Provider Department Center   10/23/2024  8:30 AM Brown Girard DPM [facility] WOUND [facility]   10/29/2024  9:15 AM Edmund Rivas,  [facility]

## 2024-10-23 ENCOUNTER — HOSPITAL ENCOUNTER (OUTPATIENT)
Dept: WOUND CARE | Age: 74
Discharge: HOME | End: 2024-10-23
Attending: PODIATRIST
Payer: MEDICARE

## 2024-10-23 VITALS
SYSTOLIC BLOOD PRESSURE: 130 MMHG | HEART RATE: 71 BPM | HEIGHT: 62 IN | DIASTOLIC BLOOD PRESSURE: 55 MMHG | RESPIRATION RATE: 18 BRPM | BODY MASS INDEX: 33.49 KG/M2 | TEMPERATURE: 97.5 F | WEIGHT: 182 LBS

## 2024-10-23 DIAGNOSIS — L97.922 NON-PRESSURE CHRONIC ULCER OF LEFT LOWER LEG, WITH FAT LAYER EXPOSED (HCC): Primary | ICD-10-CM

## 2024-10-23 PROCEDURE — 15271 SKIN SUB GRAFT TRNK/ARM/LEG: CPT

## 2024-10-23 RX ORDER — LIDOCAINE 40 MG/G
CREAM TOPICAL ONCE
OUTPATIENT
Start: 2024-10-23 | End: 2024-10-23

## 2024-10-23 RX ORDER — MUPIROCIN 20 MG/G
OINTMENT TOPICAL ONCE
OUTPATIENT
Start: 2024-10-23 | End: 2024-10-23

## 2024-10-23 RX ORDER — GENTAMICIN SULFATE 1 MG/G
OINTMENT TOPICAL ONCE
OUTPATIENT
Start: 2024-10-23 | End: 2024-10-23

## 2024-10-23 RX ORDER — SILVER SULFADIAZINE 10 MG/G
CREAM TOPICAL ONCE
OUTPATIENT
Start: 2024-10-23 | End: 2024-10-23

## 2024-10-23 RX ORDER — LIDOCAINE 50 MG/G
OINTMENT TOPICAL ONCE
OUTPATIENT
Start: 2024-10-23 | End: 2024-10-23

## 2024-10-23 RX ORDER — NEOMYCIN/BACITRACIN/POLYMYXINB 3.5-400-5K
OINTMENT (GRAM) TOPICAL ONCE
OUTPATIENT
Start: 2024-10-23 | End: 2024-10-23

## 2024-10-23 RX ORDER — LIDOCAINE HYDROCHLORIDE 40 MG/ML
SOLUTION TOPICAL ONCE
OUTPATIENT
Start: 2024-10-23 | End: 2024-10-23

## 2024-10-23 RX ORDER — LIDOCAINE HYDROCHLORIDE 20 MG/ML
JELLY TOPICAL ONCE
OUTPATIENT
Start: 2024-10-23 | End: 2024-10-23

## 2024-10-23 RX ORDER — GINSENG 100 MG
CAPSULE ORAL ONCE
OUTPATIENT
Start: 2024-10-23 | End: 2024-10-23

## 2024-10-23 RX ORDER — SODIUM CHLOR/HYPOCHLOROUS ACID 0.033 %
SOLUTION, IRRIGATION IRRIGATION ONCE
OUTPATIENT
Start: 2024-10-23 | End: 2024-10-23

## 2024-10-23 RX ORDER — BACITRACIN ZINC AND POLYMYXIN B SULFATE 500; 1000 [USP'U]/G; [USP'U]/G
OINTMENT TOPICAL ONCE
OUTPATIENT
Start: 2024-10-23 | End: 2024-10-23

## 2024-10-23 RX ORDER — LIDOCAINE HYDROCHLORIDE 40 MG/ML
SOLUTION TOPICAL ONCE
Status: COMPLETED | OUTPATIENT
Start: 2024-10-23 | End: 2024-10-23

## 2024-10-23 RX ORDER — TRIAMCINOLONE ACETONIDE 1 MG/G
OINTMENT TOPICAL ONCE
OUTPATIENT
Start: 2024-10-23 | End: 2024-10-23

## 2024-10-23 RX ORDER — CLOBETASOL PROPIONATE 0.5 MG/G
OINTMENT TOPICAL ONCE
OUTPATIENT
Start: 2024-10-23 | End: 2024-10-23

## 2024-10-23 RX ORDER — BETAMETHASONE DIPROPIONATE 0.5 MG/G
CREAM TOPICAL ONCE
OUTPATIENT
Start: 2024-10-23 | End: 2024-10-23

## 2024-10-23 RX ADMIN — LIDOCAINE HYDROCHLORIDE 10 ML: 40 SOLUTION TOPICAL at 08:28

## 2024-10-23 NOTE — DISCHARGE INSTRUCTIONS
Visit Discharge/Physician Orders     Discharge condition: Stable     Assessment of pain at discharge:     Anesthetic used:      Discharge to: Home     Left via:Private automobile     Accompanied by: accompanied by self     ECF/HHA: [facility]      Dressing Orders: Graft applied, apply versatel, ABD, KERLIX AND COBAN CHANGE 2 X WEEK OUTER DRESSING ONLY. LEAVE GRAFT INTACT     STOP WOUND VAC         Treatment Orders:  elevate lower legs above level of heart to reduce edema      [facility] followup visit _______________1 WEEK______________  (Please note your next appointment above and if you are unable to keep, kindly give a 24 hour notice. Thank you.)     Physician signature:__________________________        If you experience any of the following, please call the Wound Care Center during business hours:     * Increase in Pain  * Temperature over 101  * Increase in drainage from your wound  * Drainage with a foul odor  * Bleeding  * Increase in swelling  * Need for compression bandage changes due to slippage, breakthrough drainage.     If you need medical attention outside of the business hours of the Wound Care Centers please contact your PCP or go to the nearest emergency room.

## 2024-10-23 NOTE — PROGRESS NOTES
foraminal stenoses     Past Surgical History:   Procedure Laterality Date    BARIATRIC SURGERY      stapling of stomach    BREAST SURGERY      reduction     SECTION      CHOLECYSTECTOMY      COLONOSCOPY  2010    donald one polyp    EYE SURGERY Bilateral 2018    cataract    FOOT SURGERY      FRACTURE SURGERY Bilateral     feet    KNEE SURGERY Left 2021    LEFT KNEE MANIPULATION WITH FLUOROSCOPY REMOVE CHECKPOINT performed by Von Adrian MD at [facility] OR    SKIN BIOPSY      TONSILLECTOMY      TOTAL KNEE ARTHROPLASTY Left 2021    LEFT TOTAL KNEE ARTHROPLASTY ROBOTIC ASSISTED performed by Von Adrian MD at [facility]     Family History   Problem Relation Age of Onset    Heart Disease Mother     COPD Mother     Anxiety Disorder Mother     Asthma Mother     Other Mother         sarcoidosis    Diabetes Mother     Stroke Father     Obesity Father     Heart Disease Brother     Other Brother         suicide    Obesity Brother     Substance Abuse Brother     Substance Abuse Brother         hepatitis C    Mental Illness Brother     Cancer Paternal Uncle         throat    Diabetes Maternal Grandmother     Stroke Maternal Grandmother     Stroke Paternal Grandmother     Stroke Paternal Grandfather     Cancer Paternal Grandfather      Social History     Tobacco Use    Smoking status: Never    Smokeless tobacco: Never   Vaping Use    Vaping status: Never Used   Substance Use Topics    Alcohol use: Yes     Comment:  rarely    Drug use: No     No Known Allergies  Current Outpatient Medications on File Prior to Encounter   Medication Sig Dispense Refill    naproxen (NAPROSYN) 500 MG tablet TAKE 1 TABLET BY MOUTH TWICE A DAY AS NEEDED FOR PAIN 180 tablet 1    nystatin (MYCOSTATIN) 498782 UNIT/GM powder Apply 3 times daily. 60 g 1    b complex vitamins capsule Take 1 capsule by mouth daily      Ascorbic Acid (VITAMIN C) 1000 MG tablet Take 1 tablet by mouth daily      Vitamin D (CHOLECALCIFEROL) 25 MCG

## 2024-10-24 NOTE — DISCHARGE INSTRUCTIONS
Visit Discharge/Physician Orders     Discharge condition: Stable     Assessment of pain at discharge:     Anesthetic used:      Discharge to: Home     Left via:Private automobile     Accompanied by: accompanied by self     [facility]; PLEASE PAY CLOSE ATTENTION TO PROPER APPLICATION OF LEG WRAP; ENSURE ALL OF LAYERS OF WRAP ARE EVEN FROM TOES TO KNEE.      Dressing Orders: PURAPLY Graft applied, apply versatel, ABD, KERLIX AND COBAN CHANGE 2 X WEEK OUTER DRESSING ONLY. LEAVE GRAFT INTACT            Treatment Orders:  elevate lower legs above level of heart to reduce edema      [facility] followup visit _______________1 WEEK______________  (Please note your next appointment above and if you are unable to keep, kindly give a 24 hour notice. Thank you.)     Physician signature:__________________________        If you experience any of the following, please call the Wound Care Center during business hours:     * Increase in Pain  * Temperature over 101  * Increase in drainage from your wound  * Drainage with a foul odor  * Bleeding  * Increase in swelling  * Need for compression bandage changes due to slippage, breakthrough drainage.     If you need medical attention outside of the business hours of the Wound Care Centers please contact your PCP or go to the nearest emergency room.

## 2024-10-26 SDOH — HEALTH STABILITY: PHYSICAL HEALTH: ON AVERAGE, HOW MANY DAYS PER WEEK DO YOU ENGAGE IN MODERATE TO STRENUOUS EXERCISE (LIKE A BRISK WALK)?: 0 DAYS

## 2024-10-26 SDOH — HEALTH STABILITY: PHYSICAL HEALTH: ON AVERAGE, HOW MANY MINUTES DO YOU ENGAGE IN EXERCISE AT THIS LEVEL?: 0 MIN

## 2024-10-26 ASSESSMENT — PATIENT HEALTH QUESTIONNAIRE - PHQ9
SUM OF ALL RESPONSES TO PHQ9 QUESTIONS 1 & 2: 0
1. LITTLE INTEREST OR PLEASURE IN DOING THINGS: NOT AT ALL
SUM OF ALL RESPONSES TO PHQ QUESTIONS 1-9: 0
2. FEELING DOWN, DEPRESSED OR HOPELESS: NOT AT ALL

## 2024-10-26 ASSESSMENT — LIFESTYLE VARIABLES
HOW MANY STANDARD DRINKS CONTAINING ALCOHOL DO YOU HAVE ON A TYPICAL DAY: 1
HOW OFTEN DO YOU HAVE A DRINK CONTAINING ALCOHOL: MONTHLY OR LESS
HOW MANY STANDARD DRINKS CONTAINING ALCOHOL DO YOU HAVE ON A TYPICAL DAY: 1 OR 2
HOW OFTEN DO YOU HAVE SIX OR MORE DRINKS ON ONE OCCASION: 1
HOW OFTEN DO YOU HAVE A DRINK CONTAINING ALCOHOL: 2

## 2024-10-29 ENCOUNTER — OFFICE VISIT (OUTPATIENT)
Dept: FAMILY MEDICINE CLINIC | Age: 74
End: 2024-10-29

## 2024-10-29 VITALS
TEMPERATURE: 97.7 F | OXYGEN SATURATION: 97 % | SYSTOLIC BLOOD PRESSURE: 128 MMHG | HEIGHT: 62 IN | RESPIRATION RATE: 18 BRPM | HEART RATE: 85 BPM | WEIGHT: 183.4 LBS | DIASTOLIC BLOOD PRESSURE: 64 MMHG | BODY MASS INDEX: 33.75 KG/M2

## 2024-10-29 DIAGNOSIS — Z00.00 MEDICARE ANNUAL WELLNESS VISIT, SUBSEQUENT: ICD-10-CM

## 2024-10-29 DIAGNOSIS — F31.9 BIPOLAR DEPRESSION (HCC): ICD-10-CM

## 2024-10-29 DIAGNOSIS — Z00.00 ENCOUNTER FOR MEDICARE ANNUAL WELLNESS EXAM: Primary | ICD-10-CM

## 2024-10-29 DIAGNOSIS — L97.922 NON-PRESSURE CHRONIC ULCER OF LEFT LOWER LEG, WITH FAT LAYER EXPOSED (HCC): ICD-10-CM

## 2024-10-29 DIAGNOSIS — M48.061 SPINAL STENOSIS OF LUMBAR REGION, UNSPECIFIED WHETHER NEUROGENIC CLAUDICATION PRESENT: Chronic | ICD-10-CM

## 2024-10-29 ASSESSMENT — PATIENT HEALTH QUESTIONNAIRE - PHQ9
6. FEELING BAD ABOUT YOURSELF - OR THAT YOU ARE A FAILURE OR HAVE LET YOURSELF OR YOUR FAMILY DOWN: NOT AT ALL
SUM OF ALL RESPONSES TO PHQ QUESTIONS 1-9: 0
8. MOVING OR SPEAKING SO SLOWLY THAT OTHER PEOPLE COULD HAVE NOTICED. OR THE OPPOSITE, BEING SO FIGETY OR RESTLESS THAT YOU HAVE BEEN MOVING AROUND A LOT MORE THAN USUAL: NOT AT ALL
SUM OF ALL RESPONSES TO PHQ QUESTIONS 1-9: 0
7. TROUBLE CONCENTRATING ON THINGS, SUCH AS READING THE NEWSPAPER OR WATCHING TELEVISION: NOT AT ALL
SUM OF ALL RESPONSES TO PHQ QUESTIONS 1-9: 0
3. TROUBLE FALLING OR STAYING ASLEEP: NOT AT ALL
SUM OF ALL RESPONSES TO PHQ QUESTIONS 1-9: 0
5. POOR APPETITE OR OVEREATING: NOT AT ALL
4. FEELING TIRED OR HAVING LITTLE ENERGY: NOT AT ALL
10. IF YOU CHECKED OFF ANY PROBLEMS, HOW DIFFICULT HAVE THESE PROBLEMS MADE IT FOR YOU TO DO YOUR WORK, TAKE CARE OF THINGS AT HOME, OR GET ALONG WITH OTHER PEOPLE: NOT DIFFICULT AT ALL
9. THOUGHTS THAT YOU WOULD BE BETTER OFF DEAD, OR OF HURTING YOURSELF: NOT AT ALL

## 2024-10-29 NOTE — PROGRESS NOTES
Medicare Annual Wellness Visit    Eden Parikh is here for Medicare AWV (Pt here for her AWV ) and Health Maintenance (Due for a mammogram. (Pt doesn't think she wants to have it))    Assessment & Plan   Encounter for Medicare annual wellness exam  Bipolar depression (HCC)  --stable on current care planning  -- continue treatment as we are meeting goals   Counseling is given for anxiety and depression   All questions were taken and answers are given      Non-pressure chronic ulcer of left lower leg, with fat layer exposed (HCC)  --stable on current care planning  -- continue treatment as we are meeting goals   --follows with wound care and doing well  Spinal stenosis of lumbar region, unspecified whether neurogenic claudication present  --stable on current care planning  -- continue treatment as we are meeting goals   --PLAN--inject right and left PSIS x 2                1/2 cc xylocaine plus 1 cc depo medrol                Omt/ultra--Rx        Recommendations for Preventive Services Due: see orders and patient instructions/AVS.  Recommended screening schedule for the next 5-10 years is provided to the patient in written form: see Patient Instructions/AVS.     Return in about 3 months (around 1/29/2025).     Subjective   Wound care     Patient's complete Health Risk Assessment and screening values have been reviewed and are found in Flowsheets. The following problems were reviewed today and where indicated follow up appointments were made and/or referrals ordered.    Positive Risk Factor Screenings with Interventions:    Fall Risk:  Do you feel unsteady or are you worried about falling? : no  2 or more falls in past year?: (!) yes  Fall with injury in past year?: (!) yes       Interventions:    Reviewed medications, home hazards, visual acuity, and co-morbidities that can increase risk for falls             Inactivity:  On average, how many days per week do you engage in moderate to strenuous exercise (like a

## 2024-10-30 ENCOUNTER — HOSPITAL ENCOUNTER (OUTPATIENT)
Dept: WOUND CARE | Age: 74
Discharge: HOME | End: 2024-10-30
Attending: PODIATRIST
Payer: MEDICARE

## 2024-10-30 VITALS
TEMPERATURE: 98.4 F | DIASTOLIC BLOOD PRESSURE: 59 MMHG | RESPIRATION RATE: 18 BRPM | BODY MASS INDEX: 33.67 KG/M2 | SYSTOLIC BLOOD PRESSURE: 151 MMHG | HEIGHT: 62 IN | WEIGHT: 182.98 LBS | HEART RATE: 81 BPM

## 2024-10-30 DIAGNOSIS — L97.922 NON-PRESSURE CHRONIC ULCER OF LEFT LOWER LEG, WITH FAT LAYER EXPOSED (HCC): Primary | ICD-10-CM

## 2024-10-30 PROCEDURE — 15275 SKIN SUB GRAFT FACE/NK/HF/G: CPT

## 2024-10-30 RX ORDER — BETAMETHASONE DIPROPIONATE 0.5 MG/G
CREAM TOPICAL ONCE
OUTPATIENT
Start: 2024-10-30 | End: 2024-10-30

## 2024-10-30 RX ORDER — CLOBETASOL PROPIONATE 0.5 MG/G
OINTMENT TOPICAL ONCE
OUTPATIENT
Start: 2024-10-30 | End: 2024-10-30

## 2024-10-30 RX ORDER — LIDOCAINE HYDROCHLORIDE 40 MG/ML
SOLUTION TOPICAL ONCE
OUTPATIENT
Start: 2024-10-30 | End: 2024-10-30

## 2024-10-30 RX ORDER — GENTAMICIN SULFATE 1 MG/G
OINTMENT TOPICAL ONCE
OUTPATIENT
Start: 2024-10-30 | End: 2024-10-30

## 2024-10-30 RX ORDER — TRIAMCINOLONE ACETONIDE 1 MG/G
OINTMENT TOPICAL ONCE
OUTPATIENT
Start: 2024-10-30 | End: 2024-10-30

## 2024-10-30 RX ORDER — SODIUM CHLOR/HYPOCHLOROUS ACID 0.033 %
SOLUTION, IRRIGATION IRRIGATION ONCE
OUTPATIENT
Start: 2024-10-30 | End: 2024-10-30

## 2024-10-30 RX ORDER — LIDOCAINE HYDROCHLORIDE 20 MG/ML
JELLY TOPICAL ONCE
OUTPATIENT
Start: 2024-10-30 | End: 2024-10-30

## 2024-10-30 RX ORDER — LIDOCAINE HYDROCHLORIDE 40 MG/ML
SOLUTION TOPICAL ONCE
Status: COMPLETED | OUTPATIENT
Start: 2024-10-30 | End: 2024-10-30

## 2024-10-30 RX ORDER — LIDOCAINE 40 MG/G
CREAM TOPICAL ONCE
OUTPATIENT
Start: 2024-10-30 | End: 2024-10-30

## 2024-10-30 RX ORDER — NEOMYCIN/BACITRACIN/POLYMYXINB 3.5-400-5K
OINTMENT (GRAM) TOPICAL ONCE
OUTPATIENT
Start: 2024-10-30 | End: 2024-10-30

## 2024-10-30 RX ORDER — MUPIROCIN 20 MG/G
OINTMENT TOPICAL ONCE
OUTPATIENT
Start: 2024-10-30 | End: 2024-10-30

## 2024-10-30 RX ORDER — LIDOCAINE 50 MG/G
OINTMENT TOPICAL ONCE
OUTPATIENT
Start: 2024-10-30 | End: 2024-10-30

## 2024-10-30 RX ORDER — SILVER SULFADIAZINE 10 MG/G
CREAM TOPICAL ONCE
OUTPATIENT
Start: 2024-10-30 | End: 2024-10-30

## 2024-10-30 RX ORDER — GINSENG 100 MG
CAPSULE ORAL ONCE
OUTPATIENT
Start: 2024-10-30 | End: 2024-10-30

## 2024-10-30 RX ORDER — BACITRACIN ZINC AND POLYMYXIN B SULFATE 500; 1000 [USP'U]/G; [USP'U]/G
OINTMENT TOPICAL ONCE
OUTPATIENT
Start: 2024-10-30 | End: 2024-10-30

## 2024-10-30 RX ADMIN — LIDOCAINE HYDROCHLORIDE 10 ML: 40 SOLUTION TOPICAL at 08:46

## 2024-10-30 NOTE — PROGRESS NOTES
12.17 10/30/24 0843   Wound Volume (cm^3) 1.581 cm^3 10/30/24 0843   Wound Healing % 67 10/30/24 0843   Post-Procedure Length (cm) 3.1 cm 10/30/24 0904   Post-Procedure Width (cm) 1.7 cm 10/30/24 0904   Post-Procedure Depth (cm) 0.4 cm 10/30/24 0904   Post-Procedure Surface Area (cm^2) 5.27 cm^2 10/30/24 0904   Post-Procedure Volume (cm^3) 2.108 cm^3 10/30/24 0904   Undermining Starts ___ O'Clock 6 09/25/24 1036   Undermining Ends___ O'Clock 11 09/25/24 1036   Undermining Maxium Distance (cm) .3@7 09/25/24 1036   Wound Assessment Fibrin;Pink/red 10/30/24 0843   Drainage Amount Large (50-75% saturated) 10/30/24 0843   Drainage Description Serosanguinous;Brown;Yellow 10/30/24 0843   Odor Mild 10/30/24 0843   Breann-wound Assessment Fragile;Excoriated 10/30/24 0843   Margins Defined edges 10/23/24 0828   Number of days: 84     Incision 08/09/21 Knee Anterior;Left (Active)   Number of days: 1178       Incision 09/20/21 Knee Left (Active)   Number of days: 1136       Diabetic/Pressure/Non Pressure Ulcers:  Ulcer:   Non-Pressure ulcer, fat layer exposed      Total Surface Area of Ulcer(s) Covered 5 sq/cm    Was the Product Layered  Yes     Amount of Product Applied 7 sq/cm     Amount of Product Wasted 0 sq/cm     Reason for Waste: N/A     Surgically Fixated: Yes    Secured With: Steri Strips and Silicone Dressing     Procedural Pain: 2/10     Post Procedural Pain: 4 / 10    Response to Treatment: Well tolerated by patient.                            Plan:     Pt is not a smoker   - Discussed relationship of smoking and negative affects on wound healing   - Emphasized importance of tobacco avoidace/cessation   - Script for nicotine patch given to patient   - Information regarding support groups for smoking cessation given    In my professional opinion and based off the information that is available at this time this patient has appropriate indication for HBO Therapy: Maybe    Treatment Note please see attached Discharge

## 2024-11-04 NOTE — DISCHARGE INSTRUCTIONS
Visit Discharge/Physician Orders     Discharge condition: Stable     Assessment of pain at discharge:     Anesthetic used:      Discharge to: Home     Left via:Private automobile     Accompanied by: accompanied by self     ECF/HHA: [facility]; PLEASE PAY CLOSE ATTENTION TO PROPER APPLICATION OF LEG WRAP; ENSURE ALL OF LAYERS OF WRAP ARE EVEN FROM TOES TO KNEE.      Dressing Orders: CLEAN LEFT LEG WITH SALINE APPLY HF BLUE, ABD, KERLIX AND COBAN, CHANGE 2X PER WEEK     AQUACEL AG IN CLINIC           Treatment Orders:  elevate lower legs above level of heart to reduce edema      [facility] followup visit _______________1 WEEK______________  (Please note your next appointment above and if you are unable to keep, kindly give a 24 hour notice. Thank you.)     Physician signature:__________________________        If you experience any of the following, please call the Wound Care Center during business hours:     * Increase in Pain  * Temperature over 101  * Increase in drainage from your wound  * Drainage with a foul odor  * Bleeding  * Increase in swelling  * Need for compression bandage changes due to slippage, breakthrough drainage.     If you need medical attention outside of the business hours of the Wound Care Centers please contact your PCP or go to the nearest emergency room.

## 2024-11-06 ENCOUNTER — HOSPITAL ENCOUNTER (OUTPATIENT)
Dept: WOUND CARE | Age: 74
Discharge: HOME | End: 2024-11-06
Attending: PODIATRIST
Payer: MEDICARE

## 2024-11-06 VITALS
HEART RATE: 95 BPM | SYSTOLIC BLOOD PRESSURE: 146 MMHG | BODY MASS INDEX: 33.49 KG/M2 | WEIGHT: 182 LBS | TEMPERATURE: 98.4 F | HEIGHT: 62 IN | DIASTOLIC BLOOD PRESSURE: 76 MMHG | RESPIRATION RATE: 17 BRPM

## 2024-11-06 DIAGNOSIS — L97.922 NON-PRESSURE CHRONIC ULCER OF LEFT LOWER LEG, WITH FAT LAYER EXPOSED (HCC): Primary | ICD-10-CM

## 2024-11-06 PROCEDURE — 87205 SMEAR GRAM STAIN: CPT

## 2024-11-06 PROCEDURE — 87070 CULTURE OTHR SPECIMN AEROBIC: CPT

## 2024-11-06 PROCEDURE — 11042 DBRDMT SUBQ TIS 1ST 20SQCM/<: CPT

## 2024-11-06 PROCEDURE — 87077 CULTURE AEROBIC IDENTIFY: CPT

## 2024-11-06 RX ORDER — CLOBETASOL PROPIONATE 0.5 MG/G
OINTMENT TOPICAL ONCE
OUTPATIENT
Start: 2024-11-06 | End: 2024-11-06

## 2024-11-06 RX ORDER — LIDOCAINE HYDROCHLORIDE 20 MG/ML
JELLY TOPICAL ONCE
OUTPATIENT
Start: 2024-11-06 | End: 2024-11-06

## 2024-11-06 RX ORDER — TRIAMCINOLONE ACETONIDE 1 MG/G
OINTMENT TOPICAL ONCE
OUTPATIENT
Start: 2024-11-06 | End: 2024-11-06

## 2024-11-06 RX ORDER — LIDOCAINE HYDROCHLORIDE 40 MG/ML
SOLUTION TOPICAL ONCE
OUTPATIENT
Start: 2024-11-06 | End: 2024-11-06

## 2024-11-06 RX ORDER — LIDOCAINE 40 MG/G
CREAM TOPICAL ONCE
OUTPATIENT
Start: 2024-11-06 | End: 2024-11-06

## 2024-11-06 RX ORDER — BACITRACIN ZINC AND POLYMYXIN B SULFATE 500; 1000 [USP'U]/G; [USP'U]/G
OINTMENT TOPICAL ONCE
OUTPATIENT
Start: 2024-11-06 | End: 2024-11-06

## 2024-11-06 RX ORDER — MUPIROCIN 20 MG/G
OINTMENT TOPICAL ONCE
OUTPATIENT
Start: 2024-11-06 | End: 2024-11-06

## 2024-11-06 RX ORDER — NEOMYCIN/BACITRACIN/POLYMYXINB 3.5-400-5K
OINTMENT (GRAM) TOPICAL ONCE
OUTPATIENT
Start: 2024-11-06 | End: 2024-11-06

## 2024-11-06 RX ORDER — SILVER SULFADIAZINE 10 MG/G
CREAM TOPICAL ONCE
OUTPATIENT
Start: 2024-11-06 | End: 2024-11-06

## 2024-11-06 RX ORDER — GINSENG 100 MG
CAPSULE ORAL ONCE
OUTPATIENT
Start: 2024-11-06 | End: 2024-11-06

## 2024-11-06 RX ORDER — LIDOCAINE HYDROCHLORIDE 40 MG/ML
SOLUTION TOPICAL ONCE
Status: COMPLETED | OUTPATIENT
Start: 2024-11-06 | End: 2024-11-06

## 2024-11-06 RX ORDER — GENTAMICIN SULFATE 1 MG/G
OINTMENT TOPICAL ONCE
OUTPATIENT
Start: 2024-11-06 | End: 2024-11-06

## 2024-11-06 RX ORDER — SODIUM CHLOR/HYPOCHLOROUS ACID 0.033 %
SOLUTION, IRRIGATION IRRIGATION ONCE
OUTPATIENT
Start: 2024-11-06 | End: 2024-11-06

## 2024-11-06 RX ORDER — BETAMETHASONE DIPROPIONATE 0.5 MG/G
CREAM TOPICAL ONCE
OUTPATIENT
Start: 2024-11-06 | End: 2024-11-06

## 2024-11-06 RX ORDER — LIDOCAINE 50 MG/G
OINTMENT TOPICAL ONCE
OUTPATIENT
Start: 2024-11-06 | End: 2024-11-06

## 2024-11-06 RX ADMIN — LIDOCAINE HYDROCHLORIDE 5 ML: 40 SOLUTION TOPICAL at 08:38

## 2024-11-06 ASSESSMENT — PAIN DESCRIPTION - ORIENTATION: ORIENTATION: LEFT

## 2024-11-06 ASSESSMENT — PAIN SCALES - GENERAL: PAINLEVEL_OUTOF10: 3

## 2024-11-06 ASSESSMENT — PAIN DESCRIPTION - DESCRIPTORS: DESCRIPTORS: BURNING

## 2024-11-06 ASSESSMENT — PAIN DESCRIPTION - LOCATION: LOCATION: LEG

## 2024-11-06 NOTE — PROGRESS NOTES
Wound Healing Center  History and Physical/Consultation  Podiatry    Referring Physician : Edmund Rivas DO  Eden Parikh  MEDICAL RECORD NUMBER:  [id number]  AGE: 74 y.o.   GENDER: female  : 1950  EPISODE DATE:  2024  Subjective:     Chief Complaint   Patient presents with    Wound Check     \"Left leg\"         HISTORY of PRESENT ILLNESS HPI     Eden Parikh is a 74 y.o. female who presents today for wound/ulcer evaluation.   History of Wound Context:  The patient has had a wound of trauma ulcer left leg which was first noted approximately 2024.  This has been treated antibiotic. On their initial visit to the wound healing center, 24 ,  the patient has noted that the wound has not been improving.  The patient has had similar previous wounds in the past.      Pt is not on abx at time of initial visit.      Wound/Ulcer Pain Timing/Severity: constant  Quality of pain: sharp  Severity:  7 / 10   Modifying Factors: Pain worsens with walking  Associated Signs/Symptoms: edema, erythema, drainage, and numbness    Ulcer Identification:  Ulcer Type: traumatic  Contributing Factors: edema, venous stasis, and lymphedema    Diabetic/Pressure/Non Pressure Ulcers onl y:  Ulcer: Non-Pressure ulcer, fat layer exposed    If patient has diabetic lower extremity wounds  Austin Classification of diabetic lower extremity wounds:    Grade Description   []  0 No open wound   []  1 Superficial ulcer involving the full skin thickness   [x]  2 Deep ulcer involves ligament, tendon, joint capsule, or fascia  No bone involvement or abscess presence   []  3 Deep Ulcer with abcess formation and/or osteomyelitis   []  4 Localized gangrene   []  5 Extensive gangrene of the foot     Wound: Contusion    24  Debrided, yeast medication, needs VAC    24  Debrided, cont vac, healing well    24    Debrided, healing well    24  Debrided, cont vac, culture taken    24  Debrided, cont

## 2024-11-06 NOTE — PLAN OF CARE
Problem: Pain  Goal: Verbalizes/displays adequate comfort level or baseline comfort level  Outcome: Progressing     Problem: Wound:  Goal: Will show signs of wound healing; wound closure and no evidence of infection  Description: Will show signs of wound healing; wound closure and no evidence of infection  Outcome: Progressing     Problem: Cognitive:  Goal: Knowledge of wound care  Description: Knowledge of wound care  Outcome: Progressing  Goal: Understands risk factors for wounds  Description: Understands risk factors for wounds  Outcome: Progressing     Problem: Venous:  Goal: Signs of wound healing will improve  Description: Signs of wound healing will improve  Outcome: Progressing

## 2024-11-08 NOTE — DISCHARGE INSTRUCTIONS
Visit Discharge/Physician Orders     Discharge condition: Stable     Assessment of pain at discharge:     Anesthetic used:      Discharge to: Home     Left via:Private automobile     Accompanied by: accompanied by self     ECF/HHA: [facility]; PLEASE PAY CLOSE ATTENTION TO PROPER APPLICATION OF LEG WRAP; ENSURE ALL OF LAYERS OF WRAP ARE EVEN FROM TOES TO KNEE.      Dressing Orders: CLEAN LEFT LEG WITH SALINE APPLY HF BLUE, ABD, KERLIX AND COBAN, CHANGE 2X PER WEEK            Treatment Orders:  elevate lower legs above level of heart to reduce edema     Rx amoxicillin      C followup visit _______________1 WEEK______________  (Please note your next appointment above and if you are unable to keep, kindly give a 24 hour notice. Thank you.)     Physician signature:__________________________        If you experience any of the following, please call the Wound Care Center during business hours:     * Increase in Pain  * Temperature over 101  * Increase in drainage from your wound  * Drainage with a foul odor  * Bleeding  * Increase in swelling  * Need for compression bandage changes due to slippage, breakthrough drainage.     If you need medical attention outside of the business hours of the Wound Care Centers please contact your PCP or go to the nearest emergency room.

## 2024-11-13 ENCOUNTER — HOSPITAL ENCOUNTER (OUTPATIENT)
Dept: WOUND CARE | Age: 74
Discharge: HOME | End: 2024-11-13
Attending: PODIATRIST
Payer: MEDICARE

## 2024-11-13 VITALS
RESPIRATION RATE: 18 BRPM | DIASTOLIC BLOOD PRESSURE: 63 MMHG | BODY MASS INDEX: 33.49 KG/M2 | HEART RATE: 82 BPM | WEIGHT: 182 LBS | SYSTOLIC BLOOD PRESSURE: 151 MMHG | HEIGHT: 62 IN | TEMPERATURE: 98.1 F

## 2024-11-13 DIAGNOSIS — L97.922 NON-PRESSURE CHRONIC ULCER OF LEFT LOWER LEG, WITH FAT LAYER EXPOSED (HCC): Primary | ICD-10-CM

## 2024-11-13 PROBLEM — S61.451A CAT BITE OF HAND, RIGHT, INITIAL ENCOUNTER: Status: RESOLVED | Noted: 2021-01-11 | Resolved: 2024-11-13
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
## 2024-11-13 PROCEDURE — 11042 DBRDMT SUBQ TIS 1ST 20SQCM/<: CPT | Performed by: SURGERY

## 2024-11-13 PROCEDURE — 11042 DBRDMT SUBQ TIS 1ST 20SQCM/<: CPT

## 2024-11-13 RX ORDER — SILVER SULFADIAZINE 10 MG/G
CREAM TOPICAL ONCE
OUTPATIENT
Start: 2024-11-13 | End: 2024-11-13

## 2024-11-13 RX ORDER — CLOBETASOL PROPIONATE 0.5 MG/G
OINTMENT TOPICAL ONCE
OUTPATIENT
Start: 2024-11-13 | End: 2024-11-13

## 2024-11-13 RX ORDER — LIDOCAINE HYDROCHLORIDE 20 MG/ML
JELLY TOPICAL ONCE
OUTPATIENT
Start: 2024-11-13 | End: 2024-11-13

## 2024-11-13 RX ORDER — BETAMETHASONE DIPROPIONATE 0.5 MG/G
CREAM TOPICAL ONCE
OUTPATIENT
Start: 2024-11-13 | End: 2024-11-13

## 2024-11-13 RX ORDER — LIDOCAINE 50 MG/G
OINTMENT TOPICAL ONCE
OUTPATIENT
Start: 2024-11-13 | End: 2024-11-13

## 2024-11-13 RX ORDER — TRIAMCINOLONE ACETONIDE 1 MG/G
OINTMENT TOPICAL ONCE
OUTPATIENT
Start: 2024-11-13 | End: 2024-11-13

## 2024-11-13 RX ORDER — LIDOCAINE HYDROCHLORIDE 40 MG/ML
SOLUTION TOPICAL ONCE
Status: COMPLETED | OUTPATIENT
Start: 2024-11-13 | End: 2024-11-13

## 2024-11-13 RX ORDER — SODIUM CHLOR/HYPOCHLOROUS ACID 0.033 %
SOLUTION, IRRIGATION IRRIGATION ONCE
OUTPATIENT
Start: 2024-11-13 | End: 2024-11-13

## 2024-11-13 RX ORDER — LIDOCAINE 40 MG/G
CREAM TOPICAL ONCE
OUTPATIENT
Start: 2024-11-13 | End: 2024-11-13

## 2024-11-13 RX ORDER — GENTAMICIN SULFATE 1 MG/G
OINTMENT TOPICAL ONCE
OUTPATIENT
Start: 2024-11-13 | End: 2024-11-13

## 2024-11-13 RX ORDER — BACITRACIN ZINC AND POLYMYXIN B SULFATE 500; 1000 [USP'U]/G; [USP'U]/G
OINTMENT TOPICAL ONCE
OUTPATIENT
Start: 2024-11-13 | End: 2024-11-13

## 2024-11-13 RX ORDER — AMOXICILLIN 500 MG/1
500 CAPSULE ORAL 3 TIMES DAILY
Qty: 30 CAPSULE | Refills: 0 | Status: SHIPPED | OUTPATIENT
Start: 2024-11-13 | End: 2024-11-23

## 2024-11-13 RX ORDER — LIDOCAINE HYDROCHLORIDE 40 MG/ML
SOLUTION TOPICAL ONCE
OUTPATIENT
Start: 2024-11-13 | End: 2024-11-13

## 2024-11-13 RX ORDER — MUPIROCIN 20 MG/G
OINTMENT TOPICAL ONCE
OUTPATIENT
Start: 2024-11-13 | End: 2024-11-13

## 2024-11-13 RX ORDER — NEOMYCIN/BACITRACIN/POLYMYXINB 3.5-400-5K
OINTMENT (GRAM) TOPICAL ONCE
OUTPATIENT
Start: 2024-11-13 | End: 2024-11-13

## 2024-11-13 RX ORDER — GINSENG 100 MG
CAPSULE ORAL ONCE
OUTPATIENT
Start: 2024-11-13 | End: 2024-11-13

## 2024-11-13 RX ADMIN — LIDOCAINE HYDROCHLORIDE 10 ML: 40 SOLUTION TOPICAL at 09:13

## 2024-11-13 ASSESSMENT — PAIN DESCRIPTION - LOCATION: LOCATION: LEG

## 2024-11-13 ASSESSMENT — PAIN DESCRIPTION - PAIN TYPE: TYPE: CHRONIC PAIN

## 2024-11-13 ASSESSMENT — PAIN SCALES - GENERAL: PAINLEVEL_OUTOF10: 3

## 2024-11-13 ASSESSMENT — PAIN DESCRIPTION - FREQUENCY: FREQUENCY: INTERMITTENT

## 2024-11-13 ASSESSMENT — PAIN DESCRIPTION - DESCRIPTORS: DESCRIPTORS: BURNING;ACHING

## 2024-11-13 ASSESSMENT — PAIN DESCRIPTION - ONSET: ONSET: ON-GOING

## 2024-11-13 ASSESSMENT — PAIN - FUNCTIONAL ASSESSMENT: PAIN_FUNCTIONAL_ASSESSMENT: ACTIVITIES ARE NOT PREVENTED

## 2024-11-13 ASSESSMENT — PAIN DESCRIPTION - ORIENTATION: ORIENTATION: LEFT

## 2024-11-13 NOTE — PROGRESS NOTES
REPLACEMENT  8/9/21    RTKR? Left knee-not right    KNEE SURGERY Left 09/20/2021    LEFT KNEE MANIPULATION WITH FLUOROSCOPY REMOVE CHECKPOINT performed by Von Adrian MD at [facility] OR    SKIN BIOPSY      TONSILLECTOMY      TOTAL KNEE ARTHROPLASTY Left 08/09/2021    LEFT TOTAL KNEE ARTHROPLASTY ROBOTIC ASSISTED performed by Von Adrian MD at [facility] OR    UPPER GASTROINTESTINAL ENDOSCOPY  2/21     Family History   Problem Relation Age of Onset    Heart Disease Mother     COPD Mother     Anxiety Disorder Mother     Asthma Mother     Other Mother         COPD    Diabetes Mother     Stroke Father     Obesity Father     Heart Disease Brother     Other Brother         suicide    Obesity Brother     Substance Abuse Brother     Diabetes Brother     Substance Abuse Brother         hepatitis C    Mental Illness Brother     Cancer Paternal Uncle         throat    Diabetes Maternal Grandmother     Stroke Maternal Grandmother     Stroke Paternal Grandmother     Stroke Paternal Grandfather     Cancer Paternal Grandfather      Social History     Tobacco Use    Smoking status: Never    Smokeless tobacco: Never    Tobacco comments:     non-smoker   Vaping Use    Vaping status: Never Used   Substance Use Topics    Alcohol use: Yes     Comment:  rarely    Drug use: No     No Known Allergies  Current Outpatient Medications on File Prior to Encounter   Medication Sig Dispense Refill    naproxen (NAPROSYN) 500 MG tablet TAKE 1 TABLET BY MOUTH TWICE A DAY AS NEEDED FOR PAIN 180 tablet 1    b complex vitamins capsule Take 1 capsule by mouth daily      Ascorbic Acid (VITAMIN C) 1000 MG tablet Take 1 tablet by mouth daily      Vitamin D (CHOLECALCIFEROL) 25 MCG (1000 UT) TABS tablet Take 1 tablet by mouth daily Vitamin D3       No current facility-administered medications on file prior to encounter.       REVIEW OF SYSTEMS   ROS : All others Negative if blank [], Positive if [x]  General Vascular   [] Fevers [] Claudication   []

## 2024-11-20 ENCOUNTER — HOSPITAL ENCOUNTER (OUTPATIENT)
Dept: WOUND CARE | Age: 74
Discharge: HOME | End: 2024-11-20
Attending: PODIATRIST
Payer: MEDICARE

## 2024-11-20 VITALS
HEART RATE: 71 BPM | RESPIRATION RATE: 18 BRPM | HEIGHT: 62 IN | TEMPERATURE: 97.6 F | WEIGHT: 182 LBS | DIASTOLIC BLOOD PRESSURE: 69 MMHG | BODY MASS INDEX: 33.49 KG/M2 | SYSTOLIC BLOOD PRESSURE: 132 MMHG

## 2024-11-20 DIAGNOSIS — L97.922 NON-PRESSURE CHRONIC ULCER OF LEFT LOWER LEG, WITH FAT LAYER EXPOSED (HCC): Primary | ICD-10-CM

## 2024-11-20 PROCEDURE — 11042 DBRDMT SUBQ TIS 1ST 20SQCM/<: CPT

## 2024-11-20 RX ORDER — GENTAMICIN SULFATE 1 MG/G
OINTMENT TOPICAL ONCE
OUTPATIENT
Start: 2024-11-20 | End: 2024-11-20

## 2024-11-20 RX ORDER — BACITRACIN ZINC AND POLYMYXIN B SULFATE 500; 1000 [USP'U]/G; [USP'U]/G
OINTMENT TOPICAL ONCE
OUTPATIENT
Start: 2024-11-20 | End: 2024-11-20

## 2024-11-20 RX ORDER — CLOBETASOL PROPIONATE 0.5 MG/G
OINTMENT TOPICAL ONCE
OUTPATIENT
Start: 2024-11-20 | End: 2024-11-20

## 2024-11-20 RX ORDER — LIDOCAINE HYDROCHLORIDE 20 MG/ML
JELLY TOPICAL ONCE
OUTPATIENT
Start: 2024-11-20 | End: 2024-11-20

## 2024-11-20 RX ORDER — LIDOCAINE 40 MG/G
CREAM TOPICAL ONCE
OUTPATIENT
Start: 2024-11-20 | End: 2024-11-20

## 2024-11-20 RX ORDER — GINSENG 100 MG
CAPSULE ORAL ONCE
OUTPATIENT
Start: 2024-11-20 | End: 2024-11-20

## 2024-11-20 RX ORDER — LIDOCAINE HYDROCHLORIDE 40 MG/ML
SOLUTION TOPICAL ONCE
OUTPATIENT
Start: 2024-11-20 | End: 2024-11-20

## 2024-11-20 RX ORDER — LIDOCAINE 50 MG/G
OINTMENT TOPICAL ONCE
OUTPATIENT
Start: 2024-11-20 | End: 2024-11-20

## 2024-11-20 RX ORDER — NEOMYCIN/BACITRACIN/POLYMYXINB 3.5-400-5K
OINTMENT (GRAM) TOPICAL ONCE
OUTPATIENT
Start: 2024-11-20 | End: 2024-11-20

## 2024-11-20 RX ORDER — MUPIROCIN 20 MG/G
OINTMENT TOPICAL ONCE
OUTPATIENT
Start: 2024-11-20 | End: 2024-11-20

## 2024-11-20 RX ORDER — LIDOCAINE HYDROCHLORIDE 40 MG/ML
SOLUTION TOPICAL ONCE
Status: COMPLETED | OUTPATIENT
Start: 2024-11-20 | End: 2024-11-20

## 2024-11-20 RX ORDER — BETAMETHASONE DIPROPIONATE 0.5 MG/G
CREAM TOPICAL ONCE
OUTPATIENT
Start: 2024-11-20 | End: 2024-11-20

## 2024-11-20 RX ORDER — SODIUM CHLOR/HYPOCHLOROUS ACID 0.033 %
SOLUTION, IRRIGATION IRRIGATION ONCE
OUTPATIENT
Start: 2024-11-20 | End: 2024-11-20

## 2024-11-20 RX ORDER — TRIAMCINOLONE ACETONIDE 1 MG/G
OINTMENT TOPICAL ONCE
OUTPATIENT
Start: 2024-11-20 | End: 2024-11-20

## 2024-11-20 RX ORDER — SILVER SULFADIAZINE 10 MG/G
CREAM TOPICAL ONCE
OUTPATIENT
Start: 2024-11-20 | End: 2024-11-20

## 2024-11-20 RX ADMIN — LIDOCAINE HYDROCHLORIDE 5 ML: 40 SOLUTION TOPICAL at 09:35

## 2024-11-20 NOTE — DISCHARGE INSTRUCTIONS
Visit Discharge/Physician Orders     Discharge condition: Stable     Assessment of pain at discharge:     Anesthetic used:      Discharge to: Home     Left via:Private automobile     Accompanied by: accompanied by self     ECF/HHA: [facility]; PLEASE PAY CLOSE ATTENTION TO PROPER APPLICATION OF LEG WRAP; ENSURE ALL OF LAYERS OF WRAP ARE EVEN FROM TOES TO KNEE.      Dressing Orders: CLEAN LEFT LEG WITH SALINE APPLY HF BLUE, ABD, KERLIX AND COBAN, CHANGE 2X PER WEEK            Treatment Orders:  elevate lower legs above level of heart to reduce edema      Rx amoxicillin      C followup visit ________2 weeks_____________  (Please note your next appointment above and if you are unable to keep, kindly give a 24 hour notice. Thank you.)     Physician signature:__________________________        If you experience any of the following, please call the Wound Care Center during business hours:     * Increase in Pain  * Temperature over 101  * Increase in drainage from your wound  * Drainage with a foul odor  * Bleeding  * Increase in swelling  * Need for compression bandage changes due to slippage, breakthrough drainage.     If you need medical attention outside of the business hours of the Wound Care Centers please contact your PCP or go to the nearest emergency room.

## 2024-11-20 NOTE — PROGRESS NOTES
Wound Healing Center  History and Physical/Consultation  Podiatry    Referring Physician : Edmund Rivas DO  Eden Parikh  MEDICAL RECORD NUMBER:  [id number]  AGE: 74 y.o.   GENDER: female  : 1950  EPISODE DATE:  2024  Subjective:     Chief Complaint   Patient presents with    Wound Check     L leg         HISTORY of PRESENT ILLNESS HPI     Eden Parikh is a 74 y.o. female who presents today for wound/ulcer evaluation.   History of Wound Context:  The patient has had a wound of trauma ulcer left leg which was first noted approximately 2024.  This has been treated antibiotic. On their initial visit to the wound healing center, 24 ,  the patient has noted that the wound has not been improving.  The patient has had similar previous wounds in the past.      Pt is not on abx at time of initial visit.      Wound/Ulcer Pain Timing/Severity: constant  Quality of pain: sharp  Severity:  7 / 10   Modifying Factors: Pain worsens with walking  Associated Signs/Symptoms: edema, erythema, drainage, and numbness    Ulcer Identification:  Ulcer Type: traumatic  Contributing Factors: edema, venous stasis, and lymphedema    Diabetic/Pressure/Non Pressure Ulcers onl y:  Ulcer: Non-Pressure ulcer, fat layer exposed    If patient has diabetic lower extremity wounds  Austin Classification of diabetic lower extremity wounds:    Grade Description   []  0 No open wound   []  1 Superficial ulcer involving the full skin thickness   [x]  2 Deep ulcer involves ligament, tendon, joint capsule, or fascia  No bone involvement or abscess presence   []  3 Deep Ulcer with abcess formation and/or osteomyelitis   []  4 Localized gangrene   []  5 Extensive gangrene of the foot     Wound: Contusion    24  Debrided, yeast medication, needs VAC    24  Debrided, cont vac, healing well    24    Debrided, healing well    24  Debrided, cont vac, culture taken    24  Debrided, cont

## 2024-12-04 ENCOUNTER — HOSPITAL ENCOUNTER (OUTPATIENT)
Dept: WOUND CARE | Age: 74
Discharge: HOME | End: 2024-12-04
Attending: PODIATRIST
Payer: MEDICARE

## 2024-12-04 VITALS
DIASTOLIC BLOOD PRESSURE: 59 MMHG | SYSTOLIC BLOOD PRESSURE: 132 MMHG | WEIGHT: 182 LBS | BODY MASS INDEX: 33.49 KG/M2 | TEMPERATURE: 97.8 F | HEIGHT: 62 IN | RESPIRATION RATE: 18 BRPM | HEART RATE: 72 BPM

## 2024-12-04 DIAGNOSIS — L97.922 NON-PRESSURE CHRONIC ULCER OF LEFT LOWER LEG, WITH FAT LAYER EXPOSED (HCC): Primary | ICD-10-CM

## 2024-12-04 PROCEDURE — 11042 DBRDMT SUBQ TIS 1ST 20SQCM/<: CPT

## 2024-12-04 RX ORDER — MUPIROCIN 20 MG/G
OINTMENT TOPICAL ONCE
OUTPATIENT
Start: 2024-12-04 | End: 2024-12-04

## 2024-12-04 RX ORDER — LIDOCAINE HYDROCHLORIDE 20 MG/ML
JELLY TOPICAL ONCE
OUTPATIENT
Start: 2024-12-04 | End: 2024-12-04

## 2024-12-04 RX ORDER — GENTAMICIN SULFATE 1 MG/G
OINTMENT TOPICAL ONCE
OUTPATIENT
Start: 2024-12-04 | End: 2024-12-04

## 2024-12-04 RX ORDER — LIDOCAINE HYDROCHLORIDE 40 MG/ML
SOLUTION TOPICAL ONCE
OUTPATIENT
Start: 2024-12-04 | End: 2024-12-04

## 2024-12-04 RX ORDER — SODIUM CHLOR/HYPOCHLOROUS ACID 0.033 %
SOLUTION, IRRIGATION IRRIGATION ONCE
OUTPATIENT
Start: 2024-12-04 | End: 2024-12-04

## 2024-12-04 RX ORDER — CLOBETASOL PROPIONATE 0.5 MG/G
OINTMENT TOPICAL ONCE
OUTPATIENT
Start: 2024-12-04 | End: 2024-12-04

## 2024-12-04 RX ORDER — BETAMETHASONE DIPROPIONATE 0.5 MG/G
CREAM TOPICAL ONCE
OUTPATIENT
Start: 2024-12-04 | End: 2024-12-04

## 2024-12-04 RX ORDER — BACITRACIN ZINC AND POLYMYXIN B SULFATE 500; 1000 [USP'U]/G; [USP'U]/G
OINTMENT TOPICAL ONCE
OUTPATIENT
Start: 2024-12-04 | End: 2024-12-04

## 2024-12-04 RX ORDER — LIDOCAINE 50 MG/G
OINTMENT TOPICAL ONCE
OUTPATIENT
Start: 2024-12-04 | End: 2024-12-04

## 2024-12-04 RX ORDER — GINSENG 100 MG
CAPSULE ORAL ONCE
OUTPATIENT
Start: 2024-12-04 | End: 2024-12-04

## 2024-12-04 RX ORDER — LIDOCAINE HYDROCHLORIDE 40 MG/ML
SOLUTION TOPICAL ONCE
Status: COMPLETED | OUTPATIENT
Start: 2024-12-04 | End: 2024-12-04

## 2024-12-04 RX ORDER — NEOMYCIN/BACITRACIN/POLYMYXINB 3.5-400-5K
OINTMENT (GRAM) TOPICAL ONCE
OUTPATIENT
Start: 2024-12-04 | End: 2024-12-04

## 2024-12-04 RX ORDER — SILVER SULFADIAZINE 10 MG/G
CREAM TOPICAL ONCE
OUTPATIENT
Start: 2024-12-04 | End: 2024-12-04

## 2024-12-04 RX ORDER — TRIAMCINOLONE ACETONIDE 1 MG/G
OINTMENT TOPICAL ONCE
OUTPATIENT
Start: 2024-12-04 | End: 2024-12-04

## 2024-12-04 RX ORDER — LIDOCAINE 40 MG/G
CREAM TOPICAL ONCE
OUTPATIENT
Start: 2024-12-04 | End: 2024-12-04

## 2024-12-04 RX ADMIN — LIDOCAINE HYDROCHLORIDE 5 ML: 40 SOLUTION TOPICAL at 09:29

## 2024-12-04 NOTE — PROGRESS NOTES
Post Procedural Pain:  6 / 10     Response to treatment:  Well tolerated by patient.     A culture was done.                    Plan:     Pt is not a smoker   - Discussed relationship of smoking and negative affects on wound healing   - Emphasized importance of tobacco avoidace/cessation   - Script for nicotine patch given to patient   - Information regarding support groups for smoking cessation given    In my professional opinion and based off the information that is available at this time this patient has appropriate indication for HBO Therapy: Maybe    Treatment Note please see attached Discharge Instructions    Written patient dismissal instructions given to patient and signed by patient or POA.         Discharge Instructions         Visit Discharge/Physician Orders     Discharge condition: Stable     Assessment of pain at discharge:     Anesthetic used:      Discharge to: Home     Left via:Private automobile     Accompanied by: accompanied by self     [facility]; PLEASE PAY CLOSE ATTENTION TO PROPER APPLICATION OF LEG WRAP; ENSURE ALL OF LAYERS OF WRAP ARE EVEN FROM TOES TO KNEE.      Dressing Orders: CLEAN LEFT LEG WITH SALINE APPLY HF BLUE, ABD, KERLIX AND COBAN, CHANGE 2X PER WEEK    In clinic apply joseph         Treatment Orders:  elevate lower legs above level of heart to reduce edema           [facility] followup visit ______2 weeks_________  (Please note your next appointment above and if you are unable to keep, kindly give a 24 hour notice. Thank you.)     Physician signature:__________________________        If you experience any of the following, please call the Wound Care Center during business hours:     * Increase in Pain  * Temperature over 101  * Increase in drainage from your wound  * Drainage with a foul odor  * Bleeding  * Increase in swelling  * Need for compression bandage changes due to slippage, breakthrough drainage.     If you need medical attention outside of the business

## 2024-12-04 NOTE — DISCHARGE INSTRUCTIONS
Visit Discharge/Physician Orders     Discharge condition: Stable     Assessment of pain at discharge:     Anesthetic used:      Discharge to: Home     Left via:Private automobile     Accompanied by: accompanied by self     ECF/HHA: [facility]; PLEASE PAY CLOSE ATTENTION TO PROPER APPLICATION OF LEG WRAP; ENSURE ALL OF LAYERS OF WRAP ARE EVEN FROM TOES TO KNEE.      Dressing Orders: CLEAN LEFT LEG WITH SALINE APPLY HF BLUE, ABD, KERLIX AND COBAN, CHANGE 2X PER WEEK    In clinic apply joseph         Treatment Orders:  elevate lower legs above level of heart to reduce edema           [facility] followup visit ______2 weeks_________  (Please note your next appointment above and if you are unable to keep, kindly give a 24 hour notice. Thank you.)     Physician signature:__________________________        If you experience any of the following, please call the Wound Care Center during business hours:     * Increase in Pain  * Temperature over 101  * Increase in drainage from your wound  * Drainage with a foul odor  * Bleeding  * Increase in swelling  * Need for compression bandage changes due to slippage, breakthrough drainage.     If you need medical attention outside of the business hours of the Wound Care Centers please contact your PCP or go to the nearest emergency room.

## 2024-12-17 NOTE — DISCHARGE INSTRUCTIONS
Visit Discharge/Physician Orders     Discharge condition: Stable     Assessment of pain at discharge:     Anesthetic used:      Discharge to: Home     Left via:Private automobile     Accompanied by: accompanied by self     ECF/HHA: [facility]; PLEASE PAY CLOSE ATTENTION TO PROPER APPLICATION OF LEG WRAP; ENSURE ALL OF LAYERS OF WRAP ARE EVEN FROM TOES TO KNEE.      Dressing Orders: CLEAN LEFT LEG WITH SALINE APPLY HF BLUE, ABD, KERLIX AND COBAN, CHANGE 2X PER WEEK    In clinic apply joseph         Treatment Orders:  elevate lower legs above level of heart to reduce edema            [facility] followup visit ______3 weeks_________  (Please note your next appointment above and if you are unable to keep, kindly give a 24 hour notice. Thank you.)     Physician signature:__________________________        If you experience any of the following, please call the Wound Care Center during business hours:     * Increase in Pain  * Temperature over 101  * Increase in drainage from your wound  * Drainage with a foul odor  * Bleeding  * Increase in swelling  * Need for compression bandage changes due to slippage, breakthrough drainage.     If you need medical attention outside of the business hours of the Wound Care Centers please contact your PCP or go to the nearest emergency room.

## 2024-12-18 ENCOUNTER — HOSPITAL ENCOUNTER (OUTPATIENT)
Dept: WOUND CARE | Age: 74
Discharge: HOME | End: 2024-12-18
Attending: PODIATRIST
Payer: MEDICARE

## 2024-12-18 VITALS
TEMPERATURE: 96.5 F | SYSTOLIC BLOOD PRESSURE: 154 MMHG | HEIGHT: 62 IN | DIASTOLIC BLOOD PRESSURE: 59 MMHG | WEIGHT: 182 LBS | HEART RATE: 75 BPM | RESPIRATION RATE: 18 BRPM | BODY MASS INDEX: 33.49 KG/M2

## 2024-12-18 DIAGNOSIS — L97.922 NON-PRESSURE CHRONIC ULCER OF LEFT LOWER LEG, WITH FAT LAYER EXPOSED (HCC): Primary | ICD-10-CM

## 2024-12-18 PROCEDURE — 11042 DBRDMT SUBQ TIS 1ST 20SQCM/<: CPT

## 2024-12-18 RX ORDER — LIDOCAINE 40 MG/G
CREAM TOPICAL ONCE
OUTPATIENT
Start: 2024-12-18 | End: 2024-12-18

## 2024-12-18 RX ORDER — LIDOCAINE HYDROCHLORIDE 20 MG/ML
JELLY TOPICAL ONCE
OUTPATIENT
Start: 2024-12-18 | End: 2024-12-18

## 2024-12-18 RX ORDER — MUPIROCIN 20 MG/G
OINTMENT TOPICAL ONCE
OUTPATIENT
Start: 2024-12-18 | End: 2024-12-18

## 2024-12-18 RX ORDER — BETAMETHASONE DIPROPIONATE 0.5 MG/G
CREAM TOPICAL ONCE
OUTPATIENT
Start: 2024-12-18 | End: 2024-12-18

## 2024-12-18 RX ORDER — NEOMYCIN/BACITRACIN/POLYMYXINB 3.5-400-5K
OINTMENT (GRAM) TOPICAL ONCE
OUTPATIENT
Start: 2024-12-18 | End: 2024-12-18

## 2024-12-18 RX ORDER — LIDOCAINE 50 MG/G
OINTMENT TOPICAL ONCE
OUTPATIENT
Start: 2024-12-18 | End: 2024-12-18

## 2024-12-18 RX ORDER — GENTAMICIN SULFATE 1 MG/G
OINTMENT TOPICAL ONCE
OUTPATIENT
Start: 2024-12-18 | End: 2024-12-18

## 2024-12-18 RX ORDER — LIDOCAINE HYDROCHLORIDE 40 MG/ML
SOLUTION TOPICAL ONCE
Status: COMPLETED | OUTPATIENT
Start: 2024-12-18 | End: 2024-12-18

## 2024-12-18 RX ORDER — TRIAMCINOLONE ACETONIDE 1 MG/G
OINTMENT TOPICAL ONCE
OUTPATIENT
Start: 2024-12-18 | End: 2024-12-18

## 2024-12-18 RX ORDER — LIDOCAINE HYDROCHLORIDE 40 MG/ML
SOLUTION TOPICAL ONCE
OUTPATIENT
Start: 2024-12-18 | End: 2024-12-18

## 2024-12-18 RX ORDER — GINSENG 100 MG
CAPSULE ORAL ONCE
OUTPATIENT
Start: 2024-12-18 | End: 2024-12-18

## 2024-12-18 RX ORDER — CLOBETASOL PROPIONATE 0.5 MG/G
OINTMENT TOPICAL ONCE
OUTPATIENT
Start: 2024-12-18 | End: 2024-12-18

## 2024-12-18 RX ORDER — SODIUM CHLOR/HYPOCHLOROUS ACID 0.033 %
SOLUTION, IRRIGATION IRRIGATION ONCE
OUTPATIENT
Start: 2024-12-18 | End: 2024-12-18

## 2024-12-18 RX ORDER — BACITRACIN ZINC AND POLYMYXIN B SULFATE 500; 1000 [USP'U]/G; [USP'U]/G
OINTMENT TOPICAL ONCE
OUTPATIENT
Start: 2024-12-18 | End: 2024-12-18

## 2024-12-18 RX ORDER — SILVER SULFADIAZINE 10 MG/G
CREAM TOPICAL ONCE
OUTPATIENT
Start: 2024-12-18 | End: 2024-12-18

## 2024-12-18 RX ADMIN — LIDOCAINE HYDROCHLORIDE 5 ML: 40 SOLUTION TOPICAL at 09:15

## 2024-12-18 ASSESSMENT — PAIN DESCRIPTION - PAIN TYPE: TYPE: CHRONIC PAIN

## 2024-12-18 ASSESSMENT — PAIN - FUNCTIONAL ASSESSMENT: PAIN_FUNCTIONAL_ASSESSMENT: ACTIVITIES ARE NOT PREVENTED

## 2024-12-18 ASSESSMENT — PAIN SCALES - GENERAL: PAINLEVEL_OUTOF10: 3

## 2024-12-18 ASSESSMENT — PAIN DESCRIPTION - ORIENTATION: ORIENTATION: LEFT

## 2024-12-18 ASSESSMENT — PAIN DESCRIPTION - DESCRIPTORS: DESCRIPTORS: ACHING

## 2024-12-18 ASSESSMENT — PAIN DESCRIPTION - FREQUENCY: FREQUENCY: INTERMITTENT

## 2024-12-18 ASSESSMENT — PAIN DESCRIPTION - LOCATION: LOCATION: LEG

## 2024-12-18 NOTE — PROGRESS NOTES
to treatment:  Well tolerated by patient.     A culture was done.                    Plan:     Pt is not a smoker   - Discussed relationship of smoking and negative affects on wound healing   - Emphasized importance of tobacco avoidace/cessation   - Script for nicotine patch given to patient   - Information regarding support groups for smoking cessation given    In my professional opinion and based off the information that is available at this time this patient has appropriate indication for HBO Therapy: Maybe    Treatment Note please see attached Discharge Instructions    Written patient dismissal instructions given to patient and signed by patient or POA.         Discharge Instructions             Visit Discharge/Physician Orders     Discharge condition: Stable     Assessment of pain at discharge:     Anesthetic used:      Discharge to: Home     Left via:Private automobile     Accompanied by: accompanied by self     [facility]; PLEASE PAY CLOSE ATTENTION TO PROPER APPLICATION OF LEG WRAP; ENSURE ALL OF LAYERS OF WRAP ARE EVEN FROM TOES TO KNEE.      Dressing Orders: CLEAN LEFT LEG WITH SALINE APPLY HF BLUE, ABD, KERLIX AND COBAN, CHANGE 2X PER WEEK    In clinic apply joseph         Treatment Orders:  elevate lower legs above level of heart to reduce edema            [facility] followup visit ______3 weeks_________  (Please note your next appointment above and if you are unable to keep, kindly give a 24 hour notice. Thank you.)     Physician signature:__________________________        If you experience any of the following, please call the Wound Care Center during business hours:     * Increase in Pain  * Temperature over 101  * Increase in drainage from your wound  * Drainage with a foul odor  * Bleeding  * Increase in swelling  * Need for compression bandage changes due to slippage, breakthrough drainage.     If you need medical attention outside of the business hours of the Wound Care Centers please

## 2024-12-23 RX ORDER — NYSTATIN TOPICAL POWDER 100000 U/G
POWDER TOPICAL
Qty: 60 G | Refills: 1 | OUTPATIENT
Start: 2024-12-23

## 2024-12-30 RX ORDER — NYSTATIN 100000 [USP'U]/G
POWDER TOPICAL
Qty: 120 G | Refills: 3 | Status: SHIPPED | OUTPATIENT
Start: 2024-12-30

## 2025-01-06 NOTE — DISCHARGE INSTRUCTIONS
Visit Discharge/Physician Orders     Discharge condition: Stable     Assessment of pain at discharge:     Anesthetic used:      Discharge to: Home     Left via:Private automobile     Accompanied by: accompanied by self     ECF/HHA: [facility]; OK TO D/C      Dressing Orders: HEALED         Treatment Orders:          [facility] followup visit ______AS NEEDED, HEALED ________  (Please note your next appointment above and if you are unable to keep, kindly give a 24 hour notice. Thank you.)     Physician signature:__________________________        If you experience any of the following, please call the Wound Care Center during business hours:     * Increase in Pain  * Temperature over 101  * Increase in drainage from your wound  * Drainage with a foul odor  * Bleeding  * Increase in swelling  * Need for compression bandage changes due to slippage, breakthrough drainage.     If you need medical attention outside of the business hours of the Wound Care Centers please contact your PCP or go to the nearest emergency room.

## 2025-01-08 ENCOUNTER — HOSPITAL ENCOUNTER (OUTPATIENT)
Dept: WOUND CARE | Age: 75
Discharge: HOME | End: 2025-01-08
Attending: PODIATRIST
Payer: MEDICARE

## 2025-01-08 VITALS
HEIGHT: 62 IN | WEIGHT: 182 LBS | SYSTOLIC BLOOD PRESSURE: 133 MMHG | TEMPERATURE: 97.1 F | RESPIRATION RATE: 19 BRPM | DIASTOLIC BLOOD PRESSURE: 55 MMHG | BODY MASS INDEX: 33.49 KG/M2 | HEART RATE: 69 BPM

## 2025-01-08 DIAGNOSIS — L97.922 NON-PRESSURE CHRONIC ULCER OF LEFT LOWER LEG, WITH FAT LAYER EXPOSED (HCC): Primary | ICD-10-CM

## 2025-01-08 PROCEDURE — 99212 OFFICE O/P EST SF 10 MIN: CPT

## 2025-01-08 RX ORDER — CLOBETASOL PROPIONATE 0.5 MG/G
OINTMENT TOPICAL ONCE
Status: CANCELLED | OUTPATIENT
Start: 2025-01-08 | End: 2025-01-08

## 2025-01-08 RX ORDER — SILVER SULFADIAZINE 10 MG/G
CREAM TOPICAL ONCE
Status: CANCELLED | OUTPATIENT
Start: 2025-01-08 | End: 2025-01-08

## 2025-01-08 RX ORDER — BETAMETHASONE DIPROPIONATE 0.5 MG/G
CREAM TOPICAL ONCE
Status: CANCELLED | OUTPATIENT
Start: 2025-01-08 | End: 2025-01-08

## 2025-01-08 RX ORDER — NEOMYCIN/BACITRACIN/POLYMYXINB 3.5-400-5K
OINTMENT (GRAM) TOPICAL ONCE
Status: CANCELLED | OUTPATIENT
Start: 2025-01-08 | End: 2025-01-08

## 2025-01-08 RX ORDER — SODIUM CHLOR/HYPOCHLOROUS ACID 0.033 %
SOLUTION, IRRIGATION IRRIGATION ONCE
Status: CANCELLED | OUTPATIENT
Start: 2025-01-08 | End: 2025-01-08

## 2025-01-08 RX ORDER — LIDOCAINE HYDROCHLORIDE 40 MG/ML
SOLUTION TOPICAL ONCE
Status: CANCELLED | OUTPATIENT
Start: 2025-01-08 | End: 2025-01-08

## 2025-01-08 RX ORDER — LIDOCAINE HYDROCHLORIDE 40 MG/ML
SOLUTION TOPICAL ONCE
Status: COMPLETED | OUTPATIENT
Start: 2025-01-08 | End: 2025-01-08

## 2025-01-08 RX ORDER — GENTAMICIN SULFATE 1 MG/G
OINTMENT TOPICAL ONCE
Status: CANCELLED | OUTPATIENT
Start: 2025-01-08 | End: 2025-01-08

## 2025-01-08 RX ORDER — GINSENG 100 MG
CAPSULE ORAL ONCE
Status: CANCELLED | OUTPATIENT
Start: 2025-01-08 | End: 2025-01-08

## 2025-01-08 RX ORDER — BACITRACIN ZINC AND POLYMYXIN B SULFATE 500; 1000 [USP'U]/G; [USP'U]/G
OINTMENT TOPICAL ONCE
Status: CANCELLED | OUTPATIENT
Start: 2025-01-08 | End: 2025-01-08

## 2025-01-08 RX ORDER — MUPIROCIN 20 MG/G
OINTMENT TOPICAL ONCE
Status: CANCELLED | OUTPATIENT
Start: 2025-01-08 | End: 2025-01-08

## 2025-01-08 RX ORDER — LIDOCAINE 50 MG/G
OINTMENT TOPICAL ONCE
Status: CANCELLED | OUTPATIENT
Start: 2025-01-08 | End: 2025-01-08

## 2025-01-08 RX ORDER — TRIAMCINOLONE ACETONIDE 1 MG/G
OINTMENT TOPICAL ONCE
Status: CANCELLED | OUTPATIENT
Start: 2025-01-08 | End: 2025-01-08

## 2025-01-08 RX ORDER — LIDOCAINE 40 MG/G
CREAM TOPICAL ONCE
Status: CANCELLED | OUTPATIENT
Start: 2025-01-08 | End: 2025-01-08

## 2025-01-08 RX ORDER — LIDOCAINE HYDROCHLORIDE 20 MG/ML
JELLY TOPICAL ONCE
Status: CANCELLED | OUTPATIENT
Start: 2025-01-08 | End: 2025-01-08

## 2025-01-08 RX ADMIN — LIDOCAINE HYDROCHLORIDE 3 ML: 40 SOLUTION TOPICAL at 09:47

## 2025-01-08 NOTE — PLAN OF CARE
Problem: Cognitive:  Goal: Knowledge of wound care  Description: Knowledge of wound care  Outcome: Completed  Goal: Understands risk factors for wounds  Description: Understands risk factors for wounds  Outcome: Completed     Problem: Wound:  Goal: Will show signs of wound healing; wound closure and no evidence of infection  Description: Will show signs of wound healing; wound closure and no evidence of infection  Outcome: Completed     Problem: Venous:  Goal: Signs of wound healing will improve  Description: Signs of wound healing will improve  Outcome: Completed

## 2025-01-08 NOTE — PROGRESS NOTES
Wound Healing Center  History and Physical/Consultation  Podiatry    Referring Physician : Edmund Rivas DO  Eden Parikh  MEDICAL RECORD NUMBER:  [id number]  AGE: 74 y.o.   GENDER: female  : 1950  EPISODE DATE:  2025  Subjective:     Chief Complaint   Patient presents with    Wound Check     Left leg         HISTORY of PRESENT ILLNESS HPI     Eden Parikh is a 74 y.o. female who presents today for wound/ulcer evaluation.   History of Wound Context:  The patient has had a wound of trauma ulcer left leg which was first noted approximately 2024.  This has been treated antibiotic. On their initial visit to the wound healing center, 24 ,  the patient has noted that the wound has not been improving.  The patient has had similar previous wounds in the past.      Pt is not on abx at time of initial visit.      Wound/Ulcer Pain Timing/Severity: constant  Quality of pain: sharp  Severity:  7 / 10   Modifying Factors: Pain worsens with walking  Associated Signs/Symptoms: edema, erythema, drainage, and numbness    Ulcer Identification:  Ulcer Type: traumatic  Contributing Factors: edema, venous stasis, and lymphedema    Diabetic/Pressure/Non Pressure Ulcers onl y:  Ulcer: Non-Pressure ulcer, fat layer exposed    If patient has diabetic lower extremity wounds  [place] Classification of diabetic lower extremity wounds:    Grade Description   []  0 No open wound   []  1 Superficial ulcer involving the full skin thickness   [x]  2 Deep ulcer involves ligament, tendon, joint capsule, or fascia  No bone involvement or abscess presence   []  3 Deep Ulcer with abcess formation and/or osteomyelitis   []  4 Localized gangrene   []  5 Extensive gangrene of the foot     Wound: Contusion    24  Debrided, yeast medication, needs VAC    24  Debrided, cont vac, healing well    24    Debrided, healing well    24  Debrided, cont vac, culture taken    24  Debrided, cont

## 2025-01-25 SDOH — ECONOMIC STABILITY: FOOD INSECURITY: WITHIN THE PAST 12 MONTHS, YOU WORRIED THAT YOUR FOOD WOULD RUN OUT BEFORE YOU GOT MONEY TO BUY MORE.: NEVER TRUE

## 2025-01-25 SDOH — ECONOMIC STABILITY: TRANSPORTATION INSECURITY
IN THE PAST 12 MONTHS, HAS THE LACK OF TRANSPORTATION KEPT YOU FROM MEDICAL APPOINTMENTS OR FROM GETTING MEDICATIONS?: NO

## 2025-01-25 SDOH — ECONOMIC STABILITY: INCOME INSECURITY: IN THE LAST 12 MONTHS, WAS THERE A TIME WHEN YOU WERE NOT ABLE TO PAY THE MORTGAGE OR RENT ON TIME?: NO

## 2025-01-25 SDOH — ECONOMIC STABILITY: FOOD INSECURITY: WITHIN THE PAST 12 MONTHS, THE FOOD YOU BOUGHT JUST DIDN'T LAST AND YOU DIDN'T HAVE MONEY TO GET MORE.: NEVER TRUE

## 2025-01-25 ASSESSMENT — PATIENT HEALTH QUESTIONNAIRE - PHQ9
6. FEELING BAD ABOUT YOURSELF - OR THAT YOU ARE A FAILURE OR HAVE LET YOURSELF OR YOUR FAMILY DOWN: NOT AT ALL
9. THOUGHTS THAT YOU WOULD BE BETTER OFF DEAD, OR OF HURTING YOURSELF: NOT AT ALL
4. FEELING TIRED OR HAVING LITTLE ENERGY: NOT AT ALL
SUM OF ALL RESPONSES TO PHQ QUESTIONS 1-9: 0
7. TROUBLE CONCENTRATING ON THINGS, SUCH AS READING THE NEWSPAPER OR WATCHING TELEVISION: NOT AT ALL
SUM OF ALL RESPONSES TO PHQ QUESTIONS 1-9: 0
SUM OF ALL RESPONSES TO PHQ QUESTIONS 1-9: 0
3. TROUBLE FALLING OR STAYING ASLEEP: NOT AT ALL
3. TROUBLE FALLING OR STAYING ASLEEP: NOT AT ALL
10. IF YOU CHECKED OFF ANY PROBLEMS, HOW DIFFICULT HAVE THESE PROBLEMS MADE IT FOR YOU TO DO YOUR WORK, TAKE CARE OF THINGS AT HOME, OR GET ALONG WITH OTHER PEOPLE: NOT DIFFICULT AT ALL
8. MOVING OR SPEAKING SO SLOWLY THAT OTHER PEOPLE COULD HAVE NOTICED. OR THE OPPOSITE - BEING SO FIDGETY OR RESTLESS THAT YOU HAVE BEEN MOVING AROUND A LOT MORE THAN USUAL: NOT AT ALL
9. THOUGHTS THAT YOU WOULD BE BETTER OFF DEAD, OR OF HURTING YOURSELF: NOT AT ALL
SUM OF ALL RESPONSES TO PHQ QUESTIONS 1-9: 0
7. TROUBLE CONCENTRATING ON THINGS, SUCH AS READING THE NEWSPAPER OR WATCHING TELEVISION: NOT AT ALL
6. FEELING BAD ABOUT YOURSELF - OR THAT YOU ARE A FAILURE OR HAVE LET YOURSELF OR YOUR FAMILY DOWN: NOT AT ALL
1. LITTLE INTEREST OR PLEASURE IN DOING THINGS: NOT AT ALL
2. FEELING DOWN, DEPRESSED OR HOPELESS: NOT AT ALL
5. POOR APPETITE OR OVEREATING: NOT AT ALL
SUM OF ALL RESPONSES TO PHQ QUESTIONS 1-9: 0
2. FEELING DOWN, DEPRESSED OR HOPELESS: NOT AT ALL
8. MOVING OR SPEAKING SO SLOWLY THAT OTHER PEOPLE COULD HAVE NOTICED. OR THE OPPOSITE, BEING SO FIGETY OR RESTLESS THAT YOU HAVE BEEN MOVING AROUND A LOT MORE THAN USUAL: NOT AT ALL
10. IF YOU CHECKED OFF ANY PROBLEMS, HOW DIFFICULT HAVE THESE PROBLEMS MADE IT FOR YOU TO DO YOUR WORK, TAKE CARE OF THINGS AT HOME, OR GET ALONG WITH OTHER PEOPLE: NOT DIFFICULT AT ALL
1. LITTLE INTEREST OR PLEASURE IN DOING THINGS: NOT AT ALL
SUM OF ALL RESPONSES TO PHQ9 QUESTIONS 1 & 2: 0
5. POOR APPETITE OR OVEREATING: NOT AT ALL
4. FEELING TIRED OR HAVING LITTLE ENERGY: NOT AT ALL

## 2025-01-28 ENCOUNTER — OFFICE VISIT (OUTPATIENT)
Dept: FAMILY MEDICINE CLINIC | Age: 75
End: 2025-01-28
Payer: MEDICARE

## 2025-01-28 VITALS
WEIGHT: 186.6 LBS | RESPIRATION RATE: 18 BRPM | DIASTOLIC BLOOD PRESSURE: 72 MMHG | SYSTOLIC BLOOD PRESSURE: 130 MMHG | HEART RATE: 80 BPM | OXYGEN SATURATION: 97 % | TEMPERATURE: 97.3 F | HEIGHT: 62 IN | BODY MASS INDEX: 34.34 KG/M2

## 2025-01-28 DIAGNOSIS — M51.26 LUMBAR DISC HERNIATION: Chronic | ICD-10-CM

## 2025-01-28 DIAGNOSIS — Z53.20 BREAST SCREENING DECLINED: ICD-10-CM

## 2025-01-28 DIAGNOSIS — I10 ESSENTIAL HYPERTENSION: Primary | Chronic | ICD-10-CM

## 2025-01-28 DIAGNOSIS — R73.03 PREDIABETES: ICD-10-CM

## 2025-01-28 DIAGNOSIS — R53.83 OTHER FATIGUE: ICD-10-CM

## 2025-01-28 DIAGNOSIS — F41.9 ANXIETY DISORDER, UNSPECIFIED TYPE: Chronic | ICD-10-CM

## 2025-01-28 DIAGNOSIS — M43.10 SPONDYLOLISTHESIS, GRADE 1: Chronic | ICD-10-CM

## 2025-01-28 DIAGNOSIS — E66.9 MODERATE OBESITY: ICD-10-CM

## 2025-01-28 DIAGNOSIS — K21.00 GASTROESOPHAGEAL REFLUX DISEASE WITH ESOPHAGITIS WITHOUT HEMORRHAGE: ICD-10-CM

## 2025-01-28 DIAGNOSIS — F31.9 BIPOLAR DEPRESSION (HCC): ICD-10-CM

## 2025-01-28 DIAGNOSIS — E78.5 DYSLIPIDEMIA: ICD-10-CM

## 2025-01-28 PROBLEM — L97.922 NON-PRESSURE CHRONIC ULCER OF LEFT LOWER LEG, WITH FAT LAYER EXPOSED (HCC): Status: RESOLVED | Noted: 2024-08-28 | Resolved: 2025-01-28

## 2025-01-28 PROCEDURE — 1160F RVW MEDS BY RX/DR IN RCRD: CPT | Performed by: FAMILY MEDICINE

## 2025-01-28 PROCEDURE — 3078F DIAST BP <80 MM HG: CPT | Performed by: FAMILY MEDICINE

## 2025-01-28 PROCEDURE — 99214 OFFICE O/P EST MOD 30 MIN: CPT | Performed by: FAMILY MEDICINE

## 2025-01-28 PROCEDURE — 1159F MED LIST DOCD IN RCRD: CPT | Performed by: FAMILY MEDICINE

## 2025-01-28 PROCEDURE — 1123F ACP DISCUSS/DSCN MKR DOCD: CPT | Performed by: FAMILY MEDICINE

## 2025-01-28 PROCEDURE — 3075F SYST BP GE 130 - 139MM HG: CPT | Performed by: FAMILY MEDICINE

## 2025-01-28 ASSESSMENT — ENCOUNTER SYMPTOMS
NAUSEA: 0
RECTAL PAIN: 0
ANAL BLEEDING: 0
ALLERGIC/IMMUNOLOGIC NEGATIVE: 1
STRIDOR: 0
BLURRED VISION: 0
ABDOMINAL DISTENTION: 0
VOICE CHANGE: 0
TROUBLE SWALLOWING: 0
PHOTOPHOBIA: 0
EYE REDNESS: 0
COLOR CHANGE: 0
GASTROINTESTINAL NEGATIVE: 1
BLOOD IN STOOL: 0
ABDOMINAL PAIN: 0
EYE DISCHARGE: 0
WHEEZING: 0
VOMITING: 0
SINUS PAIN: 0
ORTHOPNEA: 0
APNEA: 0
BACK PAIN: 1
SORE THROAT: 0
RESPIRATORY NEGATIVE: 1
CHEST TIGHTNESS: 0
SINUS PRESSURE: 0
RHINORRHEA: 0
DIARRHEA: 0
COUGH: 0
FACIAL SWELLING: 0
CHOKING: 0
CONSTIPATION: 0
SHORTNESS OF BREATH: 0
EYE ITCHING: 0
EYE PAIN: 0

## 2025-01-28 NOTE — PROGRESS NOTES
SUBJECTIVE  Eden Parikh is a 74 y.o. female.    HPI/Chief C/O:  Chief Complaint   Patient presents with    3 Month Follow-Up     She was just released from wound care with her left lower leg cellulitis being healed     Health Maintenance     Declined mammo     No Known Allergies  The patient is here for a medication list and treatment planning review  We will go over our care planning goals as well as take care of all refills  We will set up labs as well      He leg is healed and she is done with wound clinic     Hypertension  This is a chronic problem. The current episode started more than 1 year ago. The problem is controlled. Associated symptoms include anxiety and malaise/fatigue. Pertinent negatives include no blurred vision, chest pain, headaches, neck pain, orthopnea, palpitations, peripheral edema, PND, shortness of breath or sweats. Risk factors for coronary artery disease include obesity, post-menopausal state and stress. Past treatments include lifestyle changes. The current treatment provides significant improvement. Compliance problems include diet, exercise and psychosocial issues.  There is no history of angina, kidney disease, CAD/MI, CVA, heart failure, left ventricular hypertrophy, PVD or retinopathy. There is no history of chronic renal disease, coarctation of the aorta, hyperaldosteronism, hypercortisolism, hyperparathyroidism, a hypertension causing med, pheochromocytoma, renovascular disease, sleep apnea or a thyroid problem.       ROS:  Review of Systems   Constitutional:  Positive for fatigue and malaise/fatigue. Negative for activity change, appetite change, chills, diaphoresis, fever and unexpected weight change.   HENT: Negative.  Negative for congestion, dental problem, drooling, ear discharge, ear pain, facial swelling, hearing loss, mouth sores, nosebleeds, postnasal drip, rhinorrhea, sinus pressure, sinus pain, sneezing, sore throat, tinnitus, trouble swallowing and voice

## 2025-04-18 ENCOUNTER — APPOINTMENT (OUTPATIENT)
Dept: CT IMAGING | Age: 75
End: 2025-04-18
Payer: MEDICARE

## 2025-04-18 ENCOUNTER — APPOINTMENT (OUTPATIENT)
Dept: GENERAL RADIOLOGY | Age: 75
End: 2025-04-18
Payer: MEDICARE

## 2025-04-18 ENCOUNTER — HOSPITAL ENCOUNTER (EMERGENCY)
Age: 75
Discharge: HOME | End: 2025-04-18
Attending: EMERGENCY MEDICINE
Payer: MEDICARE

## 2025-04-18 VITALS
DIASTOLIC BLOOD PRESSURE: 64 MMHG | OXYGEN SATURATION: 98 % | HEART RATE: 86 BPM | SYSTOLIC BLOOD PRESSURE: 156 MMHG | RESPIRATION RATE: 18 BRPM | TEMPERATURE: 98.3 F

## 2025-04-18 DIAGNOSIS — M54.12 CERVICAL RADICULOPATHY: Primary | ICD-10-CM

## 2025-04-18 DIAGNOSIS — I51.7 CARDIOMEGALY: ICD-10-CM

## 2025-04-18 DIAGNOSIS — M25.461 KNEE EFFUSION, RIGHT: ICD-10-CM

## 2025-04-18 DIAGNOSIS — M17.11 OSTEOARTHRITIS OF RIGHT KNEE, UNSPECIFIED OSTEOARTHRITIS TYPE: ICD-10-CM

## 2025-04-18 DIAGNOSIS — M79.89 LEG SWELLING: ICD-10-CM

## 2025-04-18 LAB
ALBUMIN SERPL-MCNC: 3.9 G/DL (ref 3.5–5.2)
ALP SERPL-CCNC: 86 U/L (ref 35–104)
ALT SERPL-CCNC: 20 U/L (ref 0–32)
ANION GAP SERPL CALCULATED.3IONS-SCNC: 9 MMOL/L (ref 7–16)
AST SERPL-CCNC: 28 U/L (ref 0–31)
BASOPHILS # BLD: 0.04 K/UL (ref 0–0.2)
BASOPHILS NFR BLD: 1 % (ref 0–2)
BILIRUB SERPL-MCNC: 0.6 MG/DL (ref 0–1.2)
BNP SERPL-MCNC: 265 PG/ML (ref 0–125)
BUN SERPL-MCNC: 18 MG/DL (ref 6–23)
CALCIUM SERPL-MCNC: 9.8 MG/DL (ref 8.6–10.2)
CHLORIDE SERPL-SCNC: 104 MMOL/L (ref 98–107)
CO2 SERPL-SCNC: 26 MMOL/L (ref 22–29)
CREAT SERPL-MCNC: 0.7 MG/DL (ref 0.5–1)
EOSINOPHIL # BLD: 0.23 K/UL (ref 0.05–0.5)
EOSINOPHILS RELATIVE PERCENT: 3 % (ref 0–6)
ERYTHROCYTE [DISTWIDTH] IN BLOOD BY AUTOMATED COUNT: 12.9 % (ref 11.5–15)
GFR, ESTIMATED: >90 ML/MIN/1.73M2
GLUCOSE SERPL-MCNC: 108 MG/DL (ref 74–99)
HCT VFR BLD AUTO: 39.6 % (ref 34–48)
HGB BLD-MCNC: 13.6 G/DL (ref 11.5–15.5)
IMM GRANULOCYTES # BLD AUTO: 0.03 K/UL (ref 0–0.58)
IMM GRANULOCYTES NFR BLD: 0 % (ref 0–5)
LYMPHOCYTES NFR BLD: 0.89 K/UL (ref 1.5–4)
LYMPHOCYTES RELATIVE PERCENT: 12 % (ref 20–42)
MCH RBC QN AUTO: 32.5 PG (ref 26–35)
MCHC RBC AUTO-ENTMCNC: 34.3 G/DL (ref 32–34.5)
MCV RBC AUTO: 94.5 FL (ref 80–99.9)
MONOCYTES NFR BLD: 0.82 K/UL (ref 0.1–0.95)
MONOCYTES NFR BLD: 11 % (ref 2–12)
NEUTROPHILS NFR BLD: 74 % (ref 43–80)
NEUTS SEG NFR BLD: 5.75 K/UL (ref 1.8–7.3)
PLATELET # BLD AUTO: 285 K/UL (ref 130–450)
PMV BLD AUTO: 8.5 FL (ref 7–12)
POTASSIUM SERPL-SCNC: 4.5 MMOL/L (ref 3.5–5)
PROT SERPL-MCNC: 6.8 G/DL (ref 6.4–8.3)
RBC # BLD AUTO: 4.19 M/UL (ref 3.5–5.5)
SODIUM SERPL-SCNC: 139 MMOL/L (ref 132–146)
TROPONIN I SERPL HS-MCNC: 20 NG/L (ref 0–14)
TROPONIN I SERPL HS-MCNC: 22 NG/L (ref 0–14)
WBC OTHER # BLD: 7.8 K/UL (ref 4.5–11.5)

## 2025-04-18 PROCEDURE — 6360000002 HC RX W HCPCS: Performed by: EMERGENCY MEDICINE

## 2025-04-18 PROCEDURE — 85025 COMPLETE CBC W/AUTO DIFF WBC: CPT

## 2025-04-18 PROCEDURE — 73564 X-RAY EXAM KNEE 4 OR MORE: CPT

## 2025-04-18 PROCEDURE — 93005 ELECTROCARDIOGRAM TRACING: CPT | Performed by: EMERGENCY MEDICINE

## 2025-04-18 PROCEDURE — 96375 TX/PRO/DX INJ NEW DRUG ADDON: CPT

## 2025-04-18 PROCEDURE — 80053 COMPREHEN METABOLIC PANEL: CPT

## 2025-04-18 PROCEDURE — 99285 EMERGENCY DEPT VISIT HI MDM: CPT

## 2025-04-18 PROCEDURE — 96374 THER/PROPH/DIAG INJ IV PUSH: CPT

## 2025-04-18 PROCEDURE — 71046 X-RAY EXAM CHEST 2 VIEWS: CPT

## 2025-04-18 PROCEDURE — 84484 ASSAY OF TROPONIN QUANT: CPT

## 2025-04-18 PROCEDURE — 72125 CT NECK SPINE W/O DYE: CPT

## 2025-04-18 PROCEDURE — 83880 ASSAY OF NATRIURETIC PEPTIDE: CPT

## 2025-04-18 RX ORDER — DEXAMETHASONE SODIUM PHOSPHATE 10 MG/ML
8 INJECTION, SOLUTION INTRA-ARTICULAR; INTRALESIONAL; INTRAMUSCULAR; INTRAVENOUS; SOFT TISSUE ONCE
Status: COMPLETED | OUTPATIENT
Start: 2025-04-18 | End: 2025-04-18

## 2025-04-18 RX ORDER — METHYLPREDNISOLONE 4 MG/1
4 TABLET ORAL SEE ADMIN INSTRUCTIONS
Qty: 1 KIT | Refills: 0 | Status: SHIPPED | OUTPATIENT
Start: 2025-04-18

## 2025-04-18 RX ORDER — KETOROLAC TROMETHAMINE 30 MG/ML
15 INJECTION, SOLUTION INTRAMUSCULAR; INTRAVENOUS ONCE
Status: COMPLETED | OUTPATIENT
Start: 2025-04-18 | End: 2025-04-18

## 2025-04-18 RX ADMIN — DEXAMETHASONE SODIUM PHOSPHATE 8 MG: 10 INJECTION INTRAMUSCULAR; INTRAVENOUS at 11:46

## 2025-04-18 RX ADMIN — KETOROLAC TROMETHAMINE 15 MG: 30 INJECTION, SOLUTION INTRAMUSCULAR at 09:47

## 2025-04-18 ASSESSMENT — PAIN DESCRIPTION - PAIN TYPE: TYPE: ACUTE PAIN

## 2025-04-18 ASSESSMENT — LIFESTYLE VARIABLES
HOW OFTEN DO YOU HAVE A DRINK CONTAINING ALCOHOL: NEVER
HOW MANY STANDARD DRINKS CONTAINING ALCOHOL DO YOU HAVE ON A TYPICAL DAY: PATIENT DOES NOT DRINK

## 2025-04-18 ASSESSMENT — PAIN DESCRIPTION - DESCRIPTORS: DESCRIPTORS: ACHING;BURNING;DISCOMFORT

## 2025-04-18 ASSESSMENT — PAIN - FUNCTIONAL ASSESSMENT: PAIN_FUNCTIONAL_ASSESSMENT: 0-10

## 2025-04-18 ASSESSMENT — PAIN DESCRIPTION - ONSET: ONSET: ON-GOING

## 2025-04-18 ASSESSMENT — PAIN SCALES - GENERAL: PAINLEVEL_OUTOF10: 10

## 2025-04-18 ASSESSMENT — PAIN DESCRIPTION - FREQUENCY: FREQUENCY: CONTINUOUS

## 2025-04-18 ASSESSMENT — PAIN DESCRIPTION - LOCATION: LOCATION: HAND

## 2025-04-18 ASSESSMENT — PAIN DESCRIPTION - ORIENTATION: ORIENTATION: RIGHT;LEFT

## 2025-04-18 NOTE — ED PROVIDER NOTES
ED PROVIDER NOTE    Chief Complaint   Patient presents with    OTHER     Bilateral hands hurting numbness and pinching up arms       HPI:  4/18/25,   Time: 9:16 AM EDT       Eden Parikh is a 74 y.o. female presenting to the ED for bilateral hand pain and numbness.  Worse on the right side.  Has history of peripheral neuropathy, however has been significantly worse over the last 2 to 3 weeks after a recent fall.  At that time patient was walking in the mall and tripped.  She fell forward.  She landed on her bilateral arms with her arms crossed over her chest.  No head injury or LOC.  Not on anticoagulants.  Since then she has been having worsening radiculopathy radiating from her shoulder to her hands.  Associated burning pain and numbness in the bilateral hands worse on the right side.  No changes in bowel or bladder function.  No leg weakness.  She has noticed some swelling in bilateral lower extremities and dyspnea with exertion.  No fever, chills, cough, abdominal pain, chest pain, nausea, vomiting.  Normal p.o. intake and urine output.    Chart review: hx of anxiety, bipolar disorder, GERD, HTN, insomnia, OA, lumbar spinal stenosis    Reviewed outpatient primary care progress note from 1/28/2025 by Dr. Rivas:  Dx HTN, prediabetes, dyslipidemia, fatigue, bipolar depression, obesity, GERD, lumbar disc herniation, anxiety    Review of Systems:     Review of Systems  Pertinent positives and negatives as stated in HPI     --------------------------------------------- PAST HISTORY ---------------------------------------------  Past Medical History:   Past Medical History:   Diagnosis Date    Allergic rhinitis     Anxiety disorder     Bipolar disorder     Caffeine use 5/19/2015    Chronic low back pain 5/19/2015    Depression, major, in remission     multiple hospitalizations    Endocervical polyp 4/19/2012    GERD (gastroesophageal reflux disease)     Heart murmur, systolic     g2/6    History of bariatric

## 2025-04-18 NOTE — DISCHARGE INSTRUCTIONS
Return to the emergency department if you have chest pain, difficulty breathing, vomiting, unable to keep down food or drink, dizziness, lightheadedness, passing out, numbness or weakness in your extremities, difficulty walking, talking, or seeing, or any other new or concerning symptoms.

## 2025-04-20 LAB
EKG ATRIAL RATE: 77 BPM
EKG P AXIS: 39 DEGREES
EKG P-R INTERVAL: 170 MS
EKG Q-T INTERVAL: 378 MS
EKG QRS DURATION: 96 MS
EKG QTC CALCULATION (BAZETT): 427 MS
EKG R AXIS: -11 DEGREES
EKG T AXIS: 24 DEGREES
EKG VENTRICULAR RATE: 77 BPM

## 2025-04-20 PROCEDURE — 93010 ELECTROCARDIOGRAM REPORT: CPT | Performed by: INTERNAL MEDICINE

## 2025-04-22 ENCOUNTER — OFFICE VISIT (OUTPATIENT)
Dept: FAMILY MEDICINE CLINIC | Age: 75
End: 2025-04-22
Payer: MEDICARE

## 2025-04-22 VITALS
TEMPERATURE: 98.3 F | HEART RATE: 84 BPM | RESPIRATION RATE: 18 BRPM | DIASTOLIC BLOOD PRESSURE: 62 MMHG | OXYGEN SATURATION: 98 % | SYSTOLIC BLOOD PRESSURE: 132 MMHG | HEIGHT: 62 IN | WEIGHT: 185.8 LBS | BODY MASS INDEX: 34.19 KG/M2

## 2025-04-22 DIAGNOSIS — K21.00 GASTROESOPHAGEAL REFLUX DISEASE WITH ESOPHAGITIS WITHOUT HEMORRHAGE: ICD-10-CM

## 2025-04-22 DIAGNOSIS — E66.9 MODERATE OBESITY: ICD-10-CM

## 2025-04-22 DIAGNOSIS — R07.9 CHEST PAIN, UNSPECIFIED TYPE: ICD-10-CM

## 2025-04-22 DIAGNOSIS — F41.9 ANXIETY DISORDER, UNSPECIFIED TYPE: Chronic | ICD-10-CM

## 2025-04-22 DIAGNOSIS — I10 ESSENTIAL HYPERTENSION: Primary | Chronic | ICD-10-CM

## 2025-04-22 DIAGNOSIS — M43.10 SPONDYLOLISTHESIS, GRADE 1: Chronic | ICD-10-CM

## 2025-04-22 DIAGNOSIS — F31.9 BIPOLAR DEPRESSION (HCC): ICD-10-CM

## 2025-04-22 DIAGNOSIS — M54.12 CERVICAL RADICULITIS: ICD-10-CM

## 2025-04-22 PROCEDURE — 1160F RVW MEDS BY RX/DR IN RCRD: CPT | Performed by: FAMILY MEDICINE

## 2025-04-22 PROCEDURE — 1123F ACP DISCUSS/DSCN MKR DOCD: CPT | Performed by: FAMILY MEDICINE

## 2025-04-22 PROCEDURE — 99214 OFFICE O/P EST MOD 30 MIN: CPT | Performed by: FAMILY MEDICINE

## 2025-04-22 PROCEDURE — 1159F MED LIST DOCD IN RCRD: CPT | Performed by: FAMILY MEDICINE

## 2025-04-22 PROCEDURE — 3078F DIAST BP <80 MM HG: CPT | Performed by: FAMILY MEDICINE

## 2025-04-22 PROCEDURE — 3075F SYST BP GE 130 - 139MM HG: CPT | Performed by: FAMILY MEDICINE

## 2025-04-22 RX ORDER — DULOXETIN HYDROCHLORIDE 30 MG/1
30 CAPSULE, DELAYED RELEASE ORAL DAILY
Qty: 90 CAPSULE | Refills: 1 | Status: SHIPPED | OUTPATIENT
Start: 2025-04-22

## 2025-04-22 RX ORDER — M-VIT,TX,IRON,MINS/CALC/FOLIC 27MG-0.4MG
1 TABLET ORAL DAILY
COMMUNITY

## 2025-04-22 ASSESSMENT — ENCOUNTER SYMPTOMS
GASTROINTESTINAL NEGATIVE: 1
VOMITING: 0
BACK PAIN: 1
FACIAL SWELLING: 0
EYE ITCHING: 0
ORTHOPNEA: 0
STRIDOR: 0
VOICE CHANGE: 0
RESPIRATORY NEGATIVE: 1
SINUS PAIN: 0
RECTAL PAIN: 0
SORE THROAT: 0
NAUSEA: 0
COLOR CHANGE: 0
BLURRED VISION: 0
SHORTNESS OF BREATH: 0
COUGH: 0
ABDOMINAL PAIN: 0
APNEA: 0
CHEST TIGHTNESS: 0
ABDOMINAL DISTENTION: 0
RHINORRHEA: 0
SINUS PRESSURE: 0
CHOKING: 0
DIARRHEA: 0
EYE DISCHARGE: 0
WHEEZING: 0
BLOOD IN STOOL: 0
PHOTOPHOBIA: 0
EYE PAIN: 0
CONSTIPATION: 0
ANAL BLEEDING: 0
TROUBLE SWALLOWING: 0
EYE REDNESS: 0
ALLERGIC/IMMUNOLOGIC NEGATIVE: 1

## 2025-04-22 NOTE — PROGRESS NOTES
SUBJECTIVE  Eden Parikh is a 74 y.o. female.    HPI/Chief C/O:  Chief Complaint   Patient presents with    ED Follow-up     Pt was in the ED. Pt started exercising. She ended up falling. Pt have numbness and tingling up her arms. She also had swelling of feet to above ankles. SOB Notes state to order MRI of cervical spine and 2 D ECHO     No Known Allergies  The patient is here for a medication list and treatment planning review  We will go over our care planning goals as well as take care of all refills  We will set up labs as well      She is post ER for numbness and pain down into both arms     Hypertension  This is a chronic problem. The current episode started more than 1 year ago. The problem is controlled. Associated symptoms include anxiety, malaise/fatigue and neck pain. Pertinent negatives include no blurred vision, chest pain, headaches, orthopnea, palpitations, peripheral edema, PND, shortness of breath or sweats. Risk factors for coronary artery disease include obesity, post-menopausal state and stress. Past treatments include lifestyle changes. The current treatment provides significant improvement. Compliance problems include diet, exercise and psychosocial issues.  There is no history of angina, kidney disease, CAD/MI, CVA, heart failure, left ventricular hypertrophy, PVD or retinopathy. There is no history of chronic renal disease, coarctation of the aorta, hyperaldosteronism, hypercortisolism, hyperparathyroidism, a hypertension causing med, pheochromocytoma, renovascular disease, sleep apnea or a thyroid problem.       ROS:  Review of Systems   Constitutional:  Positive for fatigue and malaise/fatigue. Negative for activity change, appetite change, chills, diaphoresis, fever and unexpected weight change.   HENT: Negative.  Negative for congestion, dental problem, drooling, ear discharge, ear pain, facial swelling, hearing loss, mouth sores, nosebleeds, postnasal drip, rhinorrhea, sinus 
<-- Click to add NO significant Past Surgical History

## 2025-04-24 ENCOUNTER — HOSPITAL ENCOUNTER (OUTPATIENT)
Dept: MRI IMAGING | Age: 75
Discharge: HOME | End: 2025-04-26
Attending: FAMILY MEDICINE
Payer: MEDICARE

## 2025-04-24 DIAGNOSIS — M54.12 CERVICAL RADICULITIS: ICD-10-CM

## 2025-04-24 PROCEDURE — 72141 MRI NECK SPINE W/O DYE: CPT

## 2025-05-05 ENCOUNTER — HOSPITAL ENCOUNTER (OUTPATIENT)
Dept: CARDIOLOGY | Age: 75
Discharge: HOME | End: 2025-05-07
Attending: FAMILY MEDICINE
Payer: MEDICARE

## 2025-05-05 VITALS — HEIGHT: 62 IN | WEIGHT: 185 LBS | BODY MASS INDEX: 34.04 KG/M2

## 2025-05-05 DIAGNOSIS — R07.9 CHEST PAIN, UNSPECIFIED TYPE: ICD-10-CM

## 2025-05-05 PROCEDURE — 93306 TTE W/DOPPLER COMPLETE: CPT

## 2025-05-06 LAB
ECHO AO ASC DIAM: 3.3 CM
ECHO AO ASCENDING AORTA INDEX: 1.78 CM/M2
ECHO AO SINUS VALSALVA DIAM: 3.3 CM
ECHO AO SINUS VALSALVA INDEX: 1.78 CM/M2
ECHO AO ST JNCT DIAM: 2.9 CM
ECHO AR MAX VEL PISA: 3.9 M/S
ECHO AV AREA PEAK VELOCITY: 2 CM2
ECHO AV AREA VTI: 1.7 CM2
ECHO AV AREA/BSA PEAK VELOCITY: 1.1 CM2/M2
ECHO AV AREA/BSA VTI: 0.9 CM2/M2
ECHO AV MEAN GRADIENT: 13 MMHG
ECHO AV MEAN VELOCITY: 1.7 M/S
ECHO AV PEAK GRADIENT: 21 MMHG
ECHO AV PEAK VELOCITY: 2.3 M/S
ECHO AV REGURGITANT PHT: 362.1 MS
ECHO AV VELOCITY RATIO: 0.65
ECHO AV VTI: 49.6 CM
ECHO BSA: 1.92 M2
ECHO IVC PROX: 1.9 CM
ECHO LA DIAMETER INDEX: 1.89 CM/M2
ECHO LA DIAMETER: 3.5 CM
ECHO LA VOL A-L A2C: 74 ML (ref 22–52)
ECHO LA VOL A-L A4C: 49 ML (ref 22–52)
ECHO LA VOL MOD A2C: 72 ML (ref 22–52)
ECHO LA VOL MOD A4C: 47 ML (ref 22–52)
ECHO LA VOLUME AREA LENGTH: 61 ML
ECHO LA VOLUME INDEX A-L A2C: 40 ML/M2 (ref 16–34)
ECHO LA VOLUME INDEX A-L A4C: 26 ML/M2 (ref 16–34)
ECHO LA VOLUME INDEX AREA LENGTH: 33 ML/M2 (ref 16–34)
ECHO LA VOLUME INDEX MOD A2C: 39 ML/M2 (ref 16–34)
ECHO LA VOLUME INDEX MOD A4C: 25 ML/M2 (ref 16–34)
ECHO LV E' LATERAL VELOCITY: 6 CM/S
ECHO LV E' SEPTAL VELOCITY: 6 CM/S
ECHO LV EJECTION FRACTION 3D: 65 %
ECHO LV FRACTIONAL SHORTENING: 44 % (ref 28–44)
ECHO LV INTERNAL DIMENSION DIASTOLE INDEX: 2.32 CM/M2
ECHO LV INTERNAL DIMENSION DIASTOLIC: 4.3 CM (ref 3.9–5.3)
ECHO LV INTERNAL DIMENSION SYSTOLIC INDEX: 1.3 CM/M2
ECHO LV INTERNAL DIMENSION SYSTOLIC: 2.4 CM
ECHO LV ISOVOLUMETRIC RELAXATION TIME (IVRT): 94.6 MS
ECHO LV IVSD: 1 CM (ref 0.6–0.9)
ECHO LV MASS 2D: 132.7 G (ref 67–162)
ECHO LV MASS INDEX 2D: 71.8 G/M2 (ref 43–95)
ECHO LV POSTERIOR WALL DIASTOLIC: 0.9 CM (ref 0.6–0.9)
ECHO LV RELATIVE WALL THICKNESS RATIO: 0.42
ECHO LVOT AREA: 2.8 CM2
ECHO LVOT AV VTI INDEX: 0.59
ECHO LVOT DIAM: 1.9 CM
ECHO LVOT MEAN GRADIENT: 6 MMHG
ECHO LVOT PEAK GRADIENT: 9 MMHG
ECHO LVOT PEAK VELOCITY: 1.5 M/S
ECHO LVOT STROKE VOLUME INDEX: 44.7 ML/M2
ECHO LVOT SV: 82.7 ML
ECHO LVOT VTI: 29.2 CM
ECHO MV "A" WAVE DURATION: 133.8 MSEC
ECHO MV A VELOCITY: 1.64 M/S
ECHO MV AREA PHT: 2.1 CM2
ECHO MV AREA VTI: 1.8 CM2
ECHO MV E DECELERATION TIME (DT): 241.6 MS
ECHO MV E VELOCITY: 1.32 M/S
ECHO MV E/A RATIO: 0.8
ECHO MV E/E' LATERAL: 22
ECHO MV E/E' RATIO (AVERAGED): 22
ECHO MV E/E' SEPTAL: 22
ECHO MV LVOT VTI INDEX: 1.55
ECHO MV MAX VELOCITY: 1.8 M/S
ECHO MV MEAN GRADIENT: 4 MMHG
ECHO MV MEAN VELOCITY: 1 M/S
ECHO MV PEAK GRADIENT: 12 MMHG
ECHO MV PRESSURE HALF TIME (PHT): 102.5 MS
ECHO MV VTI: 45.2 CM
ECHO PVEIN A DURATION: 113 MS
ECHO PVEIN A VELOCITY: 0.4 M/S
ECHO PVEIN PEAK D VELOCITY: 0.5 M/S
ECHO PVEIN PEAK S VELOCITY: 0.7 M/S
ECHO PVEIN S/D RATIO: 1.4
ECHO RA AREA 4C: 15.1 CM2
ECHO RA VOLUME AREA LENGTH APICAL 4 CHAMBER: 38 ML
ECHO RV BASAL DIMENSION: 4 CM
ECHO RV INTERNAL DIMENSION: 2.9 CM
ECHO RV LONGITUDINAL DIMENSION: 8 CM
ECHO RV MID DIMENSION: 2.9 CM
ECHO TV REGURGITANT MAX VELOCITY: 3.37 M/S
ECHO TV REGURGITANT PEAK GRADIENT: 45 MMHG

## 2025-05-06 PROCEDURE — 93306 TTE W/DOPPLER COMPLETE: CPT | Performed by: INTERNAL MEDICINE

## 2025-05-12 ENCOUNTER — TELEMEDICINE (OUTPATIENT)
Dept: FAMILY MEDICINE CLINIC | Age: 75
End: 2025-05-12
Payer: MEDICARE

## 2025-05-12 DIAGNOSIS — Q76.49 CONGENITAL CERVICAL SPINE STENOSIS: ICD-10-CM

## 2025-05-12 DIAGNOSIS — I10 ESSENTIAL HYPERTENSION: Primary | Chronic | ICD-10-CM

## 2025-05-12 DIAGNOSIS — M43.10 SPONDYLOLISTHESIS, GRADE 1: Chronic | ICD-10-CM

## 2025-05-12 DIAGNOSIS — F31.9 BIPOLAR DEPRESSION (HCC): ICD-10-CM

## 2025-05-12 DIAGNOSIS — M48.02 CERVICAL STENOSIS OF SPINE: ICD-10-CM

## 2025-05-12 DIAGNOSIS — M50.30 DDD (DEGENERATIVE DISC DISEASE), CERVICAL: ICD-10-CM

## 2025-05-12 PROCEDURE — 1160F RVW MEDS BY RX/DR IN RCRD: CPT | Performed by: FAMILY MEDICINE

## 2025-05-12 PROCEDURE — 1159F MED LIST DOCD IN RCRD: CPT | Performed by: FAMILY MEDICINE

## 2025-05-12 PROCEDURE — 1123F ACP DISCUSS/DSCN MKR DOCD: CPT | Performed by: FAMILY MEDICINE

## 2025-05-12 PROCEDURE — 99213 OFFICE O/P EST LOW 20 MIN: CPT | Performed by: FAMILY MEDICINE

## 2025-05-12 ASSESSMENT — ENCOUNTER SYMPTOMS
EYE PAIN: 0
APNEA: 0
EYE ITCHING: 0
DIARRHEA: 0
ORTHOPNEA: 0
SINUS PAIN: 0
BLURRED VISION: 0
ABDOMINAL DISTENTION: 0
PHOTOPHOBIA: 0
WHEEZING: 0
EYE REDNESS: 0
BLOOD IN STOOL: 0
CHOKING: 0
COUGH: 0
NAUSEA: 0
RHINORRHEA: 0
TROUBLE SWALLOWING: 0
STRIDOR: 0
BACK PAIN: 1
CONSTIPATION: 0
ANAL BLEEDING: 0
RESPIRATORY NEGATIVE: 1
SHORTNESS OF BREATH: 0
ABDOMINAL PAIN: 0
GASTROINTESTINAL NEGATIVE: 1
COLOR CHANGE: 0
VOICE CHANGE: 0
CHEST TIGHTNESS: 0
ALLERGIC/IMMUNOLOGIC NEGATIVE: 1
EYE DISCHARGE: 0
SORE THROAT: 0
FACIAL SWELLING: 0
VOMITING: 0
RECTAL PAIN: 0
SINUS PRESSURE: 0

## 2025-05-12 NOTE — PROGRESS NOTES
SUBJECTIVE  Eden Parikh is a 74 y.o. female.    HPI/Chief C/O:  Chief Complaint   Patient presents with    Discuss Labs     Pt results of MRI and ECHO     consent     Pt gives verbal consent for the VV and is in [place]     Discuss Medications     Pt not taking the Duloxetine       No Known Allergies  TeleMedicine Video Visit    Eden Parikh, was evaluated through a synchronous (real-time) audio-video encounter. The patient (or guardian if applicable) is aware that this is a billable service., which includes applicable co-pays.  This virtual visit was conducted with the patient's  (and/or legal guardian's) consent.  This Virtual Visit was conducted with patient's (and/or legal guardian's) consent  Patient identification was verified, and a caregiver was present when appropriate.     The patient was located in a state where the provider was licensed to provide care.    The patient was located at Home: [address] [address]  Provider was located at Facility (Appt Dept): [address] [place] [address]     other people involved in call  are none      Patient identification was verified at the start of the visit, including the patient's telephone number and physical location. I discussed with the patient the nature of our telehealth visits, that:     Due to the nature of an audio- video modality, the only components of a physical exam that could be done are the elements supported by direct observation.  I would evaluate the patient and recommend diagnostics and treatments based on my assessment.  If it was felt that the patient should be evaluated in clinic or an emergency room setting, then they would be directed there.  Our sessions are not being recorded and that personal health information is protected.  Our team would provide follow up care in person if/when the patient needs it.         On this date 5/12/2025 I have spent 30  reviewing previous notes, test results, and

## 2025-05-29 ENCOUNTER — EVALUATION (OUTPATIENT)
Dept: PHYSICAL THERAPY | Age: 75
End: 2025-05-29
Payer: MEDICARE

## 2025-05-29 DIAGNOSIS — M50.30 DDD (DEGENERATIVE DISC DISEASE), CERVICAL: Primary | ICD-10-CM

## 2025-05-29 PROCEDURE — 97161 PT EVAL LOW COMPLEX 20 MIN: CPT | Performed by: PHYSICAL THERAPIST

## 2025-05-29 PROCEDURE — 97110 THERAPEUTIC EXERCISES: CPT | Performed by: PHYSICAL THERAPIST

## 2025-05-29 NOTE — PROGRESS NOTES
[facility]          Phone: [phone] Fax: [phone]    Physical Therapy Daily Treatment Note  Date:  2025    Patient Name:  Eden Parikh    :  1950  MRN: [id number]    Evaluating therapist: Yancy Ramos, PT, DPT  [id number]    Restrictions/Precautions:      Diagnosis:     Diagnosis Orders   1. DDD (degenerative disc disease), cervical          Treatment Diagnosis:    Insurance/Certification information:  [employer]age  Referring Physician:  Edmund Rivas DO  Plan of care signed (Y/N):    Visit# / total visits:    Pain level: 5/10   Time In:  0800  Time Out:  08    Subjective:  See initial evaluation    Exercises:  Exercise/Equipment Resistance/Repetitions Other comments            Cervical retractions 10x 3 sec hold      Scapular retractions 10x 3 sec hold      Median nerve glide 5x ea side                                                                                                                  Other:  Pt reports feeling stretching and muscle fatigue through posterior neck and shoulder musculature with stretches, hand symptoms reproduced with median nerve glides. Pt in agreement with POC and reports understanding of HEP.    Home Exercise Program:  cervical and scapular retractions, median nerve glides    Manual Treatments:  TBD    Modalities:  TBD     Time-in Time-out Total Time   30586  Evaluation Low Complexity 0800 0830 30   25922  Evaluation Med Complexity      59841  Evaluation High Complexity      17870  Ther Ex 0830 0840 15   01498  Neuro Re-ed        42574  Ther Activities        20843  Manual Therapy       41689  E-stim       32802  Ultrasound            Session 0800 0845 45       Treatment/Activity Tolerance:  [x] Patient tolerated treatment well [] Patient limited by fatigue  [] Patient limited by pain  [] Patient limited by other medical complications  [] Other:     Prognosis: [] Good [x] Fair  []

## 2025-05-29 NOTE — PROGRESS NOTES
[facility]   Phone: [phone]   Fax: [phone]    Patient: Eden Parikh  : 1950  MRN: [id number]  Referring Provider: Edmund Rivas,   [address] [place] [id number]     Medical Diagnosis:      Diagnosis Orders   1. DDD (degenerative disc disease), cervical             SUBJECTIVE:     Onset date: Pt had a fall when she landed forward with arms crossed. She has been having B hand pain described as raw. Pain radiates distal to proximal with burning, shooting pain. Pt has noticed decreased  strength    Onset: Unknown    Mechanism of Injury: Pt has had numbness on and off for years. Burning has worsened in recent months.    Previous PT: none    Medical Management for Current Problem:  none    Chief complaint: pain, poor sleep quality , paraesthesias of B UE    Behavior: condition is getting better    Pain: constant  Current: 8/10     Best: 5/10     Worst:10/10    Symptom Type/Quality: burning, numbness, pins and needles  Location: B arms and hands, tightness through shoulders         Provoking Activities/Positions:  trying to                  Relieving Activities/Positions:   laying supine without pillows, running hands under warm water     Disturbed Sleep: yes    Imaging results: Echo (TTE) complete (PRN contrast/bubble/strain/3D)  Result Date: 2025    Left Ventricle: The EF by visual approximation is 65 - 70%. Findings consistent with mild concentric hypertrophy. Diastolic dysfunction present with increased LAP with normal LV EF.   Right Ventricle: Right ventricle size is normal. Normal systolic function.   Aortic Valve: Not well visualized. Moderate regurgitation. No significant stenosis. AV Area by VTI is 1.7 cm2. AV Area by Peak Velocity is 2.0 cm2.   Mitral Valve: Mild regurgitation. No stenosis noted. MV Area by PHT is 2.1 cm2. MV Area by VTI is 1.8 cm2.   Tricuspid Valve: Mild regurgitation.   Pulmonic Valve: Trace

## 2025-06-04 ENCOUNTER — TREATMENT (OUTPATIENT)
Dept: PHYSICAL THERAPY | Age: 75
End: 2025-06-04
Payer: MEDICARE

## 2025-06-04 DIAGNOSIS — M50.30 DDD (DEGENERATIVE DISC DISEASE), CERVICAL: Primary | ICD-10-CM

## 2025-06-04 PROCEDURE — 97014 ELECTRIC STIMULATION THERAPY: CPT | Performed by: PHYSICAL THERAPIST

## 2025-06-04 PROCEDURE — 97110 THERAPEUTIC EXERCISES: CPT | Performed by: PHYSICAL THERAPIST

## 2025-06-04 NOTE — PROGRESS NOTES
by pain  [] Patient limited by other medical complications  [] Other:     Prognosis: [] Good [x] Fair  [] Poor    Patient Requires Follow-up: [x] Yes  [] No    Plan:   [x] Continue per plan of care [] Alter current plan (see comments)  [] Plan of care initiated [] Hold pending MD visit [] Discharge    Plan for Next Session:  progress postural strengthening and alignment, modalities and manual therapy PRN for pain management      Electronically signed by:  Yancy Ramos, PT, DPT  [id number]

## 2025-06-06 ENCOUNTER — TREATMENT (OUTPATIENT)
Dept: PHYSICAL THERAPY | Age: 75
End: 2025-06-06

## 2025-06-06 DIAGNOSIS — M50.30 DDD (DEGENERATIVE DISC DISEASE), CERVICAL: Primary | ICD-10-CM

## 2025-06-06 NOTE — PROGRESS NOTES
[facility]          Phone: [phone] Fax: [phone]    Physical Therapy Daily Treatment Note  Date:  2025    Patient Name:  Eden Parikh    :  1950  MRN: [id number]    Evaluating therapist: Yancy Ramos, PT, DPT  [id number]    Restrictions/Precautions:      Diagnosis:     Diagnosis Orders   1. DDD (degenerative disc disease), cervical            Treatment Diagnosis:    Insurance/Certification information:  [employer]age  Referring Physician:  Edmund Rivas DO  Plan of care signed (Y/N):  yes  Visit# / total visits:  3/8  Pain level: 5/10   Time In:  0850  Time Out:  920    Subjective:  Pt reports continued B hand pain and paraesthesias that worsens when laying down at the end of the day. She was grocery shopping buying many items in bulk yesterday which she felt increased symptoms somewhat.     Exercises:  Exercise/Equipment Resistance/Repetitions Other comments            Cervical retractions      Scapular retractions      Median nerve glide 5x ea side             Mid rows 10x 2 sec hold YTB     Resisted ER 10x 2 sec hold      UBE 5 min forward               Single UE doorway stretch 30 sec x2 ea side                                                                      Other:  Pt tolerated continuation of TE's this date without exacerbation of symptoms. Stretch discomfort noted during doorway stretch and with scapular retraction position required of mid-rows. Pt noted feeling \"looser\" at end of session.    Pt was intermittently tearful during session regarding feeling depressed about symptoms and current condition. She denies any intent for self harm or need for referral. She notes she is working with someone affiliated through her [facility] to initiate psychiatric care     Testing completed for thoracic outlet syndrome: - supraclavicular pressure test, + joy's sign, - theresa's test    Home Exercise Program:  cervical and scapular

## 2025-06-10 ENCOUNTER — TREATMENT (OUTPATIENT)
Dept: PHYSICAL THERAPY | Age: 75
End: 2025-06-10
Payer: MEDICARE

## 2025-06-10 DIAGNOSIS — M50.30 DDD (DEGENERATIVE DISC DISEASE), CERVICAL: Primary | ICD-10-CM

## 2025-06-10 PROCEDURE — 97110 THERAPEUTIC EXERCISES: CPT

## 2025-06-10 NOTE — PROGRESS NOTES
Re-ed        46098  Ther Activities        48912  Manual Therapy       59556  E-stim       66372  Ultrasound            Session 532 187 27       Treatment/Activity Tolerance:  [x] Patient tolerated treatment well [] Patient limited by fatigue  [] Patient limited by pain  [] Patient limited by other medical complications  [] Other:     Prognosis: [] Good [x] Fair  [] Poor    Patient Requires Follow-up: [x] Yes  [] No    Plan:   [x] Continue per plan of care [] Alter current plan (see comments)  [] Plan of care initiated [] Hold pending MD visit [] Discharge    Plan for Next Session:  progress postural strengthening and alignment, modalities and manual therapy PRN for pain management      Electronically signed by:  Carey Field PTA [id number]

## 2025-06-13 ENCOUNTER — TREATMENT (OUTPATIENT)
Dept: PHYSICAL THERAPY | Age: 75
End: 2025-06-13
Payer: MEDICARE

## 2025-06-13 DIAGNOSIS — M50.30 DDD (DEGENERATIVE DISC DISEASE), CERVICAL: Primary | ICD-10-CM

## 2025-06-13 PROCEDURE — 97110 THERAPEUTIC EXERCISES: CPT | Performed by: PHYSICAL THERAPIST

## 2025-06-13 NOTE — PROGRESS NOTES
24661  Evaluation Low Complexity      64795  Evaluation Med Complexity      64523  Evaluation High Complexity      66804  Ther Ex 0930 1000 30   02007  Neuro Re-ed        32486  Ther Activities        01823  Manual Therapy       51520  E-stim       23032  Ultrasound            Session 0930 1000 30       Treatment/Activity Tolerance:  [x] Patient tolerated treatment well [] Patient limited by fatigue  [] Patient limited by pain  [] Patient limited by other medical complications  [] Other:     Prognosis: [] Good [x] Fair  [] Poor    Patient Requires Follow-up: [x] Yes  [] No    Plan:   [x] Continue per plan of care [] Alter current plan (see comments)  [] Plan of care initiated [] Hold pending MD visit [] Discharge    Plan for Next Session:  progress postural strengthening and alignment, modalities and manual therapy PRN for pain management      Electronically signed by:  Yancy Ramos, PT, DPT  [id number]

## 2025-06-20 ENCOUNTER — OFFICE VISIT (OUTPATIENT)
Dept: PRIMARY CARE CLINIC | Age: 75
End: 2025-06-20
Payer: MEDICARE

## 2025-06-20 VITALS
HEART RATE: 111 BPM | HEIGHT: 62 IN | DIASTOLIC BLOOD PRESSURE: 72 MMHG | BODY MASS INDEX: 32.76 KG/M2 | SYSTOLIC BLOOD PRESSURE: 122 MMHG | TEMPERATURE: 98.3 F | WEIGHT: 178 LBS | OXYGEN SATURATION: 95 %

## 2025-06-20 DIAGNOSIS — J02.9 SORE THROAT: ICD-10-CM

## 2025-06-20 DIAGNOSIS — H69.93 ACUTE DYSFUNCTION OF BOTH EUSTACHIAN TUBES: ICD-10-CM

## 2025-06-20 LAB — S PYO AG THROAT QL: NORMAL

## 2025-06-20 PROCEDURE — 1159F MED LIST DOCD IN RCRD: CPT | Performed by: NURSE PRACTITIONER

## 2025-06-20 PROCEDURE — 3078F DIAST BP <80 MM HG: CPT | Performed by: NURSE PRACTITIONER

## 2025-06-20 PROCEDURE — 1123F ACP DISCUSS/DSCN MKR DOCD: CPT | Performed by: NURSE PRACTITIONER

## 2025-06-20 PROCEDURE — 87880 STREP A ASSAY W/OPTIC: CPT | Performed by: NURSE PRACTITIONER

## 2025-06-20 PROCEDURE — 3074F SYST BP LT 130 MM HG: CPT | Performed by: NURSE PRACTITIONER

## 2025-06-20 PROCEDURE — 99213 OFFICE O/P EST LOW 20 MIN: CPT | Performed by: NURSE PRACTITIONER

## 2025-06-20 RX ORDER — FLUTICASONE PROPIONATE 50 MCG
1 SPRAY, SUSPENSION (ML) NASAL 2 TIMES DAILY
Qty: 16 G | Refills: 2 | Status: SHIPPED | OUTPATIENT
Start: 2025-06-20

## 2025-06-20 RX ORDER — DULOXETIN HYDROCHLORIDE 60 MG/1
60 CAPSULE, DELAYED RELEASE ORAL DAILY
COMMUNITY

## 2025-06-20 NOTE — PROGRESS NOTES
Chief Complaint:   Pharyngitis (For approx 1 week , pain travels up to right ear ) and Ear Fullness      History of Present Illness   Source of history provided by:  patient.      Eden Parikh is a 74 y.o. old female with a past medical history of:   Past Medical History:   Diagnosis Date    Allergic rhinitis     Anxiety disorder     Bipolar disorder (HCC)     Caffeine use 5/19/2015    Chronic low back pain 5/19/2015    Depression, major, in remission     multiple hospitalizations    Endocervical polyp 4/19/2012    GERD (gastroesophageal reflux disease)     Heart murmur, systolic     g2/6    History of bariatric surgery 1980's    gastric stapling    History of cervical dysplasia 5/19/2015    Hx of colonic polyp 3/18/2021    Colonoscopy 2/17/21 Naffah/Awaida no recurrent polyps repeat in 5 years    Hypertension          Insomnia 5/19/2015    Knee pain, right     severe OA per  Dr Adrian    Knee stiffness, left 09/15/2021    with pain    Kyphosis 5/19/2015    MVP (mitral valve prolapse)     Neuropathy     cervical spondylosis    Obesity     Osteoarthritis     Osteoarthritis of both knees     Osteoarthritis of hand 5/19/2015    Pap smear abnormality of cervix with LGSIL     Skin cancer     Spinal stenosis, lumbar 5/2013 MRI    disc degeneration and bulges with canal and foraminal stenoses       Pt  presents to the [facility] with a sore throat and bilateral ear fullness for the past 7 days.  No fever noted.   Denies any N/V/D, difficulty swallowing, congestion, cough, abdominal pain, CP, progressive SOB, dizziness, or lethargy.         ROS    Unless otherwise stated in this report or unable to obtain because of the patient's clinical or mental status as evidenced by the medical record, this patients's positive and negative responses for Review of Systems, constitutional, psych, eyes, ENT, cardiovascular, respiratory, gastrointestinal, neurological, genitourinary, musculoskeletal, integument systems and

## 2025-06-24 ENCOUNTER — TREATMENT (OUTPATIENT)
Dept: PHYSICAL THERAPY | Age: 75
End: 2025-06-24
Payer: MEDICARE

## 2025-06-24 DIAGNOSIS — M50.30 DDD (DEGENERATIVE DISC DISEASE), CERVICAL: Primary | ICD-10-CM

## 2025-06-24 PROCEDURE — 97110 THERAPEUTIC EXERCISES: CPT

## 2025-06-24 NOTE — PROGRESS NOTES
[facility]          Phone: [phone] Fax: [phone]    Physical Therapy Daily Treatment Note  Date:  2025    Patient Name:  Eden Parikh    :  1950  MRN: [id number]    Evaluating therapist: Yancy Ramos, PT, DPT  [id number]    Restrictions/Precautions:      Diagnosis:     Diagnosis Orders   1. DDD (degenerative disc disease), cervical            Treatment Diagnosis:    Insurance/Certification information:  [employer]vantage  Referring Physician:  Edmund Rivas DO  Plan of care signed (Y/N):  yes  Visit# / total visits:    Pain level: 4/10 in B arms and hands, 0/10 in neck and shoulders   Time In:  830  Time Out: 908     Subjective:  Pt reports no shoulder pain but continues to have \" burning and tingling \" in B arms and hands. She remains frustrated and is awaiting neurosurgery evaluation.   Pt became tearful reporting to therapist that Jorge Luis Isabell 15, 2025 she began taking Simbalta as prescribed by her doctor , after taking it for several days , she became paranoid, couldn't sleep d/t her mind would not quiet down, and she had increased irritability.  Per pt , she could not live like this and weaned herself off the medication.     Exercises:  Exercise/Equipment Resistance/Repetitions Other comments            Cervical retractions 10x 3 sec hold      Scapular retractions      Median nerve glide 5x ea side             Mid rows 15x 2 sec hold YTB     Resisted ER 10x 2 sec hold W/ towel     UBE 3 min forward, 3 min backward             Single UE doorway stretch 30 sec x2 ea side                                                                      Other:  Pt tolerated TE's this date without significant change in symptoms. Pt reporting muscular fatigue at the end of the session. Pt remains frustrated with chronic pain, and does not feel therapy is beneficial at this time. Pt would like to discharge after next session        Home Exercise

## 2025-06-27 ENCOUNTER — INITIAL CONSULT (OUTPATIENT)
Age: 75
End: 2025-06-27
Payer: MEDICARE

## 2025-06-27 VITALS
WEIGHT: 180 LBS | HEART RATE: 103 BPM | HEIGHT: 62 IN | BODY MASS INDEX: 33.13 KG/M2 | DIASTOLIC BLOOD PRESSURE: 70 MMHG | SYSTOLIC BLOOD PRESSURE: 130 MMHG | RESPIRATION RATE: 16 BRPM | OXYGEN SATURATION: 90 %

## 2025-06-27 DIAGNOSIS — M54.12 CERVICAL RADICULOPATHY: ICD-10-CM

## 2025-06-27 DIAGNOSIS — M54.2 NECK PAIN: Primary | ICD-10-CM

## 2025-06-27 PROCEDURE — 99203 OFFICE O/P NEW LOW 30 MIN: CPT | Performed by: STUDENT IN AN ORGANIZED HEALTH CARE EDUCATION/TRAINING PROGRAM

## 2025-06-27 PROCEDURE — 1123F ACP DISCUSS/DSCN MKR DOCD: CPT | Performed by: STUDENT IN AN ORGANIZED HEALTH CARE EDUCATION/TRAINING PROGRAM

## 2025-06-27 PROCEDURE — 3075F SYST BP GE 130 - 139MM HG: CPT | Performed by: STUDENT IN AN ORGANIZED HEALTH CARE EDUCATION/TRAINING PROGRAM

## 2025-06-27 PROCEDURE — 1125F AMNT PAIN NOTED PAIN PRSNT: CPT | Performed by: STUDENT IN AN ORGANIZED HEALTH CARE EDUCATION/TRAINING PROGRAM

## 2025-06-27 PROCEDURE — 1159F MED LIST DOCD IN RCRD: CPT | Performed by: STUDENT IN AN ORGANIZED HEALTH CARE EDUCATION/TRAINING PROGRAM

## 2025-06-27 PROCEDURE — 3078F DIAST BP <80 MM HG: CPT | Performed by: STUDENT IN AN ORGANIZED HEALTH CARE EDUCATION/TRAINING PROGRAM

## 2025-06-27 ASSESSMENT — ENCOUNTER SYMPTOMS
ABDOMINAL PAIN: 0
SHORTNESS OF BREATH: 0
TROUBLE SWALLOWING: 0
PHOTOPHOBIA: 0

## 2025-06-27 NOTE — PROGRESS NOTES
[facility]      Subjective:  Eden Parikh is a 75 y/o female who has a PMH of HTN, MVP, GERD, anxiety who presents for evaluation of neck pain. She states she started to have this pain after March 31st when she fell. Since the fall she started to have worsening neck and arm pain. She describes this pain as a \"electric\" burning pain that radiates into his arms. She admits to associated numbness and weakness with this pain. She admits to loss of balance. She has tried heat and duloxetine with no relief. She has tried PT at [facility] with no relief. She has not tried any VALDEMAR for this pain. She is a nonsmoker and denies any blood thinner usage. MRI reviewed with patient.       Review of Systems   Constitutional:  Negative for chills and fever.   HENT:  Negative for trouble swallowing.    Eyes:  Negative for photophobia.   Respiratory:  Negative for shortness of breath.    Cardiovascular:  Negative for chest pain.   Gastrointestinal:  Negative for abdominal pain.   Endocrine: Negative for heat intolerance.   Genitourinary:  Negative for difficulty urinating and flank pain.   Musculoskeletal:  Positive for neck pain. Negative for myalgias.   Skin:  Negative for wound.   Neurological:  Positive for weakness and numbness. Negative for headaches.   Psychiatric/Behavioral:  Negative for confusion.       Objective:  Vitals:    06/27/25 1239   BP: 130/70   Pulse: (!) 103   Resp: 16   SpO2: 90%     Physical Exam  HENT:      Head: Normocephalic.   Eyes:      Pupils: Pupils are equal, round, and reactive to light.   Cardiovascular:      Rate and Rhythm: Normal rate.   Pulmonary:      Effort: Pulmonary effort is normal.   Abdominal:      General: There is no distension.   Skin:     General: Skin is warm and dry.   Neurological:      Mental Status: She is alert.      Comments: A&Ox3  CN3-12 intact  Motor Strength full   Sensation intact to light touch   Reflexes normal    Psychiatric:

## 2025-07-08 ENCOUNTER — PATIENT MESSAGE (OUTPATIENT)
Dept: FAMILY MEDICINE CLINIC | Age: 75
End: 2025-07-08

## 2025-07-09 ENCOUNTER — HOSPITAL ENCOUNTER (OUTPATIENT)
Age: 75
Discharge: HOME | End: 2025-07-09
Payer: MEDICARE

## 2025-07-09 ENCOUNTER — TELEPHONE (OUTPATIENT)
Dept: FAMILY MEDICINE CLINIC | Age: 75
End: 2025-07-09

## 2025-07-09 ENCOUNTER — OFFICE VISIT (OUTPATIENT)
Dept: FAMILY MEDICINE CLINIC | Age: 75
End: 2025-07-09
Payer: MEDICARE

## 2025-07-09 VITALS
OXYGEN SATURATION: 91 % | DIASTOLIC BLOOD PRESSURE: 60 MMHG | HEART RATE: 110 BPM | SYSTOLIC BLOOD PRESSURE: 118 MMHG | TEMPERATURE: 98.4 F | HEIGHT: 62 IN | BODY MASS INDEX: 32.39 KG/M2 | WEIGHT: 176 LBS | RESPIRATION RATE: 18 BRPM

## 2025-07-09 DIAGNOSIS — M50.30 DDD (DEGENERATIVE DISC DISEASE), CERVICAL: ICD-10-CM

## 2025-07-09 DIAGNOSIS — E66.9 MODERATE OBESITY: ICD-10-CM

## 2025-07-09 DIAGNOSIS — R73.03 PREDIABETES: ICD-10-CM

## 2025-07-09 DIAGNOSIS — E78.5 DYSLIPIDEMIA: ICD-10-CM

## 2025-07-09 DIAGNOSIS — M43.10 SPONDYLOLISTHESIS, GRADE 1: Chronic | ICD-10-CM

## 2025-07-09 DIAGNOSIS — F41.9 ANXIETY DISORDER, UNSPECIFIED TYPE: Chronic | ICD-10-CM

## 2025-07-09 DIAGNOSIS — Z53.20 BREAST SCREENING DECLINED: ICD-10-CM

## 2025-07-09 DIAGNOSIS — R53.83 OTHER FATIGUE: ICD-10-CM

## 2025-07-09 DIAGNOSIS — F31.9 BIPOLAR DEPRESSION (HCC): ICD-10-CM

## 2025-07-09 DIAGNOSIS — I10 ESSENTIAL HYPERTENSION: Primary | Chronic | ICD-10-CM

## 2025-07-09 LAB
ALBUMIN SERPL-MCNC: 3.2 G/DL (ref 3.5–5.2)
ALP SERPL-CCNC: 79 U/L (ref 35–104)
ALT SERPL-CCNC: 29 U/L (ref 0–35)
ANION GAP SERPL CALCULATED.3IONS-SCNC: 9 MMOL/L (ref 7–16)
AST SERPL-CCNC: 44 U/L (ref 0–35)
BASOPHILS # BLD: 0.07 K/UL (ref 0–0.2)
BASOPHILS NFR BLD: 0 % (ref 0–2)
BILIRUB SERPL-MCNC: 0.4 MG/DL (ref 0–1.2)
BUN SERPL-MCNC: 14 MG/DL (ref 8–23)
CALCIUM SERPL-MCNC: 9.6 MG/DL (ref 8.8–10.2)
CHLORIDE SERPL-SCNC: 102 MMOL/L (ref 98–107)
CHOLEST SERPL-MCNC: 152 MG/DL
CO2 SERPL-SCNC: 26 MMOL/L (ref 22–29)
CREAT SERPL-MCNC: 0.7 MG/DL (ref 0.5–1)
EOSINOPHIL # BLD: 0.23 K/UL (ref 0.05–0.5)
EOSINOPHILS RELATIVE PERCENT: 1 % (ref 0–6)
ERYTHROCYTE [DISTWIDTH] IN BLOOD BY AUTOMATED COUNT: 14.6 % (ref 11.5–15)
GFR, ESTIMATED: 85 ML/MIN/1.73M2
GLUCOSE SERPL-MCNC: 107 MG/DL (ref 74–99)
HBA1C MFR BLD: 5.7 % (ref 4–5.6)
HCT VFR BLD AUTO: 36.8 % (ref 34–48)
HDLC SERPL-MCNC: 31 MG/DL
HGB BLD-MCNC: 11.9 G/DL (ref 11.5–15.5)
IMM GRANULOCYTES # BLD AUTO: 0.21 K/UL (ref 0–0.58)
IMM GRANULOCYTES NFR BLD: 1 % (ref 0–5)
LDLC SERPL CALC-MCNC: 96 MG/DL
LYMPHOCYTES NFR BLD: 1.27 K/UL (ref 1.5–4)
LYMPHOCYTES RELATIVE PERCENT: 7 % (ref 20–42)
MCH RBC QN AUTO: 29.5 PG (ref 26–35)
MCHC RBC AUTO-ENTMCNC: 32.3 G/DL (ref 32–34.5)
MCV RBC AUTO: 91.1 FL (ref 80–99.9)
MONOCYTES NFR BLD: 1.02 K/UL (ref 0.1–0.95)
MONOCYTES NFR BLD: 5 % (ref 2–12)
NEUTROPHILS NFR BLD: 85 % (ref 43–80)
NEUTS SEG NFR BLD: 16.38 K/UL (ref 1.8–7.3)
PLATELET # BLD AUTO: 569 K/UL (ref 130–450)
PMV BLD AUTO: 8.2 FL (ref 7–12)
POTASSIUM SERPL-SCNC: 4.2 MMOL/L (ref 3.5–5.1)
PROT SERPL-MCNC: 6.4 G/DL (ref 6.4–8.3)
RBC # BLD AUTO: 4.04 M/UL (ref 3.5–5.5)
SODIUM SERPL-SCNC: 138 MMOL/L (ref 136–145)
TRIGL SERPL-MCNC: 125 MG/DL
TSH SERPL DL<=0.05 MIU/L-ACNC: 2.67 UIU/ML (ref 0.27–4.2)
URATE SERPL-MCNC: 4.5 MG/DL (ref 2.4–5.7)
VLDLC SERPL CALC-MCNC: 25 MG/DL
WBC OTHER # BLD: 19.2 K/UL (ref 4.5–11.5)

## 2025-07-09 PROCEDURE — 84443 ASSAY THYROID STIM HORMONE: CPT

## 2025-07-09 PROCEDURE — 1160F RVW MEDS BY RX/DR IN RCRD: CPT | Performed by: FAMILY MEDICINE

## 2025-07-09 PROCEDURE — 3078F DIAST BP <80 MM HG: CPT | Performed by: FAMILY MEDICINE

## 2025-07-09 PROCEDURE — 1123F ACP DISCUSS/DSCN MKR DOCD: CPT | Performed by: FAMILY MEDICINE

## 2025-07-09 PROCEDURE — 99214 OFFICE O/P EST MOD 30 MIN: CPT | Performed by: FAMILY MEDICINE

## 2025-07-09 PROCEDURE — 80061 LIPID PANEL: CPT

## 2025-07-09 PROCEDURE — 84550 ASSAY OF BLOOD/URIC ACID: CPT

## 2025-07-09 PROCEDURE — 80053 COMPREHEN METABOLIC PANEL: CPT

## 2025-07-09 PROCEDURE — 3074F SYST BP LT 130 MM HG: CPT | Performed by: FAMILY MEDICINE

## 2025-07-09 PROCEDURE — 85025 COMPLETE CBC W/AUTO DIFF WBC: CPT

## 2025-07-09 PROCEDURE — 83036 HEMOGLOBIN GLYCOSYLATED A1C: CPT

## 2025-07-09 PROCEDURE — 1159F MED LIST DOCD IN RCRD: CPT | Performed by: FAMILY MEDICINE

## 2025-07-09 PROCEDURE — 36415 COLL VENOUS BLD VENIPUNCTURE: CPT

## 2025-07-09 RX ORDER — METOPROLOL SUCCINATE 25 MG/1
25 TABLET, EXTENDED RELEASE ORAL EVERY MORNING
Qty: 90 TABLET | Refills: 1 | Status: SHIPPED | OUTPATIENT
Start: 2025-07-09 | End: 2025-07-09 | Stop reason: SDUPTHER

## 2025-07-09 RX ORDER — METOPROLOL SUCCINATE 25 MG/1
25 TABLET, EXTENDED RELEASE ORAL EVERY MORNING
Qty: 90 TABLET | Refills: 1
Start: 2025-07-09

## 2025-07-09 RX ORDER — DOXYCYCLINE HYCLATE 100 MG
100 TABLET ORAL 2 TIMES DAILY
Qty: 20 TABLET | Refills: 0 | Status: SHIPPED | OUTPATIENT
Start: 2025-07-09 | End: 2025-07-19

## 2025-07-09 RX ORDER — ALPHA LIPOIC ACID 600 MG
600 TABLET ORAL DAILY
Qty: 90 TABLET | Refills: 1 | Status: SHIPPED | OUTPATIENT
Start: 2025-07-09

## 2025-07-09 ASSESSMENT — ENCOUNTER SYMPTOMS
DIARRHEA: 0
COUGH: 0
BLURRED VISION: 0
GASTROINTESTINAL NEGATIVE: 1
SINUS PAIN: 0
RESPIRATORY NEGATIVE: 1
RECTAL PAIN: 0
SORE THROAT: 0
VOMITING: 0
ABDOMINAL DISTENTION: 0
ABDOMINAL PAIN: 0
CONSTIPATION: 0
WHEEZING: 0
BLOOD IN STOOL: 0
PHOTOPHOBIA: 0
SINUS PRESSURE: 0
EYE DISCHARGE: 0
STRIDOR: 0
CHOKING: 0
CHEST TIGHTNESS: 0
ALLERGIC/IMMUNOLOGIC NEGATIVE: 1
APNEA: 0
BACK PAIN: 1
TROUBLE SWALLOWING: 0
EYE ITCHING: 0
SHORTNESS OF BREATH: 0
RHINORRHEA: 0
EYE PAIN: 0
COLOR CHANGE: 0
EYE REDNESS: 0
ORTHOPNEA: 0
FACIAL SWELLING: 0
VOICE CHANGE: 0
NAUSEA: 0
ANAL BLEEDING: 0

## 2025-07-09 NOTE — PROGRESS NOTES
SUBJECTIVE  Eden Parikh is a 74 y.o. female.    HPI/Chief C/O:  Chief Complaint   Patient presents with   • Pain     Pt completed PT but still has muscle pain and weakness. Her hands up into her arms has nerve pain    • Shortness of Breath     Reports being SOB easily    • Discuss Medications     Due for a mammogram (refused)   • Leg Swelling     Pt has bilateral lower leg swelling and feels tight. Tenderness of both achilles areas   • Nutrition Counseling     Pt would like to see someone to help her diet      No Known Allergies  The patient is here for a medication list and treatment planning review  We will go over our care planning goals as well as take care of all refills  We will set up labs as well        Hypertension  This is a chronic problem. The current episode started more than 1 year ago. The problem is controlled. Associated symptoms include anxiety, malaise/fatigue and neck pain. Pertinent negatives include no blurred vision, chest pain, headaches, orthopnea, palpitations, peripheral edema, PND, shortness of breath or sweats. Risk factors for coronary artery disease include obesity, post-menopausal state and stress. Past treatments include lifestyle changes and beta blockers. The current treatment provides significant improvement. Compliance problems include diet, exercise and psychosocial issues.  There is no history of angina, kidney disease, CAD/MI, CVA, heart failure, left ventricular hypertrophy, PVD or retinopathy. There is no history of chronic renal disease, coarctation of the aorta, hyperaldosteronism, hypercortisolism, hyperparathyroidism, a hypertension causing med, pheochromocytoma, renovascular disease, sleep apnea or a thyroid problem.       ROS:  Review of Systems   Constitutional:  Positive for fatigue and malaise/fatigue. Negative for activity change, appetite change, chills, diaphoresis, fever and unexpected weight change.   HENT: Negative.  Negative for congestion, dental

## 2025-07-09 NOTE — TELEPHONE ENCOUNTER
Pt called stating she went to the pharmacy to  RX and an antibiotic was given to her she was unsure what that was for she said it was not discussed during the visit

## 2025-07-18 ENCOUNTER — HOSPITAL ENCOUNTER (OUTPATIENT)
Age: 75
Discharge: HOME | End: 2025-07-18
Payer: MEDICARE

## 2025-07-18 ENCOUNTER — RESULTS FOLLOW-UP (OUTPATIENT)
Dept: FAMILY MEDICINE CLINIC | Age: 75
End: 2025-07-18

## 2025-07-18 DIAGNOSIS — E78.5 HYPERLIPIDEMIA, UNSPECIFIED HYPERLIPIDEMIA TYPE: ICD-10-CM

## 2025-07-18 DIAGNOSIS — I10 ESSENTIAL HYPERTENSION: Chronic | ICD-10-CM

## 2025-07-18 DIAGNOSIS — I10 ESSENTIAL HYPERTENSION: Primary | Chronic | ICD-10-CM

## 2025-07-18 DIAGNOSIS — D72.829 LEUKOCYTOSIS, UNSPECIFIED TYPE: Primary | ICD-10-CM

## 2025-07-18 LAB
ANION GAP SERPL CALCULATED.3IONS-SCNC: 9 MMOL/L (ref 7–16)
BASOPHILS # BLD: 0.07 K/UL (ref 0–0.2)
BASOPHILS NFR BLD: 0 % (ref 0–2)
BUN SERPL-MCNC: 16 MG/DL (ref 8–23)
CALCIUM SERPL-MCNC: 9.6 MG/DL (ref 8.8–10.2)
CHLORIDE SERPL-SCNC: 105 MMOL/L (ref 98–107)
CO2 SERPL-SCNC: 25 MMOL/L (ref 22–29)
CREAT SERPL-MCNC: 0.7 MG/DL (ref 0.5–1)
EOSINOPHIL # BLD: 0.33 K/UL (ref 0.05–0.5)
EOSINOPHILS RELATIVE PERCENT: 2 % (ref 0–6)
ERYTHROCYTE [DISTWIDTH] IN BLOOD BY AUTOMATED COUNT: 15.2 % (ref 11.5–15)
GFR, ESTIMATED: 84 ML/MIN/1.73M2
GLUCOSE SERPL-MCNC: 121 MG/DL (ref 74–99)
HCT VFR BLD AUTO: 38.3 % (ref 34–48)
HGB BLD-MCNC: 12.1 G/DL (ref 11.5–15.5)
IMM GRANULOCYTES # BLD AUTO: 0.11 K/UL (ref 0–0.58)
IMM GRANULOCYTES NFR BLD: 1 % (ref 0–5)
LYMPHOCYTES NFR BLD: 1.06 K/UL (ref 1.5–4)
LYMPHOCYTES RELATIVE PERCENT: 6 % (ref 20–42)
MCH RBC QN AUTO: 28.8 PG (ref 26–35)
MCHC RBC AUTO-ENTMCNC: 31.6 G/DL (ref 32–34.5)
MCV RBC AUTO: 91.2 FL (ref 80–99.9)
MONOCYTES NFR BLD: 0.99 K/UL (ref 0.1–0.95)
MONOCYTES NFR BLD: 6 % (ref 2–12)
NEUTROPHILS NFR BLD: 85 % (ref 43–80)
NEUTS SEG NFR BLD: 14.62 K/UL (ref 1.8–7.3)
PLATELET # BLD AUTO: 522 K/UL (ref 130–450)
PMV BLD AUTO: 8.1 FL (ref 7–12)
POTASSIUM SERPL-SCNC: 4.1 MMOL/L (ref 3.5–5.1)
RBC # BLD AUTO: 4.2 M/UL (ref 3.5–5.5)
SODIUM SERPL-SCNC: 138 MMOL/L (ref 136–145)
WBC OTHER # BLD: 17.2 K/UL (ref 4.5–11.5)

## 2025-07-18 PROCEDURE — 80048 BASIC METABOLIC PNL TOTAL CA: CPT

## 2025-07-18 PROCEDURE — 85025 COMPLETE CBC W/AUTO DIFF WBC: CPT

## 2025-07-18 PROCEDURE — 36415 COLL VENOUS BLD VENIPUNCTURE: CPT

## 2025-07-19 ENCOUNTER — HOSPITAL ENCOUNTER (INPATIENT)
Age: 75
LOS: 5 days | Discharge: HOME HEALTH | DRG: 280 | End: 2025-07-24
Attending: EMERGENCY MEDICINE | Admitting: INTERNAL MEDICINE
Payer: MEDICARE

## 2025-07-19 ENCOUNTER — APPOINTMENT (OUTPATIENT)
Dept: CT IMAGING | Age: 75
DRG: 280 | End: 2025-07-19
Payer: MEDICARE

## 2025-07-19 ENCOUNTER — APPOINTMENT (OUTPATIENT)
Dept: GENERAL RADIOLOGY | Age: 75
DRG: 280 | End: 2025-07-19
Payer: MEDICARE

## 2025-07-19 DIAGNOSIS — J18.9 PNEUMONIA DUE TO INFECTIOUS ORGANISM, UNSPECIFIED LATERALITY, UNSPECIFIED PART OF LUNG: ICD-10-CM

## 2025-07-19 DIAGNOSIS — I21.4 NSTEMI (NON-ST ELEVATED MYOCARDIAL INFARCTION) (HCC): Primary | ICD-10-CM

## 2025-07-19 DIAGNOSIS — J18.9 ATYPICAL PNEUMONIA: ICD-10-CM

## 2025-07-19 DIAGNOSIS — J96.00 ACUTE RESPIRATORY FAILURE, UNSPECIFIED WHETHER WITH HYPOXIA OR HYPERCAPNIA (HCC): ICD-10-CM

## 2025-07-19 LAB
ALBUMIN SERPL-MCNC: 3.3 G/DL (ref 3.5–5.2)
ALP SERPL-CCNC: 109 U/L (ref 35–104)
ALT SERPL-CCNC: 29 U/L (ref 0–35)
AMORPH SED URNS QL MICRO: PRESENT
ANION GAP SERPL CALCULATED.3IONS-SCNC: 12 MMOL/L (ref 7–16)
AST SERPL-CCNC: 47 U/L (ref 0–35)
BACTERIA URNS QL MICRO: ABNORMAL
BASOPHILS # BLD: 0.09 K/UL (ref 0–0.2)
BASOPHILS NFR BLD: 0 % (ref 0–2)
BILIRUB SERPL-MCNC: 0.5 MG/DL (ref 0–1.2)
BILIRUB UR QL STRIP: NEGATIVE
BNP SERPL-MCNC: 760 PG/ML (ref 0–450)
BUN SERPL-MCNC: 19 MG/DL (ref 8–23)
CALCIUM SERPL-MCNC: 9.7 MG/DL (ref 8.8–10.2)
CHLORIDE SERPL-SCNC: 103 MMOL/L (ref 98–107)
CLARITY UR: CLEAR
CO2 SERPL-SCNC: 23 MMOL/L (ref 22–29)
COLOR UR: YELLOW
CREAT SERPL-MCNC: 0.8 MG/DL (ref 0.5–1)
EOSINOPHIL # BLD: 0.4 K/UL (ref 0.05–0.5)
EOSINOPHILS RELATIVE PERCENT: 2 % (ref 0–6)
EPI CELLS #/AREA URNS HPF: ABNORMAL /HPF
ERYTHROCYTE [DISTWIDTH] IN BLOOD BY AUTOMATED COUNT: 15.1 % (ref 11.5–15)
FLUAV RNA RESP QL NAA+PROBE: NOT DETECTED
FLUBV RNA RESP QL NAA+PROBE: NOT DETECTED
GFR, ESTIMATED: 82 ML/MIN/1.73M2
GLUCOSE SERPL-MCNC: 94 MG/DL (ref 74–99)
GLUCOSE UR STRIP-MCNC: NEGATIVE MG/DL
HCT VFR BLD AUTO: 38.8 % (ref 34–48)
HGB BLD-MCNC: 12.6 G/DL (ref 11.5–15.5)
HGB UR QL STRIP.AUTO: NEGATIVE
IMM GRANULOCYTES # BLD AUTO: 0.36 K/UL (ref 0–0.58)
IMM GRANULOCYTES NFR BLD: 2 % (ref 0–5)
INR PPP: 1.3
KETONES UR STRIP-MCNC: 15 MG/DL
LACTATE BLDV-SCNC: 1.4 MMOL/L (ref 0.5–1.9)
LEUKOCYTE ESTERASE UR QL STRIP: NEGATIVE
LYMPHOCYTES NFR BLD: 1.4 K/UL (ref 1.5–4)
LYMPHOCYTES RELATIVE PERCENT: 7 % (ref 20–42)
MCH RBC QN AUTO: 29.2 PG (ref 26–35)
MCHC RBC AUTO-ENTMCNC: 32.5 G/DL (ref 32–34.5)
MCV RBC AUTO: 90 FL (ref 80–99.9)
MONOCYTES NFR BLD: 0.98 K/UL (ref 0.1–0.95)
MONOCYTES NFR BLD: 5 % (ref 2–12)
NEUTROPHILS NFR BLD: 84 % (ref 43–80)
NEUTS SEG NFR BLD: 16.83 K/UL (ref 1.8–7.3)
NITRITE UR QL STRIP: NEGATIVE
PARTIAL THROMBOPLASTIN TIME: 54.4 SEC (ref 24.5–35.1)
PH UR STRIP: 5.5 [PH] (ref 5–8)
PLATELET # BLD AUTO: 551 K/UL (ref 130–450)
PMV BLD AUTO: 8.2 FL (ref 7–12)
POTASSIUM SERPL-SCNC: 4 MMOL/L (ref 3.5–5.1)
PROT SERPL-MCNC: 6.7 G/DL (ref 6.4–8.3)
PROT UR STRIP-MCNC: ABNORMAL MG/DL
PROTHROMBIN TIME: 14.4 SEC (ref 9.3–12.4)
RBC # BLD AUTO: 4.31 M/UL (ref 3.5–5.5)
RBC #/AREA URNS HPF: ABNORMAL /HPF
RSV BY PCR: NOT DETECTED
SARS-COV-2 RNA RESP QL NAA+PROBE: NOT DETECTED
SODIUM SERPL-SCNC: 137 MMOL/L (ref 136–145)
SOURCE: NORMAL
SP GR UR STRIP: >1.03 (ref 1–1.03)
SPECIMEN DESCRIPTION: NORMAL
SPECIMEN SOURCE: NORMAL
TROPONIN I SERPL HS-MCNC: 118 NG/L (ref 0–14)
TROPONIN I SERPL HS-MCNC: 127 NG/L (ref 0–14)
TROPONIN I SERPL HS-MCNC: 128 NG/L (ref 0–14)
UROBILINOGEN UR STRIP-ACNC: 0.2 EU/DL (ref 0–1)
WBC #/AREA URNS HPF: ABNORMAL /HPF
WBC OTHER # BLD: 20.1 K/UL (ref 4.5–11.5)

## 2025-07-19 PROCEDURE — 36415 COLL VENOUS BLD VENIPUNCTURE: CPT

## 2025-07-19 PROCEDURE — 85025 COMPLETE CBC W/AUTO DIFF WBC: CPT

## 2025-07-19 PROCEDURE — 87040 BLOOD CULTURE FOR BACTERIA: CPT

## 2025-07-19 PROCEDURE — 84484 ASSAY OF TROPONIN QUANT: CPT

## 2025-07-19 PROCEDURE — 96375 TX/PRO/DX INJ NEW DRUG ADDON: CPT

## 2025-07-19 PROCEDURE — 6370000000 HC RX 637 (ALT 250 FOR IP): Performed by: NURSE PRACTITIONER

## 2025-07-19 PROCEDURE — 83605 ASSAY OF LACTIC ACID: CPT

## 2025-07-19 PROCEDURE — 96365 THER/PROPH/DIAG IV INF INIT: CPT

## 2025-07-19 PROCEDURE — 85730 THROMBOPLASTIN TIME PARTIAL: CPT

## 2025-07-19 PROCEDURE — 81001 URINALYSIS AUTO W/SCOPE: CPT

## 2025-07-19 PROCEDURE — 87634 RSV DNA/RNA AMP PROBE: CPT

## 2025-07-19 PROCEDURE — 93005 ELECTROCARDIOGRAM TRACING: CPT | Performed by: EMERGENCY MEDICINE

## 2025-07-19 PROCEDURE — 71275 CT ANGIOGRAPHY CHEST: CPT

## 2025-07-19 PROCEDURE — 87636 SARSCOV2 & INF A&B AMP PRB: CPT

## 2025-07-19 PROCEDURE — 2580000003 HC RX 258: Performed by: EMERGENCY MEDICINE

## 2025-07-19 PROCEDURE — 80053 COMPREHEN METABOLIC PANEL: CPT

## 2025-07-19 PROCEDURE — 71045 X-RAY EXAM CHEST 1 VIEW: CPT

## 2025-07-19 PROCEDURE — 6360000002 HC RX W HCPCS: Performed by: EMERGENCY MEDICINE

## 2025-07-19 PROCEDURE — 83880 ASSAY OF NATRIURETIC PEPTIDE: CPT

## 2025-07-19 PROCEDURE — 85610 PROTHROMBIN TIME: CPT

## 2025-07-19 PROCEDURE — 0202U NFCT DS 22 TRGT SARS-COV-2: CPT

## 2025-07-19 PROCEDURE — 99285 EMERGENCY DEPT VISIT HI MDM: CPT

## 2025-07-19 PROCEDURE — 6360000004 HC RX CONTRAST MEDICATION: Performed by: RADIOLOGY

## 2025-07-19 PROCEDURE — 2060000000 HC ICU INTERMEDIATE R&B

## 2025-07-19 PROCEDURE — 2500000003 HC RX 250 WO HCPCS: Performed by: EMERGENCY MEDICINE

## 2025-07-19 RX ORDER — ASCORBIC ACID 500 MG
1000 TABLET ORAL DAILY
Status: DISCONTINUED | OUTPATIENT
Start: 2025-07-20 | End: 2025-07-24 | Stop reason: HOSPADM

## 2025-07-19 RX ORDER — METOPROLOL SUCCINATE 25 MG/1
25 TABLET, EXTENDED RELEASE ORAL EVERY MORNING
Status: DISCONTINUED | OUTPATIENT
Start: 2025-07-20 | End: 2025-07-24 | Stop reason: HOSPADM

## 2025-07-19 RX ORDER — GUAIFENESIN 200 MG/10ML
200 LIQUID ORAL EVERY 6 HOURS PRN
Status: DISCONTINUED | OUTPATIENT
Start: 2025-07-19 | End: 2025-07-24 | Stop reason: HOSPADM

## 2025-07-19 RX ORDER — ACETAMINOPHEN 650 MG/1
650 SUPPOSITORY RECTAL EVERY 6 HOURS PRN
Status: DISCONTINUED | OUTPATIENT
Start: 2025-07-19 | End: 2025-07-24 | Stop reason: HOSPADM

## 2025-07-19 RX ORDER — POTASSIUM CHLORIDE 1500 MG/1
40 TABLET, EXTENDED RELEASE ORAL PRN
Status: DISCONTINUED | OUTPATIENT
Start: 2025-07-19 | End: 2025-07-20

## 2025-07-19 RX ORDER — SODIUM CHLORIDE 0.9 % (FLUSH) 0.9 %
5-40 SYRINGE (ML) INJECTION EVERY 12 HOURS SCHEDULED
Status: DISCONTINUED | OUTPATIENT
Start: 2025-07-19 | End: 2025-07-24 | Stop reason: HOSPADM

## 2025-07-19 RX ORDER — SODIUM CHLORIDE 0.9 % (FLUSH) 0.9 %
5-40 SYRINGE (ML) INJECTION PRN
Status: DISCONTINUED | OUTPATIENT
Start: 2025-07-19 | End: 2025-07-24 | Stop reason: HOSPADM

## 2025-07-19 RX ORDER — IOPAMIDOL 755 MG/ML
75 INJECTION, SOLUTION INTRAVASCULAR
Status: COMPLETED | OUTPATIENT
Start: 2025-07-19 | End: 2025-07-19

## 2025-07-19 RX ORDER — ENOXAPARIN SODIUM 100 MG/ML
40 INJECTION SUBCUTANEOUS DAILY
Status: DISCONTINUED | OUTPATIENT
Start: 2025-07-20 | End: 2025-07-20

## 2025-07-19 RX ORDER — ONDANSETRON 4 MG/1
4 TABLET, ORALLY DISINTEGRATING ORAL EVERY 8 HOURS PRN
Status: DISCONTINUED | OUTPATIENT
Start: 2025-07-19 | End: 2025-07-24 | Stop reason: HOSPADM

## 2025-07-19 RX ORDER — ACETAMINOPHEN 325 MG/1
650 TABLET ORAL EVERY 6 HOURS PRN
Status: DISCONTINUED | OUTPATIENT
Start: 2025-07-19 | End: 2025-07-24 | Stop reason: HOSPADM

## 2025-07-19 RX ORDER — SODIUM CHLORIDE 9 MG/ML
INJECTION, SOLUTION INTRAVENOUS CONTINUOUS
Status: DISCONTINUED | OUTPATIENT
Start: 2025-07-19 | End: 2025-07-22

## 2025-07-19 RX ORDER — ALPHA LIPOIC ACID 600 MG
600 TABLET ORAL DAILY
Status: DISCONTINUED | OUTPATIENT
Start: 2025-07-20 | End: 2025-07-19 | Stop reason: CLARIF

## 2025-07-19 RX ORDER — BISACODYL 5 MG/1
5 TABLET, DELAYED RELEASE ORAL DAILY PRN
Status: DISCONTINUED | OUTPATIENT
Start: 2025-07-19 | End: 2025-07-24 | Stop reason: HOSPADM

## 2025-07-19 RX ORDER — IPRATROPIUM BROMIDE AND ALBUTEROL SULFATE 2.5; .5 MG/3ML; MG/3ML
1 SOLUTION RESPIRATORY (INHALATION) EVERY 6 HOURS PRN
Status: DISCONTINUED | OUTPATIENT
Start: 2025-07-19 | End: 2025-07-24 | Stop reason: HOSPADM

## 2025-07-19 RX ORDER — MAGNESIUM SULFATE IN WATER 40 MG/ML
2000 INJECTION, SOLUTION INTRAVENOUS PRN
Status: DISCONTINUED | OUTPATIENT
Start: 2025-07-19 | End: 2025-07-20

## 2025-07-19 RX ORDER — SODIUM CHLORIDE 9 MG/ML
INJECTION, SOLUTION INTRAVENOUS PRN
Status: DISCONTINUED | OUTPATIENT
Start: 2025-07-19 | End: 2025-07-24 | Stop reason: HOSPADM

## 2025-07-19 RX ORDER — VITAMIN B COMPLEX
1000 TABLET ORAL DAILY
Status: DISCONTINUED | OUTPATIENT
Start: 2025-07-20 | End: 2025-07-24 | Stop reason: HOSPADM

## 2025-07-19 RX ORDER — POTASSIUM CHLORIDE 7.45 MG/ML
10 INJECTION INTRAVENOUS PRN
Status: DISCONTINUED | OUTPATIENT
Start: 2025-07-19 | End: 2025-07-20

## 2025-07-19 RX ORDER — ONDANSETRON 2 MG/ML
4 INJECTION INTRAMUSCULAR; INTRAVENOUS EVERY 6 HOURS PRN
Status: DISCONTINUED | OUTPATIENT
Start: 2025-07-19 | End: 2025-07-24 | Stop reason: HOSPADM

## 2025-07-19 RX ORDER — IPRATROPIUM BROMIDE AND ALBUTEROL SULFATE 2.5; .5 MG/3ML; MG/3ML
1 SOLUTION RESPIRATORY (INHALATION) ONCE
Status: COMPLETED | OUTPATIENT
Start: 2025-07-19 | End: 2025-07-19

## 2025-07-19 RX ADMIN — IOPAMIDOL 75 ML: 755 INJECTION, SOLUTION INTRAVENOUS at 15:06

## 2025-07-19 RX ADMIN — WATER 1000 MG: 1 INJECTION INTRAMUSCULAR; INTRAVENOUS; SUBCUTANEOUS at 15:44

## 2025-07-19 RX ADMIN — IPRATROPIUM BROMIDE AND ALBUTEROL SULFATE 1 DOSE: 2.5; .5 SOLUTION RESPIRATORY (INHALATION) at 14:08

## 2025-07-19 RX ADMIN — DOXYCYCLINE 100 MG: 100 INJECTION, POWDER, LYOPHILIZED, FOR SOLUTION INTRAVENOUS at 15:52

## 2025-07-19 ASSESSMENT — PAIN DESCRIPTION - ONSET: ONSET: ON-GOING

## 2025-07-19 ASSESSMENT — PAIN DESCRIPTION - DESCRIPTORS: DESCRIPTORS: ACHING;DISCOMFORT;SORE

## 2025-07-19 ASSESSMENT — PAIN DESCRIPTION - PAIN TYPE: TYPE: ACUTE PAIN

## 2025-07-19 ASSESSMENT — PAIN DESCRIPTION - LOCATION: LOCATION: GENERALIZED

## 2025-07-19 ASSESSMENT — PAIN - FUNCTIONAL ASSESSMENT: PAIN_FUNCTIONAL_ASSESSMENT: 0-10

## 2025-07-19 ASSESSMENT — PAIN DESCRIPTION - FREQUENCY: FREQUENCY: CONTINUOUS

## 2025-07-19 ASSESSMENT — PAIN SCALES - GENERAL: PAINLEVEL_OUTOF10: 2

## 2025-07-19 NOTE — ED PROVIDER NOTES
Shared MARCELLO-ED Attending Visit.  CC: No          [facility]  Department of Emergency Medicine   ED  Encounter Note  Admit Date/RoomTime: 2025  1:00 PM  ED Room:   NAME: Eden Parikh  : 1950  MRN: [id number]     Chief Complaint:  Shortness of Breath (Having issues with breathing, ongoing for awhile)    HISTORY OF PRESENT ILLNESS      Patient presents to the emergency department for evaluation of worsening shortness of breath over time.  Patient states that has been ongoing for some time and she recently had a 2D echo and pending possible surgery with Dr. Leblanc for her neck.  She has noticed that she is having worsening dyspnea with exertion.  Her daughter is an nurse and got her a pulse ox to check her oxygen level at home.  Today she was taking out trash and doing some activity at home when she became more short of breath.  She put the pulse ox on and her SpO2 was 80%. She does does not endorse any chest pain, syncope, dizziness, productive cough, nausea, vomiting, abdominal pain, UTI symptoms, decreased urinary output or rash.  She does have a dry cough and she does have symmetrical bilateral lower leg swelling.  She has not taken any over-the-counter medication for her symptoms.  She does have a history of a blood clot in her younger years which was believed to be secondary to birth control.  She denies a history of COPD or asthma and is never been a smoker.  Her concern is the progression of her shortness of breath and her pulse ox being 80% at home with activity.      ROS   Pertinent positives and negatives are stated within HPI, all other systems reviewed and are negative.    Past Medical History:  has a past medical history of Allergic rhinitis, Anxiety disorder, Bipolar disorder (HCC), Caffeine use, Chronic low back pain, Depression, major, in remission, Endocervical polyp, GERD (gastroesophageal reflux disease), Heart murmur, systolic, History of bariatric surgery,

## 2025-07-20 PROBLEM — B37.0 ORAL THRUSH: Status: ACTIVE | Noted: 2025-07-20

## 2025-07-20 LAB
ANION GAP SERPL CALCULATED.3IONS-SCNC: 11 MMOL/L (ref 7–16)
B PARAP IS1001 DNA NPH QL NAA+NON-PROBE: NOT DETECTED
B PERT DNA SPEC QL NAA+PROBE: NOT DETECTED
BASOPHILS # BLD: 0.07 K/UL (ref 0–0.2)
BASOPHILS NFR BLD: 0 % (ref 0–2)
BUN SERPL-MCNC: 14 MG/DL (ref 8–23)
C PNEUM DNA NPH QL NAA+NON-PROBE: NOT DETECTED
CALCIUM SERPL-MCNC: 9.3 MG/DL (ref 8.8–10.2)
CHLORIDE SERPL-SCNC: 107 MMOL/L (ref 98–107)
CO2 SERPL-SCNC: 22 MMOL/L (ref 22–29)
CREAT SERPL-MCNC: 0.7 MG/DL (ref 0.5–1)
EOSINOPHIL # BLD: 0.59 K/UL (ref 0.05–0.5)
EOSINOPHILS RELATIVE PERCENT: 3 % (ref 0–6)
ERYTHROCYTE [DISTWIDTH] IN BLOOD BY AUTOMATED COUNT: 15.5 % (ref 11.5–15)
FLUAV RNA NPH QL NAA+NON-PROBE: NOT DETECTED
FLUBV RNA NPH QL NAA+NON-PROBE: NOT DETECTED
GFR, ESTIMATED: 85 ML/MIN/1.73M2
GLUCOSE SERPL-MCNC: 97 MG/DL (ref 74–99)
HADV DNA NPH QL NAA+NON-PROBE: NOT DETECTED
HCOV 229E RNA NPH QL NAA+NON-PROBE: NOT DETECTED
HCOV HKU1 RNA NPH QL NAA+NON-PROBE: NOT DETECTED
HCOV NL63 RNA NPH QL NAA+NON-PROBE: NOT DETECTED
HCOV OC43 RNA NPH QL NAA+NON-PROBE: NOT DETECTED
HCT VFR BLD AUTO: 35.7 % (ref 34–48)
HGB BLD-MCNC: 11.7 G/DL (ref 11.5–15.5)
HMPV RNA NPH QL NAA+NON-PROBE: NOT DETECTED
HPIV1 RNA NPH QL NAA+NON-PROBE: NOT DETECTED
HPIV2 RNA NPH QL NAA+NON-PROBE: NOT DETECTED
HPIV3 RNA NPH QL NAA+NON-PROBE: NOT DETECTED
HPIV4 RNA NPH QL NAA+NON-PROBE: NOT DETECTED
IMM GRANULOCYTES # BLD AUTO: 0.12 K/UL (ref 0–0.58)
IMM GRANULOCYTES NFR BLD: 1 % (ref 0–5)
LYMPHOCYTES NFR BLD: 1.45 K/UL (ref 1.5–4)
LYMPHOCYTES RELATIVE PERCENT: 7 % (ref 20–42)
M PNEUMO DNA NPH QL NAA+NON-PROBE: NOT DETECTED
MCH RBC QN AUTO: 30.1 PG (ref 26–35)
MCHC RBC AUTO-ENTMCNC: 32.8 G/DL (ref 32–34.5)
MCV RBC AUTO: 91.8 FL (ref 80–99.9)
MONOCYTES NFR BLD: 1.15 K/UL (ref 0.1–0.95)
MONOCYTES NFR BLD: 6 % (ref 2–12)
NEUTROPHILS NFR BLD: 83 % (ref 43–80)
NEUTS SEG NFR BLD: 16.14 K/UL (ref 1.8–7.3)
PLATELET # BLD AUTO: 526 K/UL (ref 130–450)
PMV BLD AUTO: 8.4 FL (ref 7–12)
POTASSIUM SERPL-SCNC: 4 MMOL/L (ref 3.5–5.1)
PROCALCITONIN SERPL-MCNC: 0.1 NG/ML (ref 0–0.08)
RBC # BLD AUTO: 3.89 M/UL (ref 3.5–5.5)
RSV RNA NPH QL NAA+NON-PROBE: NOT DETECTED
RV+EV RNA NPH QL NAA+NON-PROBE: NOT DETECTED
SARS-COV-2 RNA NPH QL NAA+NON-PROBE: NOT DETECTED
SODIUM SERPL-SCNC: 139 MMOL/L (ref 136–145)
SPECIMEN DESCRIPTION: NORMAL
WBC OTHER # BLD: 19.5 K/UL (ref 4.5–11.5)

## 2025-07-20 PROCEDURE — 87899 AGENT NOS ASSAY W/OPTIC: CPT

## 2025-07-20 PROCEDURE — 6360000002 HC RX W HCPCS: Performed by: NURSE PRACTITIONER

## 2025-07-20 PROCEDURE — 0202U NFCT DS 22 TRGT SARS-COV-2: CPT

## 2025-07-20 PROCEDURE — 99223 1ST HOSP IP/OBS HIGH 75: CPT | Performed by: INTERNAL MEDICINE

## 2025-07-20 PROCEDURE — 85025 COMPLETE CBC W/AUTO DIFF WBC: CPT

## 2025-07-20 PROCEDURE — 36415 COLL VENOUS BLD VENIPUNCTURE: CPT

## 2025-07-20 PROCEDURE — 2060000000 HC ICU INTERMEDIATE R&B

## 2025-07-20 PROCEDURE — 6360000002 HC RX W HCPCS: Performed by: EMERGENCY MEDICINE

## 2025-07-20 PROCEDURE — 2500000003 HC RX 250 WO HCPCS: Performed by: EMERGENCY MEDICINE

## 2025-07-20 PROCEDURE — 6370000000 HC RX 637 (ALT 250 FOR IP): Performed by: INTERNAL MEDICINE

## 2025-07-20 PROCEDURE — 2580000003 HC RX 258: Performed by: EMERGENCY MEDICINE

## 2025-07-20 PROCEDURE — APPSS180 APP SPLIT SHARED TIME > 60 MINUTES: Performed by: NURSE PRACTITIONER

## 2025-07-20 PROCEDURE — 6370000000 HC RX 637 (ALT 250 FOR IP): Performed by: NURSE PRACTITIONER

## 2025-07-20 PROCEDURE — 2580000003 HC RX 258: Performed by: NURSE PRACTITIONER

## 2025-07-20 PROCEDURE — 2500000003 HC RX 250 WO HCPCS: Performed by: NURSE PRACTITIONER

## 2025-07-20 PROCEDURE — 87449 NOS EACH ORGANISM AG IA: CPT

## 2025-07-20 PROCEDURE — 84145 PROCALCITONIN (PCT): CPT

## 2025-07-20 PROCEDURE — 80048 BASIC METABOLIC PNL TOTAL CA: CPT

## 2025-07-20 RX ORDER — CLOTRIMAZOLE 10 MG/1
10 LOZENGE ORAL
Status: DISCONTINUED | OUTPATIENT
Start: 2025-07-20 | End: 2025-07-24 | Stop reason: HOSPADM

## 2025-07-20 RX ORDER — ENOXAPARIN SODIUM 100 MG/ML
1 INJECTION SUBCUTANEOUS 2 TIMES DAILY
Status: DISCONTINUED | OUTPATIENT
Start: 2025-07-20 | End: 2025-07-21

## 2025-07-20 RX ADMIN — ACETAMINOPHEN 650 MG: 325 TABLET ORAL at 01:05

## 2025-07-20 RX ADMIN — SODIUM CHLORIDE: 0.9 INJECTION, SOLUTION INTRAVENOUS at 00:00

## 2025-07-20 RX ADMIN — DOXYCYCLINE 100 MG: 100 INJECTION, POWDER, LYOPHILIZED, FOR SOLUTION INTRAVENOUS at 04:12

## 2025-07-20 RX ADMIN — SODIUM CHLORIDE, PRESERVATIVE FREE 10 ML: 5 INJECTION INTRAVENOUS at 10:20

## 2025-07-20 RX ADMIN — METOPROLOL SUCCINATE 25 MG: 25 TABLET, EXTENDED RELEASE ORAL at 10:20

## 2025-07-20 RX ADMIN — Medication 1000 MG: at 10:20

## 2025-07-20 RX ADMIN — CLOTRIMAZOLE 10 MG: 10 LOZENGE ORAL at 15:57

## 2025-07-20 RX ADMIN — SODIUM CHLORIDE, PRESERVATIVE FREE 10 ML: 5 INJECTION INTRAVENOUS at 04:14

## 2025-07-20 RX ADMIN — CLOTRIMAZOLE 10 MG: 10 LOZENGE ORAL at 20:42

## 2025-07-20 RX ADMIN — ACETAMINOPHEN 650 MG: 325 TABLET ORAL at 20:42

## 2025-07-20 RX ADMIN — ENOXAPARIN SODIUM 80 MG: 100 INJECTION SUBCUTANEOUS at 10:20

## 2025-07-20 RX ADMIN — ENOXAPARIN SODIUM 80 MG: 100 INJECTION SUBCUTANEOUS at 20:41

## 2025-07-20 RX ADMIN — WATER 1000 MG: 1 INJECTION INTRAMUSCULAR; INTRAVENOUS; SUBCUTANEOUS at 15:56

## 2025-07-20 RX ADMIN — CLOTRIMAZOLE 10 MG: 10 LOZENGE ORAL at 13:00

## 2025-07-20 RX ADMIN — Medication 1000 UNITS: at 10:20

## 2025-07-20 RX ADMIN — AZITHROMYCIN MONOHYDRATE 500 MG: 500 INJECTION, POWDER, LYOPHILIZED, FOR SOLUTION INTRAVENOUS at 11:30

## 2025-07-20 ASSESSMENT — PAIN DESCRIPTION - LOCATION
LOCATION: BACK;SHOULDER;ARM
LOCATION: MOUTH

## 2025-07-20 ASSESSMENT — PAIN SCALES - GENERAL
PAINLEVEL_OUTOF10: 6
PAINLEVEL_OUTOF10: 2
PAINLEVEL_OUTOF10: 0
PAINLEVEL_OUTOF10: 2

## 2025-07-20 ASSESSMENT — PAIN DESCRIPTION - ORIENTATION
ORIENTATION: LOWER
ORIENTATION: RIGHT;LEFT

## 2025-07-20 ASSESSMENT — PAIN DESCRIPTION - DESCRIPTORS
DESCRIPTORS: ACHING
DESCRIPTORS: ACHING;PINS AND NEEDLES;SORE

## 2025-07-20 NOTE — PROGRESS NOTES
Consult Note    Dental Resident    Patient: Eden Parikh  MRN: [id number]  Date of Service: 2025    Reason for Consult/CHIEF COMPLAINT:  Patient is a 72 years old female who presents complaining of broken tooth and sore tongue.      History Obtained From:  patient  PCP: Edmund Rivas DO    Requesting Physician: N/A    HISTORY OF PRESENT ILLNESS:   The patient is a 75 y.o. female who presents with a fractured lower right first molar (#30)  and sore tongue.          Diagnosis Date    Allergic rhinitis     Anxiety disorder     Bipolar disorder (HCC)     Caffeine use 2015    Chronic low back pain 2015    Depression, major, in remission     multiple hospitalizations    Endocervical polyp 2012    GERD (gastroesophageal reflux disease)     Heart murmur, systolic     g2/6    History of bariatric surgery     gastric stapling    History of cervical dysplasia 2015    Hx of colonic polyp 3/18/2021    Colonoscopy 21 Naffah/Awaida no recurrent polyps repeat in 5 years    Hypertension          Insomnia 2015    Knee pain, right     severe OA per  Dr Adrian    Knee stiffness, left 09/15/2021    with pain    Kyphosis 2015    MVP (mitral valve prolapse)     Neuropathy     cervical spondylosis    Obesity     Osteoarthritis     Osteoarthritis of both knees     Osteoarthritis of hand 2015    Pap smear abnormality of cervix with LGSIL     Skin cancer     Spinal stenosis, lumbar 2013 MRI    disc degeneration and bulges with canal and foraminal stenoses           Procedure Laterality Date    BARIATRIC SURGERY      stapling of stomach    BREAST SURGERY      reduction     SECTION      CHOLECYSTECTOMY      COLONOSCOPY  2010    donald one polyp    EYE SURGERY Bilateral 2018    cataract    FOOT SURGERY      FRACTURE SURGERY Bilateral     feet    JOINT REPLACEMENT  21    RTKR? Left knee-not right    KNEE SURGERY Left 2021    LEFT KNEE MANIPULATION  WITH FLUOROSCOPY REMOVE CHECKPOINT performed by Von Adrian MD at [facility]    SKIN BIOPSY      TONSILLECTOMY      TOTAL KNEE ARTHROPLASTY Left 08/09/2021    LEFT TOTAL KNEE ARTHROPLASTY ROBOTIC ASSISTED performed by Von Adrian MD at [facility]    UPPER GASTROINTESTINAL ENDOSCOPY  2/21       Home Medications:  Prior to Admission medications    Medication Sig Start Date End Date Taking? Authorizing Provider   Alpha-Lipoic Acid 600 MG TABS Take 600 mg by mouth daily 7/9/25  Yes Edmund Rivas, DO   metoprolol succinate (TOPROL XL) 25 MG extended release tablet Take 1 tablet by mouth every morning 7/9/25  Yes Edmund Rivas, DO   Cyanocobalamin (VITAMIN B-12 PO) Take by mouth   Yes ProviderJose David MD   Ascorbic Acid (VITAMIN C) 1000 MG tablet Take 1 tablet by mouth daily   Yes Jose David Walden MD   Vitamin D (CHOLECALCIFEROL) 25 MCG (1000 UT) TABS tablet Take 1 tablet by mouth daily Vitamin D3   Yes ProviderJose David MD       Allergies:  Patient has no known allergies.    Social History:   TOBACCO:   reports that she has never smoked. She has never used smokeless tobacco.  ETOH:   reports current alcohol use.  OCCUPATION: retired    Family History:       Problem Relation Age of Onset    Heart Disease Mother     COPD Mother     Anxiety Disorder Mother     Asthma Mother     Other Mother         COPD    Diabetes Mother     Stroke Father     Obesity Father     Heart Disease Brother     Other Brother         suicide    Obesity Brother     Substance Abuse Brother     Diabetes Brother     Substance Abuse Brother         hepatitis C    Mental Illness Brother     Cancer Paternal Uncle         throat    Diabetes Maternal Grandmother     Stroke Maternal Grandmother     Stroke Paternal Grandmother     Stroke Paternal Grandfather     Cancer Paternal Grandfather        REVIEW OF SYSTEMS:  Pertinent items are noted in HPI.    Dental:   negative fordysphagia, mandibular ROM normal, swelling,

## 2025-07-20 NOTE — PROGRESS NOTES
Dr. Nice consulted via answering service. Electronically signed by Nusrat Reynaga RN on 7/20/2025 at 10:38 AM

## 2025-07-20 NOTE — H&P
Hospital Medicine History & Physical      PCP: Edmund Rivas DO    Date of Admission: 2025    Date of Service: .2025    Chief Complaint:   SOB      History Of Present Illness:     75 y.o. female presented with SOB,  PALPITATIONS.   NO CHEST PAIN,  NV, OR DIAPHORESIS. CALLED HER PMD AND HE ORDERED BLOOD WORK.  FOUND TO HAVE AN ELEVATED WBC, STARTED ON DOXY.  DAUGHTER BOUGHT A PULSE OX ND FOUND IT TO BE 88%.  CAME TO THE HOSPITAL, POS TT.      Past Medical History:          Diagnosis Date    Allergic rhinitis     Anxiety disorder     Bipolar disorder (HCC)     Caffeine use 2015    Chronic low back pain 2015    Depression, major, in remission     multiple hospitalizations    Endocervical polyp 2012    GERD (gastroesophageal reflux disease)     Heart murmur, systolic     g2/6    History of bariatric surgery     gastric stapling    History of cervical dysplasia 2015    Hx of colonic polyp 3/18/2021    Colonoscopy 21 Naffah/Awaida no recurrent polyps repeat in 5 years    Hypertension          Insomnia 2015    Knee pain, right     severe OA per  Dr Adrian    Knee stiffness, left 09/15/2021    with pain    Kyphosis 2015    MVP (mitral valve prolapse)     Neuropathy     cervical spondylosis    Obesity     Osteoarthritis     Osteoarthritis of both knees     Osteoarthritis of hand 2015    Pap smear abnormality of cervix with LGSIL     Skin cancer     Spinal stenosis, lumbar 2013 MRI    disc degeneration and bulges with canal and foraminal stenoses       Past Surgical History:          Procedure Laterality Date    BARIATRIC SURGERY      stapling of stomach    BREAST SURGERY      reduction     SECTION      CHOLECYSTECTOMY      COLONOSCOPY  2010    donald one polyp    EYE SURGERY Bilateral 2018    cataract    FOOT  Stroke Maternal Grandmother     Stroke Paternal Grandmother     Stroke Paternal Grandfather     Cancer Paternal Grandfather        REVIEW OF SYSTEMS:   Pertinent positives as noted in the HPI. All other systems reviewed and negative.    PHYSICAL EXAM:    /66   Pulse 88   Temp 98.8 °F (37.1 °C)   Resp (!) 31   Ht 1.575 m (5' 2.01\")   Wt 79.4 kg (175 lb)   SpO2 97%   BMI 32.00 kg/m²     General appearance:  No apparent distress, appears stated age.  HEENT:  Normal cephalic, atraumatic without obvious deformity. Pupils equal, round, and reactive to light.  Extra ocular muscles intact. Conjunctivae/corneas clear. POOR DENTITION, LESION RIGHT SIDE OF TONGUE, COATING ON TONGUE  Neck: Supple, with full range of motion. No jugular venous distention. Trachea midline.  Respiratory:  Normal respiratory effort. Clear to auscultation,   Cardiovascular:  RRR II/VI LISA  Abdomen: Soft, non-tender, non-distended with normal bowel sounds.  Musculoskeletal:  No clubbing, cyanosis POS  edema bilaterally.    Skin: smooth and dry   Neurologic:   Cranial nerves: II-XII intact,   Psychiatric:  Alert and oriented x 3        Labs:     Recent Labs     07/18/25  0938 07/19/25  1346 07/20/25  0410   WBC 17.2* 20.1* 19.5*   HGB 12.1 12.6 11.7   HCT 38.3 38.8 35.7   * 551* 526*     Recent Labs     07/18/25  0938 07/19/25  1346 07/20/25  0410    137 139   K 4.1 4.0 4.0    103 107   CO2 25 23 22   BUN 16 19 14   CREATININE 0.7 0.8 0.7   CALCIUM 9.6 9.7 9.3     Recent Labs     07/19/25  1346   AST 47*   ALT 29   BILITOT 0.5   ALKPHOS 109*     Recent Labs     07/19/25  1346   INR 1.3     No results for input(s): \"CKTOTAL\", \"TROPONINI\" in the last 72 hours.    Urinalysis:      Lab Results   Component Value Date/Time    NITRU NEGATIVE 07/19/2025 01:45 PM    WBCUA 6 TO 9 07/19/2025 01:45 PM    BACTERIA TRACE 07/19/2025 01:45 PM    RBCUA 0 TO 2 07/19/2025 01:45 PM    GLUCOSEU NEGATIVE 07/19/2025 01:45 PM       Radiology:

## 2025-07-20 NOTE — PROGRESS NOTES
Surgery scheduling notified of bronch time needed for tomorrow. Electronically signed by Nusrat Reynaga RN on 7/20/2025 at 1:14 PM

## 2025-07-20 NOTE — PROGRESS NOTES
Bronch permit signed and placed in chart. Electronically signed by Nusrat Reynaga RN on 7/20/2025 at 1:46 PM

## 2025-07-20 NOTE — PROGRESS NOTES
Pharmacy Note    Eden Parikh was ordered Alpha Lipoic Acid.  As per the [facility] Formulary Committee Policy, herbals and certain dietary supplements will be discontinued.  The herbal or dietary supplement may be continued after discharge from the hospital.

## 2025-07-20 NOTE — PLAN OF CARE
Problem: Discharge Planning  Goal: Discharge to home or other facility with appropriate resources  7/20/2025 1646 by Nusrat Reynaga RN  Outcome: Progressing  7/20/2025 0646 by Janay Durant RN  Outcome: Progressing     Problem: Pain  Goal: Verbalizes/displays adequate comfort level or baseline comfort level  7/20/2025 1646 by Nusrat Reynaga RN  Outcome: Progressing  7/20/2025 0646 by Janay Durant RN  Outcome: Progressing     Problem: Safety - Adult  Goal: Free from fall injury  7/20/2025 1646 by Nusrat Reynaga RN  Outcome: Progressing  7/20/2025 0646 by Janay Durant RN  Outcome: Progressing

## 2025-07-20 NOTE — PROGRESS NOTES
4 Eyes Skin Assessment     NAME:  Eden Parikh  YOB: 1950  MEDICAL RECORD NUMBER:  [id number]    The patient is being assessed for  Admission    I agree that at least one RN has performed a thorough Head to Toe Skin Assessment on the patient. ALL assessment sites listed below have been assessed.      Areas assessed by both nurses:    Head, Face, Ears, Shoulders, Back, Chest, Arms, Elbows, Hands, Sacrum. Buttock, Coccyx, Ischium, Legs. Feet and Heels, and Under Medical Devices         Does the Patient have a Wound? No noted wound(s)       Ministerio Prevention initiated by RN: Yes  Wound Care Orders initiated by RN: No    For hospital-acquired stage 1 & 2 and ALL Stage 3,4, Unstageable, DTI, NWPT, and Complex wounds: place order “IP Wound Care/Ostomy Nurse Eval and Treat” by RN under : No    New Ostomies, if present place, Ostomy referral order under : No     Nurse 1 eSignature: Electronically signed by Janay Durant RN on 7/20/25 at 4:58 AM EDT    **SHARE this note so that the co-signing nurse can place an eSignature**    Nurse 2 eSignature: Electronically signed by Renée Vicente RN on 7/20/25 at 6:52 AM EDT

## 2025-07-20 NOTE — CONSULTS
Pulmonary Consultation    Admit Date: 7/19/2025    Requesting Physician: Edmund Rivas DO    CC: Consult for pneumonia    HPI:  Eden Parikh 75 y.o. female with PMH of hypertension, GERD, MVP, anxiety, cervical spinal stenosis, lumbar spondylolisthesis, bipolar disorder, obesity, DVT felt to be related to birth control, history of bariatric surgery, osteoarthritis    7/19: Presented to [place] ER with complaints of shortness of breath mostly occurring with exertion.  Dry cough.  Bilateral lower extremity swelling reported pulse oximetry 80% at home.  In ED, troponin 128, 118, proBNP 760, WBC 20.1, rapid flu, RSV, COVID-negative  Portable chest x-ray was negative  CTA chest without PE, patchy groundglass opacities    Patient seen resting in bed on 1.5 L nasal cannula, pulse oximetry 94%  She denies shortness of breath at rest.  Reports shortness of breath worsens with activity or talking for long periods.   Reports SOB present since she fell in March 2025. Reports has been progressively worsening since that time especially the last 2 weeks. Cough is intermittent and usually not productive.   She denies recent respiratory illness or exposure to friends/family who have been sick  Pulse oximetry at home was 80-85% at home which prompted ED visit  No recent travel  Daughter at bedside concerned about aspiration as she has noticed patient coughing after taking her medications. Patient also reports GERD for which she takes TUMS Prn. She does not take medication routinely for GERD.  She denies blood in sputum, chest pain.    PMH:    Past Medical History:   Diagnosis Date    Allergic rhinitis     Anxiety disorder     Bipolar disorder (HCC)     Caffeine use 5/19/2015    Chronic low back pain 5/19/2015    Depression, major, in remission     multiple hospitalizations    Endocervical polyp 4/19/2012    GERD (gastroesophageal reflux disease)     Heart murmur, systolic     g2/6    History of bariatric

## 2025-07-20 NOTE — CONSULTS
Inpatient Cardiology Consultation      Reason for Consult: NSTEMI    Consulting Physician: Dr. Newman    Requesting Physician: Manda Grayson    Date of Consultation: 7/20/2025    HISTORY OF PRESENT ILLNESS:   Eden Parikh  is a 75 y.o.  female known to [facility] cardiology. Follows with Dr. Renetta MOTT 8/2/2021 for history of hypertension and preoperative cardiovascular evaluation for total left knee joint replacement.  No advanced testing was ordered.      PMH: Hypertension, bipolar disorder, moderate obesity see below    [place] ED 7/19/2025 at 1219 walk-in complaint of shortness of breath.  Patient complained of worsening shortness of breath over time.  She reported having worsening dyspnea with exertion.  SPO2 was 80% at home during minimal exercise with complaint of worsening shortness of breath.  Reported dry cough and symmetrical bilateral lower leg swelling.  Arrival vitals: T98.4, RR 18, P93, /51, SpO2 92% on room air 2+ edema-respiratory distress on assessment in ED  Significant Labs: Protos   CBC with Auto Differential   Result Value Ref Range     WBC 20.1 (H) 4.5 - 11.5 k/uL     RBC 4.31 3.50 - 5.50 m/uL     Hemoglobin 12.6 11.5 - 15.5 g/dL     Hematocrit 38.8 34.0 - 48.0 %     MCV 90.0 80.0 - 99.9 fL     MCH 29.2 26.0 - 35.0 pg     MCHC 32.5 32.0 - 34.5 g/dL     RDW 15.1 (H) 11.5 - 15.0 %     Platelets 551 (H) 130 - 450 k/uL     MPV 8.2 7.0 - 12.0 fL     Neutrophils % 84 (H) 43.0 - 80.0 %     Lymphocytes % 7 (L) 20.0 - 42.0 %     Monocytes % 5 2.0 - 12.0 %     Eosinophils % 2 0 - 6 %     Basophils % 0 0.0 - 2.0 %     Immature Granulocytes % 2 0.0 - 5.0 %     Neutrophils Absolute 16.83 (H) 1.80 - 7.30 k/uL     Lymphocytes Absolute 1.40 (L) 1.50 - 4.00 k/uL     Monocytes Absolute 0.98 (H) 0.10 - 0.95 k/uL     Eosinophils Absolute 0.40 0.05 - 0.50 k/uL     Basophils Absolute 0.09 0.00 - 0.20 k/uL     Immature Granulocytes Absolute 0.36 0.00 - 0.58 k/uL   Troponin   Result

## 2025-07-21 ENCOUNTER — APPOINTMENT (OUTPATIENT)
Dept: GENERAL RADIOLOGY | Age: 75
DRG: 280 | End: 2025-07-21
Payer: MEDICARE

## 2025-07-21 ENCOUNTER — ANESTHESIA (OUTPATIENT)
Dept: ENDOSCOPY | Age: 75
DRG: 280 | End: 2025-07-21
Payer: MEDICARE

## 2025-07-21 ENCOUNTER — ANESTHESIA EVENT (OUTPATIENT)
Dept: ENDOSCOPY | Age: 75
DRG: 280 | End: 2025-07-21
Payer: MEDICARE

## 2025-07-21 LAB
ANION GAP SERPL CALCULATED.3IONS-SCNC: 10 MMOL/L (ref 7–16)
BASOPHILS # BLD: 0.05 K/UL (ref 0–0.2)
BASOPHILS NFR BLD: 0 % (ref 0–2)
BUN SERPL-MCNC: 12 MG/DL (ref 8–23)
CALCIUM SERPL-MCNC: 9 MG/DL (ref 8.8–10.2)
CHLORIDE SERPL-SCNC: 108 MMOL/L (ref 98–107)
CO2 SERPL-SCNC: 22 MMOL/L (ref 22–29)
CREAT SERPL-MCNC: 0.6 MG/DL (ref 0.5–1)
EOSINOPHIL # BLD: 0.75 K/UL (ref 0.05–0.5)
EOSINOPHILS RELATIVE PERCENT: 5 % (ref 0–6)
ERYTHROCYTE [DISTWIDTH] IN BLOOD BY AUTOMATED COUNT: 15.3 % (ref 11.5–15)
GFR, ESTIMATED: >90 ML/MIN/1.73M2
GLUCOSE SERPL-MCNC: 89 MG/DL (ref 74–99)
HCT VFR BLD AUTO: 35.1 % (ref 34–48)
HGB BLD-MCNC: 11.3 G/DL (ref 11.5–15.5)
IMM GRANULOCYTES # BLD AUTO: 0.1 K/UL (ref 0–0.58)
IMM GRANULOCYTES NFR BLD: 1 % (ref 0–5)
L PNEUMO1 AG UR QL IA.RAPID: NEGATIVE
LYMPHOCYTES NFR BLD: 1.2 K/UL (ref 1.5–4)
LYMPHOCYTES RELATIVE PERCENT: 8 % (ref 20–42)
MCH RBC QN AUTO: 30.1 PG (ref 26–35)
MCHC RBC AUTO-ENTMCNC: 32.2 G/DL (ref 32–34.5)
MCV RBC AUTO: 93.4 FL (ref 80–99.9)
MONOCYTES NFR BLD: 1.21 K/UL (ref 0.1–0.95)
MONOCYTES NFR BLD: 8 % (ref 2–12)
NEUTROPHILS NFR BLD: 79 % (ref 43–80)
NEUTS SEG NFR BLD: 12.49 K/UL (ref 1.8–7.3)
PLATELET # BLD AUTO: 451 K/UL (ref 130–450)
PMV BLD AUTO: 8.6 FL (ref 7–12)
POTASSIUM SERPL-SCNC: 3.9 MMOL/L (ref 3.5–5.1)
RBC # BLD AUTO: 3.76 M/UL (ref 3.5–5.5)
S PNEUM AG SPEC QL: NEGATIVE
SODIUM SERPL-SCNC: 140 MMOL/L (ref 136–145)
SPECIMEN SOURCE: NORMAL
WBC OTHER # BLD: 15.8 K/UL (ref 4.5–11.5)

## 2025-07-21 PROCEDURE — 87206 SMEAR FLUORESCENT/ACID STAI: CPT

## 2025-07-21 PROCEDURE — 87102 FUNGUS ISOLATION CULTURE: CPT

## 2025-07-21 PROCEDURE — 6360000002 HC RX W HCPCS: Performed by: INTERNAL MEDICINE

## 2025-07-21 PROCEDURE — 2500000003 HC RX 250 WO HCPCS: Performed by: INTERNAL MEDICINE

## 2025-07-21 PROCEDURE — 6360000002 HC RX W HCPCS

## 2025-07-21 PROCEDURE — 88112 CYTOPATH CELL ENHANCE TECH: CPT

## 2025-07-21 PROCEDURE — 0BBF8ZX EXCISION OF RIGHT LOWER LUNG LOBE, VIA NATURAL OR ARTIFICIAL OPENING ENDOSCOPIC, DIAGNOSTIC: ICD-10-PCS | Performed by: FAMILY MEDICINE

## 2025-07-21 PROCEDURE — 82785 ASSAY OF IGE: CPT

## 2025-07-21 PROCEDURE — 2060000000 HC ICU INTERMEDIATE R&B

## 2025-07-21 PROCEDURE — 2500000003 HC RX 250 WO HCPCS: Performed by: EMERGENCY MEDICINE

## 2025-07-21 PROCEDURE — 0BD38ZX EXTRACTION OF RIGHT MAIN BRONCHUS, VIA NATURAL OR ARTIFICIAL OPENING ENDOSCOPIC, DIAGNOSTIC: ICD-10-PCS | Performed by: FAMILY MEDICINE

## 2025-07-21 PROCEDURE — 89051 BODY FLUID CELL COUNT: CPT

## 2025-07-21 PROCEDURE — 0B9H8ZX DRAINAGE OF LUNG LINGULA, VIA NATURAL OR ARTIFICIAL OPENING ENDOSCOPIC, DIAGNOSTIC: ICD-10-PCS | Performed by: FAMILY MEDICINE

## 2025-07-21 PROCEDURE — 2580000003 HC RX 258

## 2025-07-21 PROCEDURE — 88305 TISSUE EXAM BY PATHOLOGIST: CPT

## 2025-07-21 PROCEDURE — 6370000000 HC RX 637 (ALT 250 FOR IP): Performed by: INTERNAL MEDICINE

## 2025-07-21 PROCEDURE — 6360000002 HC RX W HCPCS: Performed by: NURSE PRACTITIONER

## 2025-07-21 PROCEDURE — 87106 FUNGI IDENTIFICATION YEAST: CPT

## 2025-07-21 PROCEDURE — 3700000001 HC ADD 15 MINUTES (ANESTHESIA): Performed by: INTERNAL MEDICINE

## 2025-07-21 PROCEDURE — 2580000003 HC RX 258: Performed by: INTERNAL MEDICINE

## 2025-07-21 PROCEDURE — 85025 COMPLETE CBC W/AUTO DIFF WBC: CPT

## 2025-07-21 PROCEDURE — 2580000003 HC RX 258: Performed by: NURSE PRACTITIONER

## 2025-07-21 PROCEDURE — 87116 MYCOBACTERIA CULTURE: CPT

## 2025-07-21 PROCEDURE — 80048 BASIC METABOLIC PNL TOTAL CA: CPT

## 2025-07-21 PROCEDURE — 7100000001 HC PACU RECOVERY - ADDTL 15 MIN: Performed by: INTERNAL MEDICINE

## 2025-07-21 PROCEDURE — 3700000000 HC ANESTHESIA ATTENDED CARE: Performed by: INTERNAL MEDICINE

## 2025-07-21 PROCEDURE — 0BBD8ZX EXCISION OF RIGHT MIDDLE LUNG LOBE, VIA NATURAL OR ARTIFICIAL OPENING ENDOSCOPIC, DIAGNOSTIC: ICD-10-PCS | Performed by: FAMILY MEDICINE

## 2025-07-21 PROCEDURE — 87015 SPECIMEN INFECT AGNT CONCNTJ: CPT

## 2025-07-21 PROCEDURE — 36415 COLL VENOUS BLD VENIPUNCTURE: CPT

## 2025-07-21 PROCEDURE — 87205 SMEAR GRAM STAIN: CPT

## 2025-07-21 PROCEDURE — 71045 X-RAY EXAM CHEST 1 VIEW: CPT

## 2025-07-21 PROCEDURE — 3609010800 HC BRONCHOSCOPY ALVEOLAR LAVAGE: Performed by: INTERNAL MEDICINE

## 2025-07-21 PROCEDURE — 6360000002 HC RX W HCPCS: Performed by: EMERGENCY MEDICINE

## 2025-07-21 PROCEDURE — 0BD68ZX EXTRACTION OF RIGHT LOWER LOBE BRONCHUS, VIA NATURAL OR ARTIFICIAL OPENING ENDOSCOPIC, DIAGNOSTIC: ICD-10-PCS | Performed by: FAMILY MEDICINE

## 2025-07-21 PROCEDURE — 87070 CULTURE OTHR SPECIMN AEROBIC: CPT

## 2025-07-21 PROCEDURE — 2709999900 HC NON-CHARGEABLE SUPPLY: Performed by: INTERNAL MEDICINE

## 2025-07-21 PROCEDURE — 3609011900 HC BRONCHOSCOPY NEEDLE BX TRACHEA MAIN STEM&/BRON: Performed by: INTERNAL MEDICINE

## 2025-07-21 PROCEDURE — 7100000000 HC PACU RECOVERY - FIRST 15 MIN: Performed by: INTERNAL MEDICINE

## 2025-07-21 RX ORDER — PROPOFOL 10 MG/ML
INJECTION, EMULSION INTRAVENOUS
Status: DISCONTINUED | OUTPATIENT
Start: 2025-07-21 | End: 2025-07-21 | Stop reason: SDUPTHER

## 2025-07-21 RX ORDER — METHYLPREDNISOLONE SODIUM SUCCINATE 40 MG/ML
40 INJECTION INTRAMUSCULAR; INTRAVENOUS EVERY 12 HOURS
Status: COMPLETED | OUTPATIENT
Start: 2025-07-21 | End: 2025-07-23

## 2025-07-21 RX ORDER — LIDOCAINE HYDROCHLORIDE 20 MG/ML
INJECTION, SOLUTION INFILTRATION; PERINEURAL
Status: DISCONTINUED | OUTPATIENT
Start: 2025-07-21 | End: 2025-07-21 | Stop reason: SDUPTHER

## 2025-07-21 RX ORDER — ENOXAPARIN SODIUM 100 MG/ML
40 INJECTION SUBCUTANEOUS DAILY
Status: DISCONTINUED | OUTPATIENT
Start: 2025-07-21 | End: 2025-07-24 | Stop reason: HOSPADM

## 2025-07-21 RX ORDER — LIDOCAINE 4 G/G
1 PATCH TOPICAL DAILY
Status: DISCONTINUED | OUTPATIENT
Start: 2025-07-22 | End: 2025-07-24 | Stop reason: HOSPADM

## 2025-07-21 RX ORDER — SODIUM CHLORIDE 9 MG/ML
INJECTION, SOLUTION INTRAVENOUS
Status: DISCONTINUED | OUTPATIENT
Start: 2025-07-21 | End: 2025-07-21 | Stop reason: SDUPTHER

## 2025-07-21 RX ADMIN — AZITHROMYCIN MONOHYDRATE 500 MG: 500 INJECTION, POWDER, LYOPHILIZED, FOR SOLUTION INTRAVENOUS at 10:39

## 2025-07-21 RX ADMIN — SODIUM CHLORIDE: 0.9 INJECTION, SOLUTION INTRAVENOUS at 10:37

## 2025-07-21 RX ADMIN — CLOTRIMAZOLE 10 MG: 10 LOZENGE ORAL at 21:44

## 2025-07-21 RX ADMIN — SODIUM CHLORIDE: 0.9 INJECTION, SOLUTION INTRAVENOUS at 21:51

## 2025-07-21 RX ADMIN — SODIUM CHLORIDE, PRESERVATIVE FREE 10 ML: 5 INJECTION INTRAVENOUS at 21:44

## 2025-07-21 RX ADMIN — PROPOFOL 220 MG: 10 INJECTION, EMULSION INTRAVENOUS at 17:13

## 2025-07-21 RX ADMIN — METHYLPREDNISOLONE SODIUM SUCCINATE 40 MG: 40 INJECTION INTRAMUSCULAR; INTRAVENOUS at 21:44

## 2025-07-21 RX ADMIN — WATER 1000 MG: 1 INJECTION INTRAMUSCULAR; INTRAVENOUS; SUBCUTANEOUS at 16:04

## 2025-07-21 RX ADMIN — SODIUM CHLORIDE: 9 INJECTION, SOLUTION INTRAVENOUS at 17:11

## 2025-07-21 RX ADMIN — LIDOCAINE HYDROCHLORIDE 60 MG: 20 INJECTION, SOLUTION INFILTRATION; PERINEURAL at 17:19

## 2025-07-21 ASSESSMENT — PAIN SCALES - GENERAL
PAINLEVEL_OUTOF10: 0
PAINLEVEL_OUTOF10: 2
PAINLEVEL_OUTOF10: 0
PAINLEVEL_OUTOF10: 0

## 2025-07-21 ASSESSMENT — PAIN - FUNCTIONAL ASSESSMENT: PAIN_FUNCTIONAL_ASSESSMENT: 0-10

## 2025-07-21 ASSESSMENT — LIFESTYLE VARIABLES: SMOKING_STATUS: 0

## 2025-07-21 NOTE — PROGRESS NOTES
Speech Language Pathology  NAME:  Eden Parikh  :  1950  DATE: 2025  ROOM:  [facility]/4504-A    Pt unavailable at 1100 for Clinical Swallow Evaluation Discussed with patient nurse via phone     REASON:  Patient NPO for procedure not able to address dysphagia goals due to this.    Will re-attempt as appropriate.

## 2025-07-21 NOTE — PROGRESS NOTES
Spiritual Health History and Assessment/Progress Note  [facility] Eden Naranjo    (P) Initial Encounter,  ,  ,      Name: Eden Parikh MRN: [id number]    Age: 75 y.o.     Sex: female   Language: English   Scientology: Yarsani   NSTEMI (non-ST elevated myocardial infarction) (HCC)     Date: 7/21/2025                           Spiritual Assessment began in [address] PICU        Referral/Consult From: (P) Rounding   Encounter Overview/Reason: (P) Initial Encounter  Service Provided For: (P) Patient    Kaity, Belief, Meaning:   Patient identifies as spiritual and is connected with a kaity tradition or spiritual practice  Family/Friends No family/friends present      Importance and Influence:  Patient has spiritual/personal beliefs that influence decisions regarding their health  Family/Friends No family/friends present    Community:  Patient is connected with a spiritual community and feels well-supported. Support system includes: Children and Extended family  Family/Friends No family/friends present    Assessment and Plan of Care:     Patient Interventions include: Facilitated expression of thoughts and feelings, Explored spiritual coping/struggle/distress, Engaged in theological reflection, Affirmed coping skills/support systems, and Provided sacramental/Baptist ritual  Family/Friends Interventions include: No family/friends present    Patient Plan of Care: Spiritual Care available upon further referral  Family/Friends Plan of Care: No family/friends present    Electronically signed by BRIANDA STALLINGS on 7/21/2025 at 1:15 PM

## 2025-07-21 NOTE — PLAN OF CARE
Problem: Discharge Planning  Goal: Discharge to home or other facility with appropriate resources  7/21/2025 0046 by Eulalia North RN  Outcome: Progressing  7/21/2025 0046 by Eulalia North RN  Outcome: Progressing  7/20/2025 1646 by Nusrat Reynaga RN  Outcome: Progressing     Problem: Pain  Goal: Verbalizes/displays adequate comfort level or baseline comfort level  7/21/2025 0046 by Eulalia North RN  Outcome: Progressing  7/21/2025 0046 by Eulalia North RN  Outcome: Progressing  7/20/2025 1646 by Nusrat Reynaga RN  Outcome: Progressing     Problem: Safety - Adult  Goal: Free from fall injury  7/21/2025 0046 by Eulalia North RN  Outcome: Progressing  7/21/2025 0046 by Eulalia North RN  Outcome: Progressing  7/20/2025 1646 by Nusrat Reynaga RN  Outcome: Progressing

## 2025-07-21 NOTE — ANESTHESIA POSTPROCEDURE EVALUATION
Department of Anesthesiology  Postprocedure Note    Patient: Eden Parikh  MRN: [id number]  YOB: 1950  Date of evaluation: 7/21/2025    Procedure Summary       Date: 07/21/25 Room / Location: [place] [address] / [facility]    Anesthesia Start: 1713 Anesthesia Stop: 1745    Procedures:       BRONCHOSCOPY/TRANSBRONCHIAL NEEDLE BIOPSY (Bilateral)      BRONCHOSCOPY ALVEOLAR LAVAGE Diagnosis:       Pneumonia due to infectious organism, unspecified laterality, unspecified part of lung      (Pneumonia due to infectious organism, unspecified laterality, unspecified part of lung [J18.9])    Surgeons: Roney Nice MD Responsible Provider: Benson Moncada DO    Anesthesia Type: MAC ASA Status: 3            Anesthesia Type: MAC    Arnol Phase I: Arnol Score: 9    Arnol Phase II:      Anesthesia Post Evaluation    Patient location during evaluation: PACU  Patient participation: complete - patient participated  Level of consciousness: awake  Cardiovascular status: blood pressure returned to baseline  Respiratory status: acceptable  Hydration status: euvolemic  Multimodal analgesia pain management approach  Pain management: adequate        No notable events documented.

## 2025-07-21 NOTE — PLAN OF CARE
Problem: Discharge Planning  Goal: Discharge to home or other facility with appropriate resources  7/21/2025 0625 by Eulalia North RN  Outcome: Progressing  7/21/2025 0046 by Eulalia North RN  Outcome: Progressing  7/21/2025 0046 by Eulalia North RN  Outcome: Progressing  7/20/2025 1646 by Nusrat Reynaga RN  Outcome: Progressing     Problem: Pain  Goal: Verbalizes/displays adequate comfort level or baseline comfort level  7/21/2025 0625 by Eulalia North RN  Outcome: Progressing  7/21/2025 0046 by Eulalia North RN  Outcome: Progressing  7/21/2025 0046 by Eulalia North RN  Outcome: Progressing  7/20/2025 1646 by Nusrat Reynaga RN  Outcome: Progressing     Problem: Safety - Adult  Goal: Free from fall injury  7/21/2025 0625 by Eulalia North RN  Outcome: Progressing  7/21/2025 0046 by Eulalia North RN  Outcome: Progressing  7/21/2025 0046 by Eulalia North RN  Outcome: Progressing  7/20/2025 1646 by Nusrat Reynaga RN  Outcome: Progressing

## 2025-07-21 NOTE — PLAN OF CARE
Problem: Discharge Planning  Goal: Discharge to home or other facility with appropriate resources  7/21/2025 0046 by Eulalia North RN  Outcome: Progressing  7/20/2025 1646 by Nusrat Reynaga RN  Outcome: Progressing     Problem: Pain  Goal: Verbalizes/displays adequate comfort level or baseline comfort level  7/21/2025 0046 by Eulalia North RN  Outcome: Progressing  7/20/2025 1646 by Nusrat Reynaga, RN  Outcome: Progressing     Problem: Safety - Adult  Goal: Free from fall injury  7/21/2025 0046 by Eulalia North RN  Outcome: Progressing  7/20/2025 1646 by Nusrat Reynaga RN  Outcome: Progressing

## 2025-07-21 NOTE — PROGRESS NOTES
Pulmonary Progress Note    Admit Date: 2025                            PCP: Edmund Rivas DO  Principal Problem:    NSTEMI (non-ST elevated myocardial infarction) ([facility])  Active Problems:    Hyperlipidemia    Essential hypertension    CAP (community acquired pneumonia)    Pneumonia due to organism    Oral thrush  Resolved Problems:    * No resolved hospital problems. *      Subjective:    Patient seen sitting on side of bed on 2L, pulse oximetry 94%  No improvement in SOB with any activity.   Intermittent dry cough. She denies wheeze    Medications:   sodium chloride      sodium chloride 75 mL/hr at 25 1646        enoxaparin  1 mg/kg SubCUTAneous BID    azithromycin  500 mg IntraVENous Q24H    clotrimazole  10 mg Oral 5x Daily    cefTRIAXone (ROCEPHIN) IV  1,000 mg IntraVENous Q24H    sodium chloride flush  5-40 mL IntraVENous 2 times per day    vitamin C  1,000 mg Oral Daily    metoprolol succinate  25 mg Oral QAM    Vitamin D  1,000 Units Oral Daily       Vitals:  VITALS:  BP (!) 131/48   Pulse 95   Temp 98 °F (36.7 °C) (Temporal)   Resp 20   Ht 1.575 m (5' 2.01\")   Wt 79.4 kg (175 lb)   SpO2 91%   BMI 32.00 kg/m²   24HR INTAKE/OUTPUT:    Intake/Output Summary (Last 24 hours) at 2025 0726  Last data filed at 2025 0454  Gross per 24 hour   Intake 1189.62 ml   Output 300 ml   Net 889.62 ml     CURRENT PULSE OXIMETRY:  SpO2: 91 %  24HR PULSE OXIMETRY RANGE:  SpO2  Av.8 %  Min: 91 %  Max: 97 %  CVP:    VENT SETTINGS:      Additional Respiratory Assessments  Pulse: 95  Respirations: 20  SpO2: 91 %      EXAM:  General: No distress. Alert. 2L  Eyes: No sclera icterus. No conjunctival injection.  ENT: No discharge.  Neck: Trachea midline  Resp: No accessory muscle use. Coarse crackles bilateral posterior lower. No wheezing. No rhonchi.   CV: Regular rate. Regular rhythm. No mumur or rub.    ABD: Non-tender. Non-distended. Normal bowel sounds.   Skin: Warm and dry. No nodule on  Arteries: Pulmonary arteries are adequately opacified for evaluation.  No evidence of intraluminal filling defect to suggest pulmonary embolism.  Main pulmonary artery is normal in caliber. Mediastinum: No evidence of mediastinal lymphadenopathy.  The heart and pericardium demonstrate no acute abnormality.  There is no acute abnormality of the thoracic aorta. Lungs/pleura: Bilateral patchy areas of ground-glass appearance consistent with pneumonia.  The correlate with COVID pneumonia clinically.  No evidence of pleural effusion or pneumothorax. Upper Abdomen: Limited images of the upper abdomen are unremarkable. Soft Tissues/Bones: No acute bone or soft tissue abnormality.     No evidence of pulmonary embolism or acute. Bilateral patchy areas of ground-glass appearance.  Correlate with MR knee on the specifically COVID related pneumonia.     XR CHEST PORTABLE  Result Date: 7/19/2025  EXAMINATION: ONE XRAY VIEW OF THE CHEST 7/19/2025 1:22 pm COMPARISON: None. HISTORY: ORDERING SYSTEM PROVIDED HISTORY: dyspnea TECHNOLOGIST PROVIDED HISTORY: Reason for exam:->dyspnea FINDINGS: The lungs are without acute focal process.  Lower lobe atelectasis.  There is no effusion or pneumothorax. The cardiomediastinal silhouette is without acute process. The osseous structures are without acute process.     No acute process.       Summary of Events:     Eden Parikh 75 y.o. female with PMH of hypertension, GERD, MVP, anxiety, cervical spinal stenosis, lumbar spondylolisthesis, bipolar disorder, obesity, DVT felt to be related to birth control, history of bariatric surgery, osteoarthritis     7/19: Presented to [place] ER with complaints of shortness of breath mostly occurring with exertion.  Dry cough.  Bilateral lower extremity swelling reported pulse oximetry 80% at home.  In ED, troponin 128, 118, proBNP 760, WBC 20.1, rapid flu, RSV, COVID-negative  Portable chest x-ray was negative  CTA chest without PE, extensive  patchy groundglass opacities     7/20: Patient seen resting in bed on 1.5 L nasal cannula, pulse oximetry 94%  Reports SOB present since she fell in March 2025. Reports has been progressively worsening since that time especially the last 2 weeks. Cough is intermittent and usually not productive.   Her mother had pulmonary sarcoidosis  7/21: 2L    Assessment/Plan:    Acute hypoxic respiratory failure  Baseline RA  Currently on 2 L  Wean O2  Ambulatory pulse ox prior to d/c  Atypical Pneumonia, Extensive ground glass infiltrates, Peripheral Eosinophilia  Recently completed 10 days doxycycline as OP  Urine antigens are pending, resp panel negative, procal 0.10  SLP consulted  For now continue ceftriaxone and azithromycin  Concern for eosinophilic pneumonia, Abs EOS on arrival 590, today 750  Plan for bronchoscopy today for BAL and transbronchial biopsy   IgE pending  GERD  Elevated troponin  Cardiology consulted  Plans for ischemic eval prior to d/c or as OP  Lower extremity edema  DVT prophylaxis will change to prophylactic dose Lovenox    Electronically signed by SHAWN Linares CNP on 7/21/2025 at 7:26 AM    Seen and examined, agree with above. For bronch with bal and tbbx this pm in endoscopy. Will empirically start steroids after bronchoscopy.

## 2025-07-21 NOTE — CARE COORDINATION
Transition of Care: Met with Patient at bedside and explained case management role. Patient lives with her daughter and family in a farm house with 5-6 steps to get to the level where her bed bath and kitchen are set-up. Hx [facility]. No Hx SNF or DME. Patient primary care physician is Edmund Rivas DO. Patient uses [facility] in [place]. Primary decision maker is Amina (daughter). Discharge plan is Home with possible oxygen through [employer]. Patient currently on 3 liters nasal cannula. Room air is baseline. Patient was taking out the trash became SOB and used a pulse oximeter that read 80%. She then went to [place] ED. Found to have Pna and NSTEMI. Patient will need pulse ox testing prior to discharge. CM/SW to follow. MT    Case Management Assessment  Initial Evaluation    Date/Time of Evaluation: 7/21/2025 1:12 PM  Assessment Completed by: Awais Aburto RN    If patient is discharged prior to next notation, then this note serves as note for discharge by case management.    Patient Name: Eden Parikh                   YOB: 1950  Diagnosis: Atypical pneumonia [J18.9]  NSTEMI (non-ST elevated myocardial infarction) (HCC) [I21.4]  Acute respiratory failure, unspecified whether with hypoxia or hypercapnia (HCC) [J96.00]  Pneumonia due to organism [J18.9]                   Date / Time: 7/19/2025  1:00 PM    Patient Admission Status: Inpatient   Readmission Risk (Low < 19, Mod (19-27), High > 27): Readmission Risk Score: 10.7    Current PCP: Edmund Rivas DO  PCP verified by CM? Yes    Chart Reviewed: Yes      History Provided by: Patient  Patient Orientation: Alert and Oriented    Patient Cognition: Alert    Hospitalization in the last 30 days (Readmission):  No    If yes, Readmission Assessment in  Navigator will be completed.    Advance Directives:      Code Status: Full Code   Patient's Primary Decision Maker is: Legal Next of Kin    Primary Decision Maker:  TanyaAmina nash - Child - [phone]    Secondary Decision Maker: Prashant Borjas - Other - [phone]    Secondary Decision Maker: IndioVivi - Grandchild - [phone]    Discharge Planning:    Patient lives with: Alone Type of Home: House  Primary Care Giver: Self  Patient Support Systems include: Family Members   Current Financial resources:    Current community resources:    Current services prior to admission: None            Current DME:              Type of Home Care services:  None    ADLS  Prior functional level: Independent in ADLs/IADLs  Current functional level: Independent in ADLs/IADLs    PT AM-PAC:   /24  OT AM-PAC:   /24    Family can provide assistance at DC: Yes  Would you like Case Management to discuss the discharge plan with any other family members/significant others, and if so, who? No  Plans to Return to Present Housing: Yes  Other Identified Issues/Barriers to RETURNING to current housing:   Potential Assistance needed at discharge: N/A            Potential DME:    Patient expects to discharge to: House  Plan for transportation at discharge:      Financial    Payor: [employer] / Plan: [employer] / Product Type: *No Product type* /     Does insurance require precert for SNF: Yes    Potential assistance Purchasing Medications:    Meds-to-Beds request: Yes      [id number] - [place], [place] - [address] [phone] - F [phone]  [address] [place] [address]  Phone: [phone] Fax: [phone]      Notes:    Factors facilitating achievement of predicted outcomes: Family support    Barriers to discharge: Medical complications    Additional Case Management Notes:     The Plan for Transition of Care is related to the following treatment goals of Atypical pneumonia [J18.9]  NSTEMI (non-ST elevated myocardial infarction) (HCC) [I21.4]  Acute respiratory failure, unspecified whether with hypoxia or hypercapnia (HCC)

## 2025-07-21 NOTE — OP NOTE
Operative Note      Patient: Eden Parikh  YOB: 1950  MRN: [id number]    Date of Procedure: 7/21/2025    Pre-Op Diagnosis Codes:      * Pneumonia due to infectious organism, unspecified laterality, unspecified part of lung [J18.9]    Post-Op Diagnosis: Same       Procedure(s):  BRONCHOSCOPY- anytime    Surgeon(s):  Roney Nice MD    Assistant:   * No surgical staff found *    Anesthesia: Monitor Anesthesia Care    Estimated Blood Loss (mL): Minimal    Complications: None    Specimens:   ID Type Source Tests Collected by Time Destination   1 : BRONCH LINGULA-PATH Respiratory BAL- Bronch. Lavage CELL COUNT WITH DIFFERENTIAL, BODY FLUID, CULTURE, FUNGUS, CULTURE WITH SMEAR, ACID FAST BACILLIUS, CULTURE, RESPIRATORY (WITH GRAM STAIN) Roney Nice MD 7/21/2025 1728    2 :  Tissue Bronchial Biopsy  Roney Nice MD 7/21/2025 1737    A : BRONCH BAL LINGULA-CYTO Respiratory BAL- Bronch. Lavage CYTOLOGY, NON-GYN Roney Nice MD 7/21/2025 1724        Implants:  * No implants in log *      Drains: * No LDAs found *    Findings:  Present At Time Of Surgery (PATOS) (choose all levels that have infection present):  No infection present  Other Findings: Nl airways    Detailed Description of Procedure:   By biteblock, videobronchoscope passed/  Cords moved and appeared normal.  Trachea entered and nl as was natali.  LMB entered and normal.  Scope wedged in lingula and bal done 2-40 cc saline in with return of 45 cc clear returned fluid.  Airway exam done. Minimal erythema in left airway but HIEU, lingula and LLL were examined to subsegments and were without endobronchial lesion.  Right main bronchus entered and normal.  RLL, RML and RUL examined to subsegments and were normal.  Multiple transbronchial biopsies were obtained using fluroscopy from RLL and RML. First for culture the remainder for pathology.  Tolerated well without immediate complication.  CXR pending.    Electronically

## 2025-07-21 NOTE — PLAN OF CARE
Problem: Discharge Planning  Goal: Discharge to home or other facility with appropriate resources  7/21/2025 1536 by Jenny Maciel RN  Outcome: Progressing  7/21/2025 1535 by Jenny Maciel RN  Outcome: Progressing  Flowsheets (Taken 7/21/2025 1535)  Discharge to home or other facility with appropriate resources:   Identify barriers to discharge with patient and caregiver   Identify discharge learning needs (meds, wound care, etc)   Arrange for needed discharge resources and transportation as appropriate   Refer to discharge planning if patient needs post-hospital services based on physician order or complex needs related to functional status, cognitive ability or social support system  7/21/2025 0625 by Eulalia North RN  Outcome: Progressing     Problem: Pain  Goal: Verbalizes/displays adequate comfort level or baseline comfort level  7/21/2025 1536 by Jenny Maciel RN  Outcome: Progressing  7/21/2025 1535 by Jenny Maciel RN  Outcome: Progressing  Flowsheets (Taken 7/21/2025 1535)  Verbalizes/displays adequate comfort level or baseline comfort level:   Encourage patient to monitor pain and request assistance   Assess pain using appropriate pain scale   Administer analgesics based on type and severity of pain and evaluate response   Implement non-pharmacological measures as appropriate and evaluate response   Consider cultural and social influences on pain and pain management  7/21/2025 0625 by Eulalia North RN  Outcome: Progressing     Problem: Safety - Adult  Goal: Free from fall injury  7/21/2025 1536 by Jenny Maciel RN  Outcome: Progressing  Flowsheets (Taken 7/21/2025 1536)  Free From Fall Injury: Instruct family/caregiver on patient safety  7/21/2025 1535 by Jenny Maciel RN  Outcome: Progressing  Flowsheets (Taken 7/21/2025 1535)  Free From Fall Injury: Instruct family/caregiver on patient safety  7/21/2025 0625 by Eulalia North RN  Outcome: Progressing     Problem: Chronic  Conditions and Co-morbidities  Goal: Patient's chronic conditions and co-morbidity symptoms are monitored and maintained or improved  Outcome: Progressing  Flowsheets (Taken 7/21/2025 1536)  Care Plan - Patient's Chronic Conditions and Co-Morbidity Symptoms are Monitored and Maintained or Improved:   Monitor and assess patient's chronic conditions and comorbid symptoms for stability, deterioration, or improvement   Collaborate with multidisciplinary team to address chronic and comorbid conditions and prevent exacerbation or deterioration   Update acute care plan with appropriate goals if chronic or comorbid symptoms are exacerbated and prevent overall improvement and discharge     Problem: Respiratory - Adult  Goal: Achieves optimal ventilation and oxygenation  Outcome: Progressing  Flowsheets (Taken 7/21/2025 1536)  Achieves optimal ventilation and oxygenation:   Assess for changes in respiratory status   Assess for changes in mentation and behavior   Position to facilitate oxygenation and minimize respiratory effort   Oxygen supplementation based on oxygen saturation or arterial blood gases   Encourage broncho-pulmonary hygiene including cough, deep breathe, incentive spirometry   Initiate smoking cessation protocol as indicated   Assess the need for suctioning and aspirate as needed   Assess and instruct to report shortness of breath or any respiratory difficulty   Respiratory therapy support as indicated

## 2025-07-21 NOTE — PROGRESS NOTES
[facility]                                Progress note    Subjective:    The patient is awake and alert.    Ambulating from the bathroom to her bed  States that she is feeling better    Objective:    /66   Pulse 97   Temp 98.2 °F (36.8 °C)   Resp 17   Ht 1.575 m (5' 2.01\")   Wt 79.4 kg (175 lb)   SpO2 96%   BMI 32.00 kg/m²     In: 2778.2 [P.O.:120; I.V.:2013.7]  Out: 300   In: 2778.2   Out: 300 [Urine:300]    General appearance: NAD, conversant  HEENT: AT/NC, MMM  Neck: FROM, supple  Lungs: Diminished, 2 L  CV: RRR, no MRGs  Vasc: Radial pulses 2+  Abdomen: Soft, non-tender; no masses or HSM  Extremities: No peripheral edema or digital cyanosis  Skin: no rash, lesions or ulcers  Psych: Alert and oriented to person, place and time  Neuro: Alert and interactive     Recent Labs     07/19/25  1346 07/20/25  0410 07/21/25  0414   WBC 20.1* 19.5* 15.8*   HGB 12.6 11.7 11.3*   HCT 38.8 35.7 35.1   * 526* 451*       Recent Labs     07/19/25  1346 07/20/25  0410 07/21/25  0414    139 140   K 4.0 4.0 3.9    107 108*   CO2 23 22 22   BUN 19 14 12   CREATININE 0.8 0.7 0.6   CALCIUM 9.7 9.3 9.0       Assessment:    Principal Problem:    NSTEMI (non-ST elevated myocardial infarction) ([facility])  Active Problems:    Hyperlipidemia    Essential hypertension    CAP (community acquired pneumonia)    Pneumonia due to organism    Oral thrush  Resolved Problems:    * No resolved hospital problems. *      Plan:    Patient is a 75-year-old female admitted to [facility] for  Atypical pneumonia  - Continue Rocephin and azithromycin per pulmonology recommendations  - Improving leukocytosis, 15.8  -Plans for bronchoscopy today given concern for eosinophilic pneumonia  - Currently utilizing 2 L nasal cannula to maintain adequate oxygenation  -Resume diet following bronch  - Other labs and vitals stable    Code status: full  Consultants:  pulm    DVT Prophylaxis Lovenox  PT/OT  Discharge

## 2025-07-22 ENCOUNTER — TELEPHONE (OUTPATIENT)
Dept: FAMILY MEDICINE CLINIC | Age: 75
End: 2025-07-22

## 2025-07-22 LAB
ANION GAP SERPL CALCULATED.3IONS-SCNC: 13 MMOL/L (ref 7–16)
APPEARANCE BRONCH: NORMAL
BASOPHILS # BLD: 0 K/UL (ref 0–0.2)
BASOPHILS NFR BLD: 0 % (ref 0–2)
BNP SERPL-MCNC: 653 PG/ML (ref 0–450)
BUN SERPL-MCNC: 10 MG/DL (ref 8–23)
CALCIUM SERPL-MCNC: 9.1 MG/DL (ref 8.8–10.2)
CHLORIDE SERPL-SCNC: 106 MMOL/L (ref 98–107)
CLOT CHECK: NORMAL
CO2 SERPL-SCNC: 20 MMOL/L (ref 22–29)
COLOR BRONCH: COLORLESS
CREAT SERPL-MCNC: 0.6 MG/DL (ref 0.5–1)
EKG ATRIAL RATE: 91 BPM
EKG P AXIS: 50 DEGREES
EKG P-R INTERVAL: 172 MS
EKG Q-T INTERVAL: 370 MS
EKG QRS DURATION: 90 MS
EKG QTC CALCULATION (BAZETT): 455 MS
EKG R AXIS: 22 DEGREES
EKG T AXIS: 27 DEGREES
EKG VENTRICULAR RATE: 91 BPM
EOSINOPHIL # BLD: 0 K/UL (ref 0.05–0.5)
EOSINOPHILS RELATIVE PERCENT: 0 % (ref 0–6)
ERYTHROCYTE [DISTWIDTH] IN BLOOD BY AUTOMATED COUNT: 15 % (ref 11.5–15)
GFR, ESTIMATED: >90 ML/MIN/1.73M2
GLUCOSE SERPL-MCNC: 106 MG/DL (ref 74–99)
HCT VFR BLD AUTO: 35.3 % (ref 34–48)
HGB BLD-MCNC: 11.2 G/DL (ref 11.5–15.5)
IGE SERPL-ACNC: 23 IU/ML (ref 0–100)
LYMPHOCYTES NFR BLD: 0.19 K/UL (ref 1.5–4)
LYMPHOCYTES RELATIVE PERCENT: 1 % (ref 20–42)
LYMPHOCYTES, BAL: 36 %
MACROPHAGES, BAL: 10 %
MCH RBC QN AUTO: 29.3 PG (ref 26–35)
MCHC RBC AUTO-ENTMCNC: 31.7 G/DL (ref 32–34.5)
MCV RBC AUTO: 92.4 FL (ref 80–99.9)
MONOCYTES NFR BLD: 0.97 K/UL (ref 0.1–0.95)
MONOCYTES NFR BLD: 4 % (ref 2–12)
NEUTROPHILS NFR BLD: 95 % (ref 43–80)
NEUTS SEG NFR BLD: 21.14 K/UL (ref 1.8–7.3)
PLATELET # BLD AUTO: 452 K/UL (ref 130–450)
PMV BLD AUTO: 8.5 FL (ref 7–12)
POTASSIUM SERPL-SCNC: 4.1 MMOL/L (ref 3.5–5.1)
RBC # BLD AUTO: 3.82 M/UL (ref 3.5–5.5)
RBC # BLD: ABNORMAL 10*6/UL
RBC, BAL: 300 CELLS/UL
SEGMENTED NEUTROPHILS, BAL: 54 %
SODIUM SERPL-SCNC: 139 MMOL/L (ref 136–145)
SPECIMEN TYPE: NORMAL
TOTAL CELLS COUNTED BRONCH: 120 CELLS/UL
WBC OTHER # BLD: 22.3 K/UL (ref 4.5–11.5)

## 2025-07-22 PROCEDURE — 6370000000 HC RX 637 (ALT 250 FOR IP): Performed by: INTERNAL MEDICINE

## 2025-07-22 PROCEDURE — 6360000002 HC RX W HCPCS: Performed by: INTERNAL MEDICINE

## 2025-07-22 PROCEDURE — 80048 BASIC METABOLIC PNL TOTAL CA: CPT

## 2025-07-22 PROCEDURE — 97535 SELF CARE MNGMENT TRAINING: CPT

## 2025-07-22 PROCEDURE — 36415 COLL VENOUS BLD VENIPUNCTURE: CPT

## 2025-07-22 PROCEDURE — 6370000000 HC RX 637 (ALT 250 FOR IP)

## 2025-07-22 PROCEDURE — 83880 ASSAY OF NATRIURETIC PEPTIDE: CPT

## 2025-07-22 PROCEDURE — 2700000000 HC OXYGEN THERAPY PER DAY

## 2025-07-22 PROCEDURE — 2580000003 HC RX 258: Performed by: INTERNAL MEDICINE

## 2025-07-22 PROCEDURE — 2500000003 HC RX 250 WO HCPCS: Performed by: INTERNAL MEDICINE

## 2025-07-22 PROCEDURE — 92610 EVALUATE SWALLOWING FUNCTION: CPT

## 2025-07-22 PROCEDURE — 93010 ELECTROCARDIOGRAM REPORT: CPT | Performed by: INTERNAL MEDICINE

## 2025-07-22 PROCEDURE — 97530 THERAPEUTIC ACTIVITIES: CPT

## 2025-07-22 PROCEDURE — 85025 COMPLETE CBC W/AUTO DIFF WBC: CPT

## 2025-07-22 PROCEDURE — 97165 OT EVAL LOW COMPLEX 30 MIN: CPT

## 2025-07-22 PROCEDURE — 97161 PT EVAL LOW COMPLEX 20 MIN: CPT

## 2025-07-22 PROCEDURE — 2060000000 HC ICU INTERMEDIATE R&B

## 2025-07-22 RX ADMIN — ACETAMINOPHEN 650 MG: 325 TABLET ORAL at 10:12

## 2025-07-22 RX ADMIN — AZITHROMYCIN MONOHYDRATE 500 MG: 500 INJECTION, POWDER, LYOPHILIZED, FOR SOLUTION INTRAVENOUS at 10:25

## 2025-07-22 RX ADMIN — CLOTRIMAZOLE 10 MG: 10 LOZENGE ORAL at 10:22

## 2025-07-22 RX ADMIN — Medication 1000 MG: at 10:13

## 2025-07-22 RX ADMIN — CLOTRIMAZOLE 10 MG: 10 LOZENGE ORAL at 15:12

## 2025-07-22 RX ADMIN — Medication 1000 UNITS: at 10:13

## 2025-07-22 RX ADMIN — METHYLPREDNISOLONE SODIUM SUCCINATE 40 MG: 40 INJECTION INTRAMUSCULAR; INTRAVENOUS at 10:13

## 2025-07-22 RX ADMIN — SODIUM CHLORIDE, PRESERVATIVE FREE 10 ML: 5 INJECTION INTRAVENOUS at 10:16

## 2025-07-22 RX ADMIN — METHYLPREDNISOLONE SODIUM SUCCINATE 40 MG: 40 INJECTION INTRAMUSCULAR; INTRAVENOUS at 21:12

## 2025-07-22 RX ADMIN — ENOXAPARIN SODIUM 40 MG: 100 INJECTION SUBCUTANEOUS at 10:12

## 2025-07-22 RX ADMIN — CLOTRIMAZOLE 10 MG: 10 LOZENGE ORAL at 21:12

## 2025-07-22 RX ADMIN — SODIUM CHLORIDE, PRESERVATIVE FREE 10 ML: 5 INJECTION INTRAVENOUS at 21:13

## 2025-07-22 RX ADMIN — METOPROLOL SUCCINATE 25 MG: 25 TABLET, EXTENDED RELEASE ORAL at 10:13

## 2025-07-22 RX ADMIN — WATER 1000 MG: 1 INJECTION INTRAMUSCULAR; INTRAVENOUS; SUBCUTANEOUS at 17:11

## 2025-07-22 ASSESSMENT — PAIN - FUNCTIONAL ASSESSMENT: PAIN_FUNCTIONAL_ASSESSMENT: ACTIVITIES ARE NOT PREVENTED

## 2025-07-22 ASSESSMENT — PAIN DESCRIPTION - DESCRIPTORS: DESCRIPTORS: ACHING;DISCOMFORT;SORE

## 2025-07-22 ASSESSMENT — PAIN SCALES - GENERAL
PAINLEVEL_OUTOF10: 6
PAINLEVEL_OUTOF10: 0
PAINLEVEL_OUTOF10: 0

## 2025-07-22 ASSESSMENT — PAIN DESCRIPTION - ORIENTATION: ORIENTATION: RIGHT;LEFT

## 2025-07-22 ASSESSMENT — PAIN DESCRIPTION - LOCATION: LOCATION: ARM;HAND

## 2025-07-22 NOTE — PROGRESS NOTES
[facility] Pulmonary Progress Note  LISA  Admit Date: 2025                            PCP: Edmund Rivas DO  Principal Problem:    NSTEMI (non-ST elevated myocardial infarction) ([facility])  Active Problems:    Hyperlipidemia    Essential hypertension    CAP (community acquired pneumonia)    Pneumonia due to organism    Oral thrush  Resolved Problems:    * No resolved hospital problems. *      Subjective:    Patient seen sitting on side of bed on 2L, pulse oximetry 94%  Breathing and coughing is better  No wz     Medications:   sodium chloride          enoxaparin  40 mg SubCUTAneous Daily    methylPREDNISolone  40 mg IntraVENous Q12H    lidocaine  1 patch TransDERmal Daily    azithromycin  500 mg IntraVENous Q24H    clotrimazole  10 mg Oral 5x Daily    cefTRIAXone (ROCEPHIN) IV  1,000 mg IntraVENous Q24H    sodium chloride flush  5-40 mL IntraVENous 2 times per day    vitamin C  1,000 mg Oral Daily    metoprolol succinate  25 mg Oral QAM    Vitamin D  1,000 Units Oral Daily       Vitals:  VITALS:  BP (!) 123/50   Pulse (!) 101   Temp 98.1 °F (36.7 °C) (Oral)   Resp 23   Ht 1.575 m (5' 2.01\")   Wt 79.4 kg (175 lb)   SpO2 95%   BMI 32.00 kg/m²   24HR INTAKE/OUTPUT:    Intake/Output Summary (Last 24 hours) at 2025 0947  Last data filed at 2025 1258  Gross per 24 hour   Intake 1588.6 ml   Output --   Net 1588.6 ml     CURRENT PULSE OXIMETRY:  SpO2: 95 %  24HR PULSE OXIMETRY RANGE:  SpO2  Av.1 %  Min: 87 %  Max: 98 %  CVP:    VENT SETTINGS:      Additional Respiratory Assessments  Pulse: (!) 101  Respirations: 23  SpO2: 95 %      EXAM:  General: No distress. Alert. 2L  ENT: No discharge.  Neck: Trachea midline  Resp: No accessory muscle use. Coarse crackles bilateral posterior lower. No wheezing. No rhonchi.   CV: Regular rate. Regular rhythm. No murmur or rub.    ABD: Non-tender. Non-distended. Normal bowel sounds.   Skin: Warm and dry.   Ext: No joint deformity. No clubbing. No  defect to suggest pulmonary embolism.  Main pulmonary artery is normal in caliber. Mediastinum: No evidence of mediastinal lymphadenopathy.  The heart and pericardium demonstrate no acute abnormality.  There is no acute abnormality of the thoracic aorta. Lungs/pleura: Bilateral patchy areas of ground-glass appearance consistent with pneumonia.  The correlate with COVID pneumonia clinically.  No evidence of pleural effusion or pneumothorax. Upper Abdomen: Limited images of the upper abdomen are unremarkable. Soft Tissues/Bones: No acute bone or soft tissue abnormality.     No evidence of pulmonary embolism or acute. Bilateral patchy areas of ground-glass appearance.  Correlate with MR knee on the specifically COVID related pneumonia.     XR CHEST PORTABLE  Result Date: 7/19/2025  EXAMINATION: ONE XRAY VIEW OF THE CHEST 7/19/2025 1:22 pm COMPARISON: None. HISTORY: ORDERING SYSTEM PROVIDED HISTORY: dyspnea TECHNOLOGIST PROVIDED HISTORY: Reason for exam:->dyspnea FINDINGS: The lungs are without acute focal process.  Lower lobe atelectasis.  There is no effusion or pneumothorax. The cardiomediastinal silhouette is without acute process. The osseous structures are without acute process.     No acute process.       Summary of Events:   75 y.o. female with PMH of hypertension, GERD, MVP, anxiety, cervical spinal stenosis, lumbar spondylolisthesis, bipolar disorder, obesity, DVT felt to be related to birth control, history of bariatric surgery, osteoarthritis     7/19: Presented to [place] ER with complaints of shortness of breath mostly occurring with exertion.  Dry cough.  Bilateral lower extremity swelling reported pulse oximetry 80% at home.  In ED, troponin 128, 118, proBNP 760, WBC 20.1, rapid flu, RSV, COVID-negative  Portable chest x-ray was negative  CTA chest without PE, extensive patchy groundglass opacities     7/20: Patient seen resting in bed on 1.5 L nasal cannula, pulse oximetry 94%  Reports SOB present

## 2025-07-22 NOTE — PROGRESS NOTES
OCCUPATIONAL THERAPY INITIAL EVALUATION    [facility]  [address] [place], [place]      Date:2025                                                  Patient Name: Eden Parikh  MRN: [id number]  : 1950  Room: [address] [place] [address]-A    Evaluating OT: SHRUTHI Malone, OTR/L  # [id number]    Referring Provider:  Jenn López APRN - CNP   Specific Provider Orders:  \"OT Eval and Treat\"  25    Diagnosis: Atypical pneumonia [J18.9]  NSTEMI (non-ST elevated myocardial infarction) (HCC) [I21.4]  Acute respiratory failure, unspecified whether with hypoxia or hypercapnia (HCC) [J96.00]  Pneumonia due to organism [J18.9]    Pt was admitted w/ SOB, Hypoxia, Elevated Troponin, LE Swelling.      Reported increase in falls recently r/t Severe Cervical Stenosis.  Per Office Visit w/ NS PA: \"Patient to follow up with Dr. Leblanc to discuss surgical options\"       Pertinent Medical History:  Pt has a past medical history of Allergic rhinitis, Anxiety disorder, Bipolar disorder ([facility]), Caffeine use, Chronic low back pain, Depression, major, in remission, Endocervical polyp, GERD (gastroesophageal reflux disease), Heart murmur, systolic, History of bariatric surgery, History of cervical dysplasia, Hx of colonic polyp, Hypertension, Insomnia, Knee pain, right, Knee stiffness, left, Kyphosis, MVP (mitral valve prolapse), Neuropathy, Obesity, Osteoarthritis, Osteoarthritis of both knees, Osteoarthritis of hand, Pap smear abnormality of cervix with LGSIL, Skin cancer, and Spinal stenosis, lumbar.,  has a past surgical history that includes Tonsillectomy; Foot surgery;  section; Bariatric Surgery (); Breast surgery; Cholecystectomy; Colonoscopy (2010); skin biopsy; fracture surgery (Bilateral); eye surgery (Bilateral, 2018); Total knee arthroplasty (Left, 2021); knee surgery (Left, 2021); joint replacement (21); Upper

## 2025-07-22 NOTE — PROGRESS NOTES
SPEECH/LANGUAGE PATHOLOGY  CLINICAL ASSESSMENT OF SWALLOWING FUNCTION   and PLAN OF CARE      PATIENT NAME:  Eden Parikh  (female)     MRN:  [id number]    :  1950  (75 y.o.)  STATUS:  Inpatient: Room 4504/4504-A    TODAY'S DATE:  2025  ORDER DATE, DESCRIPTION AND REFERRING PROVIDER:    25 Steffen  SLP eval and treat  Start:  25 1200,   End:  25 1200,   ONE TIME,   Standing Count:  1 Occurrences,   R        Last continued at transfer on   7:49 PM    Roney Nice MD     REASON FOR REFERRAL: DYSPHAGIA   EVALUATING THERAPIST: DAX Jenkins                 ASSESSMENT:    DYSPHAGIA DIAGNOSIS:   functional oropharyngeal swallow for age/premorbid functioning      DIET RECOMMENDATIONS:  Regular consistency solids (IDDSI level 7) with  thin liquids (IDDSI level 0)     FEEDING RECOMMENDATIONS:     Assistance level:  No assistance needed      Compensatory strategies recommended: No strategies are recommended at this time      Discussed recommendations with:  Patient     SPEECH THERAPY  PLAN OF CARE   The dysphagia POC is established based on physician order, dysphagia diagnosis and results of clinical assessment     Dysphagia therapy is not recommended     Conditions Requiring Skilled Therapeutic Intervention for dysphagia:    not applicable    Specific dysphagia interventions to include:     Not applicable    Specific instructions for next treatment:  not applicable   Patient Treatment Goals:    Short Term Goals:  Not applicable no therapy warranted     Long Term Goals:   Not applicable no therapy warranted      Patient/family Goal:    not applicable                    ADMITTING DIAGNOSIS: Atypical pneumonia [J18.9]  NSTEMI (non-ST elevated myocardial infarction) ([facility]) [I21.4]  Acute respiratory failure, unspecified whether with hypoxia or hypercapnia ([facility]) [J96.00]  Pneumonia due to organism [J18.9]    VISIT DIAGNOSIS:   Visit Diagnoses         Codes      Acute  bedside swallow eval      [x]The admitting diagnosis and active problem list, have been reviewed prior to initiation of this evaluation.        ACTIVE PROBLEM LIST:   Patient Active Problem List   Diagnosis    Anxiety disorder    MVP (mitral valve prolapse)    Osteoarthritis of both knees    Spinal stenosis, lumbar    History of bariatric surgery    Spondylolisthesis L5 on S1, grade 1    Lumbar disc herniation L5-S1    Osteoarthritis of hand    Chronic low back pain    History of cervical dysplasia    Kyphosis    Essential hypertension    Allergic rhinitis    Gastroesophageal reflux disease with esophagitis without hemorrhage    Ganglion cyst of tendon sheath of right hand    Mood disorder of depressed type    Hx of colonic polyp    Bipolar depression (HCC)    Moderate obesity    S/P total knee arthroplasty, left    Hyperlipidemia    History of cholecystectomy    DDD (degenerative disc disease), cervical    NSTEMI (non-ST elevated myocardial infarction) ([facility])    CAP (community acquired pneumonia)    Pneumonia due to organism    Oral thrush

## 2025-07-22 NOTE — CARE COORDINATION
CM Update: Met with patient at bedside to discuss transition of care plan. Discharge plan is Home with possible oxygen through [employer] and home healthcare through [facility]. Patient currently on 3 liters nasal cannula. Room air is baseline. Patient will need pulse ox testing prior to discharge. CM/ASHOK to follow. Awais Aburto [phone]

## 2025-07-22 NOTE — PROGRESS NOTES
Physical Therapy  Physical Therapy Initial Assessment     Name: Eden Parikh  : 1950  MRN: [id number]      Date of Service: 2025    Evaluating PT:  Andrea Bryant PT, DPT [id number]    Room #:  4504/4504-A  Diagnosis:  Atypical pneumonia [J18.9]  NSTEMI (non-ST elevated myocardial infarction) ([facility]) [I21.4]  Acute respiratory failure, unspecified whether with hypoxia or hypercapnia ([facility]) [J96.00]  Pneumonia due to organism [J18.9]  PMHx/PSHx:    Past Medical History:   Diagnosis Date    Allergic rhinitis     Anxiety disorder     Bipolar disorder ([facility])     Caffeine use 2015    Chronic low back pain 2015    Depression, major, in remission     multiple hospitalizations    Endocervical polyp 2012    GERD (gastroesophageal reflux disease)     Heart murmur, systolic     g2/6    History of bariatric surgery     gastric stapling    History of cervical dysplasia 2015    Hx of colonic polyp 3/18/2021    Colonoscopy 21 Naffah/Awaida no recurrent polyps repeat in 5 years    Hypertension          Insomnia 2015    Knee pain, right     severe OA per  Dr Adrian    Knee stiffness, left 09/15/2021    with pain    Kyphosis 2015    MVP (mitral valve prolapse)     Neuropathy     cervical spondylosis    Obesity     Osteoarthritis     Osteoarthritis of both knees     Osteoarthritis of hand 2015    Pap smear abnormality of cervix with LGSIL     Skin cancer     Spinal stenosis, lumbar 2013 MRI    disc degeneration and bulges with canal and foraminal stenoses     Procedure/Surgery:  none  Precautions:  Falls, O2, continuous pulse ox  Equipment Needs:  TBD - pt may benefit from rollator for energy conservation    SUBJECTIVE:    Pt lives with family in a farm house with 5-6 step(s) and 1 rail(s).      Pt ambulated without device and was independent PTA.    OBJECTIVE:   Initial Evaluation  Date: 25 Treatment Short Term/ Long Term   Goals   AM-PAC 6 Clicks      Was pt  when getting out of chair; pt understood and agreed to use call light.  RN aware of assessment and vitals.    Treatment:  Patient practiced and was instructed in the following treatment:    Transfer training - pt was given verbal cues to facilitate proper hand placement, technique and safety during sit to stand, stand to sit and stand pivot transfers .   Gait training- pt was given verbal cues to facilitate safety during ambulation.    Pt's/ family goals   1. Return home    Prognosis is good for reaching above PT goals.    Patient and or family understand(s) diagnosis, prognosis, and plan of care:   [x] Yes [] No      PHYSICAL THERAPY PLAN OF CARE:    PT POC is established based on physician order and patient diagnosis     Referring provider/PT Order:  Jenn López APRN - CNP  /07/22/25 0815  PT eval and treat  Diagnosis:  Atypical pneumonia [J18.9]  NSTEMI (non-ST elevated myocardial infarction) ([facility]) [I21.4]  Acute respiratory failure, unspecified whether with hypoxia or hypercapnia ([facility]) [J96.00]  Pneumonia due to organism [J18.9]  Specific instructions for next treatment:  Progress activity     Current Treatment Recommendations:     [x] Strengthening to improve independence with functional mobility   [] ROM to improve independence with functional mobility   [x] Balance Training to improve static/dynamic balance and to reduce fall risk  [x] Endurance Training to improve activity tolerance during functional mobility   [x] Transfer Training to improve safety and independence with all functional transfers   [x] Gait Training to improve gait mechanics, endurance and asses need for appropriate assistive device  [x] Stair Training in preparation for safe discharge home and/or into the community   [] Positioning to prevent skin breakdown and contractures  [x] Safety and Education Training   [x] Patient/Caregiver Education   [] HEP  [] Other     PT long term treatment goals are located in above grid    Frequency of

## 2025-07-22 NOTE — PLAN OF CARE
Problem: Discharge Planning  Goal: Discharge to home or other facility with appropriate resources  Outcome: Progressing     Problem: Pain  Goal: Verbalizes/displays adequate comfort level or baseline comfort level  Outcome: Progressing     Problem: Safety - Adult  Goal: Free from fall injury  Outcome: Progressing     Problem: Chronic Conditions and Co-morbidities  Goal: Patient's chronic conditions and co-morbidity symptoms are monitored and maintained or improved  Outcome: Progressing     Problem: Respiratory - Adult  Goal: Achieves optimal ventilation and oxygenation  Outcome: Progressing

## 2025-07-22 NOTE — PROGRESS NOTES
[facility]                                Progress note    Subjective:    The patient is awake and alert.    Sitting up in a chair    Objective:    /87   Pulse 90   Temp 97.9 °F (36.6 °C) (Oral)   Resp 20   Ht 1.575 m (5' 2.01\")   Wt 79.4 kg (175 lb)   SpO2 94%   BMI 32.00 kg/m²     In: 3417.3 [P.O.:480; I.V.:2357.7]  Out: -   In: 3417.3   Out: -     General appearance: NAD, conversant  HEENT: AT/NC, MMM  Neck: FROM, supple  Lungs: Diminished, room air  CV: RRR, no MRGs  Vasc: Radial pulses 2+  Abdomen: Soft, non-tender; no masses or HSM  Extremities: No peripheral edema or digital cyanosis  Skin: no rash, lesions or ulcers  Psych: Alert and oriented to person, place and time  Neuro: Alert and interactive     Recent Labs     07/20/25  0410 07/21/25  0414 07/22/25  0333   WBC 19.5* 15.8* 22.3*   HGB 11.7 11.3* 11.2*   HCT 35.7 35.1 35.3   * 451* 452*       Recent Labs     07/20/25  0410 07/21/25  0414 07/22/25  0333    140 139   K 4.0 3.9 4.1    108* 106   CO2 22 22 20*   BUN 14 12 10   CREATININE 0.7 0.6 0.6   CALCIUM 9.3 9.0 9.1       Assessment:    Principal Problem:    NSTEMI (non-ST elevated myocardial infarction) ([facility])  Active Problems:    Hyperlipidemia    Essential hypertension    CAP (community acquired pneumonia)    Pneumonia due to organism    Oral thrush  Resolved Problems:    * No resolved hospital problems. *      Plan:    Patient is a 75-year-old female admitted to [facility] for  Atypical pneumonia  - Continue Rocephin and azithromycin per pulmonology recommendations  - Improving leukocytosis, 15.8  -Plans for bronchoscopy today given concern for eosinophilic pneumonia  - Currently utilizing 2 L nasal cannula to maintain adequate oxygenation  -Resume diet following bronch  - Other labs and vitals stable    7/22/25  - Started on IV Solu-Medrol 40 mg twice daily by pulmonology yesterday following bronchoscopy  - Weaned down to room air saturating 93%

## 2025-07-22 NOTE — PROGRESS NOTES
Message left via telephone for Dr. Puentes group regarding patient requesting something for right neck/shoulder pain as well as a diet order.

## 2025-07-22 NOTE — PLAN OF CARE
Problem: Pain  Goal: Verbalizes/displays adequate comfort level or baseline comfort level  7/21/2025 2218 by Jacquelyn Hirsch RN  Outcome: Progressing  7/21/2025 1536 by Jenny Maciel RN  Outcome: Progressing  7/21/2025 1535 by Jenny Maciel RN  Outcome: Progressing  Flowsheets (Taken 7/21/2025 1535)  Verbalizes/displays adequate comfort level or baseline comfort level:   Encourage patient to monitor pain and request assistance   Assess pain using appropriate pain scale   Administer analgesics based on type and severity of pain and evaluate response   Implement non-pharmacological measures as appropriate and evaluate response   Consider cultural and social influences on pain and pain management     Problem: Safety - Adult  Goal: Free from fall injury  7/21/2025 2218 by Jacquelyn Hirsch RN  Outcome: Progressing  7/21/2025 1536 by Jenny Maciel RN  Outcome: Progressing  Flowsheets (Taken 7/21/2025 1536)  Free From Fall Injury: Instruct family/caregiver on patient safety  7/21/2025 1535 by Jenny Maciel RN  Outcome: Progressing  Flowsheets (Taken 7/21/2025 1535)  Free From Fall Injury: Instruct family/caregiver on patient safety     Problem: Chronic Conditions and Co-morbidities  Goal: Patient's chronic conditions and co-morbidity symptoms are monitored and maintained or improved  7/21/2025 1536 by Jenny Maciel RN  Outcome: Progressing  Flowsheets (Taken 7/21/2025 1536)  Care Plan - Patient's Chronic Conditions and Co-Morbidity Symptoms are Monitored and Maintained or Improved:   Monitor and assess patient's chronic conditions and comorbid symptoms for stability, deterioration, or improvement   Collaborate with multidisciplinary team to address chronic and comorbid conditions and prevent exacerbation or deterioration   Update acute care plan with appropriate goals if chronic or comorbid symptoms are exacerbated and prevent overall improvement and discharge

## 2025-07-23 LAB
ANION GAP SERPL CALCULATED.3IONS-SCNC: 9 MMOL/L (ref 7–16)
BUN SERPL-MCNC: 18 MG/DL (ref 8–23)
CALCIUM SERPL-MCNC: 9.5 MG/DL (ref 8.8–10.2)
CHLORIDE SERPL-SCNC: 107 MMOL/L (ref 98–107)
CO2 SERPL-SCNC: 24 MMOL/L (ref 22–29)
CREAT SERPL-MCNC: 0.7 MG/DL (ref 0.5–1)
EKG ATRIAL RATE: 94 BPM
EKG P AXIS: 48 DEGREES
EKG P-R INTERVAL: 166 MS
EKG Q-T INTERVAL: 346 MS
EKG QRS DURATION: 88 MS
EKG QTC CALCULATION (BAZETT): 432 MS
EKG R AXIS: 6 DEGREES
EKG T AXIS: 20 DEGREES
EKG VENTRICULAR RATE: 94 BPM
GFR, ESTIMATED: >90 ML/MIN/1.73M2
GLUCOSE SERPL-MCNC: 179 MG/DL (ref 74–99)
MICROORGANISM SPEC CULT: NORMAL
MICROORGANISM/AGENT SPEC: NORMAL
NON-GYN CYTOLOGY REPORT: NORMAL
POTASSIUM SERPL-SCNC: 4.6 MMOL/L (ref 3.5–5.1)
SERVICE CMNT-IMP: NORMAL
SODIUM SERPL-SCNC: 140 MMOL/L (ref 136–145)
SPECIMEN DESCRIPTION: NORMAL

## 2025-07-23 PROCEDURE — 2500000003 HC RX 250 WO HCPCS: Performed by: NURSE PRACTITIONER

## 2025-07-23 PROCEDURE — 6360000002 HC RX W HCPCS: Performed by: INTERNAL MEDICINE

## 2025-07-23 PROCEDURE — 2060000000 HC ICU INTERMEDIATE R&B

## 2025-07-23 PROCEDURE — 6360000002 HC RX W HCPCS: Performed by: NURSE PRACTITIONER

## 2025-07-23 PROCEDURE — 6370000000 HC RX 637 (ALT 250 FOR IP): Performed by: INTERNAL MEDICINE

## 2025-07-23 PROCEDURE — 36415 COLL VENOUS BLD VENIPUNCTURE: CPT

## 2025-07-23 PROCEDURE — 2580000003 HC RX 258: Performed by: INTERNAL MEDICINE

## 2025-07-23 PROCEDURE — 6370000000 HC RX 637 (ALT 250 FOR IP)

## 2025-07-23 PROCEDURE — 2700000000 HC OXYGEN THERAPY PER DAY

## 2025-07-23 PROCEDURE — 97530 THERAPEUTIC ACTIVITIES: CPT

## 2025-07-23 PROCEDURE — 80048 BASIC METABOLIC PNL TOTAL CA: CPT

## 2025-07-23 PROCEDURE — 2500000003 HC RX 250 WO HCPCS: Performed by: INTERNAL MEDICINE

## 2025-07-23 RX ORDER — PREDNISONE 20 MG/1
10 TABLET ORAL DAILY
Status: DISCONTINUED | OUTPATIENT
Start: 2025-08-08 | End: 2025-07-24 | Stop reason: HOSPADM

## 2025-07-23 RX ORDER — PREDNISONE 20 MG/1
40 TABLET ORAL DAILY
Status: DISCONTINUED | OUTPATIENT
Start: 2025-07-24 | End: 2025-07-24 | Stop reason: HOSPADM

## 2025-07-23 RX ORDER — PREDNISONE 20 MG/1
20 TABLET ORAL DAILY
Status: DISCONTINUED | OUTPATIENT
Start: 2025-08-03 | End: 2025-07-24 | Stop reason: HOSPADM

## 2025-07-23 RX ORDER — PREDNISONE 20 MG/1
30 TABLET ORAL DAILY
Status: DISCONTINUED | OUTPATIENT
Start: 2025-07-29 | End: 2025-07-24 | Stop reason: HOSPADM

## 2025-07-23 RX ADMIN — METOPROLOL SUCCINATE 25 MG: 25 TABLET, EXTENDED RELEASE ORAL at 08:56

## 2025-07-23 RX ADMIN — ENOXAPARIN SODIUM 40 MG: 100 INJECTION SUBCUTANEOUS at 08:56

## 2025-07-23 RX ADMIN — CLOTRIMAZOLE 10 MG: 10 LOZENGE ORAL at 23:53

## 2025-07-23 RX ADMIN — CLOTRIMAZOLE 10 MG: 10 LOZENGE ORAL at 19:01

## 2025-07-23 RX ADMIN — Medication 1000 UNITS: at 08:56

## 2025-07-23 RX ADMIN — SODIUM CHLORIDE, PRESERVATIVE FREE 10 ML: 5 INJECTION INTRAVENOUS at 21:08

## 2025-07-23 RX ADMIN — CLOTRIMAZOLE 10 MG: 10 LOZENGE ORAL at 11:40

## 2025-07-23 RX ADMIN — METHYLPREDNISOLONE SODIUM SUCCINATE 40 MG: 40 INJECTION INTRAMUSCULAR; INTRAVENOUS at 08:56

## 2025-07-23 RX ADMIN — SODIUM CHLORIDE, PRESERVATIVE FREE 10 ML: 5 INJECTION INTRAVENOUS at 08:56

## 2025-07-23 RX ADMIN — AZITHROMYCIN MONOHYDRATE 500 MG: 500 INJECTION, POWDER, LYOPHILIZED, FOR SOLUTION INTRAVENOUS at 11:40

## 2025-07-23 RX ADMIN — METHYLPREDNISOLONE SODIUM SUCCINATE 40 MG: 40 INJECTION INTRAMUSCULAR; INTRAVENOUS at 21:08

## 2025-07-23 RX ADMIN — CLOTRIMAZOLE 10 MG: 10 LOZENGE ORAL at 08:56

## 2025-07-23 RX ADMIN — Medication 1000 MG: at 08:56

## 2025-07-23 RX ADMIN — WATER 1000 MG: 1 INJECTION INTRAMUSCULAR; INTRAVENOUS; SUBCUTANEOUS at 15:59

## 2025-07-23 RX ADMIN — CLOTRIMAZOLE 10 MG: 10 LOZENGE ORAL at 15:59

## 2025-07-23 NOTE — PROGRESS NOTES
[facility]                                Progress note    Subjective:    The patient is awake and alert.    Sitting up in a chair    Objective:    BP (!) 140/58   Pulse 88   Temp 97.8 °F (36.6 °C)   Resp 18   Ht 1.575 m (5' 2.01\")   Wt 79.4 kg (175 lb)   SpO2 93%   BMI 32.00 kg/m²     In: 2068.7 [P.O.:720; I.V.:1220.4]  Out: -   In: 2068.7   Out: -     General appearance: NAD, conversant  HEENT: AT/NC, MMM  Neck: FROM, supple  Lungs: Diminished, room air  CV: RRR, no MRGs  Vasc: Radial pulses 2+  Abdomen: Soft, non-tender; no masses or HSM  Extremities: No peripheral edema or digital cyanosis  Skin: no rash, lesions or ulcers  Psych: Alert and oriented to person, place and time  Neuro: Alert and interactive     Recent Labs     07/21/25  0414 07/22/25  0333   WBC 15.8* 22.3*   HGB 11.3* 11.2*   HCT 35.1 35.3   * 452*       Recent Labs     07/21/25  0414 07/22/25  0333 07/23/25  0423    139 140   K 3.9 4.1 4.6   * 106 107   CO2 22 20* 24   BUN 12 10 18   CREATININE 0.6 0.6 0.7   CALCIUM 9.0 9.1 9.5       Assessment:    Principal Problem:    NSTEMI (non-ST elevated myocardial infarction) ([facility])  Active Problems:    Hyperlipidemia    Essential hypertension    CAP (community acquired pneumonia)    Pneumonia due to organism    Oral thrush  Resolved Problems:    * No resolved hospital problems. *      Plan:    Patient is a 75-year-old female admitted to [facility] for  Atypical pneumonia  - Continue Rocephin and azithromycin per pulmonology recommendations  - Improving leukocytosis, 15.8  -Plans for bronchoscopy today given concern for eosinophilic pneumonia  - Currently utilizing 2 L nasal cannula to maintain adequate oxygenation  -Resume diet following bronch  - Other labs and vitals stable    7/22/25  - Started on IV Solu-Medrol 40 mg twice daily by pulmonology yesterday following bronchoscopy  - Weaned down to room air saturating 93% on assessment  - Outpatient ischemic

## 2025-07-23 NOTE — PLAN OF CARE
Problem: Discharge Planning  Goal: Discharge to home or other facility with appropriate resources  7/23/2025 0249 by Yeimy Steiner RN  Outcome: Progressing  7/22/2025 1404 by Nusrat Ferrara RN  Outcome: Progressing     Problem: Pain  Goal: Verbalizes/displays adequate comfort level or baseline comfort level  7/23/2025 0249 by Yeimy Steiner RN  Outcome: Progressing  7/22/2025 1404 by Nusrat Ferrara RN  Outcome: Progressing     Problem: Safety - Adult  Goal: Free from fall injury  7/23/2025 0249 by Yeimy Steiner RN  Outcome: Progressing  7/22/2025 1404 by Nusrat Ferrara RN  Outcome: Progressing     Problem: Chronic Conditions and Co-morbidities  Goal: Patient's chronic conditions and co-morbidity symptoms are monitored and maintained or improved  7/23/2025 0249 by Yeimy Steiner RN  Outcome: Progressing  7/22/2025 1404 by Nusrat Ferrara RN  Outcome: Progressing     Problem: Respiratory - Adult  Goal: Achieves optimal ventilation and oxygenation  7/23/2025 0249 by Yeimy Steiner RN  Outcome: Progressing  7/22/2025 1404 by Nusrat Ferrara RN  Outcome: Progressing

## 2025-07-23 NOTE — CARE COORDINATION
CM Update: Met with patient and Amina (daughter) at bedside to discuss transition of care plan. Discharge plan is Home with oxygen through [facility] and HHC through [facility]. [facility] order noted. Family will transport at discharge. CM/ASHOK to follow. Awais Aburto [phone]

## 2025-07-23 NOTE — PLAN OF CARE
Problem: Discharge Planning  Goal: Discharge to home or other facility with appropriate resources  7/23/2025 1041 by Libby John RN  Outcome: Progressing     Problem: Pain  Goal: Verbalizes/displays adequate comfort level or baseline comfort level  7/23/2025 1041 by Libby John RN  Outcome: Progressing     Problem: Safety - Adult  Goal: Free from fall injury  7/23/2025 1041 by Libby John RN  Outcome: Progressing     Problem: Chronic Conditions and Co-morbidities  Goal: Patient's chronic conditions and co-morbidity symptoms are monitored and maintained or improved  7/23/2025 1041 by Libby John RN  Outcome: Progressing     Problem: Respiratory - Adult  Goal: Achieves optimal ventilation and oxygenation  7/23/2025 1041 by Libby John RN  Outcome: Progressing

## 2025-07-23 NOTE — PROGRESS NOTES
[facility] Pulmonary Progress Note  LISA  Admit Date: 2025                            PCP: Edmund Rivas DO  Principal Problem:    NSTEMI (non-ST elevated myocardial infarction) ([facility])  Active Problems:    Hyperlipidemia    Essential hypertension    CAP (community acquired pneumonia)    Pneumonia due to organism    Oral thrush  Resolved Problems:    * No resolved hospital problems. *      Subjective:  Patient seen sitting in bed on 1L, pulse oximetry 95%  Breathing and coughing is better  No wz     Medications:   sodium chloride          enoxaparin  40 mg SubCUTAneous Daily    methylPREDNISolone  40 mg IntraVENous Q12H    lidocaine  1 patch TransDERmal Daily    azithromycin  500 mg IntraVENous Q24H    clotrimazole  10 mg Oral 5x Daily    cefTRIAXone (ROCEPHIN) IV  1,000 mg IntraVENous Q24H    sodium chloride flush  5-40 mL IntraVENous 2 times per day    vitamin C  1,000 mg Oral Daily    metoprolol succinate  25 mg Oral QAM    Vitamin D  1,000 Units Oral Daily       Vitals:  VITALS:  /65   Pulse 90   Temp 98.2 °F (36.8 °C) (Oral)   Resp 20   Ht 1.575 m (5' 2.01\")   Wt 79.4 kg (175 lb)   SpO2 93%   BMI 32.00 kg/m²   24HR INTAKE/OUTPUT:    Intake/Output Summary (Last 24 hours) at 2025 0917  Last data filed at 2025 1406  Gross per 24 hour   Intake 1828.72 ml   Output --   Net 1828.72 ml     CURRENT PULSE OXIMETRY:  SpO2: 93 %  24HR PULSE OXIMETRY RANGE:  SpO2  Av.5 %  Min: 93 %  Max: 96 %  CVP:    VENT SETTINGS:      Additional Respiratory Assessments  Pulse: 90  Respirations: 20  SpO2: 93 %      EXAM:  General: No distress. Alert. 1L  ENT: No discharge.  Neck: Trachea midline  Resp: No accessory muscle use. fine crackles bilateral posterior lower. No wheezing. No rhonchi.   CV: Regular rate. Regular rhythm. No murmur or rub.    ABD: Non-tender. Non-distended. Normal bowel sounds.   Skin: Warm and dry.   Ext: No joint deformity. No clubbing. No cyanosis. Tr pitting edema

## 2025-07-23 NOTE — PROGRESS NOTES
Pulse ox on room air at rest 90 %  Pulse ox on room air ambulating 85%  Pulse ox on 4L ambulating recovery to 92%  Pulse ox 2L sitting recovery to 93%    Electronically signed by Libby John RN on 7/23/2025 at 10:43 AM

## 2025-07-23 NOTE — PROGRESS NOTES
Physical Therapy  Physical Therapy Treatment Note    Name: Eden Parikh  : 1950  MRN: [id number]      Date of Service: 2025    Evaluating PT:  Andrea Bryant, PT, DPT [id number]    Room #:  4504/4504-A  Diagnosis:  Atypical pneumonia [J18.9]  NSTEMI (non-ST elevated myocardial infarction) ([facility]) [I21.4]  Acute respiratory failure, unspecified whether with hypoxia or hypercapnia ([facility]) [J96.00]  Pneumonia due to organism [J18.9]  PMHx/PSHx:    Past Medical History:   Diagnosis Date    Allergic rhinitis     Anxiety disorder     Bipolar disorder ([facility])     Caffeine use 2015    Chronic low back pain 2015    Depression, major, in remission     multiple hospitalizations    Endocervical polyp 2012    GERD (gastroesophageal reflux disease)     Heart murmur, systolic     g2/6    History of bariatric surgery     gastric stapling    History of cervical dysplasia 2015    Hx of colonic polyp 3/18/2021    Colonoscopy 21 Naffah/Awaida no recurrent polyps repeat in 5 years    Hypertension          Insomnia 2015    Knee pain, right     severe OA per  Dr Adrian    Knee stiffness, left 09/15/2021    with pain    Kyphosis 2015    MVP (mitral valve prolapse)     Neuropathy     cervical spondylosis    Obesity     Osteoarthritis     Osteoarthritis of both knees     Osteoarthritis of hand 2015    Pap smear abnormality of cervix with LGSIL     Skin cancer     Spinal stenosis, lumbar 2013 MRI    disc degeneration and bulges with canal and foraminal stenoses     Procedure/Surgery:  none  Precautions:  Falls, O2, continuous pulse ox  Equipment Needs:   rollator for energy conservation    SUBJECTIVE:    Pt lives with family in a farm house with 5-6 step(s) and 1 rail(s).      Pt ambulated without device and was independent PTA.    OBJECTIVE:   Initial Evaluation  Date: 25 Treatment  25 Short Term/ Long Term   Goals   AM-PAC 6 Clicks     Was pt agreeable to

## 2025-07-24 VITALS
TEMPERATURE: 98 F | HEIGHT: 62 IN | HEART RATE: 74 BPM | BODY MASS INDEX: 32.2 KG/M2 | WEIGHT: 175 LBS | RESPIRATION RATE: 18 BRPM | SYSTOLIC BLOOD PRESSURE: 103 MMHG | DIASTOLIC BLOOD PRESSURE: 43 MMHG | OXYGEN SATURATION: 96 %

## 2025-07-24 LAB
MICROORGANISM SPEC CULT: NORMAL
MICROORGANISM/AGENT SPEC: NORMAL
SERVICE CMNT-IMP: NORMAL
SPECIMEN DESCRIPTION: NORMAL
SURGICAL PATHOLOGY REPORT: NORMAL

## 2025-07-24 PROCEDURE — 6370000000 HC RX 637 (ALT 250 FOR IP): Performed by: INTERNAL MEDICINE

## 2025-07-24 PROCEDURE — 6370000000 HC RX 637 (ALT 250 FOR IP)

## 2025-07-24 PROCEDURE — 2580000003 HC RX 258: Performed by: NURSE PRACTITIONER

## 2025-07-24 PROCEDURE — 2700000000 HC OXYGEN THERAPY PER DAY

## 2025-07-24 PROCEDURE — 6360000002 HC RX W HCPCS: Performed by: INTERNAL MEDICINE

## 2025-07-24 PROCEDURE — 6360000002 HC RX W HCPCS: Performed by: NURSE PRACTITIONER

## 2025-07-24 PROCEDURE — 2500000003 HC RX 250 WO HCPCS: Performed by: INTERNAL MEDICINE

## 2025-07-24 PROCEDURE — 6370000000 HC RX 637 (ALT 250 FOR IP): Performed by: NURSE PRACTITIONER

## 2025-07-24 RX ORDER — PREDNISONE 10 MG/1
TABLET ORAL
Qty: 50 TABLET | Refills: 0 | Status: SHIPPED | OUTPATIENT
Start: 2025-07-24 | End: 2025-08-13

## 2025-07-24 RX ADMIN — CLOTRIMAZOLE 10 MG: 10 LOZENGE ORAL at 09:32

## 2025-07-24 RX ADMIN — CLOTRIMAZOLE 10 MG: 10 LOZENGE ORAL at 05:09

## 2025-07-24 RX ADMIN — PREDNISONE 40 MG: 20 TABLET ORAL at 09:31

## 2025-07-24 RX ADMIN — AZITHROMYCIN MONOHYDRATE 500 MG: 500 INJECTION, POWDER, LYOPHILIZED, FOR SOLUTION INTRAVENOUS at 11:17

## 2025-07-24 RX ADMIN — SODIUM CHLORIDE, PRESERVATIVE FREE 10 ML: 5 INJECTION INTRAVENOUS at 09:32

## 2025-07-24 RX ADMIN — Medication 1000 MG: at 09:31

## 2025-07-24 RX ADMIN — ENOXAPARIN SODIUM 40 MG: 100 INJECTION SUBCUTANEOUS at 09:31

## 2025-07-24 RX ADMIN — Medication 1000 UNITS: at 09:32

## 2025-07-24 RX ADMIN — METOPROLOL SUCCINATE 25 MG: 25 TABLET, EXTENDED RELEASE ORAL at 09:32

## 2025-07-24 ASSESSMENT — PAIN SCALES - GENERAL: PAINLEVEL_OUTOF10: 0

## 2025-07-24 NOTE — PLAN OF CARE
Problem: Discharge Planning  Goal: Discharge to home or other facility with appropriate resources  7/23/2025 2016 by eYimy Steiner RN  Outcome: Progressing  7/23/2025 1041 by Libby John RN  Outcome: Progressing  Flowsheets (Taken 7/23/2025 0800)  Discharge to home or other facility with appropriate resources: Identify barriers to discharge with patient and caregiver     Problem: Pain  Goal: Verbalizes/displays adequate comfort level or baseline comfort level  7/23/2025 2016 by Yeimy Steiner RN  Outcome: Progressing  7/23/2025 1041 by Libyb John RN  Outcome: Progressing     Problem: Safety - Adult  Goal: Free from fall injury  7/23/2025 2016 by Yeimy Steiner RN  Outcome: Progressing  7/23/2025 1041 by Libby John RN  Outcome: Progressing     Problem: Chronic Conditions and Co-morbidities  Goal: Patient's chronic conditions and co-morbidity symptoms are monitored and maintained or improved  7/23/2025 2016 by Yeimy Steiner RN  Outcome: Progressing  7/23/2025 1041 by Libby John RN  Outcome: Progressing  Flowsheets (Taken 7/23/2025 0800)  Care Plan - Patient's Chronic Conditions and Co-Morbidity Symptoms are Monitored and Maintained or Improved: Monitor and assess patient's chronic conditions and comorbid symptoms for stability, deterioration, or improvement     Problem: Respiratory - Adult  Goal: Achieves optimal ventilation and oxygenation  7/23/2025 2016 by Yeimy Steiner RN  Outcome: Progressing  7/23/2025 1041 by Libby John RN  Outcome: Progressing  Flowsheets (Taken 7/23/2025 0800)  Achieves optimal ventilation and oxygenation: Assess for changes in respiratory status

## 2025-07-24 NOTE — PROGRESS NOTES
[facility] Pulmonary Progress Note  LISA  Admit Date: 2025                            PCP: Edmund Rivas DO  Principal Problem:    NSTEMI (non-ST elevated myocardial infarction) (Formerly [facility])  Active Problems:    Hyperlipidemia    Essential hypertension    CAP (community acquired pneumonia)    Pneumonia due to organism    Oral thrush  Resolved Problems:    * No resolved hospital problems. *      Subjective:  Patient seen sitting on the edge of the bed on 2L, pulse oximetry 97 %  Breathing and coughing is better  No wz   Patient needs 2 to 4 L nasal cannula for home    Medications:   sodium chloride          predniSONE  40 mg Oral Daily    Followed by    [START ON 2025] predniSONE  30 mg Oral Daily    Followed by    [START ON 8/3/2025] predniSONE  20 mg Oral Daily    Followed by    [START ON 2025] predniSONE  10 mg Oral Daily    enoxaparin  40 mg SubCUTAneous Daily    lidocaine  1 patch TransDERmal Daily    azithromycin  500 mg IntraVENous Q24H    clotrimazole  10 mg Oral 5x Daily    sodium chloride flush  5-40 mL IntraVENous 2 times per day    vitamin C  1,000 mg Oral Daily    metoprolol succinate  25 mg Oral QAM    Vitamin D  1,000 Units Oral Daily       Vitals:  VITALS:  BP (!) 118/53   Pulse 76   Temp 97.8 °F (36.6 °C) (Temporal)   Resp 16   Ht 1.575 m (5' 2.01\")   Wt 79.4 kg (175 lb)   SpO2 96%   BMI 32.00 kg/m²   24HR INTAKE/OUTPUT:    Intake/Output Summary (Last 24 hours) at 2025 0939  Last data filed at 2025 1450  Gross per 24 hour   Intake 480 ml   Output --   Net 480 ml     CURRENT PULSE OXIMETRY:  SpO2: 96 %  24HR PULSE OXIMETRY RANGE:  SpO2  Av.7 %  Min: 93 %  Max: 97 %  CVP:    VENT SETTINGS:      Additional Respiratory Assessments  Pulse: 76  Respirations: 16  SpO2: 96 %      EXAM:  General: No distress. Alert. 2L  ENT: No discharge.  Neck: Trachea midline  Resp: No accessory muscle use. fine crackles bilateral posterior lower. No wheezing. No rhonchi.   CV:

## 2025-07-24 NOTE — CARE COORDINATION
CM Update: Met with patient at bedside to discuss transition of care plan. Discharge plan is Home with oxygen through [employer] and HHC through [facility]. Home care order noted. [employer] scheduled to drop off tank, rollator and coordinate home setup today. Home care order noted. Family will transport at discharge. CM/ASHOK to follow. Awais Aburto [phone]

## 2025-07-24 NOTE — DISCHARGE INSTRUCTIONS
Discharge to home with home health care  Continue supplemental oxygen   Continue heart healthy general diet  Continue activity as tolerated  Call you primary care doctor with concerns after discharge.  Call and make follow up appointments with primary and specialists  Take medications as prescribed.                   After a Hospital Stay: Managing Appointments (02:22)  Your health professional recommends that you watch this short online health video.  Get tips for how to keep track of and prepare for your follow-up doctor appointments.   Purpose: Use tips for how to keep track of and prepare for follow-up appointments after a hospital stay.  Goal: Use tips for how to keep track of and prepare for follow-up appointments after a hospital stay.    Watch: Scan the QR code or visit the link to view video       https://hwi.[email or web address]  Current as of: October 24, 2024  Content Version: 14.5  © 2006-2025 "[employer].   Care instructions adapted under license by [employer]. If you have questions about a medical condition or this instruction, always ask your healthcare professional. [employer] disclaims any warranty or liability for your use of this information.

## 2025-07-24 NOTE — DISCHARGE SUMMARY
Arteries: Pulmonary arteries are adequately opacified for evaluation.  No evidence of intraluminal filling defect to suggest pulmonary embolism.  Main pulmonary artery is normal in caliber. Mediastinum: No evidence of mediastinal lymphadenopathy.  The heart and pericardium demonstrate no acute abnormality.  There is no acute abnormality of the thoracic aorta. Lungs/pleura: Bilateral patchy areas of ground-glass appearance consistent with pneumonia.  The correlate with COVID pneumonia clinically.  No evidence of pleural effusion or pneumothorax. Upper Abdomen: Limited images of the upper abdomen are unremarkable. Soft Tissues/Bones: No acute bone or soft tissue abnormality.     No evidence of pulmonary embolism or acute. Bilateral patchy areas of ground-glass appearance.  Correlate with MR knee on the specifically COVID related pneumonia.     XR CHEST PORTABLE  Result Date: 7/19/2025  EXAMINATION: ONE XRAY VIEW OF THE CHEST 7/19/2025 1:22 pm COMPARISON: None. HISTORY: ORDERING SYSTEM PROVIDED HISTORY: dyspnea TECHNOLOGIST PROVIDED HISTORY: Reason for exam:->dyspnea FINDINGS: The lungs are without acute focal process.  Lower lobe atelectasis.  There is no effusion or pneumothorax. The cardiomediastinal silhouette is without acute process. The osseous structures are without acute process.     No acute process.       Discharge Exam:    HEENT: NCAT,  PERRLA, No JVD  Heart:  RRR, no murmurs, gallops, or rubs.  Lungs:  CTA bilaterally, no wheeze, rales or rhonchi  Abd: bowel sounds present, nontender, nondistended, no masses  Extrem:  No clubbing, cyanosis, or edema    Disposition: home     Patient Condition at Discharge: Stable     Patient Instructions:      Medication List        START taking these medications      predniSONE 10 MG tablet  Commonly known as: DELTASONE  Take 4 tablets by mouth daily for 5 days, THEN 3 tablets daily for 5 days, THEN 2 tablets daily for 5 days, THEN 1 tablet daily for 5 days.  Start taking  on: July 24, 2025            CONTINUE taking these medications      Alpha-Lipoic Acid 600 MG Tabs  Take 600 mg by mouth daily     metoprolol succinate 25 MG extended release tablet  Commonly known as: Toprol XL  Take 1 tablet by mouth every morning     VITAMIN B-12 PO     vitamin C 1000 MG tablet     Vitamin D 25 MCG (1000 UT) Tabs tablet  Commonly known as: CHOLECALCIFEROL            STOP taking these medications      doxycycline hyclate 100 MG tablet  Commonly known as: VIBRA-TABS               Where to Get Your Medications        These medications were sent to [facility]/pharmacy #[id number] - [place] - [address] - P [phone] - F [phone] [address] [place] [address] [place] [address]      Phone: [phone]   predniSONE 10 MG tablet       Activity: activity as tolerated  Diet: regular diet    Pt has been advised to:    Follow-up with Edmund Rivas DO in 1 week.  Follow-up with consultants as recommended by them      Signed:  SHAWN Kerr CNP  7/24/2025  8:35 AM    Above note edited to reflect my thoughts     I personally provided care for the patient. Radiographs, labs and medication list were reviewed by me independently.  The case was discussed in detail and plans for care were established. Review of JOSAFAT Kerr   , documentation was conducted and revisions were made as appropriate directly by me. I agree with the above documented exam, problem list, and plan of care.     Mariela Mahmood MD  9:30 PM  7/24/2025

## 2025-07-24 NOTE — PROGRESS NOTES
Patient aware of discharge. She states she is calling family for a ride and will let us know when they can get her from transport home.

## 2025-07-28 ENCOUNTER — TELEPHONE (OUTPATIENT)
Dept: FAMILY MEDICINE CLINIC | Age: 75
End: 2025-07-28

## 2025-07-28 NOTE — TELEPHONE ENCOUNTER
Angela from [facility] stated the occupational therapist will be seeing the patient 1-2 times a week.

## 2025-07-28 NOTE — TELEPHONE ENCOUNTER
Home health called and states pt's bp is 130/48 hr 92. Wanted to notify you, please advise.     Electronically signed by ELVIS CLIFTON MA on 7/28/25 at 2:48 PM EDT

## 2025-07-30 LAB
MICROORGANISM SPEC CULT: NORMAL
MICROORGANISM SPEC CULT: NORMAL
MICROORGANISM/AGENT SPEC: NORMAL
MICROORGANISM/AGENT SPEC: NORMAL
SERVICE CMNT-IMP: NORMAL
SERVICE CMNT-IMP: NORMAL
SPECIMEN DESCRIPTION: NORMAL
SPECIMEN DESCRIPTION: NORMAL

## 2025-08-01 ENCOUNTER — TELEMEDICINE (OUTPATIENT)
Dept: FAMILY MEDICINE CLINIC | Age: 75
End: 2025-08-01

## 2025-08-01 DIAGNOSIS — F31.9 BIPOLAR DEPRESSION (HCC): ICD-10-CM

## 2025-08-01 DIAGNOSIS — J98.4 PNEUMONITIS: ICD-10-CM

## 2025-08-01 DIAGNOSIS — I10 ESSENTIAL HYPERTENSION: Primary | Chronic | ICD-10-CM

## 2025-08-01 DIAGNOSIS — R53.83 OTHER FATIGUE: ICD-10-CM

## 2025-08-01 DIAGNOSIS — Z09 HOSPITAL DISCHARGE FOLLOW-UP: ICD-10-CM

## 2025-08-01 DIAGNOSIS — E78.5 DYSLIPIDEMIA: ICD-10-CM

## 2025-08-01 DIAGNOSIS — F41.9 ANXIETY DISORDER, UNSPECIFIED TYPE: Chronic | ICD-10-CM

## 2025-08-01 DIAGNOSIS — R73.03 PREDIABETES: ICD-10-CM

## 2025-08-01 DIAGNOSIS — I25.2 H/O NON-ST ELEVATION MYOCARDIAL INFARCTION (NSTEMI): ICD-10-CM

## 2025-08-01 PROBLEM — I21.4 NSTEMI (NON-ST ELEVATED MYOCARDIAL INFARCTION) (HCC): Status: RESOLVED | Noted: 2025-07-19 | Resolved: 2025-08-01

## 2025-08-01 NOTE — PROGRESS NOTES
Post-Discharge Transitional Care  Follow Up      Eden Parikh   YOB: 1950    Date of Office Visit:  8/1/2025  Date of Hospital Admission: 7/19/25  Date of Hospital Discharge: 7/24/25  Risk of hospital readmission (high >=14%. Medium >=10%) :Readmission Risk Score: 9.9      Care management risk score Rising risk (score 2-5) and Complex Care (Scores >=6): No Risk Score On File     Non face to face  following discharge, date last encounter closed (first attempt may have been earlier): *No documented post hospital discharge outreach found in the last 14 days    Call initiated 2 business days of discharge: *No response recorded in the last 14 days    ASSESSMENT/PLAN:   Essential hypertension  --patient is instructed on low to moderate sodium ( 2 to 2.5 grams ), daily    Also to increase potassium in the diet to about 3.5 grams daily    Literature is provided     Prediabetes  -     Basic Metabolic Panel; Future  -     CBC with Auto Differential; Future  -     Hemoglobin A1C; Future  Dyslipidemia  -     Basic Metabolic Panel; Future  -     CBC with Auto Differential; Future  Other fatigue  -     Basic Metabolic Panel; Future  -     CBC with Auto Differential; Future  -     TSH; Future  Pneumonitis  -     XR CHEST (2 VW); Future  H/O non-ST elevation myocardial infarction (NSTEMI)  --stable on current care planning  -- continue treatment as we are meeting goals     Bipolar depression (HCC)  Long talk on treatment and prevention  Literature is given   Counseling is given for anxiety and depression   All questions were taken and answers are given      Anxiety disorder, unspecified type      Medical Decision Making: high complexity  Return in about 4 weeks (around 8/29/2025).    On this date 8/1/2025 I have spent 30  minutes reviewing previous notes, test results and face to face with the patient discussing the diagnosis and importance of compliance with the treatment plan as well as documenting on the

## 2025-08-03 NOTE — PROGRESS NOTES
Physician Progress Note      PATIENT:               COLLEEN GILBERT  CSN #:                  [id number]  :                       1950  ADMIT DATE:       2025 1:00 PM  DISCH DATE:        2025 2:35 PM  RESPONDING  PROVIDER #:        Mariela Mahmood MD          QUERY TEXT:    NSTEMI is documented in the medical record last dated  in the  DS.   Please clarify the status of this condition:    The clinical indicators include:  --Per Cardiology consult note , \"Elevated troponin: Flat pattern, not   consistent with ACS type I, suspect secondary to acute hypoxic respiratory   failure. Continue current treatment. Ischemic evaluation prior to discharge or   as an outpatient. No active general cardiology problems, will see as needed,   thank you.\" (Cardiology consult note ).  --Discharge diagnosis NSTEMI (IM DS ).  --Troponin: 128 118 127. (LAB summary ).  Options provided:  -- Patient has this condition  -- Condition was ruled out after study, Please include corresponding diagnosis   for patient?s clinical picture and treatment.  -- Other - I will add my own diagnosis  -- Disagree - Not applicable / Not valid  -- Disagree - Clinically unable to determine / Unknown  -- Refer to Clinical Documentation Reviewer    PROVIDER RESPONSE TEXT:    The condition was ruled out after study. ruled out dt [facility]    Query created by: Barb Smith on 2025 10:59 AM      QUERY TEXT:    Pneumonia is documented in the medical record in the H/P  by Dr. Avitia.    Please clarify any associated diagnoses:    The clinical indicators include:  -admitted with shortness of breath and was found to have pneumonia (H/P    Dr. Avitia)  -WBC 17.2 (Lab )  -Hr  (VS record ), RR 18-21 (VS record )  -blood cultures (Orders ), LA (Orders ), Vibramycin and Rocephin IV   (Orders )  Options provided:  -- Sepsis, present on admission  -- Sepsis, present on admission, now resolved  --

## 2025-08-05 ENCOUNTER — OFFICE VISIT (OUTPATIENT)
Age: 75
End: 2025-08-05
Payer: MEDICARE

## 2025-08-05 VITALS
SYSTOLIC BLOOD PRESSURE: 126 MMHG | DIASTOLIC BLOOD PRESSURE: 50 MMHG | HEART RATE: 67 BPM | TEMPERATURE: 97.8 F | WEIGHT: 187 LBS | HEIGHT: 62 IN | OXYGEN SATURATION: 97 % | RESPIRATION RATE: 17 BRPM | BODY MASS INDEX: 34.41 KG/M2

## 2025-08-05 DIAGNOSIS — M48.02 CERVICAL STENOSIS OF SPINAL CANAL: Primary | ICD-10-CM

## 2025-08-05 PROCEDURE — 1125F AMNT PAIN NOTED PAIN PRSNT: CPT | Performed by: NEUROLOGICAL SURGERY

## 2025-08-05 PROCEDURE — 1123F ACP DISCUSS/DSCN MKR DOCD: CPT | Performed by: NEUROLOGICAL SURGERY

## 2025-08-05 PROCEDURE — 3074F SYST BP LT 130 MM HG: CPT | Performed by: NEUROLOGICAL SURGERY

## 2025-08-05 PROCEDURE — 3078F DIAST BP <80 MM HG: CPT | Performed by: NEUROLOGICAL SURGERY

## 2025-08-05 PROCEDURE — 1159F MED LIST DOCD IN RCRD: CPT | Performed by: NEUROLOGICAL SURGERY

## 2025-08-05 PROCEDURE — 99213 OFFICE O/P EST LOW 20 MIN: CPT | Performed by: NEUROLOGICAL SURGERY

## 2025-08-06 LAB
MICROORGANISM SPEC CULT: ABNORMAL
MICROORGANISM SPEC CULT: NORMAL
MICROORGANISM SPEC CULT: NORMAL
MICROORGANISM/AGENT SPEC: ABNORMAL
MICROORGANISM/AGENT SPEC: NORMAL
MICROORGANISM/AGENT SPEC: NORMAL
SERVICE CMNT-IMP: ABNORMAL
SERVICE CMNT-IMP: NORMAL
SERVICE CMNT-IMP: NORMAL
SPECIMEN DESCRIPTION: ABNORMAL
SPECIMEN DESCRIPTION: NORMAL
SPECIMEN DESCRIPTION: NORMAL

## 2025-08-10 LAB
MICROORGANISM SPEC CULT: NORMAL
MICROORGANISM/AGENT SPEC: NORMAL
SERVICE CMNT-IMP: NORMAL
SPECIMEN DESCRIPTION: NORMAL

## 2025-08-20 ENCOUNTER — HOSPITAL ENCOUNTER (OUTPATIENT)
Dept: LAB | Age: 75
Discharge: HOME | End: 2025-08-20
Payer: MEDICARE

## 2025-08-20 DIAGNOSIS — R53.83 OTHER FATIGUE: ICD-10-CM

## 2025-08-20 DIAGNOSIS — R73.03 PREDIABETES: ICD-10-CM

## 2025-08-20 DIAGNOSIS — E78.5 DYSLIPIDEMIA: ICD-10-CM

## 2025-08-20 LAB
ANION GAP SERPL CALCULATED.3IONS-SCNC: 10 MMOL/L (ref 7–16)
BASOPHILS # BLD: 0.04 K/UL (ref 0–0.2)
BASOPHILS NFR BLD: 1 % (ref 0–2)
BUN SERPL-MCNC: 16 MG/DL (ref 8–23)
CALCIUM SERPL-MCNC: 9.7 MG/DL (ref 8.8–10.2)
CHLORIDE SERPL-SCNC: 105 MMOL/L (ref 98–107)
CO2 SERPL-SCNC: 27 MMOL/L (ref 22–29)
CREAT SERPL-MCNC: 0.7 MG/DL (ref 0.5–1)
EOSINOPHIL # BLD: 0.17 K/UL (ref 0.05–0.5)
EOSINOPHILS RELATIVE PERCENT: 2 % (ref 0–6)
ERYTHROCYTE [DISTWIDTH] IN BLOOD BY AUTOMATED COUNT: 18.2 % (ref 11.5–15)
GFR, ESTIMATED: >90 ML/MIN/1.73M2
GLUCOSE SERPL-MCNC: 102 MG/DL (ref 74–99)
HBA1C MFR BLD: 5.7 % (ref 4–5.6)
HCT VFR BLD AUTO: 38.9 % (ref 34–48)
HGB BLD-MCNC: 12.5 G/DL (ref 11.5–15.5)
IMM GRANULOCYTES # BLD AUTO: <0.03 K/UL (ref 0–0.58)
IMM GRANULOCYTES NFR BLD: 0 % (ref 0–5)
LYMPHOCYTES NFR BLD: 0.83 K/UL (ref 1.5–4)
LYMPHOCYTES RELATIVE PERCENT: 11 % (ref 20–42)
MCH RBC QN AUTO: 29.8 PG (ref 26–35)
MCHC RBC AUTO-ENTMCNC: 32.1 G/DL (ref 32–34.5)
MCV RBC AUTO: 92.8 FL (ref 80–99.9)
MICROORGANISM SPEC CULT: NORMAL
MICROORGANISM/AGENT SPEC: NORMAL
MONOCYTES NFR BLD: 0.74 K/UL (ref 0.1–0.95)
MONOCYTES NFR BLD: 10 % (ref 2–12)
NEUTROPHILS NFR BLD: 76 % (ref 43–80)
NEUTS SEG NFR BLD: 5.65 K/UL (ref 1.8–7.3)
PLATELET # BLD AUTO: 290 K/UL (ref 130–450)
PMV BLD AUTO: 8.1 FL (ref 7–12)
POTASSIUM SERPL-SCNC: 4.6 MMOL/L (ref 3.5–5.1)
RBC # BLD AUTO: 4.19 M/UL (ref 3.5–5.5)
SERVICE CMNT-IMP: NORMAL
SODIUM SERPL-SCNC: 142 MMOL/L (ref 136–145)
SPECIMEN DESCRIPTION: NORMAL
TSH SERPL DL<=0.05 MIU/L-ACNC: 1.58 UIU/ML (ref 0.27–4.2)
WBC OTHER # BLD: 7.4 K/UL (ref 4.5–11.5)

## 2025-08-20 PROCEDURE — 83036 HEMOGLOBIN GLYCOSYLATED A1C: CPT

## 2025-08-20 PROCEDURE — 80048 BASIC METABOLIC PNL TOTAL CA: CPT

## 2025-08-20 PROCEDURE — 36415 COLL VENOUS BLD VENIPUNCTURE: CPT

## 2025-08-20 PROCEDURE — 84443 ASSAY THYROID STIM HORMONE: CPT

## 2025-08-20 PROCEDURE — 85025 COMPLETE CBC W/AUTO DIFF WBC: CPT

## 2025-08-26 ENCOUNTER — TELEPHONE (OUTPATIENT)
Dept: FAMILY MEDICINE CLINIC | Age: 75
End: 2025-08-26

## 2025-09-01 ENCOUNTER — TRANSCRIBE ORDERS (OUTPATIENT)
Dept: ADMINISTRATIVE | Age: 75
End: 2025-09-01

## 2025-09-01 DIAGNOSIS — R91.8 LUNG INFILTRATE: Primary | ICD-10-CM

## 2025-09-03 ENCOUNTER — TELEPHONE (OUTPATIENT)
Dept: FAMILY MEDICINE CLINIC | Age: 75
End: 2025-09-03

## 2025-09-03 ENCOUNTER — TELEPHONE (OUTPATIENT)
Dept: CARDIOLOGY CLINIC | Age: 75
End: 2025-09-03

## 2025-09-03 LAB
MICROORGANISM SPEC CULT: NORMAL
MICROORGANISM SPEC CULT: NORMAL
MICROORGANISM/AGENT SPEC: NORMAL
MICROORGANISM/AGENT SPEC: NORMAL
SERVICE CMNT-IMP: NORMAL
SERVICE CMNT-IMP: NORMAL
SPECIMEN DESCRIPTION: NORMAL
SPECIMEN DESCRIPTION: NORMAL

## (undated) PROCEDURE — 0BDF8ZX EXTRACTION OF RIGHT LOWER LUNG LOBE, VIA NATURAL OR ARTIFICIAL OPENING ENDOSCOPIC, DIAGNOSTIC: ICD-10-PCS

## (undated) PROCEDURE — 0BDD8ZX EXTRACTION OF RIGHT MIDDLE LUNG LOBE, VIA NATURAL OR ARTIFICIAL OPENING ENDOSCOPIC, DIAGNOSTIC: ICD-10-PCS

## (undated) DEVICE — INTENDED FOR TISSUE SEPARATION, AND OTHER PROCEDURES THAT REQUIRE A SHARP SURGICAL BLADE TO PUNCTURE OR CUT.: Brand: BARD-PARKER ® STAINLESS STEEL BLADES

## (undated) DEVICE — SWABSTICK MEDICATED L4IN BENZ TINC SKIN PREP APLICARE

## (undated) DEVICE — TUBING SUCT 12FR MAL ALUM SHFT FN CAP VENT UNIV CONN W/ OBT

## (undated) DEVICE — GOWN,SIRUS,POLYRNF,BRTHSLV,XL,30/CS: Brand: MEDLINE

## (undated) DEVICE — BANDAGE COMPR W6INXL12FT SMOOTH FOR LIMB EXSANG ESMARCH

## (undated) DEVICE — KIT TRK KNEE PROC VIZADISC

## (undated) DEVICE — PIN BNE FIX TEMP L140MM DIA4MM MAKO

## (undated) DEVICE — BNDG,ELSTC,MATRIX,STRL,4"X5YD,LF,HOOK&LP: Brand: MEDLINE

## (undated) DEVICE — COVER HNDL LT DISP

## (undated) DEVICE — BOOT POS LEG DEMAYO

## (undated) DEVICE — KIT INT FIX FEM TIB CKPT MAKOPLASTY

## (undated) DEVICE — SOLUTION IV IRRIG POUR BRL 0.9% SODIUM CHL 2F7124

## (undated) DEVICE — DOUBLE BASIN SET: Brand: MEDLINE INDUSTRIES, INC.

## (undated) DEVICE — PADDING CAST W6INXL4YD COT LO LINTING WYTEX

## (undated) DEVICE — TOTAL KNEE PK

## (undated) DEVICE — BLADE SAGITAL 18X90X1.27MM

## (undated) DEVICE — 3M™ COBAN™ NL STERILE NON-LATEX SELF-ADHERENT WRAP, 2084S, 4 IN X 5 YD (10 CM X 4,5 M), 18 ROLLS/CASE: Brand: 3M™ COBAN™

## (undated) DEVICE — INSTRUMENT CURRETTES REUSABLE

## (undated) DEVICE — SET EXTN IV L30IN TBNG DIA0.1IN PRIMING 4ML MACBOR FEM ADPT

## (undated) DEVICE — CORD RETRCT SIL

## (undated) DEVICE — INSTRUMENT SYSTEM 7 BATTERY REUSABLE

## (undated) DEVICE — TRAY SYSTEM 7 DRILL REUSABLE

## (undated) DEVICE — STRIP,CLOSURE,WOUND,MEDI-STRIP,1/2X4: Brand: MEDLINE

## (undated) DEVICE — TRAY STRYKER MAKO TOTAL KNEE POWER

## (undated) DEVICE — BOWL AND CEMENT CARTRIDGE WITH BREAKAWAY FEMORAL NOZZLE: Brand: ACM

## (undated) DEVICE — 4-PORT MANIFOLD: Brand: NEPTUNE 2

## (undated) DEVICE — ZIMMER® STERILE DISPOSABLE TOURNIQUET CUFF WITH PLC, DUAL PORT, SINGLE BLADDER, 34 IN. (86 CM)

## (undated) DEVICE — TUBE IRRIG HNDPC HI FLO TP INTRPULS W/SUCTION TUBE

## (undated) DEVICE — GOWN,SIRUS,NONRNF,SETINSLV,XL,20/CS: Brand: MEDLINE

## (undated) DEVICE — BNDG,ELSTC,MATRIX,STRL,6"X5YD,LF,HOOK&LP: Brand: MEDLINE

## (undated) DEVICE — TUBING, SUCTION, 9/32" X 10', STRAIGHT: Brand: MEDLINE

## (undated) DEVICE — STERILE PVP: Brand: MEDLINE INDUSTRIES, INC.

## (undated) DEVICE — ELECTRODE PT RET AD L9FT HI MOIST COND ADH HYDRGEL CORDED

## (undated) DEVICE — AIR/WATER CLEANING ADAPTER FOR OLYMPUS® GI ENDOSCOPE: Brand: BULLDOG®

## (undated) DEVICE — TRAY STRYKER MAKO LEG POSITIONER INSTR

## (undated) DEVICE — TOWEL,OR,DSP,ST,BLUE,STD,6/PK,12PK/CS: Brand: MEDLINE

## (undated) DEVICE — BLADE SURG SAW STD S STL OSC W/ SERR EDGE DISP

## (undated) DEVICE — BRONCHOSCOPY PACK: Brand: MEDLINE INDUSTRIES, INC.

## (undated) DEVICE — TAPE ADH W3INXL10YD WHT COT WVN BK POWERFUL RUB BASE HIGHLY

## (undated) DEVICE — Z DISCONTINUED PER MEDLINE USE 2741943 DRESSING AQUACEL 10 IN ALG W9XL25CM SIL CVR WTRPRF VIR BACT BARR ANTIMIC

## (undated) DEVICE — Z INACTIVE USE 2660664 SOLUTION IRRIG 3000ML 0.9% SOD CHL USP UROMATIC PLAS CONT

## (undated) DEVICE — SET STRYKER GLUE GUN

## (undated) DEVICE — STOCKINETTE,DOUBLE PLY,6X48,STERILE: Brand: MEDLINE

## (undated) DEVICE — DRESSING PETRO W3XL8IN OIL EMUL N ADH GZ KNIT IMPREG CELOS

## (undated) DEVICE — DRESSING FOAM W4XL12IN AG SIL ADH ANTIMIC POSTOP OPTIFOAM

## (undated) DEVICE — INSTRUMENT GRADUATE REUSABLE

## (undated) DEVICE — SPONGE LAP W18XL18IN WHT COT 4 PLY FLD STRUNG RADPQ DISP ST

## (undated) DEVICE — SET ORTHO STD STORTSTD1

## (undated) DEVICE — TRAY LAMBOTS REUSABLE

## (undated) DEVICE — PADDING UNDERCAST W6INXL4YD WYTEX 6 PER BG

## (undated) DEVICE — DRAPE,REIN 53X77,STERILE: Brand: MEDLINE

## (undated) DEVICE — PACK PROCEDURE SURG GEN CUST

## (undated) DEVICE — STOPCOCK IV SM 4 W ROT SWVL M LUERLOCK NVENT BLU CAP HI PRSS

## (undated) DEVICE — SINGLE USE SUCTION VALVE MAJ-209: Brand: SINGLE USE SUCTION VALVE (STERILE)

## (undated) DEVICE — SINGLE USE BIOPSY VALVE MAJ-210: Brand: SINGLE USE BIOPSY VALVE (STERILE)

## (undated) DEVICE — SINGLE-USE BIOPSY FORCEPS: Brand: RADIAL JAW 4

## (undated) DEVICE — GLOVE ORANGE PI 8   MSG9080

## (undated) DEVICE — NEEDLE FLTR 18GA L1.5IN MEM THK5UM BLNT DISP

## (undated) DEVICE — BASIC SINGLE BASIN 1-LF: Brand: MEDLINE INDUSTRIES, INC.

## (undated) DEVICE — SYRINGE MED 50ML LUERLOCK TIP

## (undated) DEVICE — DECANTER: Brand: UNBRANDED

## (undated) DEVICE — SOLUTION IV 500ML 0.9% SOD CHL PH 5 INJ USP VIAFLX PLAS

## (undated) DEVICE — RETRACTOR KNE PCA

## (undated) DEVICE — PIN DISPNS IV ADD FOR RUB STOPPERED MD VI REUSE MINI-SPIKE

## (undated) DEVICE — KIT DRP FOR RIO ROBOTIC ARM ASST SYS

## (undated) DEVICE — PIN BNE FIX TEMP L110MM DIA4MM MAKO

## (undated) DEVICE — NEEDLE HYPO 25GA L1.5IN BLU POLYPR HUB S STL REG BVL STR

## (undated) DEVICE — Device

## (undated) DEVICE — TRAP SPEC MUCUS FOR SUCT

## (undated) DEVICE — APPLICATOR PREP 26ML 0.7% IOD POVACRYLEX 74% ISO ALC ST

## (undated) DEVICE — GAUZE,SPONGE,4"X4",8PLY,STRL,LF,10/TRAY: Brand: MEDLINE

## (undated) DEVICE — STANDARD HYPODERMIC NEEDLE,POLYPROPYLENE HUB: Brand: MONOJECT

## (undated) DEVICE — SOLUTION IRRIG 500ML 0.9% SOD CHLO USP POUR PLAS BTL

## (undated) DEVICE — TRAY STRYKER MAKO TOTAL KNEE ARRAY

## (undated) DEVICE — DOUBLE  SWIVEL ELBOW 15M - DOUBLE FLIP TOP CAP WITH SEAL - 22M/15F: Brand: DOUBLE  SWIVEL ELBOW 15M - DOUBLE FLIP TOP CAP WITH SEAL - 22M/15F